# Patient Record
Sex: MALE | Race: WHITE | Employment: FULL TIME | URBAN - METROPOLITAN AREA
[De-identification: names, ages, dates, MRNs, and addresses within clinical notes are randomized per-mention and may not be internally consistent; named-entity substitution may affect disease eponyms.]

---

## 2020-10-16 ENCOUNTER — HOSPITAL ENCOUNTER (INPATIENT)
Age: 45
LOS: 18 days | Discharge: NURSING, REHAB OR LONG-TERM CARE | DRG: 969 | End: 2020-11-03
Attending: EMERGENCY MEDICINE | Admitting: INTERNAL MEDICINE
Payer: COMMERCIAL

## 2020-10-16 ENCOUNTER — APPOINTMENT (OUTPATIENT)
Dept: CT IMAGING | Age: 45
DRG: 969 | End: 2020-10-16
Payer: COMMERCIAL

## 2020-10-16 PROBLEM — L02.211 ABDOMINAL WALL ABSCESS: Status: ACTIVE | Noted: 2020-10-16

## 2020-10-16 PROBLEM — K57.92 DIVERTICULITIS: Status: ACTIVE | Noted: 2020-10-16

## 2020-10-16 LAB
ALBUMIN SERPL-MCNC: 2.8 GM/DL (ref 3.4–5)
ALCOHOL SCREEN SERUM: <0.01 %WT/VOL
ALP BLD-CCNC: 127 IU/L (ref 40–129)
ALT SERPL-CCNC: 6 U/L (ref 10–40)
ANION GAP SERPL CALCULATED.3IONS-SCNC: 13 MMOL/L (ref 4–16)
ANISOCYTOSIS: ABNORMAL
AST SERPL-CCNC: 10 IU/L (ref 15–37)
BACTERIA: NEGATIVE /HPF
BANDED NEUTROPHILS ABSOLUTE COUNT: 0.66 K/CU MM
BANDED NEUTROPHILS RELATIVE PERCENT: 2 % (ref 5–11)
BASOPHILS ABSOLUTE: 0.3 K/CU MM
BASOPHILS RELATIVE PERCENT: 1 % (ref 0–1)
BILIRUB SERPL-MCNC: 0.5 MG/DL (ref 0–1)
BILIRUBIN URINE: ABNORMAL MG/DL
BLOOD, URINE: NEGATIVE
BUN BLDV-MCNC: 8 MG/DL (ref 6–23)
CALCIUM SERPL-MCNC: 8.8 MG/DL (ref 8.3–10.6)
CAST TYPE: ABNORMAL /HPF
CHLORIDE BLD-SCNC: 98 MMOL/L (ref 99–110)
CLARITY: ABNORMAL
CO2: 25 MMOL/L (ref 21–32)
COLOR: ABNORMAL
COMMENT UA: ABNORMAL
CREAT SERPL-MCNC: 0.9 MG/DL (ref 0.9–1.3)
CRYSTAL TYPE: ABNORMAL /HPF
DIFFERENTIAL TYPE: ABNORMAL
EPITHELIAL CELLS, UA: ABNORMAL /HPF
GFR AFRICAN AMERICAN: >60 ML/MIN/1.73M2
GFR NON-AFRICAN AMERICAN: >60 ML/MIN/1.73M2
GLUCOSE BLD-MCNC: 117 MG/DL (ref 70–99)
GLUCOSE, URINE: NEGATIVE MG/DL
HCT VFR BLD CALC: 39.3 % (ref 42–52)
HEMOGLOBIN: 12.8 GM/DL (ref 13.5–18)
HYPERSEGMENTED NEUTROPHILS: ABNORMAL
HYPOCHROMIA: ABNORMAL
ICTOTEST: POSITIVE
KETONES, URINE: 80 MG/DL
LACTATE: 1.5 MMOL/L (ref 0.4–2)
LEUKOCYTE ESTERASE, URINE: NEGATIVE
LIPASE: 24 IU/L (ref 13–60)
LYMPHOCYTES ABSOLUTE: 6.2 K/CU MM
LYMPHOCYTES RELATIVE PERCENT: 19 % (ref 24–44)
MCH RBC QN AUTO: 30.3 PG (ref 27–31)
MCHC RBC AUTO-ENTMCNC: 32.6 % (ref 32–36)
MCV RBC AUTO: 92.9 FL (ref 78–100)
MONOCYTES ABSOLUTE: 2 K/CU MM
MONOCYTES RELATIVE PERCENT: 6 % (ref 0–4)
NITRITE URINE, QUANTITATIVE: NEGATIVE
PDW BLD-RTO: 13.1 % (ref 11.7–14.9)
PH, URINE: 5 (ref 5–8)
PLATELET # BLD: 563 K/CU MM (ref 140–440)
PLT MORPHOLOGY: ABNORMAL
PMV BLD AUTO: 9.1 FL (ref 7.5–11.1)
POTASSIUM SERPL-SCNC: 3.8 MMOL/L (ref 3.5–5.1)
PROTEIN UA: 2000 MG/DL
RBC # BLD: 4.23 M/CU MM (ref 4.6–6.2)
RBC URINE: ABNORMAL /HPF (ref 0–3)
SEGMENTED NEUTROPHILS ABSOLUTE COUNT: 23.6 K/CU MM
SEGMENTED NEUTROPHILS RELATIVE PERCENT: 72 % (ref 36–66)
SODIUM BLD-SCNC: 136 MMOL/L (ref 135–145)
SPECIFIC GRAVITY UA: 1.02 (ref 1–1.03)
STOMATOCYTES: ABNORMAL
TOTAL PROTEIN: 7.7 GM/DL (ref 6.4–8.2)
UROBILINOGEN, URINE: 0.2 MG/DL (ref 0.2–1)
WBC # BLD: 32.8 K/CU MM (ref 4–10.5)
WBC UA: ABNORMAL /HPF (ref 0–2)

## 2020-10-16 PROCEDURE — 93010 ELECTROCARDIOGRAM REPORT: CPT | Performed by: INTERNAL MEDICINE

## 2020-10-16 PROCEDURE — 85027 COMPLETE CBC AUTOMATED: CPT

## 2020-10-16 PROCEDURE — 6360000002 HC RX W HCPCS: Performed by: EMERGENCY MEDICINE

## 2020-10-16 PROCEDURE — 74177 CT ABD & PELVIS W/CONTRAST: CPT

## 2020-10-16 PROCEDURE — 6360000002 HC RX W HCPCS: Performed by: INTERNAL MEDICINE

## 2020-10-16 PROCEDURE — 96375 TX/PRO/DX INJ NEW DRUG ADDON: CPT

## 2020-10-16 PROCEDURE — 1200000000 HC SEMI PRIVATE

## 2020-10-16 PROCEDURE — 83605 ASSAY OF LACTIC ACID: CPT

## 2020-10-16 PROCEDURE — C9113 INJ PANTOPRAZOLE SODIUM, VIA: HCPCS | Performed by: EMERGENCY MEDICINE

## 2020-10-16 PROCEDURE — 2580000003 HC RX 258: Performed by: EMERGENCY MEDICINE

## 2020-10-16 PROCEDURE — 83690 ASSAY OF LIPASE: CPT

## 2020-10-16 PROCEDURE — 93005 ELECTROCARDIOGRAM TRACING: CPT | Performed by: EMERGENCY MEDICINE

## 2020-10-16 PROCEDURE — 2580000003 HC RX 258: Performed by: INTERNAL MEDICINE

## 2020-10-16 PROCEDURE — 94761 N-INVAS EAR/PLS OXIMETRY MLT: CPT

## 2020-10-16 PROCEDURE — 96361 HYDRATE IV INFUSION ADD-ON: CPT

## 2020-10-16 PROCEDURE — 87040 BLOOD CULTURE FOR BACTERIA: CPT

## 2020-10-16 PROCEDURE — 96367 TX/PROPH/DG ADDL SEQ IV INF: CPT

## 2020-10-16 PROCEDURE — 81001 URINALYSIS AUTO W/SCOPE: CPT

## 2020-10-16 PROCEDURE — 96376 TX/PRO/DX INJ SAME DRUG ADON: CPT

## 2020-10-16 PROCEDURE — 80053 COMPREHEN METABOLIC PANEL: CPT

## 2020-10-16 PROCEDURE — 2500000003 HC RX 250 WO HCPCS: Performed by: EMERGENCY MEDICINE

## 2020-10-16 PROCEDURE — 2500000003 HC RX 250 WO HCPCS: Performed by: INTERNAL MEDICINE

## 2020-10-16 PROCEDURE — G0480 DRUG TEST DEF 1-7 CLASSES: HCPCS

## 2020-10-16 PROCEDURE — 85007 BL SMEAR W/DIFF WBC COUNT: CPT

## 2020-10-16 PROCEDURE — 99285 EMERGENCY DEPT VISIT HI MDM: CPT

## 2020-10-16 PROCEDURE — 96365 THER/PROPH/DIAG IV INF INIT: CPT

## 2020-10-16 PROCEDURE — 6360000004 HC RX CONTRAST MEDICATION: Performed by: EMERGENCY MEDICINE

## 2020-10-16 RX ORDER — MORPHINE SULFATE 4 MG/ML
4 INJECTION, SOLUTION INTRAMUSCULAR; INTRAVENOUS ONCE
Status: COMPLETED | OUTPATIENT
Start: 2020-10-16 | End: 2020-10-16

## 2020-10-16 RX ORDER — KETOROLAC TROMETHAMINE 30 MG/ML
30 INJECTION, SOLUTION INTRAMUSCULAR; INTRAVENOUS ONCE
Status: COMPLETED | OUTPATIENT
Start: 2020-10-16 | End: 2020-10-16

## 2020-10-16 RX ORDER — SODIUM CHLORIDE 0.9 % (FLUSH) 0.9 %
10 SYRINGE (ML) INJECTION
Status: COMPLETED | OUTPATIENT
Start: 2020-10-16 | End: 2020-10-16

## 2020-10-16 RX ORDER — CIPROFLOXACIN 2 MG/ML
400 INJECTION, SOLUTION INTRAVENOUS ONCE
Status: COMPLETED | OUTPATIENT
Start: 2020-10-16 | End: 2020-10-16

## 2020-10-16 RX ORDER — 0.9 % SODIUM CHLORIDE 0.9 %
1000 INTRAVENOUS SOLUTION INTRAVENOUS ONCE
Status: COMPLETED | OUTPATIENT
Start: 2020-10-16 | End: 2020-10-16

## 2020-10-16 RX ORDER — ACETAMINOPHEN 650 MG/1
650 SUPPOSITORY RECTAL EVERY 6 HOURS PRN
Status: DISCONTINUED | OUTPATIENT
Start: 2020-10-16 | End: 2020-10-28

## 2020-10-16 RX ORDER — SODIUM CHLORIDE 0.9 % (FLUSH) 0.9 %
10 SYRINGE (ML) INJECTION EVERY 12 HOURS SCHEDULED
Status: DISCONTINUED | OUTPATIENT
Start: 2020-10-16 | End: 2020-10-28

## 2020-10-16 RX ORDER — ACETAMINOPHEN 325 MG/1
650 TABLET ORAL EVERY 6 HOURS PRN
Status: DISCONTINUED | OUTPATIENT
Start: 2020-10-16 | End: 2020-11-03 | Stop reason: HOSPADM

## 2020-10-16 RX ORDER — SODIUM CHLORIDE 9 MG/ML
INJECTION, SOLUTION INTRAVENOUS CONTINUOUS
Status: ACTIVE | OUTPATIENT
Start: 2020-10-16 | End: 2020-10-17

## 2020-10-16 RX ORDER — PANTOPRAZOLE SODIUM 40 MG/10ML
40 INJECTION, POWDER, LYOPHILIZED, FOR SOLUTION INTRAVENOUS ONCE
Status: COMPLETED | OUTPATIENT
Start: 2020-10-16 | End: 2020-10-16

## 2020-10-16 RX ORDER — CIPROFLOXACIN 2 MG/ML
400 INJECTION, SOLUTION INTRAVENOUS EVERY 12 HOURS
Status: DISCONTINUED | OUTPATIENT
Start: 2020-10-17 | End: 2020-10-17

## 2020-10-16 RX ORDER — SODIUM CHLORIDE 0.9 % (FLUSH) 0.9 %
10 SYRINGE (ML) INJECTION PRN
Status: DISCONTINUED | OUTPATIENT
Start: 2020-10-16 | End: 2020-10-28

## 2020-10-16 RX ORDER — ONDANSETRON 2 MG/ML
4 INJECTION INTRAMUSCULAR; INTRAVENOUS EVERY 6 HOURS PRN
Status: DISCONTINUED | OUTPATIENT
Start: 2020-10-16 | End: 2020-11-03 | Stop reason: HOSPADM

## 2020-10-16 RX ADMIN — IOPAMIDOL 75 ML: 755 INJECTION, SOLUTION INTRAVENOUS at 12:24

## 2020-10-16 RX ADMIN — CIPROFLOXACIN 400 MG: 2 INJECTION, SOLUTION INTRAVENOUS at 13:18

## 2020-10-16 RX ADMIN — MORPHINE SULFATE 4 MG: 4 INJECTION, SOLUTION INTRAMUSCULAR; INTRAVENOUS at 16:08

## 2020-10-16 RX ADMIN — HYDROMORPHONE HYDROCHLORIDE 1 MG: 1 INJECTION, SOLUTION INTRAMUSCULAR; INTRAVENOUS; SUBCUTANEOUS at 22:33

## 2020-10-16 RX ADMIN — MORPHINE SULFATE 4 MG: 4 INJECTION, SOLUTION INTRAMUSCULAR; INTRAVENOUS at 13:18

## 2020-10-16 RX ADMIN — SODIUM CHLORIDE, PRESERVATIVE FREE 10 ML: 5 INJECTION INTRAVENOUS at 12:24

## 2020-10-16 RX ADMIN — METRONIDAZOLE 500 MG: 500 INJECTION, SOLUTION INTRAVENOUS at 14:32

## 2020-10-16 RX ADMIN — SODIUM CHLORIDE 1000 ML: 9 INJECTION, SOLUTION INTRAVENOUS at 11:20

## 2020-10-16 RX ADMIN — ONDANSETRON 4 MG: 2 INJECTION INTRAMUSCULAR; INTRAVENOUS at 11:22

## 2020-10-16 RX ADMIN — METRONIDAZOLE 500 MG: 500 INJECTION, SOLUTION INTRAVENOUS at 22:33

## 2020-10-16 RX ADMIN — HYDROMORPHONE HYDROCHLORIDE 1 MG: 1 INJECTION, SOLUTION INTRAMUSCULAR; INTRAVENOUS; SUBCUTANEOUS at 18:50

## 2020-10-16 RX ADMIN — ENOXAPARIN SODIUM 40 MG: 40 INJECTION SUBCUTANEOUS at 18:49

## 2020-10-16 RX ADMIN — PANTOPRAZOLE SODIUM 40 MG: 40 INJECTION, POWDER, FOR SOLUTION INTRAVENOUS at 11:21

## 2020-10-16 RX ADMIN — KETOROLAC TROMETHAMINE 30 MG: 30 INJECTION, SOLUTION INTRAMUSCULAR; INTRAVENOUS at 11:21

## 2020-10-16 RX ADMIN — SODIUM CHLORIDE: 9 INJECTION, SOLUTION INTRAVENOUS at 18:47

## 2020-10-16 RX ADMIN — MORPHINE SULFATE 4 MG: 4 INJECTION, SOLUTION INTRAMUSCULAR; INTRAVENOUS at 11:21

## 2020-10-16 ASSESSMENT — PAIN SCALES - GENERAL
PAINLEVEL_OUTOF10: 10
PAINLEVEL_OUTOF10: 9
PAINLEVEL_OUTOF10: 10
PAINLEVEL_OUTOF10: 10
PAINLEVEL_OUTOF10: 8
PAINLEVEL_OUTOF10: 7
PAINLEVEL_OUTOF10: 9
PAINLEVEL_OUTOF10: 10

## 2020-10-16 ASSESSMENT — PAIN DESCRIPTION - LOCATION
LOCATION: ABDOMEN

## 2020-10-16 ASSESSMENT — PAIN DESCRIPTION - FREQUENCY
FREQUENCY: CONTINUOUS
FREQUENCY: CONTINUOUS

## 2020-10-16 ASSESSMENT — PAIN DESCRIPTION - ORIENTATION
ORIENTATION: RIGHT;LEFT
ORIENTATION: RIGHT;LEFT

## 2020-10-16 ASSESSMENT — PAIN DESCRIPTION - DESCRIPTORS
DESCRIPTORS: ACHING;DISCOMFORT
DESCRIPTORS: ACHING

## 2020-10-16 ASSESSMENT — PAIN DESCRIPTION - PROGRESSION: CLINICAL_PROGRESSION: NOT CHANGED

## 2020-10-16 ASSESSMENT — PAIN DESCRIPTION - ONSET: ONSET: ON-GOING

## 2020-10-16 ASSESSMENT — PAIN DESCRIPTION - PAIN TYPE
TYPE: ACUTE PAIN

## 2020-10-16 ASSESSMENT — PAIN - FUNCTIONAL ASSESSMENT: PAIN_FUNCTIONAL_ASSESSMENT: ACTIVITIES ARE NOT PREVENTED

## 2020-10-16 NOTE — PROGRESS NOTES
Was called about this patient by Dr. Rachel Zhu. Pt with previous colon surgery. Asked ED at that time who performed that surgery and was uncertain. When I went to see pt this evening, he stated, \"Dr. Anselmo Moore is my surgeon. \" D/w pt that the other surgery group would see him. All of his questions were answered to his satisfaction. Will update Hospitalist and other surgery group.     [address] [place] [address]

## 2020-10-16 NOTE — H&P
History and Physical      Name:  Ar Cho /Age/Sex: 1975  (39 y.o. male)   MRN & CSN:  [id number] & [id number] Admission Date/Time: 10/16/2020 11:04 AM   Location:  [address] [place] [address]- PCP: Matilde Espinoza, RN, BSN, MSN, [facility]       Hospital Day: 1    Assessment and Plan:   Ar Cho is a 39 y.o.  male  who presents with abdominal pain. · Sepsis due to possible diverticulitis with abscess  -SIRS criteria, tachycardia, leukocytosis plus source  -CT abdomen pelvis with colitis, abscess, pancreatic ductal dilatation    Plan  -Admit to [facility] [place] [place]  -Blood cultures collected pending  -Start on IV hydration, antiemetics, Dilaudid for pain  -Cont on ciprofloxacin and metronidazole, has allergy to penicillins(anaphylactic)  -Consult general surgery    · Proteinuria with hypoalbuminemia, may be related to HIV/medication related.   -Seen on urinalysis, order protein creatinine ratio    · Chronic medical conditions  -History of hypertension, has not been compliant for over a year, resume metoprolol, adjust as needed  -History of diverticulitis status post bowel resection in   -History of HIV, last 17 years ago, on antiretrovirals  -History of alcohol use disorder, last use about 5 years ago  Diet DIET GENERAL;   DVT Prophylaxis [x] Lovenox, []  Heparin, [] SCDs, [] No VTE prophylaxis, patient ambulating   GI Prophylaxis [] PPI, [] H2 Blocker, [] No GI prophylaxis, patient is receiving diet/Tube Feeds   Code Status Full Code   Disposition Patient requires continued admission due to sepsis   MDM [] Low, [x] Moderate,[]  High  Patient's risk as above due to      History of Present Illness:     Chief Complaint: Left lower quadrant abdominal pain    Ar Cho is a 39 y.o.  male  who presents with left lower quadrant abdominal pain, which patient notes started about 2 weeks ago, but has gotten worse in the past 2 to 3 days, pain is described as 10/10 in severity, sharp intermittent, no known relieving or exacerbating factors. Pain was associated with nausea, denies any vomiting, denies any diarrhea or constipation, denies any fever or chills. He admits to loss of appetite, due to abdominal pain. he has no complaints of chest pain, shortness of breath, urinary symptoms. 10-14 point ROS reviewed negative, unless as noted above    Objective:   No intake or output data in the 24 hours ending 10/16/20 1828   Vitals:   Vitals:    10/16/20 1800   BP: (!) 169/96   Pulse: 132   Resp: 16   Temp: 99.8 °F (37.7 °C)   SpO2: 96%     Physical Exam:   GEN Awake male, sitting upright in bed in mild  distress. Appears given age. EYES Pupils are equally round. No scleral erythema, discharge, or conjunctivitis. HENT Mucous membranes are moist.   NECK No apparent thyromegaly or masses. RESP Clear to auscultation, no wheezes, rales or rhonchi. Symmetric chest movement while on room air. CARDIO/VASC S1/S2 auscultated. Regular rate without appreciable murmurs, rubs, or gallops. Peripheral pulses equal bilaterally and palpable. No peripheral edema. GI tenderness in the left lower quadrant and suprapubic area, no rebound tenderness or rigidity. bowel sounds are normoactive. Rectal exam deferred.  Lackey catheter is not present. HEME/LYMPH No petechiae or ecchymoses. MSK No gross joint deformities. Spontaneous movement of all extremities  SKIN Normal coloration, warm, dry. NEURO Cranial nerves appear grossly intact, normal speech, no lateralizing weakness. PSYCH Awake, alert, oriented x 4. Affect appropriate. Past Medical History:      Past Medical History:   Diagnosis Date    Alcoholic ([facility] [address] [facility]     Diverticulitis     HIV positive ([facility] [address] [facility]     Hypertension     Pancreatitis      PSHX:  has a past surgical history that includes hernia repair and Small intestine surgery. Allergies:    Allergies   Allergen Reactions    Pcn [Penicillins]        FAM HX: Patient notes mother  at the age of 50, of pancreatitis, alcohol use, father is healthy, recovering alcoholic, no family history of malignancy  Soc HX: History of alcohol use disorder, stopped about 5 years ago, smokes a pack and a half of cigarettes a day since the age of 16 years, occasional marijuana denies any other recreational drug use  Social History     Socioeconomic History    Marital status: Single     Spouse name: None    Number of children: 0    Years of education: None    Highest education level: None   Occupational History    None   Social Needs    Financial resource strain: None    Food insecurity     Worry: None     Inability: None    Transportation needs     Medical: None     Non-medical: None   Tobacco Use    Smoking status: Current Every Day Smoker     Packs/day: 2.00     Years: 18.00     Pack years: 36.00     Types: Cigarettes    Smokeless tobacco: Never Used   Substance and Sexual Activity    Alcohol use: Not Currently    Drug use: Yes     Types: Marijuana    Sexual activity: None   Lifestyle    Physical activity     Days per week: None     Minutes per session: None    Stress: None   Relationships    Social connections     Talks on phone: None     Gets together: None     Attends Zoroastrianism service: None     Active member of club or organization: None     Attends meetings of clubs or organizations: None     Relationship status: None    Intimate partner violence     Fear of current or ex partner: None     Emotionally abused: None     Physically abused: None     Forced sexual activity: None   Other Topics Concern    None   Social History Narrative    None       Medications:   Medications:    sodium chloride flush  10 mL Intravenous 2 times per day    enoxaparin  40 mg Subcutaneous Daily    [START ON 10/17/2020] ciprofloxacin  400 mg Intravenous Q12H    metroNIDAZOLE  500 mg Intravenous Q8H      Infusions:    sodium chloride       PRN Meds: ondansetron, 4 mg, Q6H PRN  sodium chloride flush, 10 mL, PRN  acetaminophen, 650 mg, Q6H PRN    Or  acetaminophen, 650 mg, Q6H PRN  HYDROmorphone, 1 mg, Q4H PRN      EKG reviewed, sinus tachycardia otherwise normal    Images  Ct Abdomen Pelvis W Iv Contrast Additional Contrast? None    Result Date: 10/16/2020  EXAMINATION: CT OF THE ABDOMEN AND PELVIS WITH CONTRAST 10/16/2020 11:54 am TECHNIQUE: CT of the abdomen and pelvis was performed with the administration of intravenous contrast. Multiplanar reformatted images are provided for review. Dose modulation, iterative reconstruction, and/or weight based adjustment of the mA/kV was utilized to reduce the radiation dose to as low as reasonably achievable. COMPARISON: 01/08/2016 HISTORY: ORDERING SYSTEM PROVIDED HISTORY: left sided abd pain TECHNOLOGIST PROVIDED HISTORY: Reason for exam:->left sided abd pain Additional Contrast?->None Reason for Exam: left sided abd pain Acuity: Acute Type of Exam: Initial Additional signs and symptoms: LLQ pain, nausea, vomiting, constipation, diarrhea x 2 weeks Relevant Medical/Surgical History: Hx Diverticulitis, Pancreatitis, Hernia repair, Small bowel surg / 75cc [id number] FINDINGS: Lower Chest: The lung bases are clear. Organs: The spleen, adrenal glands and kidneys are unremarkable. There are right simple renal cysts. There is improved with residual mild-to-moderate intrahepatic ductal dilatation without evidence of a radiopaque obstructing nephrolithiasis. Coarse calcifications are present within the pancreatic head, likely sequela pancreatitis. There is worsening moderate pancreatic ductal dilatation, likely from stenosis from chronic pancreatitis. GI/Bowel: There is no bowel obstruction. Status post ileocecectomy. However, there is a large complex air-fluid level in close association to the descending colon measuring 6.3 x 6.6 cm in maximal dimension consistent with an abscess. Scattered diverticular present along the descending colon.  There is moderate to severe continuous circumferential wall thickening of the descending colon with moderate pericolonic inflammation likely due to colitis. Pelvis: There is mild to moderate perivesicular inflammation due to secondary cystitis. Peritoneum/Retroperitoneum: There is no free fluid. Reactive mesenteric lymph nodes are present in the left lower quadrant. An index lymph node measures 1.1 x 1.8 cm. There is no pneumoperitoneum. There are unchanged small fat containing supraumbilical hernias, likely incisional.  Mild atherosclerosis involves the abdominal aorta. Bones/Soft Tissues: Degenerative changes involve the thoracolumbar spine. 1. Multi-septated complex fluid collection within the left mid abdomen along the course of the descending colon consistent with an abscess. This is likely caused from colitis causing secondary cystitis, but cannot exclude underlying malignancy; correlate with colonoscopy once the acute symptoms have resolved. 2. Improved with residual mild-to-moderate intrahepatic, but worsening moderate pancreatic ductal dilatation likely from stenosis of chronic pancreatitis.          Electronically signed by Cari Melvin MD on 10/16/2020 at 6:28 PM

## 2020-10-16 NOTE — ED NOTES
Pt resting lights dimmed. Gave lemon swabs to ease mouth dryness.  Call light in reach     Tala Vázquez RN  10/16/20 [id number]

## 2020-10-16 NOTE — ED NOTES
Report given to medics by Dr Dank Peterson, RN  10/16/20 195 [facility], RN  10/16/20 6712

## 2020-10-16 NOTE — ED NOTES
Contacted the access center to begin the transfer process.  I have asked to speak to general surgery and then the hospitalist.      Adela Manzano RN  10/16/20 [id number]

## 2020-10-16 NOTE — ED NOTES
The patient presents to the er today with complaints of abdominal pain for the past 2 weeks. He reports that he has history of Diverticulitis and Pancreatis. He reports some intermittent vomiting and diarrhea in the past 2 weeks but none today. He is alert and oriented and rated his pain initially at 10/10. He has someone to drive him home.                        David Diaz RN  10/16/20 1847

## 2020-10-16 NOTE — ED NOTES
The patient was updated on the room assignment. He ambulated to the bathroom and  Back.                 Marikay Meigs, RN  10/16/20 8401

## 2020-10-16 NOTE — ED NOTES
Report was called to Angie Jones at [facility] and report was given to the ambulance crew. The patient was medicated with 4  Mg of Morphine just prior to departure from this ED. He is alert and oriented on departure and has stable vital signs.                       Brennon Lemus RN  10/16/20 9638

## 2020-10-16 NOTE — ED NOTES
Lab called with a critical protein level greater than 2000. Dr. Rachel Zhu was notified.                          Estevan Bertrand RN  10/16/20 1404

## 2020-10-16 NOTE — ED PROVIDER NOTES
Triage Chief Complaint:   Abdominal Pain (reports abd pain for 2 weeks)      Elem:  Marylin Nj is a 39 y.o. male that presents to the emergency department with left lower abdominal pain for the last 2 weeks. States he is having difficulty urinating. States has had diverticulitis before in the past and it feels similar. He is noted to have a history of alcoholism, HIV positive on antiviral therapy, pancreatitis in the past. Patient states he no longer drinks heavily as he once did, sober over 3 years. Denies any chest pain or pressure. Denies any shortness of breath or coughing. denies any fevers or chills. States pain is a 10 out of 10 aching, constant. Nothing makes it better or worse. States he has not eaten much so there is nothing to vomit. Denies any diarrhea or constipation. .    Past Medical History:   Diagnosis Date    Alcoholic ([facility]     Diverticulitis     HIV positive ([facility] 2003    Hypertension     Pancreatitis      Past Surgical History:   Procedure Laterality Date   Berta Arredondo     History reviewed. No pertinent family history.   Social History     Socioeconomic History    Marital status: Single     Spouse name: Not on file    Number of children: 0    Years of education: Not on file    Highest education level: Not on file   Occupational History    Not on file   Social Needs    Financial resource strain: Not on file    Food insecurity     Worry: Not on file     Inability: Not on file    Transportation needs     Medical: Not on file     Non-medical: Not on file   Tobacco Use    Smoking status: Current Every Day Smoker     Packs/day: 2.00     Years: 18.00     Pack years: 36.00     Types: Cigarettes    Smokeless tobacco: Never Used   Substance and Sexual Activity    Alcohol use: Not Currently    Drug use: Yes     Types: Marijuana    Sexual activity: Not on file   Lifestyle    Physical activity     Days per week: Not on file Minutes per session: Not on file    Stress: Not on file   Relationships    Social connections     Talks on phone: Not on file     Gets together: Not on file     Attends [employer] service: Not on file     Active member of club or organization: Not on file     Attends meetings of clubs or organizations: Not on file     Relationship status: Not on file    Intimate partner violence     Fear of current or ex partner: Not on file     Emotionally abused: Not on file     Physically abused: Not on file     Forced sexual activity: Not on file   Other Topics Concern    Not on file   Social History Narrative    Not on file     Current Facility-Administered Medications   Medication Dose Route Frequency Provider Last Rate Last Dose    ondansetron (ZOFRAN) injection 4 mg  4 mg Intravenous Q6H PRN Jose Llamas MD   4 mg at 10/16/20 1122    ciprofloxacin (CIPRO) IVPB 400 mg  400 mg Intravenous Once Jose Llamas  mL/hr at 10/16/20 1318 400 mg at 10/16/20 1318    metronidazole (FLAGYL) 500 mg in NaCl 100 mL IVPB premix  500 mg Intravenous Once Jose Llamas MD         Current Outpatient Medications   Medication Sig Dispense Refill    Abacavir-Dolutegravir-Lamivud 600- MG TABS Take by mouth      efavirenz (SUSTIVA) 600 MG tablet Take 600 mg by mouth nightly. Allergies   Allergen Reactions    Pcn [Penicillins]      Nursing Notes Reviewed    ROS:  At least 10 systems reviewed and otherwise negative except as in the [address] Physical Exam:  ED Triage Vitals [10/16/20 1107]   Enc Vitals Group      BP (!) 152/104      Pulse 120      Resp 18      Temp 97.8 °F (36.6 °C)      Temp Source Infrared      SpO2 99 %      Weight 175 lb (79.4 kg)      Height 6' 11\" (2.108 m)      Head Circumference       Peak Flow       Pain Score       Pain Loc       Pain Edu? Excl. in [address]? My pulse oximetry interpretation is which is within the normal range    GENERAL APPEARANCE: Awake and alert. Cooperative. Anxious and uncomfortable appearing. HEAD: Normocephalic. Atraumatic. EYES: EOM's grossly intact. Sclera anicteric. ENT: Mucous membranes are moist. Tolerates saliva. No trismus. NECK: Supple. No meningismus. Trachea midline. HEART: Sinus tachycardia to the 120s. . Radial pulses 2+. LUNGS: Respirations unlabored. CTAB  ABDOMEN: Soft. Left lower quadrant tenderness. . No guarding or rebound. EXTREMITIES: No acute deformities. No edema. SKIN: Warm and dry. NEUROLOGICAL: No gross facial drooping. Moves all 4 extremities spontaneously. PSYCHIATRIC: Normal mood.     I have reviewed and interpreted all of the currently available lab results from this visit (if applicable):  Results for orders placed or performed during the hospital encounter of 10/16/20   CBC with Auto Diff   Result Value Ref Range    WBC 32.8 (HH) 4.0 - 10.5 K/CU MM    RBC 4.23 (L) 4.6 - 6.2 M/CU MM    Hemoglobin 12.8 (L) 13.5 - 18.0 GM/DL    Hematocrit 39.3 (L) 42 - 52 %    MCV 92.9 78 - 100 FL    MCH 30.3 27 - 31 PG    MCHC 32.6 32.0 - 36.0 %    RDW 13.1 11.7 - 14.9 %    Platelets 959 (H) 961 - 440 K/CU MM    MPV 9.1 7.5 - 11.1 FL    Bands Relative 2 (L) 5 - 11 %    Segs Relative 72.0 (H) 36 - 66 %    Basophils % 1.0 0 - 1 %    Lymphocytes % 19.0 (L) 24 - 44 %    Monocytes % 6.0 (H) 0 - 4 %    Bands Absolute 0.66 K/CU MM    Segs Absolute 23.6 K/CU MM    Basophils Absolute 0.3 K/CU MM    Lymphocytes Absolute 6.2 K/CU MM    Monocytes Absolute 2.0 K/CU MM    Differential Type MANUAL DIFFERENTIAL     Anisocytosis 1+     Hypochromia 1+     Stomatocytes 1+     Hypersegmented Neutrophils 1+     PLT Morphology LARGE    CMP   Result Value Ref Range    Sodium 136 135 - 145 MMOL/L    Potassium 3.8 3.5 - 5.1 MMOL/L    Chloride 98 (L) 99 - 110 mMol/L    CO2 25 21 - 32 MMOL/L    BUN 8 6 - 23 MG/DL    CREATININE 0.9 0.9 - 1.3 MG/DL    Glucose 117 (H) 70 - 99 MG/DL    Calcium 8.8 8.3 - 10.6 MG/DL    Alb 2.8 (L) 3.4 - 5.0 GM/DL    Total Protein 7.7 6.4 - 8.2 GM/DL    Total Bilirubin 0.5 0.0 - 1.0 MG/DL    ALT 6 (L) 10 - 40 U/L    AST 10 (L) 15 - 37 IU/L    Alkaline Phosphatase 127 40 - 129 IU/L    GFR Non-African American >60 >60 mL/min/1.73m2    GFR African American >60 >60 mL/min/1.73m2    Anion Gap 13 4 - 16   Lipase   Result Value Ref Range    Lipase 24 13 - 60 IU/L   Urinalysis with microscopic   Result Value Ref Range    Color, UA DARK YELLOW (A) YELLOW    Clarity, UA SLIGHTLY CLOUDY (A) CLEAR    Glucose, Urine NEGATIVE NEGATIVE MG/DL    Bilirubin Urine SMALL (A) NEGATIVE MG/DL    Ketones, Urine 80 (A) NEGATIVE MG/DL    Specific Gravity, UA 1.020 1.001 - 1.035    Blood, Urine NEGATIVE NEGATIVE    pH, Urine 5.0 5.0 - 8.0    Protein, UA 2000 (HH) NEGATIVE MG/DL    Urobilinogen, Urine 0.2 0.2 - 1.0 MG/DL    Nitrite Urine, Quantitative NEGATIVE NEGATIVE    Leukocyte Esterase, Urine NEGATIVE NEGATIVE    RBC, UA NO CELLS SEEN 0 - 3 /HPF    WBC, UA RARE 0 - 2 /HPF    Epithelial Cells, UA FEW /HPF    Cast Type NO CAST FORMS SEEN NO CAST FORMS SEEN /HPF    Bacteria, UA NEGATIVE NEGATIVE /HPF    Crystal Type NO CELLS SEEN (A) NEGATIVE /HPF    Urinalysis Comments 4+ MUCAS OBSERVED    Lactic Acid, Plasma   Result Value Ref Range    Lactate 1.5 0.4 - 2.0 mMOL/L   ICTOTEST, URINE   Result Value Ref Range    Ictotest POSITIVE    Ethanol Level   Result Value Ref Range    Alcohol Scrn <0.01 <0.01 %WT/VOL   EKG 12 Lead   Result Value Ref Range    Ventricular Rate 111 BPM    Atrial Rate 111 BPM    P-R Interval 118 ms    QRS Duration 88 ms    Q-T Interval 354 ms    QTc Calculation (Bazett) 481 ms    P Axis 29 degrees    R Axis 33 degrees    T Axis 44 degrees    Diagnosis       Sinus tachycardia  Otherwise normal ECG  No previous ECGs available          Radiographs:  [] Radiologist's Wet Read Report Reviewed:      CT ABDOMEN PELVIS W IV CONTRAST Additional Contrast? None (Final result)   Result time 10/16/20 12:51:01   Final result by Kiko Valdez MD (10/16/20 12:51:01) Impression:     1. Multi-septated complex fluid collection within the left mid abdomen along   the course of the descending colon consistent with an abscess.  This is   likely caused from colitis causing secondary cystitis, but cannot exclude   underlying malignancy; correlate with colonoscopy once the acute symptoms   have resolved. 2. Improved with residual mild-to-moderate intrahepatic, but worsening   moderate pancreatic ductal dilatation likely from stenosis of chronic   pancreatitis. Narrative:     EXAMINATION:   CT OF THE ABDOMEN AND PELVIS WITH CONTRAST 10/16/2020 11:54 am     TECHNIQUE:   CT of the abdomen and pelvis was performed with the administration of   intravenous contrast. Multiplanar reformatted images are provided for review. Dose modulation, iterative reconstruction, and/or weight based adjustment of   the mA/kV was utilized to reduce the radiation dose to as low as reasonably   achievable. COMPARISON:   01/08/2016     HISTORY:   ORDERING SYSTEM PROVIDED HISTORY: left sided abd pain   TECHNOLOGIST PROVIDED HISTORY:   Reason for exam:->left sided abd pain   Additional Contrast?->None   Reason for Exam: left sided abd pain   Acuity: Acute   Type of Exam: Initial   Additional signs and symptoms: LLQ pain, nausea, vomiting, constipation,   diarrhea x 2 weeks   Relevant Medical/Surgical History: Hx Diverticulitis, Pancreatitis, Hernia   repair, Small bowel surg / 75cc [id number]     FINDINGS:   Lower Chest: The lung bases are clear. Organs: The spleen, adrenal glands and kidneys are unremarkable.  There are   right simple renal cysts. There is improved with residual mild-to-moderate intrahepatic ductal   dilatation without evidence of a radiopaque obstructing nephrolithiasis.    Coarse calcifications are present within the pancreatic head, likely sequela   pancreatitis. Moshe Emilee is worsening moderate pancreatic ductal dilatation,   likely from stenosis from chronic pancreatitis. GI/Bowel: There is no bowel obstruction.  Status post ileocecectomy. However, there is a large complex air-fluid level in close association to the   descending colon measuring 6.3 x 6.6 cm in maximal dimension consistent with   an abscess.  Scattered diverticular present along the descending colon. There is moderate to severe continuous circumferential wall thickening of the   descending colon with moderate pericolonic inflammation likely due to colitis. Pelvis: There is mild to moderate perivesicular inflammation due to secondary   cystitis. Peritoneum/Retroperitoneum: There is no free fluid.  Reactive mesenteric   lymph nodes are present in the left lower quadrant.  An index lymph node   measures 1.1 x 1.8 cm.  There is no pneumoperitoneum. There are unchanged small fat containing supraumbilical hernias, likely   incisional.  Mild atherosclerosis involves the abdominal aorta. Bones/Soft Tissues: Degenerative changes involve the thoracolumbar spine.                        [] Discussed with Radiologist:     [] The following radiograph was interpreted by myself in the absence of a radiologist:     EKG: (All EKG's are interpreted by myself in the absence of a cardiologist)  The Ekg interpreted by me shows  sinus tachycardia, fmbg=218 with a rate of 111  Axis is   Normal  QTc is  normal  Intervals and Durations are unremarkable. ST Segments: no acute change  No significant change from prior EKG dated 11-4-2012          MDM:  And is mildly hypertensive at 152/104. Afebrile. Tachycardic in the 120s. Sats normal.  Started on IV fluids, Zofran, Toradol, morphine, IV Protonix. EKG shows sinus tachycardia without any acute changes. CBC shows leukocytosis of 32.8. Hemoglobin of 12.8. CMP shows a glucose of 117. Normal sodium, normal potassium. Normal CO2. Normal anion gap. . Lipase normal at 24. . Lactic acid normal at 1.5..  Urinalysis shows dark yellow color, small bilirubin, 80 ketones, 2000 protein level and urine. Negative nitrites, negative leuks, no RBCs, rare WBCs with negative for bacteria. Patient's kidney function is normal despite very high level of protein in urine. . CT abdomen shows multi septated complex fluid collection in the left mid abdomen along the course of descending colon consistent with an abscess. Because term colitis causing secondary cystitis. Improved with residual mild to moderate intrahepatic but worsening moderate pancreatic ductal dilation stemming from stenosis of chronic pancreatitis. Did discuss results with patient. He states he would like to be transferred to [place] [address]. He states he did have a partial bowel resection over 13 years ago at the [address].  States he does not recall the surgeon's name nor does he have a general surgeon that he follows with. Per chart review he has seen Dr. Elizabeth Telles of GI before in the past.  Will call and speak with general surgeon on call at [place] [address] for consult to work on getting patient placed at [facility] for further evaluation and treatment. Patient does voiced understanding and agree to plan. Did start him on Cipro and Flagyl IV. Did speak with Dr. Stiven Aguirre who is on-call for general surgery. Did state that interventional radiology should be the ones to place a drain instead of having an open procedure and that infectious disease will likely be needed. He did state that he would consult on patient as needed if hospitalist agreed to acceptance. Did discuss with hospitalist and need for IR to place a drain as well as possible infectious disease consult as they feel necessary. Patient is currently stable, receiving IV antibiotics, pain free. Afebrile. They are comfortable accepting patient in transfer at this time. Clinical Impression:  1. Proteinuria, unspecified type    2. Diverticulitis of large intestine with abscess without bleeding    3.  Leukocytosis, unspecified type        Disposition Vitals:  [unfilled], [unfilled], [unfilled], [unfilled]    Disposition referral (if applicable):  No follow-up provider specified.     Disposition medications (if applicable):  New Prescriptions    No medications on file         (Please note that portions of this note may have been completed with a voice recognition program. Efforts were made to edit the dictations but occasionally words are mis-transcribed.)    MD Rodriguez Woodruff MD  10/16/20 5253

## 2020-10-17 ENCOUNTER — APPOINTMENT (OUTPATIENT)
Dept: CT IMAGING | Age: 45
DRG: 969 | End: 2020-10-17
Payer: COMMERCIAL

## 2020-10-17 LAB
ALBUMIN SERPL-MCNC: 2.7 GM/DL (ref 3.4–5)
ALP BLD-CCNC: 110 IU/L (ref 40–128)
ALT SERPL-CCNC: 7 U/L (ref 10–40)
ANION GAP SERPL CALCULATED.3IONS-SCNC: 11 MMOL/L (ref 4–16)
AST SERPL-CCNC: 9 IU/L (ref 15–37)
BANDED NEUTROPHILS ABSOLUTE COUNT: 2.56 K/CU MM
BANDED NEUTROPHILS RELATIVE PERCENT: 9 % (ref 5–11)
BILIRUB SERPL-MCNC: 0.5 MG/DL (ref 0–1)
BUN BLDV-MCNC: 6 MG/DL (ref 6–23)
CALCIUM SERPL-MCNC: 7.9 MG/DL (ref 8.3–10.6)
CHLORIDE BLD-SCNC: 97 MMOL/L (ref 99–110)
CO2: 22 MMOL/L (ref 21–32)
CREAT SERPL-MCNC: 0.8 MG/DL (ref 0.9–1.3)
DIFFERENTIAL TYPE: ABNORMAL
GFR AFRICAN AMERICAN: >60 ML/MIN/1.73M2
GFR NON-AFRICAN AMERICAN: >60 ML/MIN/1.73M2
GLUCOSE BLD-MCNC: 81 MG/DL (ref 70–99)
HCT VFR BLD CALC: 35.1 % (ref 42–52)
HEMOGLOBIN: 11.2 GM/DL (ref 13.5–18)
INR BLD: 1.36 INDEX
LYMPHOCYTES ABSOLUTE: 2.6 K/CU MM
LYMPHOCYTES RELATIVE PERCENT: 9 % (ref 24–44)
MCH RBC QN AUTO: 30.9 PG (ref 27–31)
MCHC RBC AUTO-ENTMCNC: 31.9 % (ref 32–36)
MCV RBC AUTO: 97 FL (ref 78–100)
MONOCYTES ABSOLUTE: 1.7 K/CU MM
MONOCYTES RELATIVE PERCENT: 6 % (ref 0–4)
PDW BLD-RTO: 13.2 % (ref 11.7–14.9)
PLATELET # BLD: 482 K/CU MM (ref 140–440)
PMV BLD AUTO: 9.2 FL (ref 7.5–11.1)
POLYCHROMASIA: ABNORMAL
POTASSIUM SERPL-SCNC: 4.1 MMOL/L (ref 3.5–5.1)
PROTHROMBIN TIME: 16.5 SECONDS (ref 11.7–14.5)
RBC # BLD: 3.62 M/CU MM (ref 4.6–6.2)
SEGMENTED NEUTROPHILS ABSOLUTE COUNT: 21.5 K/CU MM
SEGMENTED NEUTROPHILS RELATIVE PERCENT: 76 % (ref 36–66)
SODIUM BLD-SCNC: 130 MMOL/L (ref 135–145)
TOTAL PROTEIN: 6.1 GM/DL (ref 6.4–8.2)
TOXIC GRANULATION: PRESENT
WBC # BLD: 28.4 K/CU MM (ref 4–10.5)

## 2020-10-17 PROCEDURE — 80053 COMPREHEN METABOLIC PANEL: CPT

## 2020-10-17 PROCEDURE — 0W9F0ZZ DRAINAGE OF ABDOMINAL WALL, OPEN APPROACH: ICD-10-PCS | Performed by: RADIOLOGY

## 2020-10-17 PROCEDURE — 85007 BL SMEAR W/DIFF WBC COUNT: CPT

## 2020-10-17 PROCEDURE — 6370000000 HC RX 637 (ALT 250 FOR IP): Performed by: INTERNAL MEDICINE

## 2020-10-17 PROCEDURE — 94761 N-INVAS EAR/PLS OXIMETRY MLT: CPT

## 2020-10-17 PROCEDURE — 2500000003 HC RX 250 WO HCPCS: Performed by: INTERNAL MEDICINE

## 2020-10-17 PROCEDURE — C1769 GUIDE WIRE: HCPCS

## 2020-10-17 PROCEDURE — 87077 CULTURE AEROBIC IDENTIFY: CPT

## 2020-10-17 PROCEDURE — 87075 CULTR BACTERIA EXCEPT BLOOD: CPT

## 2020-10-17 PROCEDURE — 2580000003 HC RX 258: Performed by: INTERNAL MEDICINE

## 2020-10-17 PROCEDURE — 87186 SC STD MICRODIL/AGAR DIL: CPT

## 2020-10-17 PROCEDURE — 6360000002 HC RX W HCPCS: Performed by: INTERNAL MEDICINE

## 2020-10-17 PROCEDURE — 85027 COMPLETE CBC AUTOMATED: CPT

## 2020-10-17 PROCEDURE — C9113 INJ PANTOPRAZOLE SODIUM, VIA: HCPCS | Performed by: INTERNAL MEDICINE

## 2020-10-17 PROCEDURE — 6360000002 HC RX W HCPCS: Performed by: RADIOLOGY

## 2020-10-17 PROCEDURE — 87205 SMEAR GRAM STAIN: CPT

## 2020-10-17 PROCEDURE — 87076 CULTURE ANAEROBE IDENT EACH: CPT

## 2020-10-17 PROCEDURE — 1200000000 HC SEMI PRIVATE

## 2020-10-17 PROCEDURE — 87070 CULTURE OTHR SPECIMN AEROBIC: CPT

## 2020-10-17 PROCEDURE — 2580000003 HC RX 258: Performed by: PHYSICIAN ASSISTANT

## 2020-10-17 PROCEDURE — 85610 PROTHROMBIN TIME: CPT

## 2020-10-17 PROCEDURE — 36415 COLL VENOUS BLD VENIPUNCTURE: CPT

## 2020-10-17 PROCEDURE — 6360000002 HC RX W HCPCS: Performed by: SURGERY

## 2020-10-17 RX ORDER — SODIUM CHLORIDE 0.9 % (FLUSH) 0.9 %
10 SYRINGE (ML) INJECTION 2 TIMES DAILY
Status: DISCONTINUED | OUTPATIENT
Start: 2020-10-17 | End: 2020-10-28

## 2020-10-17 RX ORDER — 0.9 % SODIUM CHLORIDE 0.9 %
1000 INTRAVENOUS SOLUTION INTRAVENOUS ONCE
Status: COMPLETED | OUTPATIENT
Start: 2020-10-17 | End: 2020-10-17

## 2020-10-17 RX ORDER — MIDAZOLAM HYDROCHLORIDE 1 MG/ML
1 INJECTION INTRAMUSCULAR; INTRAVENOUS ONCE
Status: COMPLETED | OUTPATIENT
Start: 2020-10-17 | End: 2020-10-17

## 2020-10-17 RX ORDER — PANTOPRAZOLE SODIUM 40 MG/10ML
40 INJECTION, POWDER, LYOPHILIZED, FOR SOLUTION INTRAVENOUS DAILY
Status: DISCONTINUED | OUTPATIENT
Start: 2020-10-17 | End: 2020-10-23

## 2020-10-17 RX ORDER — EPINEPHRINE 1 MG/ML
0.3 INJECTION, SOLUTION, CONCENTRATE INTRAVENOUS
Status: ACTIVE | OUTPATIENT
Start: 2020-10-17 | End: 2020-10-17

## 2020-10-17 RX ORDER — DIPHENHYDRAMINE HYDROCHLORIDE 50 MG/ML
50 INJECTION INTRAMUSCULAR; INTRAVENOUS
Status: DISPENSED | OUTPATIENT
Start: 2020-10-17 | End: 2020-10-17

## 2020-10-17 RX ORDER — METHYLPREDNISOLONE SODIUM SUCCINATE 40 MG/ML
40 INJECTION, POWDER, LYOPHILIZED, FOR SOLUTION INTRAMUSCULAR; INTRAVENOUS
Status: ACTIVE | OUTPATIENT
Start: 2020-10-17 | End: 2020-10-17

## 2020-10-17 RX ORDER — FENTANYL CITRATE 50 UG/ML
100 INJECTION, SOLUTION INTRAMUSCULAR; INTRAVENOUS ONCE
Status: COMPLETED | OUTPATIENT
Start: 2020-10-17 | End: 2020-10-17

## 2020-10-17 RX ORDER — MORPHINE SULFATE 2 MG/ML
1 INJECTION, SOLUTION INTRAMUSCULAR; INTRAVENOUS
Status: DISCONTINUED | OUTPATIENT
Start: 2020-10-17 | End: 2020-10-24

## 2020-10-17 RX ORDER — HYDROMORPHONE HCL IN WATER/PF 6 MG/30 ML
PATIENT CONTROLLED ANALGESIA SYRINGE INTRAVENOUS CONTINUOUS
Status: DISCONTINUED | OUTPATIENT
Start: 2020-10-17 | End: 2020-10-21

## 2020-10-17 RX ORDER — SODIUM CHLORIDE 9 MG/ML
INJECTION, SOLUTION INTRAVENOUS CONTINUOUS
Status: DISCONTINUED | OUTPATIENT
Start: 2020-10-18 | End: 2020-10-24

## 2020-10-17 RX ORDER — KETOROLAC TROMETHAMINE 30 MG/ML
15 INJECTION, SOLUTION INTRAMUSCULAR; INTRAVENOUS EVERY 6 HOURS PRN
Status: ACTIVE | OUTPATIENT
Start: 2020-10-17 | End: 2020-10-22

## 2020-10-17 RX ORDER — EPINEPHRINE 1 MG/ML
0.3 INJECTION, SOLUTION, CONCENTRATE INTRAVENOUS
Status: DISCONTINUED | OUTPATIENT
Start: 2020-10-17 | End: 2020-10-17 | Stop reason: CLARIF

## 2020-10-17 RX ORDER — METOPROLOL TARTRATE 50 MG/1
100 TABLET, FILM COATED ORAL 2 TIMES DAILY
Status: DISCONTINUED | OUTPATIENT
Start: 2020-10-17 | End: 2020-10-25

## 2020-10-17 RX ORDER — EPINEPHRINE 1 MG/ML
0.3 INJECTION, SOLUTION, CONCENTRATE INTRAVENOUS ONCE
Status: DISCONTINUED | OUTPATIENT
Start: 2020-10-17 | End: 2020-10-17

## 2020-10-17 RX ADMIN — METRONIDAZOLE 500 MG: 500 INJECTION, SOLUTION INTRAVENOUS at 06:20

## 2020-10-17 RX ADMIN — MEROPENEM 1 G: 1 INJECTION, POWDER, FOR SOLUTION INTRAVENOUS at 18:46

## 2020-10-17 RX ADMIN — METOPROLOL TARTRATE 100 MG: 50 TABLET, FILM COATED ORAL at 20:43

## 2020-10-17 RX ADMIN — SODIUM CHLORIDE 1000 ML: 9 INJECTION, SOLUTION INTRAVENOUS at 16:13

## 2020-10-17 RX ADMIN — Medication: at 09:50

## 2020-10-17 RX ADMIN — HYDROMORPHONE HYDROCHLORIDE 1 MG: 1 INJECTION, SOLUTION INTRAMUSCULAR; INTRAVENOUS; SUBCUTANEOUS at 06:20

## 2020-10-17 RX ADMIN — HYDROMORPHONE HYDROCHLORIDE 1 MG: 1 INJECTION, SOLUTION INTRAMUSCULAR; INTRAVENOUS; SUBCUTANEOUS at 02:26

## 2020-10-17 RX ADMIN — HYDROMORPHONE HYDROCHLORIDE 1 MG: 1 INJECTION, SOLUTION INTRAMUSCULAR; INTRAVENOUS; SUBCUTANEOUS at 09:20

## 2020-10-17 RX ADMIN — SODIUM CHLORIDE, PRESERVATIVE FREE 10 ML: 5 INJECTION INTRAVENOUS at 20:44

## 2020-10-17 RX ADMIN — ENOXAPARIN SODIUM 40 MG: 40 INJECTION SUBCUTANEOUS at 18:55

## 2020-10-17 RX ADMIN — MIDAZOLAM 1 MG: 1 INJECTION INTRAMUSCULAR; INTRAVENOUS at 15:09

## 2020-10-17 RX ADMIN — CIPROFLOXACIN 400 MG: 2 INJECTION, SOLUTION INTRAVENOUS at 02:26

## 2020-10-17 RX ADMIN — SODIUM CHLORIDE: 9 INJECTION, SOLUTION INTRAVENOUS at 11:18

## 2020-10-17 RX ADMIN — SODIUM CHLORIDE, PRESERVATIVE FREE 10 ML: 5 INJECTION INTRAVENOUS at 23:43

## 2020-10-17 RX ADMIN — METOPROLOL TARTRATE 100 MG: 50 TABLET, FILM COATED ORAL at 09:30

## 2020-10-17 RX ADMIN — SODIUM CHLORIDE: 9 INJECTION, SOLUTION INTRAVENOUS at 02:27

## 2020-10-17 RX ADMIN — PANTOPRAZOLE SODIUM 40 MG: 40 INJECTION, POWDER, FOR SOLUTION INTRAVENOUS at 18:55

## 2020-10-17 RX ADMIN — VANCOMYCIN HYDROCHLORIDE 1000 MG: 1 INJECTION, POWDER, LYOPHILIZED, FOR SOLUTION INTRAVENOUS at 20:44

## 2020-10-17 RX ADMIN — ACETAMINOPHEN 650 MG: 325 TABLET ORAL at 16:02

## 2020-10-17 RX ADMIN — METRONIDAZOLE 500 MG: 500 INJECTION, SOLUTION INTRAVENOUS at 23:42

## 2020-10-17 RX ADMIN — ACETAMINOPHEN 650 MG: 325 TABLET ORAL at 23:48

## 2020-10-17 RX ADMIN — FENTANYL CITRATE 100 MCG: 50 INJECTION INTRAMUSCULAR; INTRAVENOUS at 15:09

## 2020-10-17 ASSESSMENT — PAIN DESCRIPTION - ORIENTATION
ORIENTATION: RIGHT;LEFT
ORIENTATION: RIGHT;LEFT;MID

## 2020-10-17 ASSESSMENT — PAIN DESCRIPTION - DESCRIPTORS
DESCRIPTORS: ACHING;DISCOMFORT;CONSTANT
DESCRIPTORS: ACHING;CONSTANT;DISCOMFORT

## 2020-10-17 ASSESSMENT — PAIN DESCRIPTION - LOCATION
LOCATION: ABDOMEN
LOCATION: ABDOMEN

## 2020-10-17 ASSESSMENT — PAIN DESCRIPTION - PROGRESSION
CLINICAL_PROGRESSION: NOT CHANGED
CLINICAL_PROGRESSION: NOT CHANGED

## 2020-10-17 ASSESSMENT — PAIN SCALES - GENERAL
PAINLEVEL_OUTOF10: 0
PAINLEVEL_OUTOF10: 0
PAINLEVEL_OUTOF10: 5
PAINLEVEL_OUTOF10: 5
PAINLEVEL_OUTOF10: 10
PAINLEVEL_OUTOF10: 6
PAINLEVEL_OUTOF10: 10
PAINLEVEL_OUTOF10: 7
PAINLEVEL_OUTOF10: 9

## 2020-10-17 ASSESSMENT — PAIN DESCRIPTION - PAIN TYPE
TYPE: ACUTE PAIN
TYPE: ACUTE PAIN

## 2020-10-17 ASSESSMENT — PAIN DESCRIPTION - FREQUENCY
FREQUENCY: CONTINUOUS
FREQUENCY: CONTINUOUS

## 2020-10-17 ASSESSMENT — PAIN DESCRIPTION - ONSET
ONSET: ON-GOING
ONSET: PROGRESSIVE

## 2020-10-17 ASSESSMENT — PAIN - FUNCTIONAL ASSESSMENT
PAIN_FUNCTIONAL_ASSESSMENT: ACTIVITIES ARE NOT PREVENTED
PAIN_FUNCTIONAL_ASSESSMENT: ACTIVITIES ARE NOT PREVENTED

## 2020-10-17 NOTE — PROGRESS NOTES
1415 pt transferred to the CT table for 1 drain placements. Pt signed consent with  [place]. Pt is alert and oriented. Pt stated he has diverticulitis and eats whatever he wants. Pt prepped and draped for procedure. Time out completed at 1420  Fentanyl and versed given at 1430   drain in place with large amounts of foul odor, gray/tan/red draining from LLQ. Sutured in place. 10 cc sent to the lab. Bag attached. Fentanyl given at 1442  Pt remains relaxed. 300cc removed in all. Report called to the floor. Sterile dressing attached.    Procedure ended at [facility]

## 2020-10-17 NOTE — CONSULTS
[facility]  Consultation Note    Sydney Jeter M.D.      Reason for Consult: Abdominal abscess      Patient's Name/Date of Birth: Stanislav Magallon / 1975 (2 y.o.)    Date: October 17, 2020     HPI:  61-year-old male who presents with abdominal pain worsening over 2 weeks. This patient claims that he had had surgery with a bowel resection from diverticulitis by my partner 10 years ago. The patient has had progressive discomfort and pain in the left side of the abdomen. He has had anorexia no fevers no chills. He came to the emergency room had a CT scan of the abdomen and pelvis which showed a intra-abdominal abscess along the left colon consistent with a diverticular abscess. Initially the hospital employed physician on call was consulted. According to his note the patient said that my partner Dr. Callie Burgess was his surgeon. I was never contacted yesterday and saw the patient on my list today and I am seeing the patient in consultation. The patient has had decreased bowel movements no nausea or vomiting. Past Medical History:   Diagnosis Date    [facility] ([facility]     Diverticulitis     HIV positive ([facility] 2003    Hypertension     Pancreatitis        Past Surgical History:   Procedure Laterality Date    HERNIA REPAIR      SMALL INTESTINE SURGERY      Dr Callie Burgess       Allergies   Allergen Reactions    Pcn [Penicillins]        History reviewed. No pertinent family history.     Social History     Socioeconomic History    Marital status: Single     Spouse name: Not on file    Number of children: 0    Years of education: Not on file    Highest education level: Not on file   Occupational History    Not on file   Social Needs    Financial resource strain: Not on file    Food insecurity     Worry: Not on file     Inability: Not on file    Transportation needs     Medical: Not on file     Non-medical: Not on file   Tobacco Use    Smoking status: Current Every Day Smoker Packs/day: 2.00     Years: 18.00     Pack years: 36.00     Types: Cigarettes    Smokeless tobacco: Never Used   Substance and Sexual Activity    Alcohol use: Not Currently    Drug use: Yes     Types: Marijuana    Sexual activity: Not on file   Lifestyle    Physical activity     Days per week: Not on file     Minutes per session: Not on file    Stress: Not on file   Relationships    Social connections     Talks on phone: Not on file     Gets together: Not on file     Attends Faith service: Not on file     Active member of club or organization: Not on file     Attends meetings of clubs or organizations: Not on file     Relationship status: Not on file    Intimate partner violence     Fear of current or ex partner: Not on file     Emotionally abused: Not on file     Physically abused: Not on file     Forced sexual activity: Not on file   Other Topics Concern    Not on file   Social History Narrative    Not on file       ROS: Non-contributory    Physical Exam:  Vitals:    10/17/20 0615   BP: (!) 161/88   Pulse: 123   Resp: 16   Temp: 99.3 °F (37.4 °C)   SpO2: 96%       Chest: Breath sounds were clear and equal with no rales, wheezes, or rhonchi. Respiratory effort was normal with no retractions or use of accessory muscles. Cardiovascular: Heart sounds were normal with a regular rate and rhythm. There were no murmurs or gallops. Abdomen: Midline scar from previous surgery bowel sounds were normal.  The abdomen was soft and mildly distended. There was left-sided tendernes with guarding and rebound, no rigidity. There was no masses, hepatosplenomegaly, or hernias. Genitourinary: No inguinal hernias were noted on coughing and straining.       Labs    CBC:   Lab Results   Component Value Date    WBC 32.8 10/16/2020    RBC 4.23 10/16/2020    HGB 12.8 10/16/2020    HCT 39.3 10/16/2020    MCV 92.9 10/16/2020    MCH 30.3 10/16/2020    MCHC 32.6 10/16/2020    RDW 13.1 10/16/2020     10/16/2020 MPV 9.1 10/16/2020         Assessment/Plan:  71-year-old male with 2 weeks of abdominal pain intra-abdominal abscess leukocytosis most consistent with bowel perforation from diverticulitis. I have ordered a stat IR consult for interventional radiology to drain the abscess. He will be placed on broad-spectrum antibiotic therapy kept n.p.o. and given more adequate pain medicine. I have counseled the patient regarding my findings and the plan of care.     Nelida Lopes MD   10/17/2020 at 8:27 AM

## 2020-10-17 NOTE — PROGRESS NOTES
[id number] [facility]  consulted by Dr. Kelly Alvarez for monitoring and adjustment. Indication for treatment: Diverticular abscess  Goal trough: 15 mcg/mL (AUC/BRANDEE 400-600)     Pertinent Laboratory Values:   Temp Readings from Last 3 Encounters:   10/17/20 101.9 °F (38.8 °C) (Oral)   01/08/16 98.5 °F (36.9 °C) (Oral)     Recent Labs     10/16/20  1100 10/16/20  1125 10/17/20  0942   WBC 32.8*  --  28.4*   LACTATE  --  1.5  --      Recent Labs     10/16/20  1100 10/17/20  0942   BUN 8 6   CREATININE 0.9 0.8*     Estimated Creatinine Clearance: 124 mL/min (A) (based on SCr of 0.8 mg/dL (L)). Intake/Output Summary (Last 24 hours) at 10/17/2020 1600  Last data filed at 10/17/2020 0610  Gross per 24 hour   Intake 1707.5 ml   Output --   Net 1707.5 ml       Pertinent Cultures:  Date    Source    Results  10/16   Blood    Sent           Vancomycin level:   TROUGH:  No results for input(s): VANCOTROUGH in the last 72 hours. RANDOM:  No results for input(s): VANCORANDOM in the last 72 hours. Assessment:  · WBC and temperature: elevated WBC, Tmax 101.9  · SCr, BUN, and urine output: at baseline   · Day(s) of therapy: 2   · Vancomycin level: to be collected    Plan:  · Vancomycin 1000 mg q8h   · Ordered to start @ 1000, dose has yet to be given, RN aware    · Pharmacy will continue to monitor patient and adjust therapy as indicated    Tyler 10/18 @9731    Thank you for the consult.   Merritt Mcfadden, PharmD, BCPS   10/17/2020 4:00 PM

## 2020-10-17 NOTE — PROGRESS NOTES
Hospitalist Progress Note      Name:  Ca Dexter /Age/Sex: 1975  (39 y.o. male)   MRN & CSN:  [id number] & [id number] Admission Date/Time: 10/16/2020 11:04 AM   Location:  [address] [place] [address] PCP: Rubens Galloway, RN, BSN, MSN, [facility]         Hospital Day: 2    History of Present Illness:     Chief Complaint: Ca Dexter is a 39 y.o.  male  who presents with abdominal pain     The patient seen and examined at bed side. Says has abdominal pain. Denies have any chest pain or SOB. Ten point ROS reviewed negative, unless as noted above    Objective:        Intake/Output Summary (Last 24 hours) at 10/17/2020 1219  Last data filed at 10/17/2020 0610  Gross per 24 hour   Intake 1707.5 ml   Output --   Net 1707.5 ml      Vitals:   Vitals:    10/17/20 1115   BP: 134/79   Pulse: 100   Resp: 14   Temp: 98.5 °F (36.9 °C)   SpO2:      Physical Exam:   General Appearance: alert and oriented to person, place and time, in no acute distress  Cardiovascular: normal rate, regular rhythm, normal S1 and S2  Pulmonary/Chest: clear to auscultation bilaterally  Abdomen: soft, + left lower quadrant tenderness, non-distended, normal bowel sounds, no masses   Extremities: no cyanosis, clubbing or edema, pulse   Skin: warm and dry, no rash or erythema  Head: normocephalic and atraumatic  Eyes: pupils equal, round, and reactive to light  Neck: supple and non-tender without mass, no thyromegaly   Musculoskeletal: normal range of motion, no joint swelling, deformity or tenderness  Neurological: alert, oriented, normal speech, no focal findings or movement disorder noted    Medications:   Medications:    metoprolol  100 mg Oral BID    meropenem  1 g Intravenous Q8H    vancomycin  1,000 mg Intravenous Q8H    sodium chloride flush  10 mL Intravenous 2 times per day    enoxaparin  40 mg Subcutaneous Daily    metroNIDAZOLE  500 mg Intravenous Q8H      Infusions:    HYDROmorphone HCl      sodium chloride 150 mL/hr at 10/17/20 1118     PRN Meds: morphine, 1 mg, Q2H PRN  HYDROmorphone, 1 mg, Q3H PRN  ketorolac, 15 mg, Q6H PRN  diphenhydrAMINE, 50 mg, Once PRN  methylPREDNISolone, 40 mg, Once PRN  EPINEPHrine, 0.3 mg, Once PRN  ondansetron, 4 mg, Q6H PRN  sodium chloride flush, 10 mL, PRN  acetaminophen, 650 mg, Q6H PRN    Or  acetaminophen, 650 mg, Q6H PRN            Pertinent New Labs & Imaging Studies     CBC with Differential:    Lab Results   Component Value Date    WBC 28.4 10/17/2020    RBC 3.62 10/17/2020    HGB 11.2 10/17/2020    HCT 35.1 10/17/2020     10/17/2020    MCV 97.0 10/17/2020    MCH 30.9 10/17/2020    MCHC 31.9 10/17/2020    RDW 13.2 10/17/2020    SEGSPCT 76.0 10/17/2020    BANDSPCT 9 10/17/2020    LYMPHOPCT 9.0 10/17/2020    MONOPCT 6.0 10/17/2020    BASOPCT 1.0 10/16/2020    MONOSABS 1.7 10/17/2020    LYMPHSABS 2.6 10/17/2020    EOSABS 0.2 01/08/2016    BASOSABS 0.3 10/16/2020    DIFFTYPE MANUAL DIFFERENTIAL 10/17/2020     CMP:    Lab Results   Component Value Date     10/17/2020    K 4.1 10/17/2020    CL 97 10/17/2020    CO2 22 10/17/2020    BUN 6 10/17/2020    CREATININE 0.8 10/17/2020    GFRAA >60 10/17/2020    LABGLOM >60 10/17/2020    GLUCOSE 81 10/17/2020    PROT 6.1 10/17/2020    PROT 6.1 11/04/2012    LABALBU 2.7 10/17/2020    CALCIUM 7.9 10/17/2020    BILITOT 0.5 10/17/2020    ALKPHOS 110 10/17/2020    AST 9 10/17/2020    ALT 7 10/17/2020     Ct Abdomen Pelvis W Iv Contrast Additional Contrast? None    Result Date: 10/16/2020  EXAMINATION: CT OF THE ABDOMEN AND PELVIS WITH CONTRAST 10/16/2020 11:54 am TECHNIQUE: CT of the abdomen and pelvis was performed with the administration of intravenous contrast. Multiplanar reformatted images are provided for review. Dose modulation, iterative reconstruction, and/or weight based adjustment of the mA/kV was utilized to reduce the radiation dose to as low as reasonably achievable.  COMPARISON: 01/08/2016 HISTORY: ORDERING SYSTEM PROVIDED HISTORY: left sided abd pain TECHNOLOGIST PROVIDED HISTORY: Reason for exam:->left sided abd pain Additional Contrast?->None Reason for Exam: left sided abd pain Acuity: Acute Type of Exam: Initial Additional signs and symptoms: LLQ pain, nausea, vomiting, constipation, diarrhea x 2 weeks Relevant Medical/Surgical History: Hx Diverticulitis, Pancreatitis, Hernia repair, Small bowel surg / 75cc [id number] FINDINGS: Lower Chest: The lung bases are clear. Organs: The spleen, adrenal glands and kidneys are unremarkable. There are right simple renal cysts. There is improved with residual mild-to-moderate intrahepatic ductal dilatation without evidence of a radiopaque obstructing nephrolithiasis. Coarse calcifications are present within the pancreatic head, likely sequela pancreatitis. There is worsening moderate pancreatic ductal dilatation, likely from stenosis from chronic pancreatitis. GI/Bowel: There is no bowel obstruction. Status post ileocecectomy. However, there is a large complex air-fluid level in close association to the descending colon measuring 6.3 x 6.6 cm in maximal dimension consistent with an abscess. Scattered diverticular present along the descending colon. There is moderate to severe continuous circumferential wall thickening of the descending colon with moderate pericolonic inflammation likely due to colitis. Pelvis: There is mild to moderate perivesicular inflammation due to secondary cystitis. Peritoneum/Retroperitoneum: There is no free fluid. Reactive mesenteric lymph nodes are present in the left lower quadrant. An index lymph node measures 1.1 x 1.8 cm. There is no pneumoperitoneum. There are unchanged small fat containing supraumbilical hernias, likely incisional.  Mild atherosclerosis involves the abdominal aorta. Bones/Soft Tissues: Degenerative changes involve the thoracolumbar spine.      1. Multi-septated complex fluid collection within the left mid abdomen along the course of the descending colon consistent with an abscess. This is likely caused from colitis causing secondary cystitis, but cannot exclude underlying malignancy; correlate with colonoscopy once the acute symptoms have resolved. 2. Improved with residual mild-to-moderate intrahepatic, but worsening moderate pancreatic ductal dilatation likely from stenosis of chronic pancreatitis.        Assessment and Plan:   Yuriy Akhtar is a 39 y.o.  male  who presents with abdominal pain    Sepsis  Possible diverticulitis  Elevated white count  Tachycardia  CT abdomen and pelvis with colitis, abscess, pancreatic ductal dilatation  Blood cultures pending  Added Meropenem and Vancomycin; continue Flagyl and DC Cipro  General surgery recommendations appreciated- Plan for IR guided drainage    HIV   Continue home medications    Proteinuria  Not sure if it is related to antivirals    Hypertension  Continue Metoprolol    Diet Diet NPO Effective Now Exceptions are: Ice Chips, Sips with Meds   DVT Prophylaxis [x] Lovenox, []  Heparin, [] SCDs, [] Ambulation   GI Prophylaxis [x] PPI,  [] H2 Blocker,  [] Carafate,  [] Diet/Tube Feeds   Code Status Full Code   Disposition Patient requires continued admission due to abdominal pain   MDM [] Low, [x] Moderate,[]  High     Electronically signed by Aris Lauren MD on 10/17/2020 at 12:19 PM

## 2020-10-18 LAB
ALBUMIN SERPL-MCNC: 2.3 GM/DL (ref 3.4–5)
ALP BLD-CCNC: 92 IU/L (ref 40–128)
ALT SERPL-CCNC: 6 U/L (ref 10–40)
ANION GAP SERPL CALCULATED.3IONS-SCNC: 9 MMOL/L (ref 4–16)
AST SERPL-CCNC: 8 IU/L (ref 15–37)
BANDED NEUTROPHILS ABSOLUTE COUNT: 3.3 K/CU MM
BANDED NEUTROPHILS RELATIVE PERCENT: 15 % (ref 5–11)
BILIRUB SERPL-MCNC: 0.4 MG/DL (ref 0–1)
BUN BLDV-MCNC: 8 MG/DL (ref 6–23)
CALCIUM SERPL-MCNC: 7.7 MG/DL (ref 8.3–10.6)
CHLORIDE BLD-SCNC: 100 MMOL/L (ref 99–110)
CO2: 25 MMOL/L (ref 21–32)
CREAT SERPL-MCNC: 0.9 MG/DL (ref 0.9–1.3)
DIFFERENTIAL TYPE: ABNORMAL
DOHLE BODIES: PRESENT
DOSE AMOUNT: NORMAL
DOSE TIME: NORMAL
GFR AFRICAN AMERICAN: >60 ML/MIN/1.73M2
GFR NON-AFRICAN AMERICAN: >60 ML/MIN/1.73M2
GLUCOSE BLD-MCNC: 117 MG/DL (ref 70–99)
HCT VFR BLD CALC: 33.1 % (ref 42–52)
HEMOGLOBIN: 10.4 GM/DL (ref 13.5–18)
LYMPHOCYTES ABSOLUTE: 2.2 K/CU MM
LYMPHOCYTES RELATIVE PERCENT: 10 % (ref 24–44)
MCH RBC QN AUTO: 30.9 PG (ref 27–31)
MCHC RBC AUTO-ENTMCNC: 31.4 % (ref 32–36)
MCV RBC AUTO: 98.2 FL (ref 78–100)
MONOCYTES ABSOLUTE: 1.5 K/CU MM
MONOCYTES RELATIVE PERCENT: 7 % (ref 0–4)
PDW BLD-RTO: 13.3 % (ref 11.7–14.9)
PLATELET # BLD: 426 K/CU MM (ref 140–440)
PMV BLD AUTO: 9.2 FL (ref 7.5–11.1)
POLYCHROMASIA: ABNORMAL
POTASSIUM SERPL-SCNC: 4.3 MMOL/L (ref 3.5–5.1)
RBC # BLD: 3.37 M/CU MM (ref 4.6–6.2)
SEGMENTED NEUTROPHILS ABSOLUTE COUNT: 15 K/CU MM
SEGMENTED NEUTROPHILS RELATIVE PERCENT: 68 % (ref 36–66)
SODIUM BLD-SCNC: 134 MMOL/L (ref 135–145)
TOTAL PROTEIN: 5.3 GM/DL (ref 6.4–8.2)
TOXIC GRANULATION: PRESENT
VANCOMYCIN TROUGH: 12.6 UG/ML (ref 10–20)
WBC # BLD: 22 K/CU MM (ref 4–10.5)

## 2020-10-18 PROCEDURE — 6360000002 HC RX W HCPCS: Performed by: SURGERY

## 2020-10-18 PROCEDURE — 6360000002 HC RX W HCPCS: Performed by: INTERNAL MEDICINE

## 2020-10-18 PROCEDURE — 94761 N-INVAS EAR/PLS OXIMETRY MLT: CPT

## 2020-10-18 PROCEDURE — 2580000003 HC RX 258: Performed by: INTERNAL MEDICINE

## 2020-10-18 PROCEDURE — 36415 COLL VENOUS BLD VENIPUNCTURE: CPT

## 2020-10-18 PROCEDURE — 1200000000 HC SEMI PRIVATE

## 2020-10-18 PROCEDURE — 6370000000 HC RX 637 (ALT 250 FOR IP): Performed by: INTERNAL MEDICINE

## 2020-10-18 PROCEDURE — 2580000003 HC RX 258: Performed by: PHYSICIAN ASSISTANT

## 2020-10-18 PROCEDURE — 85007 BL SMEAR W/DIFF WBC COUNT: CPT

## 2020-10-18 PROCEDURE — 80053 COMPREHEN METABOLIC PANEL: CPT

## 2020-10-18 PROCEDURE — 2500000003 HC RX 250 WO HCPCS: Performed by: INTERNAL MEDICINE

## 2020-10-18 PROCEDURE — C9113 INJ PANTOPRAZOLE SODIUM, VIA: HCPCS | Performed by: INTERNAL MEDICINE

## 2020-10-18 PROCEDURE — 85027 COMPLETE CBC AUTOMATED: CPT

## 2020-10-18 PROCEDURE — 6370000000 HC RX 637 (ALT 250 FOR IP): Performed by: PHYSICIAN ASSISTANT

## 2020-10-18 PROCEDURE — 80202 ASSAY OF VANCOMYCIN: CPT

## 2020-10-18 RX ORDER — FAMOTIDINE 20 MG/1
20 TABLET, FILM COATED ORAL ONCE
Status: COMPLETED | OUTPATIENT
Start: 2020-10-18 | End: 2020-10-18

## 2020-10-18 RX ORDER — CALCIUM CARBONATE 200(500)MG
500 TABLET,CHEWABLE ORAL 3 TIMES DAILY PRN
Status: DISCONTINUED | OUTPATIENT
Start: 2020-10-18 | End: 2020-10-18

## 2020-10-18 RX ADMIN — METRONIDAZOLE 500 MG: 500 INJECTION, SOLUTION INTRAVENOUS at 23:06

## 2020-10-18 RX ADMIN — Medication: at 06:00

## 2020-10-18 RX ADMIN — Medication: at 22:53

## 2020-10-18 RX ADMIN — VANCOMYCIN HYDROCHLORIDE 1000 MG: 1 INJECTION, POWDER, LYOPHILIZED, FOR SOLUTION INTRAVENOUS at 22:05

## 2020-10-18 RX ADMIN — VANCOMYCIN HYDROCHLORIDE 1000 MG: 1 INJECTION, POWDER, LYOPHILIZED, FOR SOLUTION INTRAVENOUS at 05:00

## 2020-10-18 RX ADMIN — ENOXAPARIN SODIUM 40 MG: 40 INJECTION SUBCUTANEOUS at 18:10

## 2020-10-18 RX ADMIN — PANTOPRAZOLE SODIUM 40 MG: 40 INJECTION, POWDER, FOR SOLUTION INTRAVENOUS at 08:01

## 2020-10-18 RX ADMIN — METOPROLOL TARTRATE 100 MG: 50 TABLET, FILM COATED ORAL at 22:05

## 2020-10-18 RX ADMIN — VANCOMYCIN HYDROCHLORIDE 1000 MG: 1 INJECTION, POWDER, LYOPHILIZED, FOR SOLUTION INTRAVENOUS at 12:54

## 2020-10-18 RX ADMIN — FAMOTIDINE 20 MG: 20 TABLET, FILM COATED ORAL at 22:53

## 2020-10-18 RX ADMIN — METRONIDAZOLE 500 MG: 500 INJECTION, SOLUTION INTRAVENOUS at 14:51

## 2020-10-18 RX ADMIN — MEROPENEM 1 G: 1 INJECTION, POWDER, FOR SOLUTION INTRAVENOUS at 08:01

## 2020-10-18 RX ADMIN — METRONIDAZOLE 500 MG: 500 INJECTION, SOLUTION INTRAVENOUS at 08:01

## 2020-10-18 RX ADMIN — SODIUM CHLORIDE, PRESERVATIVE FREE 10 ML: 5 INJECTION INTRAVENOUS at 08:02

## 2020-10-18 RX ADMIN — METOPROLOL TARTRATE 100 MG: 50 TABLET, FILM COATED ORAL at 08:01

## 2020-10-18 RX ADMIN — MEROPENEM 1 G: 1 INJECTION, POWDER, FOR SOLUTION INTRAVENOUS at 18:10

## 2020-10-18 RX ADMIN — SODIUM CHLORIDE: 9 INJECTION, SOLUTION INTRAVENOUS at 00:59

## 2020-10-18 RX ADMIN — MEROPENEM 1 G: 1 INJECTION, POWDER, FOR SOLUTION INTRAVENOUS at 01:00

## 2020-10-18 ASSESSMENT — PAIN SCALES - GENERAL: PAINLEVEL_OUTOF10: 0

## 2020-10-18 NOTE — PROGRESS NOTES
Progress Note    Subjective:      Jenny Rucker   Successful IR drainage of intra-abdominal abscess. White count down from 35,000-22,000. Patient feels better but did have a fever of 101 yesterday.   He is hungry and tolerating full liquid diet     Objective:     /77   Pulse 92   Temp 98.2 °F (36.8 °C) (Oral)   Resp 16   Ht 5' 11\" (1.803 m)   Wt 167 lb 14.4 oz (76.2 kg)   SpO2 96%   BMI 23.42 kg/m²     In: 10 [I.V.:10]  Out: 60 [Drains:60]         General: Awake and alert feeling better  Abdomen: Soft nondistended mildly tender drain in place draining purulent fluid  Lungs: Clear  Other: N/A    Labs:   CBC:   Lab Results   Component Value Date    WBC 22.0 10/18/2020    RBC 3.37 10/18/2020    HGB 10.4 10/18/2020    HCT 33.1 10/18/2020    MCV 98.2 10/18/2020    MCH 30.9 10/18/2020    MCHC 31.4 10/18/2020    RDW 13.3 10/18/2020     10/18/2020    MPV 9.2 10/18/2020        Assessment:     Intra-abdominal abscess from presumed perforated diverticulitis  Improved after abscess drainage     Plan:   Continue IV antibiotics  Check cultures  Stay on full liquid diet  Pain medication  Dr. Lisa Mccurdy will be back tomorrow to assume care of this patient

## 2020-10-18 NOTE — PROGRESS NOTES
Hospitalist Progress Note      Name:  Casper Espana /Age/Sex: 1975  (39 y.o. male)   MRN & CSN:  [id number] & [id number] Admission Date/Time: 10/16/2020 11:04 AM   Location:  [address] [place] [address] PCP: Nanda Conteh, RN, BSN, MSN, [facility]         Hospital Day: 3    History of Present Illness:     Chief Complaint: Casper Espana is a 39 y.o.  male  who presents with abdominal pain     The patient seen and examined at bed side. Says pain has improved and doing better. Denies have any chest pain or SOB. Ten point ROS reviewed negative, unless as noted above    Objective:        Intake/Output Summary (Last 24 hours) at 10/18/2020 1059  Last data filed at 10/17/2020 2043  Gross per 24 hour   Intake 10 ml   Output 60 ml   Net -50 ml      Vitals:   Vitals:    10/18/20 0823   BP: 120/73   Pulse: 91   Resp: 16   Temp: 98.7 °F (37.1 °C)   SpO2: 92%     Physical Exam:   General Appearance: alert and oriented to person, place and time, in no acute distress  Cardiovascular: normal rate, regular rhythm, normal S1 and S2  Pulmonary/Chest: clear to auscultation bilaterally  Abdomen: soft, s/p IR drainage with placement of drain with purulent fluid, non-distended, normal bowel sounds, no masses   Extremities: no cyanosis, clubbing or edema, pulse   Skin: warm and dry, no rash or erythema  Head: normocephalic and atraumatic  Eyes: pupils equal, round, and reactive to light  Neck: supple and non-tender without mass, no thyromegaly   Musculoskeletal: normal range of motion, no joint swelling, deformity or tenderness  Neurological: alert, oriented, normal speech, no focal findings or movement disorder noted    Medications:   Medications:    metoprolol  100 mg Oral BID    meropenem  1 g Intravenous Q8H    vancomycin  1,000 mg Intravenous Q8H    Abacavir-Dolutegravir-Lamivud  1 tablet Oral Daily    pantoprazole  40 mg Intravenous Daily    sodium chloride flush  10 mL Intravenous BID    sodium chloride flush  10 mL Intravenous 2 times per day    enoxaparin  40 mg Subcutaneous Daily    metroNIDAZOLE  500 mg Intravenous Q8H      Infusions:    HYDROmorphone HCl      sodium chloride 20 mL/hr at 10/18/20 0059     PRN Meds: morphine, 1 mg, Q2H PRN  HYDROmorphone, 1 mg, Q3H PRN  ketorolac, 15 mg, Q6H PRN  ondansetron, 4 mg, Q6H PRN  sodium chloride flush, 10 mL, PRN  acetaminophen, 650 mg, Q6H PRN    Or  acetaminophen, 650 mg, Q6H PRN            Pertinent New Labs & Imaging Studies     CBC with Differential:    Lab Results   Component Value Date    WBC 22.0 10/18/2020    RBC 3.37 10/18/2020    HGB 10.4 10/18/2020    HCT 33.1 10/18/2020     10/18/2020    MCV 98.2 10/18/2020    MCH 30.9 10/18/2020    MCHC 31.4 10/18/2020    RDW 13.3 10/18/2020    SEGSPCT 68.0 10/18/2020    BANDSPCT 15 10/18/2020    LYMPHOPCT 10.0 10/18/2020    MONOPCT 7.0 10/18/2020    BASOPCT 1.0 10/16/2020    MONOSABS 1.5 10/18/2020    LYMPHSABS 2.2 10/18/2020    EOSABS 0.2 01/08/2016    BASOSABS 0.3 10/16/2020    DIFFTYPE MANUAL DIFFERENTIAL 10/18/2020     CMP:    Lab Results   Component Value Date     10/18/2020    K 4.3 10/18/2020     10/18/2020    CO2 25 10/18/2020    BUN 8 10/18/2020    CREATININE 0.9 10/18/2020    GFRAA >60 10/18/2020    LABGLOM >60 10/18/2020    GLUCOSE 117 10/18/2020    PROT 5.3 10/18/2020    PROT 6.1 11/04/2012    LABALBU 2.3 10/18/2020    CALCIUM 7.7 10/18/2020    BILITOT 0.4 10/18/2020    ALKPHOS 92 10/18/2020    AST 8 10/18/2020    ALT 6 10/18/2020     Ct Abdomen Pelvis W Iv Contrast Additional Contrast? None    Result Date: 10/16/2020  EXAMINATION: CT OF THE ABDOMEN AND PELVIS WITH CONTRAST 10/16/2020 11:54 am TECHNIQUE: CT of the abdomen and pelvis was performed with the administration of intravenous contrast. Multiplanar reformatted images are provided for review.  Dose modulation, iterative reconstruction, and/or weight based adjustment of the mA/kV was utilized to reduce the radiation dose to as low as reasonably achievable. COMPARISON: 01/08/2016 HISTORY: ORDERING SYSTEM PROVIDED HISTORY: left sided abd pain TECHNOLOGIST PROVIDED HISTORY: Reason for exam:->left sided abd pain Additional Contrast?->None Reason for Exam: left sided abd pain Acuity: Acute Type of Exam: Initial Additional signs and symptoms: LLQ pain, nausea, vomiting, constipation, diarrhea x 2 weeks Relevant Medical/Surgical History: Hx Diverticulitis, Pancreatitis, Hernia repair, Small bowel surg / 75cc [id number] FINDINGS: Lower Chest: The lung bases are clear. Organs: The spleen, adrenal glands and kidneys are unremarkable. There are right simple renal cysts. There is improved with residual mild-to-moderate intrahepatic ductal dilatation without evidence of a radiopaque obstructing nephrolithiasis. Coarse calcifications are present within the pancreatic head, likely sequela pancreatitis. There is worsening moderate pancreatic ductal dilatation, likely from stenosis from chronic pancreatitis. GI/Bowel: There is no bowel obstruction. Status post ileocecectomy. However, there is a large complex air-fluid level in close association to the descending colon measuring 6.3 x 6.6 cm in maximal dimension consistent with an abscess. Scattered diverticular present along the descending colon. There is moderate to severe continuous circumferential wall thickening of the descending colon with moderate pericolonic inflammation likely due to colitis. Pelvis: There is mild to moderate perivesicular inflammation due to secondary cystitis. Peritoneum/Retroperitoneum: There is no free fluid. Reactive mesenteric lymph nodes are present in the left lower quadrant. An index lymph node measures 1.1 x 1.8 cm. There is no pneumoperitoneum. There are unchanged small fat containing supraumbilical hernias, likely incisional.  Mild atherosclerosis involves the abdominal aorta. Bones/Soft Tissues: Degenerative changes involve the thoracolumbar spine.      1. Multi-septated complex fluid collection within the left mid abdomen along the course of the descending colon consistent with an abscess. This is likely caused from colitis causing secondary cystitis, but cannot exclude underlying malignancy; correlate with colonoscopy once the acute symptoms have resolved. 2. Improved with residual mild-to-moderate intrahepatic, but worsening moderate pancreatic ductal dilatation likely from stenosis of chronic pancreatitis.        Assessment and Plan:   Bradley Bray is a 39 y.o.  male  who presents with abdominal pain    Sepsis  Acute Diverticulitis  S/p IR drainage with drain tube placement  Elevated white count>>currently trending down  Cultures pending  CT abdomen and pelvis with colitis, abscess, pancreatic ductal dilatation  Blood cultures pending  Surgical cultures with mixed organisms, including GPC, gram positive bacilli and Gram negative bacilli  Continue Meropenem(no side effects noted), Vancomycin and Flagyl   General surgery recommendations appreciated    HIV   Continue home medications    Proteinuria  Not sure if it is related to antivirals    Hypertension  Continue Metoprolol    Diet DIET FULL LIQUID;   DVT Prophylaxis [x] Lovenox, []  Heparin, [] SCDs, [] Ambulation   GI Prophylaxis [x] PPI,  [] H2 Blocker,  [] Carafate,  [] Diet/Tube Feeds   Code Status Full Code   Disposition Patient requires continued admission due to abdominal pain   MDM [] Low, [x] Moderate,[]  High     Electronically signed by Tony Isaacs MD on 10/18/2020 at 10:59 AM

## 2020-10-19 PROBLEM — K57.20 ABSCESS OF SIGMOID COLON DUE TO DIVERTICULITIS: Status: ACTIVE | Noted: 2020-10-16

## 2020-10-19 LAB
ALBUMIN SERPL-MCNC: 2.1 GM/DL (ref 3.4–5)
ALP BLD-CCNC: 111 IU/L (ref 40–129)
ALT SERPL-CCNC: 5 U/L (ref 10–40)
ANION GAP SERPL CALCULATED.3IONS-SCNC: 12 MMOL/L (ref 4–16)
AST SERPL-CCNC: 10 IU/L (ref 15–37)
BASOPHILS ABSOLUTE: 0 K/CU MM
BASOPHILS RELATIVE PERCENT: 0.2 % (ref 0–1)
BILIRUB SERPL-MCNC: 0.3 MG/DL (ref 0–1)
BUN BLDV-MCNC: 5 MG/DL (ref 6–23)
CALCIUM SERPL-MCNC: 7.5 MG/DL (ref 8.3–10.6)
CHLORIDE BLD-SCNC: 95 MMOL/L (ref 99–110)
CO2: 26 MMOL/L (ref 21–32)
CREAT SERPL-MCNC: 0.8 MG/DL (ref 0.9–1.3)
CREATININE URINE: 58.9 MG/DL (ref 39–259)
DIFFERENTIAL TYPE: ABNORMAL
EOSINOPHILS ABSOLUTE: 0.1 K/CU MM
EOSINOPHILS RELATIVE PERCENT: 0.8 % (ref 0–3)
GFR AFRICAN AMERICAN: >60 ML/MIN/1.73M2
GFR NON-AFRICAN AMERICAN: >60 ML/MIN/1.73M2
GLUCOSE BLD-MCNC: 105 MG/DL (ref 70–99)
HCT VFR BLD CALC: 34.7 % (ref 42–52)
HEMOGLOBIN: 11 GM/DL (ref 13.5–18)
IMMATURE NEUTROPHIL %: 2.8 % (ref 0–0.43)
LYMPHOCYTES ABSOLUTE: 1.9 K/CU MM
LYMPHOCYTES RELATIVE PERCENT: 11.7 % (ref 24–44)
MCH RBC QN AUTO: 30.6 PG (ref 27–31)
MCHC RBC AUTO-ENTMCNC: 31.7 % (ref 32–36)
MCV RBC AUTO: 96.7 FL (ref 78–100)
MONOCYTES ABSOLUTE: 1.9 K/CU MM
MONOCYTES RELATIVE PERCENT: 11.5 % (ref 0–4)
NUCLEATED RBC %: 0 %
PDW BLD-RTO: 13.2 % (ref 11.7–14.9)
PLATELET # BLD: 491 K/CU MM (ref 140–440)
PMV BLD AUTO: 9.1 FL (ref 7.5–11.1)
POTASSIUM SERPL-SCNC: 3.6 MMOL/L (ref 3.5–5.1)
PROT/CREAT RATIO, UR: 0.3
RBC # BLD: 3.59 M/CU MM (ref 4.6–6.2)
SEGMENTED NEUTROPHILS ABSOLUTE COUNT: 12.1 K/CU MM
SEGMENTED NEUTROPHILS RELATIVE PERCENT: 73 % (ref 36–66)
SODIUM BLD-SCNC: 133 MMOL/L (ref 135–145)
TOTAL IMMATURE NEUTOROPHIL: 0.46 K/CU MM
TOTAL NUCLEATED RBC: 0 K/CU MM
TOTAL PROTEIN: 5.5 GM/DL (ref 6.4–8.2)
URINE TOTAL PROTEIN: 19.2 MG/DL
WBC # BLD: 16.6 K/CU MM (ref 4–10.5)

## 2020-10-19 PROCEDURE — 82570 ASSAY OF URINE CREATININE: CPT

## 2020-10-19 PROCEDURE — 6360000002 HC RX W HCPCS: Performed by: SURGERY

## 2020-10-19 PROCEDURE — 6370000000 HC RX 637 (ALT 250 FOR IP): Performed by: INTERNAL MEDICINE

## 2020-10-19 PROCEDURE — 99255 IP/OBS CONSLTJ NEW/EST HI 80: CPT | Performed by: INTERNAL MEDICINE

## 2020-10-19 PROCEDURE — C9113 INJ PANTOPRAZOLE SODIUM, VIA: HCPCS | Performed by: INTERNAL MEDICINE

## 2020-10-19 PROCEDURE — 84156 ASSAY OF PROTEIN URINE: CPT

## 2020-10-19 PROCEDURE — 36415 COLL VENOUS BLD VENIPUNCTURE: CPT

## 2020-10-19 PROCEDURE — 6360000002 HC RX W HCPCS: Performed by: INTERNAL MEDICINE

## 2020-10-19 PROCEDURE — 2580000003 HC RX 258: Performed by: INTERNAL MEDICINE

## 2020-10-19 PROCEDURE — 80053 COMPREHEN METABOLIC PANEL: CPT

## 2020-10-19 PROCEDURE — 2580000003 HC RX 258: Performed by: PHYSICIAN ASSISTANT

## 2020-10-19 PROCEDURE — 85025 COMPLETE CBC W/AUTO DIFF WBC: CPT

## 2020-10-19 PROCEDURE — 1200000000 HC SEMI PRIVATE

## 2020-10-19 RX ORDER — LINEZOLID 2 MG/ML
600 INJECTION, SOLUTION INTRAVENOUS EVERY 12 HOURS
Status: DISCONTINUED | OUTPATIENT
Start: 2020-10-19 | End: 2020-11-03

## 2020-10-19 RX ADMIN — Medication 0.2 MG: at 19:16

## 2020-10-19 RX ADMIN — SODIUM CHLORIDE, PRESERVATIVE FREE 10 ML: 5 INJECTION INTRAVENOUS at 09:59

## 2020-10-19 RX ADMIN — MEROPENEM 1 G: 1 INJECTION, POWDER, FOR SOLUTION INTRAVENOUS at 21:50

## 2020-10-19 RX ADMIN — METOPROLOL TARTRATE 100 MG: 50 TABLET, FILM COATED ORAL at 21:50

## 2020-10-19 RX ADMIN — VANCOMYCIN HYDROCHLORIDE 1000 MG: 1 INJECTION, POWDER, LYOPHILIZED, FOR SOLUTION INTRAVENOUS at 05:33

## 2020-10-19 RX ADMIN — PANTOPRAZOLE SODIUM 40 MG: 40 INJECTION, POWDER, FOR SOLUTION INTRAVENOUS at 09:58

## 2020-10-19 RX ADMIN — LINEZOLID 600 MG: 600 INJECTION, SOLUTION INTRAVENOUS at 18:04

## 2020-10-19 RX ADMIN — METOPROLOL TARTRATE 100 MG: 50 TABLET, FILM COATED ORAL at 09:58

## 2020-10-19 RX ADMIN — MEROPENEM 1 G: 1 INJECTION, POWDER, FOR SOLUTION INTRAVENOUS at 01:24

## 2020-10-19 RX ADMIN — MEROPENEM 1 G: 1 INJECTION, POWDER, FOR SOLUTION INTRAVENOUS at 09:58

## 2020-10-19 RX ADMIN — ENOXAPARIN SODIUM 40 MG: 40 INJECTION SUBCUTANEOUS at 18:21

## 2020-10-19 ASSESSMENT — PAIN DESCRIPTION - LOCATION: LOCATION: ABDOMEN

## 2020-10-19 ASSESSMENT — PAIN SCALES - GENERAL
PAINLEVEL_OUTOF10: 8
PAINLEVEL_OUTOF10: 7
PAINLEVEL_OUTOF10: 8

## 2020-10-19 ASSESSMENT — PAIN DESCRIPTION - PAIN TYPE: TYPE: SURGICAL PAIN

## 2020-10-19 NOTE — PROGRESS NOTES
Infusions:    HYDROmorphone HCl      sodium chloride 20 mL/hr at 10/18/20 0059     PRN Meds: morphine, 1 mg, Q2H PRN  HYDROmorphone, 1 mg, Q3H PRN  ketorolac, 15 mg, Q6H PRN  ondansetron, 4 mg, Q6H PRN  sodium chloride flush, 10 mL, PRN  acetaminophen, 650 mg, Q6H PRN    Or  acetaminophen, 650 mg, Q6H PRN            Pertinent New Labs & Imaging Studies     CBC with Differential:    Lab Results   Component Value Date    WBC 16.6 10/19/2020    RBC 3.59 10/19/2020    HGB 11.0 10/19/2020    HCT 34.7 10/19/2020     10/19/2020    MCV 96.7 10/19/2020    MCH 30.6 10/19/2020    MCHC 31.7 10/19/2020    RDW 13.2 10/19/2020    SEGSPCT 73.0 10/19/2020    BANDSPCT 15 10/18/2020    LYMPHOPCT 11.7 10/19/2020    MONOPCT 11.5 10/19/2020    BASOPCT 0.2 10/19/2020    MONOSABS 1.9 10/19/2020    LYMPHSABS 1.9 10/19/2020    EOSABS 0.1 10/19/2020    BASOSABS 0.0 10/19/2020    DIFFTYPE AUTOMATED DIFFERENTIAL 10/19/2020     CMP:    Lab Results   Component Value Date     10/19/2020    K 3.6 10/19/2020    CL 95 10/19/2020    CO2 26 10/19/2020    BUN 5 10/19/2020    CREATININE 0.8 10/19/2020    GFRAA >60 10/19/2020    LABGLOM >60 10/19/2020    GLUCOSE 105 10/19/2020    PROT 5.5 10/19/2020    PROT 6.1 11/04/2012    LABALBU 2.1 10/19/2020    CALCIUM 7.5 10/19/2020    BILITOT 0.3 10/19/2020    ALKPHOS 111 10/19/2020    AST 10 10/19/2020    ALT 5 10/19/2020     Ct Abdomen Pelvis W Iv Contrast Additional Contrast? None    Result Date: 10/16/2020  EXAMINATION: CT OF THE ABDOMEN AND PELVIS WITH CONTRAST 10/16/2020 11:54 am TECHNIQUE: CT of the abdomen and pelvis was performed with the administration of intravenous contrast. Multiplanar reformatted images are provided for review. Dose modulation, iterative reconstruction, and/or weight based adjustment of the mA/kV was utilized to reduce the radiation dose to as low as reasonably achievable.  COMPARISON: 01/08/2016 HISTORY: ORDERING SYSTEM PROVIDED HISTORY: left sided abd pain TECHNOLOGIST PROVIDED HISTORY: Reason for exam:->left sided abd pain Additional Contrast?->None Reason for Exam: left sided abd pain Acuity: Acute Type of Exam: Initial Additional signs and symptoms: LLQ pain, nausea, vomiting, constipation, diarrhea x 2 weeks Relevant Medical/Surgical History: Hx Diverticulitis, Pancreatitis, Hernia repair, Small bowel surg / 75cc [id number] FINDINGS: Lower Chest: The lung bases are clear. Organs: The spleen, adrenal glands and kidneys are unremarkable. There are right simple renal cysts. There is improved with residual mild-to-moderate intrahepatic ductal dilatation without evidence of a radiopaque obstructing nephrolithiasis. Coarse calcifications are present within the pancreatic head, likely sequela pancreatitis. There is worsening moderate pancreatic ductal dilatation, likely from stenosis from chronic pancreatitis. GI/Bowel: There is no bowel obstruction. Status post ileocecectomy. However, there is a large complex air-fluid level in close association to the descending colon measuring 6.3 x 6.6 cm in maximal dimension consistent with an abscess. Scattered diverticular present along the descending colon. There is moderate to severe continuous circumferential wall thickening of the descending colon with moderate pericolonic inflammation likely due to colitis. Pelvis: There is mild to moderate perivesicular inflammation due to secondary cystitis. Peritoneum/Retroperitoneum: There is no free fluid. Reactive mesenteric lymph nodes are present in the left lower quadrant. An index lymph node measures 1.1 x 1.8 cm. There is no pneumoperitoneum. There are unchanged small fat containing supraumbilical hernias, likely incisional.  Mild atherosclerosis involves the abdominal aorta. Bones/Soft Tissues: Degenerative changes involve the thoracolumbar spine.      1. Multi-septated complex fluid collection within the left mid abdomen along the course of the descending colon consistent with an abscess. This is likely caused from colitis causing secondary cystitis, but cannot exclude underlying malignancy; correlate with colonoscopy once the acute symptoms have resolved. 2. Improved with residual mild-to-moderate intrahepatic, but worsening moderate pancreatic ductal dilatation likely from stenosis of chronic pancreatitis.        Assessment and Plan:   Bradley Bray is a 39 y.o.  male  who presents with abdominal pain    Sepsis  Acute Diverticulitis  S/p IR drainage with drain tube placement  Abdominal wall cellulitis  Elevated white count>>currently trending down  Cultures pending  CT abdomen and pelvis with colitis, abscess, pancreatic ductal dilatation  Blood cultures pending  Surgical cultures with mixed organisms, including GPC, gram positive bacilli and Gram negative bacilli  Continue Meropenem(no side effects noted), Vancomycin and Flagyl   ID consulted, pending recommendations  General surgery recommendations appreciated    HIV   Continue home medications    Proteinuria  Not sure if it is related to antivirals    Hypertension  Continue Metoprolol    Diet DIET FULL LIQUID;   DVT Prophylaxis [x] Lovenox, []  Heparin, [] SCDs, [] Ambulation   GI Prophylaxis [x] PPI,  [] H2 Blocker,  [] Carafate,  [] Diet/Tube Feeds   Code Status Full Code   Disposition Patient requires continued admission due to abdominal pain   MDM [] Low, [x] Moderate,[]  High     Electronically signed by Tony Isaacs MD on 10/19/2020 at 2:32 PM

## 2020-10-19 NOTE — PROGRESS NOTES
Patient seen and examined. Doing better today. Notes reviewed from the weekend. Temperatures have normalized and pain is controlled with PCA. Approximately 10 years ago patient had diverticular disease with perforation in his cecum which required a bowel resection. Status post percutaneous CT-guided drainage of pericolonic abscess secondary to complicated diverticular disease. AF VSS. Good UOP. MARI with feculent drainage. Cultures are growing out E. coli which is to be expected. abd is soft, ND, tenderness in LLQ. NR, some guarding. Continue with Zyvox and meropenem. H/o HIV, patient reports the viral load is undetectable. On medication. Keep on full liquid diet. Follow clinical course. WBC down to 16.6 from 33. Blood cultures pending.

## 2020-10-19 NOTE — PROGRESS NOTES
[id number] [facility]  consulted by Dr. Delonte Rivero for monitoring and adjustment. Indication for treatment: Diverticular abscess  Goal trough: 15 mcg/mL (AUC/BRANDEE 400-600)     Pertinent Laboratory Values:   Temp Readings from Last 3 Encounters:   10/18/20 99.2 °F (37.3 °C) (Oral)   01/08/16 98.5 °F (36.9 °C) (Oral)     Recent Labs     10/16/20  1100 10/16/20  1125 10/17/20  0942 10/18/20  0557   WBC 32.8*  --  28.4* 22.0*   LACTATE  --  1.5  --   --      Recent Labs     10/16/20  1100 10/17/20  0942 10/18/20  0557   BUN 8 6 8   CREATININE 0.9 0.8* 0.9     Estimated Creatinine Clearance: 110 mL/min (based on SCr of 0.9 mg/dL). Intake/Output Summary (Last 24 hours) at 10/19/2020 0210  Last data filed at 10/18/2020 1213  Gross per 24 hour   Intake --   Output 10 ml   Net -10 ml       Pertinent Cultures:  Date    Source    Results  10/16   Blood    Sent           Vancomycin level:   TROUGH:    Recent Labs     10/18/20  1958   VANCOTROUGH 12.6     RANDOM:  No results for input(s): VANCORANDOM in the last 72 hours. Assessment:  · WBC and temperature: elevated WBC; Afebrile  · SCr, BUN, and urine output: at baseline   · Day(s) of therapy: 3   · Vancomycin level: 12.6, slightly below therapeutic level    Plan:  · Vancomycin level returned with a trough slightly below therapeutic goal  · Continue Vancomycin 1000 mg IVPB q8h  · Repeat trough on 10/20 @ 2130  · Pharmacy will continue to monitor patient and adjust therapy as indicated    [place] 10/20/20 @2130    Thank you for the consult.   Scott Infante PharmD  10/19/2020 2:10 AM

## 2020-10-19 NOTE — CONSULTS
of significance.  No recent unusual exposures.  NO pets    ? ALLERGIES  Allergies   Allergen Reactions    Pcn [Penicillins]       MEDICATIONS  Reviewed and are per the chart/EMR. IMMUNIZATION HISTORY  Immunization History   Administered Date(s) Administered    Influenza Virus Vaccine 11/05/2012    Pneumococcal Polysaccharide ([id number]) 11/05/2012     ? Antibiotics:   Vancomycin  meropenem  ?  -------------------------------------------------------------------------------------------------------------------    Vital Signs:  Vitals:    10/19/20 0745   BP: (!) 139/92   Pulse: 95   Resp: 16   Temp: 97.8 °F (36.6 °C)   SpO2: 98%         Exam:    VS: noted; wt 76.2 kg  Gen: alert and oriented X3, no distress  Skin: no stigmata of endocarditis  Wounds: C/D/I  HEMT: AT/NC Oropharynx pink, moist, and without lesions or exudates; dentition in good state of repair  Eyes: PERRLA, EOMI, conjunctiva pink, sclera anicteric. Neck: Supple. Trachea midline. No LAD. Chest: no distress and CTA. Good air movement. Heart: RRR and no MRG. Abd: LLQ MARI drain, containing pus, left flank erythema and tenderness. soft, non-distended, no tenderness, no hepatomegaly. Normoactive bowel sounds. Ext: no clubbing, cyanosis, or edema  Catheter Site: without erythema or tenderness  Neuro: Mental status intact. CN 2-12 intact and no focal sensory or motor deficits    ? Diagnostic Studies: reviewed  ? ? I have examined this patient and available medical records on this date and have made the above observations, conclusions and recommendations.   Electronically signed by: Electronically signed by Tapan Gómez MD on 10/19/2020 at 10:27 AM

## 2020-10-20 PROBLEM — Z21 HIV (HUMAN IMMUNODEFICIENCY VIRUS INFECTION) (HCC): Status: ACTIVE | Noted: 2020-10-20

## 2020-10-20 PROBLEM — B20 HIV (HUMAN IMMUNODEFICIENCY VIRUS INFECTION) (HCC): Status: ACTIVE | Noted: 2020-10-20

## 2020-10-20 LAB
ALBUMIN SERPL-MCNC: 2.2 GM/DL (ref 3.4–5)
ALP BLD-CCNC: 83 IU/L (ref 40–128)
ALT SERPL-CCNC: 6 U/L (ref 10–40)
ANION GAP SERPL CALCULATED.3IONS-SCNC: 10 MMOL/L (ref 4–16)
AST SERPL-CCNC: 8 IU/L (ref 15–37)
BANDED NEUTROPHILS ABSOLUTE COUNT: 1.15 K/CU MM
BANDED NEUTROPHILS RELATIVE PERCENT: 7 % (ref 5–11)
BILIRUB SERPL-MCNC: 0.3 MG/DL (ref 0–1)
BUN BLDV-MCNC: 4 MG/DL (ref 6–23)
CALCIUM SERPL-MCNC: 7.3 MG/DL (ref 8.3–10.6)
CHLORIDE BLD-SCNC: 96 MMOL/L (ref 99–110)
CO2: 25 MMOL/L (ref 21–32)
CREAT SERPL-MCNC: 0.8 MG/DL (ref 0.9–1.3)
DIFFERENTIAL TYPE: ABNORMAL
EKG ATRIAL RATE: 111 BPM
EKG DIAGNOSIS: NORMAL
EKG P AXIS: 29 DEGREES
EKG P-R INTERVAL: 118 MS
EKG Q-T INTERVAL: 354 MS
EKG QRS DURATION: 88 MS
EKG QTC CALCULATION (BAZETT): 481 MS
EKG R AXIS: 33 DEGREES
EKG T AXIS: 44 DEGREES
EKG VENTRICULAR RATE: 111 BPM
EOSINOPHILS ABSOLUTE: 0.3 K/CU MM
EOSINOPHILS RELATIVE PERCENT: 2 % (ref 0–3)
ERYTHROCYTE SEDIMENTATION RATE: 81 MM/HR (ref 0–15)
GFR AFRICAN AMERICAN: >60 ML/MIN/1.73M2
GFR NON-AFRICAN AMERICAN: >60 ML/MIN/1.73M2
GLUCOSE BLD-MCNC: 97 MG/DL (ref 70–99)
HCT VFR BLD CALC: 31.5 % (ref 42–52)
HEMOGLOBIN: 10.2 GM/DL (ref 13.5–18)
HIGH SENSITIVE C-REACTIVE PROTEIN: 76.3 MG/L
LYMPHOCYTES ABSOLUTE: 1.5 K/CU MM
LYMPHOCYTES RELATIVE PERCENT: 9 % (ref 24–44)
MCH RBC QN AUTO: 31.1 PG (ref 27–31)
MCHC RBC AUTO-ENTMCNC: 32.4 % (ref 32–36)
MCV RBC AUTO: 96 FL (ref 78–100)
METAMYELOCYTES ABSOLUTE COUNT: 0.16 K/CU MM
METAMYELOCYTES PERCENT: 1 %
MONOCYTES ABSOLUTE: 1.6 K/CU MM
MONOCYTES RELATIVE PERCENT: 10 % (ref 0–4)
PDW BLD-RTO: 13.2 % (ref 11.7–14.9)
PLATELET # BLD: 454 K/CU MM (ref 140–440)
PMV BLD AUTO: 9.1 FL (ref 7.5–11.1)
POLYCHROMASIA: ABNORMAL
POTASSIUM SERPL-SCNC: 3.6 MMOL/L (ref 3.5–5.1)
PROCALCITONIN: 3.9
RBC # BLD: 3.28 M/CU MM (ref 4.6–6.2)
SEGMENTED NEUTROPHILS ABSOLUTE COUNT: 11.7 K/CU MM
SEGMENTED NEUTROPHILS RELATIVE PERCENT: 71 % (ref 36–66)
SODIUM BLD-SCNC: 131 MMOL/L (ref 135–145)
TOTAL PROTEIN: 5.2 GM/DL (ref 6.4–8.2)
TOXIC GRANULATION: PRESENT
WBC # BLD: 16.4 K/CU MM (ref 4–10.5)

## 2020-10-20 PROCEDURE — 86593 SYPHILIS TEST NON-TREP QUANT: CPT

## 2020-10-20 PROCEDURE — 85652 RBC SED RATE AUTOMATED: CPT

## 2020-10-20 PROCEDURE — 36415 COLL VENOUS BLD VENIPUNCTURE: CPT

## 2020-10-20 PROCEDURE — 6370000000 HC RX 637 (ALT 250 FOR IP): Performed by: INTERNAL MEDICINE

## 2020-10-20 PROCEDURE — 80053 COMPREHEN METABOLIC PANEL: CPT

## 2020-10-20 PROCEDURE — 2580000003 HC RX 258: Performed by: INTERNAL MEDICINE

## 2020-10-20 PROCEDURE — 87536 HIV-1 QUANT&REVRSE TRNSCRPJ: CPT

## 2020-10-20 PROCEDURE — C9113 INJ PANTOPRAZOLE SODIUM, VIA: HCPCS | Performed by: INTERNAL MEDICINE

## 2020-10-20 PROCEDURE — 87507 IADNA-DNA/RNA PROBE TQ 12-25: CPT

## 2020-10-20 PROCEDURE — 87506 IADNA-DNA/RNA PROBE TQ 6-11: CPT

## 2020-10-20 PROCEDURE — 6360000002 HC RX W HCPCS: Performed by: SURGERY

## 2020-10-20 PROCEDURE — 87517 HEPATITIS B DNA QUANT: CPT

## 2020-10-20 PROCEDURE — 2580000003 HC RX 258: Performed by: SURGERY

## 2020-10-20 PROCEDURE — 6360000002 HC RX W HCPCS: Performed by: INTERNAL MEDICINE

## 2020-10-20 PROCEDURE — 87505 NFCT AGENT DETECTION GI: CPT

## 2020-10-20 PROCEDURE — 85007 BL SMEAR W/DIFF WBC COUNT: CPT

## 2020-10-20 PROCEDURE — 94761 N-INVAS EAR/PLS OXIMETRY MLT: CPT

## 2020-10-20 PROCEDURE — 88185 FLOWCYTOMETRY/TC ADD-ON: CPT

## 2020-10-20 PROCEDURE — 1200000000 HC SEMI PRIVATE

## 2020-10-20 PROCEDURE — 99233 SBSQ HOSP IP/OBS HIGH 50: CPT | Performed by: INTERNAL MEDICINE

## 2020-10-20 PROCEDURE — 2580000003 HC RX 258: Performed by: PHYSICIAN ASSISTANT

## 2020-10-20 PROCEDURE — 86592 SYPHILIS TEST NON-TREP QUAL: CPT

## 2020-10-20 PROCEDURE — 85027 COMPLETE CBC AUTOMATED: CPT

## 2020-10-20 PROCEDURE — 86141 C-REACTIVE PROTEIN HS: CPT

## 2020-10-20 PROCEDURE — 6360000002 HC RX W HCPCS: Performed by: EMERGENCY MEDICINE

## 2020-10-20 PROCEDURE — 88184 FLOWCYTOMETRY/ TC 1 MARKER: CPT

## 2020-10-20 PROCEDURE — 84145 PROCALCITONIN (PCT): CPT

## 2020-10-20 RX ADMIN — METOPROLOL TARTRATE 100 MG: 50 TABLET, FILM COATED ORAL at 08:09

## 2020-10-20 RX ADMIN — Medication 0.2 MG: at 10:05

## 2020-10-20 RX ADMIN — PANTOPRAZOLE SODIUM 40 MG: 40 INJECTION, POWDER, FOR SOLUTION INTRAVENOUS at 08:10

## 2020-10-20 RX ADMIN — METOPROLOL TARTRATE 100 MG: 50 TABLET, FILM COATED ORAL at 20:02

## 2020-10-20 RX ADMIN — SODIUM CHLORIDE: 9 INJECTION, SOLUTION INTRAVENOUS at 05:12

## 2020-10-20 RX ADMIN — SODIUM CHLORIDE, PRESERVATIVE FREE 10 ML: 5 INJECTION INTRAVENOUS at 08:10

## 2020-10-20 RX ADMIN — LINEZOLID 600 MG: 600 INJECTION, SOLUTION INTRAVENOUS at 12:59

## 2020-10-20 RX ADMIN — ONDANSETRON 4 MG: 2 INJECTION INTRAMUSCULAR; INTRAVENOUS at 12:59

## 2020-10-20 RX ADMIN — SODIUM CHLORIDE: 9 INJECTION, SOLUTION INTRAVENOUS at 20:02

## 2020-10-20 RX ADMIN — ENOXAPARIN SODIUM 40 MG: 40 INJECTION SUBCUTANEOUS at 16:38

## 2020-10-20 RX ADMIN — MEROPENEM 1 G: 1 INJECTION, POWDER, FOR SOLUTION INTRAVENOUS at 01:23

## 2020-10-20 RX ADMIN — LINEZOLID 600 MG: 600 INJECTION, SOLUTION INTRAVENOUS at 00:20

## 2020-10-20 RX ADMIN — MEROPENEM 1 G: 1 INJECTION, POWDER, FOR SOLUTION INTRAVENOUS at 08:03

## 2020-10-20 RX ADMIN — LINEZOLID 600 MG: 600 INJECTION, SOLUTION INTRAVENOUS at 23:46

## 2020-10-20 RX ADMIN — MEROPENEM 1 G: 1 INJECTION, POWDER, FOR SOLUTION INTRAVENOUS at 16:38

## 2020-10-20 ASSESSMENT — PAIN DESCRIPTION - ONSET
ONSET: ON-GOING

## 2020-10-20 ASSESSMENT — PAIN SCALES - WONG BAKER
WONGBAKER_NUMERICALRESPONSE: 0

## 2020-10-20 ASSESSMENT — PAIN DESCRIPTION - DESCRIPTORS
DESCRIPTORS: ACHING;CONSTANT

## 2020-10-20 ASSESSMENT — PAIN DESCRIPTION - PROGRESSION
CLINICAL_PROGRESSION: GRADUALLY WORSENING

## 2020-10-20 ASSESSMENT — PAIN DESCRIPTION - PAIN TYPE
TYPE: SURGICAL PAIN

## 2020-10-20 ASSESSMENT — PAIN SCALES - GENERAL
PAINLEVEL_OUTOF10: 7
PAINLEVEL_OUTOF10: 7
PAINLEVEL_OUTOF10: 0
PAINLEVEL_OUTOF10: 7
PAINLEVEL_OUTOF10: 7

## 2020-10-20 ASSESSMENT — PAIN DESCRIPTION - FREQUENCY
FREQUENCY: CONTINUOUS

## 2020-10-20 ASSESSMENT — PAIN - FUNCTIONAL ASSESSMENT
PAIN_FUNCTIONAL_ASSESSMENT: PREVENTS OR INTERFERES SOME ACTIVE ACTIVITIES AND ADLS

## 2020-10-20 ASSESSMENT — PAIN DESCRIPTION - LOCATION
LOCATION: ABDOMEN

## 2020-10-20 ASSESSMENT — PAIN DESCRIPTION - ORIENTATION
ORIENTATION: LEFT

## 2020-10-20 NOTE — PROGRESS NOTES
Infectious Disease Progress Note  10/20/2020   Patient Name: Petey Fuentes : 1975       Reason for visit: F/u sepsis secondary to diverticular abscess  History:? Interval history noted  Increased bowel motion, left flank pain persists  Physical Exam:  Vital Signs: BP (!) 143/88   Pulse 79   Temp 97.7 °F (36.5 °C) (Oral)   Resp 16   Ht 5' 11\" (1.803 m)   Wt 167 lb 14.4 oz (76.2 kg)   SpO2 100%   BMI 23.42 kg/m²     Gen: alert and oriented X3, no distress  Skin: no stigmata of endocarditis  Wounds: C/D/I  HEMT: AT/NC Oropharynx pink, moist, and without lesions or exudates; dentition in good state of repair  Eyes: PERRLA, EOMI, conjunctiva pink, sclera anicteric. Neck: Supple. Trachea midline. No LAD. Chest: no distress and CTA. Good air movement. Heart: RRR and no MRG. Abd: LLQ MARI drain, containing pus, left flank erythema and tenderness. soft, non-distended, no tenderness, no hepatomegaly. Normoactive bowel sounds. Ext: no clubbing, cyanosis, or edema  Catheter Site: without erythema or tenderness  Neuro: Mental status intact. CN 2-12 intact and no focal sensory or motor deficits     Radiologic / Imaging / TESTING  No results found.      Labs:    Recent Results (from the past 24 hour(s))   Protein / creatinine ratio, urine    Collection Time: 10/19/20  9:53 PM   Result Value Ref Range    Urine Total Protein 19.2 (H) <12 MG/DL    Creatinine, Ur 58.9 39 - 259 MG/DL    Prot/Creat Ratio, Ur 0.3 (H) <0.2   Procalcitonin    Collection Time: 10/20/20  5:06 AM   Result Value Ref Range    Procalcitonin 3.90    Comprehensive Metabolic Panel w/ Reflex to MG    Collection Time: 10/20/20  5:06 AM   Result Value Ref Range    Sodium 131 (L) 135 - 145 MMOL/L    Potassium 3.6 3.5 - 5.1 MMOL/L    Chloride 96 (L) 99 - 110 mMol/L    CO2 25 21 - 32 MMOL/L    BUN 4 (L) 6 - 23 MG/DL    CREATININE 0.8 (L) 0.9 - 1.3 MG/DL    Glucose 97 70 - 99 MG/DL    Calcium 7.3 (L) 8.3 - 10.6 MG/DL    Alb 2.2 (L) 3.4 - 5.0 GM/DL Total Protein 5.2 (L) 6.4 - 8.2 GM/DL    Total Bilirubin 0.3 0.0 - 1.0 MG/DL    ALT 6 (L) 10 - 40 U/L    AST 8 (L) 15 - 37 IU/L    Alkaline Phosphatase 83 40 - 128 IU/L    GFR Non-African American >60 >60 mL/min/1.73m2    GFR African American >60 >60 mL/min/1.73m2    Anion Gap 10 4 - 16   CBC auto differential    Collection Time: 10/20/20  5:06 AM   Result Value Ref Range    WBC 16.4 (H) 4.0 - 10.5 K/CU MM    RBC 3.28 (L) 4.6 - 6.2 M/CU MM    Hemoglobin 10.2 (L) 13.5 - 18.0 GM/DL    Hematocrit 31.5 (L) 42 - 52 %    MCV 96.0 78 - 100 FL    MCH 31.1 (H) 27 - 31 PG    MCHC 32.4 32.0 - 36.0 %    RDW 13.2 11.7 - 14.9 %    Platelets 265 (H) 129 - 440 K/CU MM    MPV 9.1 7.5 - 11.1 FL    Metamyelocytes Relative 1 (H) 0.0 %    Bands Relative 7 5 - 11 %    Segs Relative 71.0 (H) 36 - 66 %    Eosinophils % 2.0 0 - 3 %    Lymphocytes % 9.0 (L) 24 - 44 %    Monocytes % 10.0 (H) 0 - 4 %    Metamyelocytes Absolute 0.16 K/CU MM    Bands Absolute 1.15 K/CU MM    Segs Absolute 11.7 K/CU MM    Eosinophils Absolute 0.3 K/CU MM    Lymphocytes Absolute 1.5 K/CU MM    Monocytes Absolute 1.6 K/CU MM    Differential Type MANUAL DIFFERENTIAL     Polychromasia 1+     Toxic Granulation PRESENT    Sedimentation Rate    Collection Time: 10/20/20  5:06 AM   Result Value Ref Range    Sed Rate 81 (H) 0 - 15 MM/HR   C-Reactive Protein    Collection Time: 10/20/20  5:06 AM   Result Value Ref Range    CRP, High Sensitivity 76.3 mg/L     CULTURE results: Invalid input(s): BLOOD CULTURE,  URINE CULTURE, SURGICAL CULTURE    Diagnosis:  Patient Active Problem List   Diagnosis    HIV positive ([facility]    Alcoholic ([facility]    Hypertension    Alcohol withdrawal ([facility]    Abdominal wall abscess    Abscess of sigmoid colon due to diverticulitis    Proteinuria    HIV (human immunodeficiency virus infection) (HCC)       Active Problems  Active Problems:    Abdominal wall abscess    Abscess of sigmoid colon due to diverticulitis    Proteinuria    HIV (human immunodeficiency virus infection) ([facility]  Resolved Problems:    * No resolved hospital problems.  *      Impression and plan   Clinical status: no clear improvement   Therapeutic: continue linezolid 10/19 and meropenem 10/17   Diagnostic: trend crp, and pct   F/u:   Other: if he fails to improve then repeat CT abd/pelvis with po and IV contrast/   Summary:      Electronically signed by: Electronically signed by Reid Portillo MD on 10/20/2020 at 7:34 PM

## 2020-10-20 NOTE — PROGRESS NOTES
Hospitalist Progress Note      Name:  Dulce Chaudhary /Age/Sex: 1975  (39 y.o. male)   MRN & CSN:  [id number] & [id number] Admission Date/Time: 10/16/2020 11:04 AM   Location:  [address] [place] [address] PCP: Richmond Peace, RN, BSN, MSN, [facility]         Hospital Day: 5    History of Present Illness:     Chief Complaint: Dulce Chaudhary is a 39 y.o.  male  who presents with abdominal pain     The patient seen and examined at bed side. Says his red ness of the left side of abdomen is persistent. Denies have any chest pain or SOB. Ten point ROS reviewed negative, unless as noted above    Objective: Intake/Output Summary (Last 24 hours) at 10/20/2020 1231  Last data filed at 10/20/2020 0805  Gross per 24 hour   Intake --   Output 265 ml   Net -265 ml      Vitals:   Vitals:    10/20/20 0805   BP: (!) 156/95   Pulse: 91   Resp: 16   Temp: 97.8 °F (36.6 °C)   SpO2: 99%     Physical Exam:   General Appearance: alert and oriented to person, place and time, in no acute distress  Cardiovascular: normal rate, regular rhythm, normal S1 and S2  Pulmonary/Chest: clear to auscultation bilaterally  Abdomen: soft, s/p IR drainage with placement of drain with purulent fluid, non-distended, normal bowel sounds, no masses.  Left lower quadrant redness  Extremities: no cyanosis, clubbing or edema, pulse   Skin: redness of the left abdominal wall  Head: normocephalic and atraumatic  Eyes: pupils equal, round, and reactive to light  Neck: supple and non-tender without mass, no thyromegaly   Musculoskeletal: normal range of motion, no joint swelling, deformity or tenderness  Neurological: alert, oriented, normal speech, no focal findings or movement disorder noted    Medications:   Medications:    linezolid  600 mg Intravenous Q12H    metoprolol  100 mg Oral BID    meropenem  1 g Intravenous Q8H    Abacavir-Dolutegravir-Lamivud  1 tablet Oral Daily    pantoprazole  40 mg Intravenous Daily    sodium chloride flush  10 mL dose to as low as reasonably achievable. COMPARISON: 01/08/2016 HISTORY: ORDERING SYSTEM PROVIDED HISTORY: left sided abd pain TECHNOLOGIST PROVIDED HISTORY: Reason for exam:->left sided abd pain Additional Contrast?->None Reason for Exam: left sided abd pain Acuity: Acute Type of Exam: Initial Additional signs and symptoms: LLQ pain, nausea, vomiting, constipation, diarrhea x 2 weeks Relevant Medical/Surgical History: Hx Diverticulitis, Pancreatitis, Hernia repair, Small bowel surg / 75cc [id number] FINDINGS: Lower Chest: The lung bases are clear. Organs: The spleen, adrenal glands and kidneys are unremarkable. There are right simple renal cysts. There is improved with residual mild-to-moderate intrahepatic ductal dilatation without evidence of a radiopaque obstructing nephrolithiasis. Coarse calcifications are present within the pancreatic head, likely sequela pancreatitis. There is worsening moderate pancreatic ductal dilatation, likely from stenosis from chronic pancreatitis. GI/Bowel: There is no bowel obstruction. Status post ileocecectomy. However, there is a large complex air-fluid level in close association to the descending colon measuring 6.3 x 6.6 cm in maximal dimension consistent with an abscess. Scattered diverticular present along the descending colon. There is moderate to severe continuous circumferential wall thickening of the descending colon with moderate pericolonic inflammation likely due to colitis. Pelvis: There is mild to moderate perivesicular inflammation due to secondary cystitis. Peritoneum/Retroperitoneum: There is no free fluid. Reactive mesenteric lymph nodes are present in the left lower quadrant. An index lymph node measures 1.1 x 1.8 cm. There is no pneumoperitoneum. There are unchanged small fat containing supraumbilical hernias, likely incisional.  Mild atherosclerosis involves the abdominal aorta. Bones/Soft Tissues: Degenerative changes involve the thoracolumbar spine. 1. Multi-septated complex fluid collection within the left mid abdomen along the course of the descending colon consistent with an abscess. This is likely caused from colitis causing secondary cystitis, but cannot exclude underlying malignancy; correlate with colonoscopy once the acute symptoms have resolved. 2. Improved with residual mild-to-moderate intrahepatic, but worsening moderate pancreatic ductal dilatation likely from stenosis of chronic pancreatitis. Assessment and Plan:   Camacho Castillo is a 39 y.o.  male  who presents with abdominal pain    Sepsis  Acute Diverticulitis  S/p IR drainage with drain tube placement  Abdominal wall cellulitis  Elevated white count>>currently trending down  Cultures pending  CT abdomen and pelvis with colitis, abscess, pancreatic ductal dilatation  Blood cultures pending  Surgical cultures with mixed organisms, including GPC, gram positive bacilli and Gram negative bacilli  Continue Meropenem(no side effects noted).   Vancomycin changed to Zyvox by ID  ID consulted, recommendations appreciated  General surgery recommendations appreciated-Placed back NPO if in cases needed any procedure    HIV   Continue home medications    Proteinuria  Not sure if it is related to antivirals    Hypertension  Continue Metoprolol    Diet Diet NPO Effective Now Exceptions are: Ice Chips, Sips with Meds   DVT Prophylaxis [x] Lovenox, []  Heparin, [] SCDs, [] Ambulation   GI Prophylaxis [x] PPI,  [] H2 Blocker,  [] Carafate,  [] Diet/Tube Feeds   Code Status Full Code   Disposition Patient requires continued admission due to abdominal pain   MDM [] Low, [x] Moderate,[]  High     Electronically signed by Alfredo Cervantes MD on 10/20/2020 at 12:31 PM

## 2020-10-20 NOTE — CARE COORDINATION
Reviewed chart and spoke with pt about discharge needs/plans. Pt lives with his life partner, PTA he was totally independent. He has a PCP and insurance that covers medications. We discussed possible need for De[facility]re for iv atb and MARI/wound care. He is hoping not to need Deidre d/t having multiple animals at his home.   CM will give pt PPO HC list and continue to follow

## 2020-10-20 NOTE — PLAN OF CARE
Problem: Pain:  Goal: Pain level will decrease  Description: Pain level will decrease  10/20/2020 0040 by Johnna Rascon RN  Outcome: Ongoing  10/19/2020 1401 by Jannette Soriano RN  Outcome: Ongoing  Goal: Control of acute pain  Description: Control of acute pain  10/20/2020 0040 by Johnna Rascon RN  Outcome: Ongoing  10/19/2020 1401 by Jannette Soriano RN  Outcome: Ongoing  Goal: Control of chronic pain  Description: Control of chronic pain  10/20/2020 0040 by Johnna Rascon RN  Outcome: Ongoing  10/19/2020 1401 by Jannette Soriano RN  Outcome: Ongoing

## 2020-10-21 ENCOUNTER — ANESTHESIA (OUTPATIENT)
Dept: OPERATING ROOM | Age: 45
DRG: 969 | End: 2020-10-21
Payer: COMMERCIAL

## 2020-10-21 ENCOUNTER — ANESTHESIA EVENT (OUTPATIENT)
Dept: OPERATING ROOM | Age: 45
DRG: 969 | End: 2020-10-21
Payer: COMMERCIAL

## 2020-10-21 VITALS
OXYGEN SATURATION: 100 % | DIASTOLIC BLOOD PRESSURE: 82 MMHG | RESPIRATION RATE: 14 BRPM | TEMPERATURE: 97.5 F | SYSTOLIC BLOOD PRESSURE: 122 MMHG

## 2020-10-21 LAB
ALBUMIN SERPL-MCNC: 2.3 GM/DL (ref 3.4–5)
ALP BLD-CCNC: 101 IU/L (ref 40–128)
ALT SERPL-CCNC: 7 U/L (ref 10–40)
AMPHETAMINES: NEGATIVE
ANION GAP SERPL CALCULATED.3IONS-SCNC: 10 MMOL/L (ref 4–16)
ANISOCYTOSIS: ABNORMAL
AST SERPL-CCNC: 14 IU/L (ref 15–37)
BANDED NEUTROPHILS ABSOLUTE COUNT: 1.26 K/CU MM
BANDED NEUTROPHILS RELATIVE PERCENT: 9 % (ref 5–11)
BARBITURATE SCREEN URINE: NEGATIVE
BENZODIAZEPINE SCREEN, URINE: NEGATIVE
BILIRUB SERPL-MCNC: 0.3 MG/DL (ref 0–1)
BUN BLDV-MCNC: 3 MG/DL (ref 6–23)
CALCIUM SERPL-MCNC: 7.6 MG/DL (ref 8.3–10.6)
CANNABINOID SCREEN URINE: NEGATIVE
CHLORIDE BLD-SCNC: 99 MMOL/L (ref 99–110)
CO2: 26 MMOL/L (ref 21–32)
COCAINE METABOLITE: NEGATIVE
CREAT SERPL-MCNC: 0.8 MG/DL (ref 0.9–1.3)
CULTURE: ABNORMAL
DIFFERENTIAL TYPE: ABNORMAL
GFR AFRICAN AMERICAN: >60 ML/MIN/1.73M2
GFR NON-AFRICAN AMERICAN: >60 ML/MIN/1.73M2
GLUCOSE BLD-MCNC: 83 MG/DL (ref 70–99)
GRAM SMEAR: ABNORMAL
HCT VFR BLD CALC: 34.6 % (ref 42–52)
HEMOGLOBIN: 10.7 GM/DL (ref 13.5–18)
HIGH SENSITIVE C-REACTIVE PROTEIN: 52.7 MG/L
LYMPHOCYTES ABSOLUTE: 1.5 K/CU MM
LYMPHOCYTES RELATIVE PERCENT: 11 % (ref 24–44)
Lab: ABNORMAL
MCH RBC QN AUTO: 30.2 PG (ref 27–31)
MCHC RBC AUTO-ENTMCNC: 30.9 % (ref 32–36)
MCV RBC AUTO: 97.7 FL (ref 78–100)
METAMYELOCYTES ABSOLUTE COUNT: 0.14 K/CU MM
METAMYELOCYTES PERCENT: 1 %
MONOCYTES ABSOLUTE: 1.4 K/CU MM
MONOCYTES RELATIVE PERCENT: 10 % (ref 0–4)
MYELOCYTE PERCENT: 1 %
MYELOCYTES ABSOLUTE COUNT: 0.14 K/CU MM
OPIATES, URINE: NEGATIVE
OXYCODONE: NEGATIVE
PDW BLD-RTO: 13.2 % (ref 11.7–14.9)
PHENCYCLIDINE, URINE: NEGATIVE
PLATELET # BLD: 485 K/CU MM (ref 140–440)
PMV BLD AUTO: 9 FL (ref 7.5–11.1)
POTASSIUM SERPL-SCNC: 5 MMOL/L (ref 3.5–5.1)
RBC # BLD: 3.54 M/CU MM (ref 4.6–6.2)
RBC # BLD: ABNORMAL 10*6/UL
RPR TITER: NORMAL
RPR: REACTIVE
SARS-COV-2, NAAT: NOT DETECTED
SEGMENTED NEUTROPHILS ABSOLUTE COUNT: 9.6 K/CU MM
SEGMENTED NEUTROPHILS RELATIVE PERCENT: 68 % (ref 36–66)
SODIUM BLD-SCNC: 135 MMOL/L (ref 135–145)
SOURCE: NORMAL
SPECIMEN: ABNORMAL
TOTAL PROTEIN: 5.4 GM/DL (ref 6.4–8.2)
TOXIC GRANULATION: PRESENT
WBC # BLD: 14 K/CU MM (ref 4–10.5)

## 2020-10-21 PROCEDURE — 85027 COMPLETE CBC AUTOMATED: CPT

## 2020-10-21 PROCEDURE — 2709999900 HC NON-CHARGEABLE SUPPLY: Performed by: SURGERY

## 2020-10-21 PROCEDURE — 2580000003 HC RX 258: Performed by: INTERNAL MEDICINE

## 2020-10-21 PROCEDURE — 3600000004 HC SURGERY LEVEL 4 BASE: Performed by: SURGERY

## 2020-10-21 PROCEDURE — 88307 TISSUE EXAM BY PATHOLOGIST: CPT

## 2020-10-21 PROCEDURE — U0002 COVID-19 LAB TEST NON-CDC: HCPCS

## 2020-10-21 PROCEDURE — 7100000001 HC PACU RECOVERY - ADDTL 15 MIN: Performed by: SURGERY

## 2020-10-21 PROCEDURE — 6360000002 HC RX W HCPCS: Performed by: SURGERY

## 2020-10-21 PROCEDURE — 6360000002 HC RX W HCPCS: Performed by: INTERNAL MEDICINE

## 2020-10-21 PROCEDURE — 1200000000 HC SEMI PRIVATE

## 2020-10-21 PROCEDURE — 85007 BL SMEAR W/DIFF WBC COUNT: CPT

## 2020-10-21 PROCEDURE — 2580000003 HC RX 258: Performed by: SURGERY

## 2020-10-21 PROCEDURE — 0WQF0ZZ REPAIR ABDOMINAL WALL, OPEN APPROACH: ICD-10-PCS | Performed by: SURGERY

## 2020-10-21 PROCEDURE — 2500000003 HC RX 250 WO HCPCS: Performed by: NURSE ANESTHETIST, CERTIFIED REGISTERED

## 2020-10-21 PROCEDURE — 6370000000 HC RX 637 (ALT 250 FOR IP): Performed by: SURGERY

## 2020-10-21 PROCEDURE — 7100000000 HC PACU RECOVERY - FIRST 15 MIN: Performed by: SURGERY

## 2020-10-21 PROCEDURE — 3600000014 HC SURGERY LEVEL 4 ADDTL 15MIN: Performed by: SURGERY

## 2020-10-21 PROCEDURE — 94761 N-INVAS EAR/PLS OXIMETRY MLT: CPT

## 2020-10-21 PROCEDURE — 2720000010 HC SURG SUPPLY STERILE: Performed by: SURGERY

## 2020-10-21 PROCEDURE — 2580000003 HC RX 258: Performed by: NURSE ANESTHETIST, CERTIFIED REGISTERED

## 2020-10-21 PROCEDURE — 2580000003 HC RX 258: Performed by: PHYSICIAN ASSISTANT

## 2020-10-21 PROCEDURE — 6360000002 HC RX W HCPCS: Performed by: ANESTHESIOLOGY

## 2020-10-21 PROCEDURE — 0D1N0Z4 BYPASS SIGMOID COLON TO CUTANEOUS, OPEN APPROACH: ICD-10-PCS | Performed by: SURGERY

## 2020-10-21 PROCEDURE — 2580000003 HC RX 258: Performed by: ANESTHESIOLOGY

## 2020-10-21 PROCEDURE — 0DSN0ZZ REPOSITION SIGMOID COLON, OPEN APPROACH: ICD-10-PCS | Performed by: SURGERY

## 2020-10-21 PROCEDURE — 3700000000 HC ANESTHESIA ATTENDED CARE: Performed by: SURGERY

## 2020-10-21 PROCEDURE — 6370000000 HC RX 637 (ALT 250 FOR IP)

## 2020-10-21 PROCEDURE — 3700000001 HC ADD 15 MINUTES (ANESTHESIA): Performed by: SURGERY

## 2020-10-21 PROCEDURE — P9045 ALBUMIN (HUMAN), 5%, 250 ML: HCPCS | Performed by: NURSE ANESTHETIST, CERTIFIED REGISTERED

## 2020-10-21 PROCEDURE — C9113 INJ PANTOPRAZOLE SODIUM, VIA: HCPCS | Performed by: INTERNAL MEDICINE

## 2020-10-21 PROCEDURE — 86141 C-REACTIVE PROTEIN HS: CPT

## 2020-10-21 PROCEDURE — 80307 DRUG TEST PRSMV CHEM ANLYZR: CPT

## 2020-10-21 PROCEDURE — 88305 TISSUE EXAM BY PATHOLOGIST: CPT

## 2020-10-21 PROCEDURE — 6360000002 HC RX W HCPCS: Performed by: NURSE ANESTHETIST, CERTIFIED REGISTERED

## 2020-10-21 PROCEDURE — 36415 COLL VENOUS BLD VENIPUNCTURE: CPT

## 2020-10-21 PROCEDURE — 80053 COMPREHEN METABOLIC PANEL: CPT

## 2020-10-21 RX ORDER — PROPOFOL 10 MG/ML
INJECTION, EMULSION INTRAVENOUS PRN
Status: DISCONTINUED | OUTPATIENT
Start: 2020-10-21 | End: 2020-10-21 | Stop reason: SDUPTHER

## 2020-10-21 RX ORDER — FENTANYL CITRATE 50 UG/ML
50 INJECTION, SOLUTION INTRAMUSCULAR; INTRAVENOUS EVERY 5 MIN PRN
Status: DISCONTINUED | OUTPATIENT
Start: 2020-10-21 | End: 2020-10-21

## 2020-10-21 RX ORDER — HYDRALAZINE HYDROCHLORIDE 20 MG/ML
5 INJECTION INTRAMUSCULAR; INTRAVENOUS EVERY 10 MIN PRN
Status: DISCONTINUED | OUTPATIENT
Start: 2020-10-21 | End: 2020-10-21

## 2020-10-21 RX ORDER — DEXAMETHASONE SODIUM PHOSPHATE 4 MG/ML
INJECTION, SOLUTION INTRA-ARTICULAR; INTRALESIONAL; INTRAMUSCULAR; INTRAVENOUS; SOFT TISSUE PRN
Status: DISCONTINUED | OUTPATIENT
Start: 2020-10-21 | End: 2020-10-21 | Stop reason: SDUPTHER

## 2020-10-21 RX ORDER — HYDROMORPHONE HCL 110MG/55ML
PATIENT CONTROLLED ANALGESIA SYRINGE INTRAVENOUS PRN
Status: DISCONTINUED | OUTPATIENT
Start: 2020-10-21 | End: 2020-10-21 | Stop reason: SDUPTHER

## 2020-10-21 RX ORDER — FENTANYL CITRATE 50 UG/ML
25 INJECTION, SOLUTION INTRAMUSCULAR; INTRAVENOUS EVERY 5 MIN PRN
Status: DISCONTINUED | OUTPATIENT
Start: 2020-10-21 | End: 2020-10-21

## 2020-10-21 RX ORDER — ONDANSETRON 2 MG/ML
INJECTION INTRAMUSCULAR; INTRAVENOUS PRN
Status: DISCONTINUED | OUTPATIENT
Start: 2020-10-21 | End: 2020-10-21 | Stop reason: SDUPTHER

## 2020-10-21 RX ORDER — HYDROMORPHONE HCL IN WATER/PF 6 MG/30 ML
PATIENT CONTROLLED ANALGESIA SYRINGE INTRAVENOUS CONTINUOUS
Status: DISCONTINUED | OUTPATIENT
Start: 2020-10-21 | End: 2020-10-29

## 2020-10-21 RX ORDER — MIDAZOLAM HYDROCHLORIDE 1 MG/ML
INJECTION INTRAMUSCULAR; INTRAVENOUS PRN
Status: DISCONTINUED | OUTPATIENT
Start: 2020-10-21 | End: 2020-10-21 | Stop reason: SDUPTHER

## 2020-10-21 RX ORDER — ONDANSETRON 2 MG/ML
4 INJECTION INTRAMUSCULAR; INTRAVENOUS
Status: COMPLETED | OUTPATIENT
Start: 2020-10-21 | End: 2020-10-21

## 2020-10-21 RX ORDER — LIDOCAINE HYDROCHLORIDE 20 MG/ML
INJECTION, SOLUTION INTRAVENOUS PRN
Status: DISCONTINUED | OUTPATIENT
Start: 2020-10-21 | End: 2020-10-21 | Stop reason: SDUPTHER

## 2020-10-21 RX ORDER — FENTANYL CITRATE 50 UG/ML
INJECTION, SOLUTION INTRAMUSCULAR; INTRAVENOUS PRN
Status: DISCONTINUED | OUTPATIENT
Start: 2020-10-21 | End: 2020-10-21 | Stop reason: SDUPTHER

## 2020-10-21 RX ORDER — ONDANSETRON 2 MG/ML
4 INJECTION INTRAMUSCULAR; INTRAVENOUS
Status: DISCONTINUED | OUTPATIENT
Start: 2020-10-21 | End: 2020-10-21

## 2020-10-21 RX ORDER — LABETALOL HYDROCHLORIDE 5 MG/ML
5 INJECTION, SOLUTION INTRAVENOUS EVERY 10 MIN PRN
Status: DISCONTINUED | OUTPATIENT
Start: 2020-10-21 | End: 2020-10-21

## 2020-10-21 RX ORDER — SODIUM CHLORIDE, SODIUM LACTATE, POTASSIUM CHLORIDE, CALCIUM CHLORIDE 600; 310; 30; 20 MG/100ML; MG/100ML; MG/100ML; MG/100ML
INJECTION, SOLUTION INTRAVENOUS CONTINUOUS PRN
Status: DISCONTINUED | OUTPATIENT
Start: 2020-10-21 | End: 2020-10-21 | Stop reason: SDUPTHER

## 2020-10-21 RX ORDER — SODIUM CHLORIDE, SODIUM LACTATE, POTASSIUM CHLORIDE, CALCIUM CHLORIDE 600; 310; 30; 20 MG/100ML; MG/100ML; MG/100ML; MG/100ML
INJECTION, SOLUTION INTRAVENOUS ONCE
Status: COMPLETED | OUTPATIENT
Start: 2020-10-21 | End: 2020-10-21

## 2020-10-21 RX ORDER — ALBUMIN, HUMAN INJ 5% 5 %
SOLUTION INTRAVENOUS PRN
Status: DISCONTINUED | OUTPATIENT
Start: 2020-10-21 | End: 2020-10-21 | Stop reason: SDUPTHER

## 2020-10-21 RX ORDER — ROCURONIUM BROMIDE 10 MG/ML
INJECTION, SOLUTION INTRAVENOUS PRN
Status: DISCONTINUED | OUTPATIENT
Start: 2020-10-21 | End: 2020-10-21 | Stop reason: SDUPTHER

## 2020-10-21 RX ORDER — VECURONIUM BROMIDE 1 MG/ML
INJECTION, POWDER, LYOPHILIZED, FOR SOLUTION INTRAVENOUS PRN
Status: DISCONTINUED | OUTPATIENT
Start: 2020-10-21 | End: 2020-10-21 | Stop reason: SDUPTHER

## 2020-10-21 RX ADMIN — MEROPENEM 1 G: 1 INJECTION, POWDER, FOR SOLUTION INTRAVENOUS at 09:01

## 2020-10-21 RX ADMIN — VECURONIUM BROMIDE FOR INJECTION 3 MG: 1 INJECTION, POWDER, LYOPHILIZED, FOR SOLUTION INTRAVENOUS at 14:17

## 2020-10-21 RX ADMIN — Medication 0.2 MG: at 03:54

## 2020-10-21 RX ADMIN — SODIUM CHLORIDE, POTASSIUM CHLORIDE, SODIUM LACTATE AND CALCIUM CHLORIDE: 600; 310; 30; 20 INJECTION, SOLUTION INTRAVENOUS at 12:35

## 2020-10-21 RX ADMIN — FENTANYL CITRATE 25 MCG: 50 INJECTION, SOLUTION INTRAMUSCULAR; INTRAVENOUS at 17:04

## 2020-10-21 RX ADMIN — ONDANSETRON 4 MG: 2 INJECTION INTRAMUSCULAR; INTRAVENOUS at 17:25

## 2020-10-21 RX ADMIN — FENTANYL CITRATE 50 MCG: 50 INJECTION INTRAMUSCULAR; INTRAVENOUS at 13:33

## 2020-10-21 RX ADMIN — ALBUMIN (HUMAN) 250 ML: 12.5 INJECTION, SOLUTION INTRAVENOUS at 15:00

## 2020-10-21 RX ADMIN — FENTANYL CITRATE 25 MCG: 50 INJECTION, SOLUTION INTRAMUSCULAR; INTRAVENOUS at 17:19

## 2020-10-21 RX ADMIN — LINEZOLID 600 MG: 600 INJECTION, SOLUTION INTRAVENOUS at 23:46

## 2020-10-21 RX ADMIN — METOPROLOL TARTRATE 100 MG: 50 TABLET, FILM COATED ORAL at 20:49

## 2020-10-21 RX ADMIN — PANTOPRAZOLE SODIUM 40 MG: 40 INJECTION, POWDER, FOR SOLUTION INTRAVENOUS at 09:01

## 2020-10-21 RX ADMIN — ROCURONIUM BROMIDE 50 MG: 10 INJECTION INTRAVENOUS at 13:36

## 2020-10-21 RX ADMIN — FENTANYL CITRATE 50 MCG: 50 INJECTION INTRAMUSCULAR; INTRAVENOUS at 13:45

## 2020-10-21 RX ADMIN — HYDROMORPHONE HYDROCHLORIDE 0.5 MG: 2 INJECTION INTRAMUSCULAR; INTRAVENOUS; SUBCUTANEOUS at 15:50

## 2020-10-21 RX ADMIN — SODIUM CHLORIDE, POTASSIUM CHLORIDE, SODIUM LACTATE AND CALCIUM CHLORIDE: 600; 310; 30; 20 INJECTION, SOLUTION INTRAVENOUS at 14:30

## 2020-10-21 RX ADMIN — SUGAMMADEX 200 MG: 100 INJECTION, SOLUTION INTRAVENOUS at 16:13

## 2020-10-21 RX ADMIN — PHENYLEPHRINE HYDROCHLORIDE 200 MCG: 10 INJECTION INTRAVENOUS at 14:49

## 2020-10-21 RX ADMIN — HYDROMORPHONE HYDROCHLORIDE 1 MG: 2 INJECTION INTRAMUSCULAR; INTRAVENOUS; SUBCUTANEOUS at 14:15

## 2020-10-21 RX ADMIN — PHENYLEPHRINE HYDROCHLORIDE 100 MCG: 10 INJECTION INTRAVENOUS at 14:42

## 2020-10-21 RX ADMIN — LIDOCAINE HYDROCHLORIDE 100 MG: 20 INJECTION, SOLUTION INTRAVENOUS at 13:35

## 2020-10-21 RX ADMIN — MEROPENEM 1 G: 1 INJECTION, POWDER, FOR SOLUTION INTRAVENOUS at 18:18

## 2020-10-21 RX ADMIN — DEXAMETHASONE SODIUM PHOSPHATE 4 MG: 4 INJECTION, SOLUTION INTRAMUSCULAR; INTRAVENOUS at 13:45

## 2020-10-21 RX ADMIN — MIDAZOLAM 2 MG: 1 INJECTION INTRAMUSCULAR; INTRAVENOUS at 13:25

## 2020-10-21 RX ADMIN — VECURONIUM BROMIDE FOR INJECTION 1 MG: 1 INJECTION, POWDER, LYOPHILIZED, FOR SOLUTION INTRAVENOUS at 15:48

## 2020-10-21 RX ADMIN — SODIUM CHLORIDE: 9 INJECTION, SOLUTION INTRAVENOUS at 05:07

## 2020-10-21 RX ADMIN — SODIUM CHLORIDE, PRESERVATIVE FREE 10 ML: 5 INJECTION INTRAVENOUS at 09:02

## 2020-10-21 RX ADMIN — HYDROMORPHONE HYDROCHLORIDE 1 MG: 1 INJECTION, SOLUTION INTRAMUSCULAR; INTRAVENOUS; SUBCUTANEOUS at 19:41

## 2020-10-21 RX ADMIN — PROPOFOL 200 MG: 10 INJECTION, EMULSION INTRAVENOUS at 13:35

## 2020-10-21 RX ADMIN — SODIUM CHLORIDE, POTASSIUM CHLORIDE, SODIUM LACTATE AND CALCIUM CHLORIDE: 600; 310; 30; 20 INJECTION, SOLUTION INTRAVENOUS at 13:11

## 2020-10-21 RX ADMIN — HYDROMORPHONE HYDROCHLORIDE 1 MG: 1 INJECTION, SOLUTION INTRAMUSCULAR; INTRAVENOUS; SUBCUTANEOUS at 23:47

## 2020-10-21 RX ADMIN — LINEZOLID 600 MG: 600 INJECTION, SOLUTION INTRAVENOUS at 13:43

## 2020-10-21 RX ADMIN — MEROPENEM 1 G: 1 INJECTION, POWDER, FOR SOLUTION INTRAVENOUS at 00:45

## 2020-10-21 RX ADMIN — VECURONIUM BROMIDE FOR INJECTION 2 MG: 1 INJECTION, POWDER, LYOPHILIZED, FOR SOLUTION INTRAVENOUS at 15:01

## 2020-10-21 RX ADMIN — SODIUM CHLORIDE, PRESERVATIVE FREE 10 ML: 5 INJECTION INTRAVENOUS at 09:01

## 2020-10-21 RX ADMIN — ONDANSETRON 4 MG: 2 INJECTION INTRAMUSCULAR; INTRAVENOUS at 15:57

## 2020-10-21 RX ADMIN — HYDROMORPHONE HYDROCHLORIDE 0.5 MG: 2 INJECTION INTRAMUSCULAR; INTRAVENOUS; SUBCUTANEOUS at 16:10

## 2020-10-21 ASSESSMENT — PULMONARY FUNCTION TESTS
PIF_VALUE: 15
PIF_VALUE: 15
PIF_VALUE: 16
PIF_VALUE: 15
PIF_VALUE: 16
PIF_VALUE: 15
PIF_VALUE: 15
PIF_VALUE: 16
PIF_VALUE: 15
PIF_VALUE: 16
PIF_VALUE: 0
PIF_VALUE: 15
PIF_VALUE: 16
PIF_VALUE: 10
PIF_VALUE: 15
PIF_VALUE: 16
PIF_VALUE: 15
PIF_VALUE: 16
PIF_VALUE: 22
PIF_VALUE: 15
PIF_VALUE: 16
PIF_VALUE: 25
PIF_VALUE: 4
PIF_VALUE: 16
PIF_VALUE: 15
PIF_VALUE: 1
PIF_VALUE: 16
PIF_VALUE: 15
PIF_VALUE: 0
PIF_VALUE: 16
PIF_VALUE: 15
PIF_VALUE: 16
PIF_VALUE: 0
PIF_VALUE: 16
PIF_VALUE: 16
PIF_VALUE: 15
PIF_VALUE: 13
PIF_VALUE: 16
PIF_VALUE: 15
PIF_VALUE: 16
PIF_VALUE: 15
PIF_VALUE: 16
PIF_VALUE: 16
PIF_VALUE: 0
PIF_VALUE: 15
PIF_VALUE: 15
PIF_VALUE: 16
PIF_VALUE: 0
PIF_VALUE: 16
PIF_VALUE: 15
PIF_VALUE: 0
PIF_VALUE: 15
PIF_VALUE: 16
PIF_VALUE: 15
PIF_VALUE: 16
PIF_VALUE: 15
PIF_VALUE: 15
PIF_VALUE: 16
PIF_VALUE: 15
PIF_VALUE: 22
PIF_VALUE: 16
PIF_VALUE: 16
PIF_VALUE: 18
PIF_VALUE: 15
PIF_VALUE: 25
PIF_VALUE: 15
PIF_VALUE: 0
PIF_VALUE: 15
PIF_VALUE: 22
PIF_VALUE: 1
PIF_VALUE: 16
PIF_VALUE: 0
PIF_VALUE: 15
PIF_VALUE: 4
PIF_VALUE: 15
PIF_VALUE: 15
PIF_VALUE: 22
PIF_VALUE: 16
PIF_VALUE: 15
PIF_VALUE: 16
PIF_VALUE: 4
PIF_VALUE: 14
PIF_VALUE: 16
PIF_VALUE: 14
PIF_VALUE: 3
PIF_VALUE: 15
PIF_VALUE: 16
PIF_VALUE: 15
PIF_VALUE: 16
PIF_VALUE: 16
PIF_VALUE: 15
PIF_VALUE: 16
PIF_VALUE: 14
PIF_VALUE: 16
PIF_VALUE: 15
PIF_VALUE: 15
PIF_VALUE: 16
PIF_VALUE: 0
PIF_VALUE: 16
PIF_VALUE: 16
PIF_VALUE: 1
PIF_VALUE: 17
PIF_VALUE: 22
PIF_VALUE: 18
PIF_VALUE: 15
PIF_VALUE: 15
PIF_VALUE: 16
PIF_VALUE: 22
PIF_VALUE: 15
PIF_VALUE: 16
PIF_VALUE: 15
PIF_VALUE: 16
PIF_VALUE: 16
PIF_VALUE: 15
PIF_VALUE: 16
PIF_VALUE: 16
PIF_VALUE: 15
PIF_VALUE: 15
PIF_VALUE: 16
PIF_VALUE: 16
PIF_VALUE: 15
PIF_VALUE: 16
PIF_VALUE: 18
PIF_VALUE: 16
PIF_VALUE: 21
PIF_VALUE: 1
PIF_VALUE: 16
PIF_VALUE: 15
PIF_VALUE: 16
PIF_VALUE: 17
PIF_VALUE: 24
PIF_VALUE: 15
PIF_VALUE: 16
PIF_VALUE: 17

## 2020-10-21 ASSESSMENT — PAIN SCALES - GENERAL
PAINLEVEL_OUTOF10: 5
PAINLEVEL_OUTOF10: 7
PAINLEVEL_OUTOF10: 4
PAINLEVEL_OUTOF10: 7
PAINLEVEL_OUTOF10: 6
PAINLEVEL_OUTOF10: 9
PAINLEVEL_OUTOF10: 10
PAINLEVEL_OUTOF10: 8

## 2020-10-21 ASSESSMENT — PAIN DESCRIPTION - PAIN TYPE
TYPE: SURGICAL PAIN

## 2020-10-21 ASSESSMENT — PAIN DESCRIPTION - FREQUENCY
FREQUENCY: CONTINUOUS

## 2020-10-21 ASSESSMENT — PAIN DESCRIPTION - ONSET: ONSET: ON-GOING

## 2020-10-21 ASSESSMENT — PAIN DESCRIPTION - PROGRESSION
CLINICAL_PROGRESSION: GRADUALLY IMPROVING
CLINICAL_PROGRESSION: GRADUALLY WORSENING

## 2020-10-21 ASSESSMENT — PAIN DESCRIPTION - LOCATION
LOCATION: ABDOMEN

## 2020-10-21 ASSESSMENT — PAIN DESCRIPTION - DESCRIPTORS
DESCRIPTORS: ACHING;DISCOMFORT
DESCRIPTORS: ACHING;CONSTANT
DESCRIPTORS: ACHING;DISCOMFORT

## 2020-10-21 ASSESSMENT — LIFESTYLE VARIABLES: SMOKING_STATUS: 1

## 2020-10-21 ASSESSMENT — PAIN DESCRIPTION - ORIENTATION: ORIENTATION: LEFT

## 2020-10-21 ASSESSMENT — PAIN - FUNCTIONAL ASSESSMENT: PAIN_FUNCTIONAL_ASSESSMENT: PREVENTS OR INTERFERES SOME ACTIVE ACTIVITIES AND ADLS

## 2020-10-21 NOTE — PROGRESS NOTES
Hospitalist Progress Note      Name:  Allan Cowart /Age/Sex: 1975  (39 y.o. male)   MRN & CSN:  [id number] & [id number] Admission Date/Time: 10/16/2020 11:04 AM   Location:  OR/NONE PCP: Catalina Collier, RN, BSN, MSN, [facility]         Hospital Day: 6    History of Present Illness:     Chief Complaint: Allan Cowart is a 39 y.o.  male  who presents with abdominal pain     The patient seen and examined at bed side before surgery. Says his red ness of the left side of abdomen is persistent. Denies have any chest pain or SOB. Ten point ROS reviewed negative, unless as noted above    Objective: Intake/Output Summary (Last 24 hours) at 10/21/2020 1446  Last data filed at 10/21/2020 0530  Gross per 24 hour   Intake 2844.58 ml   Output 100 ml   Net 2744.58 ml      Vitals:   Vitals:    10/21/20 1045   BP: (!) 157/92   Pulse: 97   Resp: 16   Temp: 98.2 °F (36.8 °C)   SpO2: 96%     Physical Exam:   General Appearance: alert and oriented to person, place and time, in no acute distress  Cardiovascular: normal rate, regular rhythm, normal S1 and S2  Pulmonary/Chest: clear to auscultation bilaterally  Abdomen: soft, s/p IR drainage with placement of drain with purulent fluid, non-distended, normal bowel sounds, no masses.  Left lower quadrant redness  Extremities: no cyanosis, clubbing or edema, pulse   Skin: redness of the left abdominal wall  Head: normocephalic and atraumatic  Eyes: pupils equal, round, and reactive to light  Neck: supple and non-tender without mass, no thyromegaly   Musculoskeletal: normal range of motion, no joint swelling, deformity or tenderness  Neurological: alert, oriented, normal speech, no focal findings or movement disorder noted    Medications:   Medications:    linezolid  600 mg Intravenous Q12H    metoprolol  100 mg Oral BID    meropenem  1 g Intravenous Q8H    Abacavir-Dolutegravir-Lamivud  1 tablet Oral Daily    pantoprazole  40 mg Intravenous Daily    sodium chloride flush  10 mL Intravenous BID    sodium chloride flush  10 mL Intravenous 2 times per day      Infusions:    HYDROmorphone HCl Stopped (10/21/20 1150)    sodium chloride 125 mL/hr at 10/21/20 0902     PRN Meds: morphine, 1 mg, Q2H PRN  HYDROmorphone, 1 mg, Q3H PRN  ketorolac, 15 mg, Q6H PRN  ondansetron, 4 mg, Q6H PRN  sodium chloride flush, 10 mL, PRN  acetaminophen, 650 mg, Q6H PRN    Or  acetaminophen, 650 mg, Q6H PRN            Pertinent New Labs & Imaging Studies     CBC with Differential:    Lab Results   Component Value Date    WBC 14.0 10/21/2020    RBC 3.54 10/21/2020    HGB 10.7 10/21/2020    HCT 34.6 10/21/2020     10/21/2020    MCV 97.7 10/21/2020    MCH 30.2 10/21/2020    MCHC 30.9 10/21/2020    RDW 13.2 10/21/2020    SEGSPCT 68.0 10/21/2020    BANDSPCT 9 10/21/2020    LYMPHOPCT 11.0 10/21/2020    MONOPCT 10.0 10/21/2020    MYELOPCT 1 10/21/2020    BASOPCT 0.2 10/19/2020    MONOSABS 1.4 10/21/2020    LYMPHSABS 1.5 10/21/2020    EOSABS 0.3 10/20/2020    BASOSABS 0.0 10/19/2020    DIFFTYPE MANUAL DIFFERENTIAL 10/21/2020     CMP:    Lab Results   Component Value Date     10/21/2020    K 5.0 10/21/2020    CL 99 10/21/2020    CO2 26 10/21/2020    BUN 3 10/21/2020    CREATININE 0.8 10/21/2020    GFRAA >60 10/21/2020    LABGLOM >60 10/21/2020    GLUCOSE 83 10/21/2020    PROT 5.4 10/21/2020    PROT 6.1 11/04/2012    LABALBU 2.3 10/21/2020    CALCIUM 7.6 10/21/2020    BILITOT 0.3 10/21/2020    ALKPHOS 101 10/21/2020    AST 14 10/21/2020    ALT 7 10/21/2020     Ct Abdomen Pelvis W Iv Contrast Additional Contrast? None    Result Date: 10/16/2020  EXAMINATION: CT OF THE ABDOMEN AND PELVIS WITH CONTRAST 10/16/2020 11:54 am TECHNIQUE: CT of the abdomen and pelvis was performed with the administration of intravenous contrast. Multiplanar reformatted images are provided for review.  Dose modulation, iterative reconstruction, and/or weight based adjustment of the mA/kV was utilized to reduce the radiation dose to as low as reasonably achievable. COMPARISON: 01/08/2016 HISTORY: ORDERING SYSTEM PROVIDED HISTORY: left sided abd pain TECHNOLOGIST PROVIDED HISTORY: Reason for exam:->left sided abd pain Additional Contrast?->None Reason for Exam: left sided abd pain Acuity: Acute Type of Exam: Initial Additional signs and symptoms: LLQ pain, nausea, vomiting, constipation, diarrhea x 2 weeks Relevant Medical/Surgical History: Hx Diverticulitis, Pancreatitis, Hernia repair, Small bowel surg / 75cc [id number] FINDINGS: Lower Chest: The lung bases are clear. Organs: The spleen, adrenal glands and kidneys are unremarkable. There are right simple renal cysts. There is improved with residual mild-to-moderate intrahepatic ductal dilatation without evidence of a radiopaque obstructing nephrolithiasis. Coarse calcifications are present within the pancreatic head, likely sequela pancreatitis. There is worsening moderate pancreatic ductal dilatation, likely from stenosis from chronic pancreatitis. GI/Bowel: There is no bowel obstruction. Status post ileocecectomy. However, there is a large complex air-fluid level in close association to the descending colon measuring 6.3 x 6.6 cm in maximal dimension consistent with an abscess. Scattered diverticular present along the descending colon. There is moderate to severe continuous circumferential wall thickening of the descending colon with moderate pericolonic inflammation likely due to colitis. Pelvis: There is mild to moderate perivesicular inflammation due to secondary cystitis. Peritoneum/Retroperitoneum: There is no free fluid. Reactive mesenteric lymph nodes are present in the left lower quadrant. An index lymph node measures 1.1 x 1.8 cm. There is no pneumoperitoneum. There are unchanged small fat containing supraumbilical hernias, likely incisional.  Mild atherosclerosis involves the abdominal aorta.  Bones/Soft Tissues: Degenerative changes involve the thoracolumbar spine.     1. Multi-septated complex fluid collection within the left mid abdomen along the course of the descending colon consistent with an abscess. This is likely caused from colitis causing secondary cystitis, but cannot exclude underlying malignancy; correlate with colonoscopy once the acute symptoms have resolved. 2. Improved with residual mild-to-moderate intrahepatic, but worsening moderate pancreatic ductal dilatation likely from stenosis of chronic pancreatitis.        Assessment and Plan:   Marianela Umaña is a 39 y.o.  male  who presents with abdominal pain    Sepsis  Acute Diverticulitis  S/p IR drainage with drain tube placement  Abdominal wall cellulitis  Elevated white count>>currently trending down  Cultures pending  CT abdomen and pelvis with colitis, abscess, pancreatic ductal dilatation  Blood cultures no growth so far  Surgical cultures with mixed organisms, including GPC, gram positive bacilli and Gram negative bacilli  Continue Meropenem(no side effects noted) and Zyvox  ID consulted, recommendations appreciated  General surgery recommendations appreciated-For exploratory laportotomy today    HIV   Continue home medications    Proteinuria  Not sure if it is related to antivirals    Hypertension  Continue Metoprolol    Diet Diet NPO Effective Now   DVT Prophylaxis [x] Lovenox, []  Heparin, [] SCDs, [] Ambulation   GI Prophylaxis [x] PPI,  [] H2 Blocker,  [] Carafate,  [] Diet/Tube Feeds   Code Status Full Code   Disposition Patient requires continued admission due to abdominal pain   MDM [] Low, [x] Moderate,[]  High     Electronically signed by Laury Wilkins MD on 10/21/2020 at 2:46 PM

## 2020-10-21 NOTE — ANESTHESIA POSTPROCEDURE EVALUATION
Department of Anesthesiology  Postprocedure Note    Patient: Palma Smith  MRN: [id number]  YOB: 1975  Date of evaluation: 10/21/2020  Time:  4:34 PM     Procedure Summary     Date:  10/21/20 Room / Location:  [address] [place] [address]    Anesthesia Start:  9336 Anesthesia Stop:  1275    Procedure:  LAPAROTOMY EXPLORATORY COLOSTOMY CREATION (N/A Abdomen) Diagnosis:  (BOWEL OBSTRUCTION)    Surgeon:  Kelton Bo MD Responsible Provider:  KEANU Rey CRNA    Anesthesia Type:  general ASA Status:  4 - Emergent          Anesthesia Type: general    Nain Phase I: Nain Score: 8    Nain Phase II:      Last vitals: Reviewed and per EMR flowsheets.        Anesthesia Post Evaluation    Patient location during evaluation: PACU  Patient participation: complete - patient participated  Level of consciousness: sleepy but conscious  Pain score: 1  Airway patency: patent  Nausea & Vomiting: no nausea and no vomiting  Complications: no  Cardiovascular status: hemodynamically stable  Respiratory status: acceptable  Hydration status: euvolemic

## 2020-10-21 NOTE — ANESTHESIA PRE PROCEDURE
Department of Anesthesiology  Preprocedure Note       Name:  Javier Lyons   Age:  39 y.o.  :  1975                                          MRN:  [id number]         Date:  10/21/2020      Surgeon: Rakesh Francis):  Kely Jean-Baptiste MD    Procedure: Procedure(s):  LAPAROTOMY EXPLORATORY POSS COLOSTOMY    Medications prior to admission:   Prior to Admission medications    Medication Sig Start Date End Date Taking? Authorizing Provider   Abacavir-Dolutegravir-Lamivud 803- MG TABS Take by mouth   Yes Historical Provider, MD   efavirenz (SUSTIVA) 600 MG tablet Take 600 mg by mouth nightly.      Yes Historical Provider, MD       Current medications:    Current Facility-Administered Medications   Medication Dose Route Frequency Provider Last Rate Last Dose    linezolid (ZYVOX) IVPB 600 mg  600 mg Intravenous Q12H Chas Mendez MD   Stopped at 10/21/20 0046    morphine (PF) injection 1 mg  1 mg Intravenous Q2H PRN Trino Oneil MD        metoprolol tartrate (LOPRESSOR) tablet 100 mg  100 mg Oral BID Kinjal Virgen MD   100 mg at 10/20/20 2002    HYDROmorphone (DILAUDID) injection 1 mg  1 mg Intravenous Q3H PRN Kailey Scott MD   1 mg at 10/17/20 0920    HYDROmorphone HCl (DILAUDID) 0.2 mg/mL PCA 50 mL   Intravenous Continuous Kailey Scott MD 1 mL/hr at 10/21/20 0354 0.2 mg at 10/21/20 0354    ketorolac (TORADOL) injection 15 mg  15 mg Intravenous Q6H PRN Kailey Scott MD        [facility]) 1 g in sodium chloride 0.9 % 100 mL IVPB (mini-bag)  1 g Intravenous Q8H Ksenia Garcia MD   Stopped at 10/21/20 0931    Abacavir-Dolutegravir-Lamivud 600- MG TABS 600 mg  1 tablet Oral Daily Ksenia Garcia MD   600 mg at 10/20/20 3778    pantoprazole (PROTONIX) injection 40 mg  40 mg Intravenous Daily Ksenia Garcia MD   40 mg at 10/21/20 0901    sodium chloride flush 0.9 % injection 10 mL  10 mL Intravenous BID Brenda Espinoza PA-C   10 mL at 10/21/20 0901    0.9 % sodium chloride infusion   Intravenous Continuous Wendy Rosas  mL/hr at 10/21/20 0902      ondansetron [facility]) injection 4 mg  4 mg Intravenous Q6H PRN Taty Vergara MD   4 mg at 10/20/20 1259    sodium chloride flush 0.9 % injection 10 mL  10 mL Intravenous 2 times per day Diego Navarro MD   10 mL at 10/21/20 0902    sodium chloride flush 0.9 % injection 10 mL  10 mL Intravenous PRN Diego Navarro MD        acetaminophen (TYLENOL) tablet 650 mg  650 mg Oral Q6H PRN Diego Navarro MD   650 mg at 10/17/20 2348    Or    acetaminophen (TYLENOL) suppository 650 mg  650 mg Rectal Q6H PRN Diego Navarro MD           Allergies: Allergies   Allergen Reactions    Pcn [Penicillins]        Problem List:    Patient Active Problem List   Diagnosis Code    HIV positive ([facility] [address] X97    Alcoholic ([facility] [address] C33.45    Hypertension I10    Alcohol withdrawal ([facility] [address] F10.239    Abdominal wall abscess L02. 80    Abscess of sigmoid colon due to diverticulitis K57.20    Proteinuria R80.9    HIV (human immunodeficiency virus infection) ([facility] [address] B20       Past Medical History:        Diagnosis Date    Abscess of sigmoid colon due to diverticulitis 55/20/8298    Alcoholic ([address]     Diverticulitis     HIV positive ([facility] [address] 2003    Hypertension     Pancreatitis        Past Surgical History:        Procedure Laterality Date    CT DRAINAGE ABDOM ABSCESS OPEN  10/17/2020    CT DRAINAGE ABDOM ABSCESS OPEN 10/17/2020 [facility] CT SCAN   [place] Kye Edmondson       Social History:    Social History     Tobacco Use    Smoking status: Current Every Day Smoker     Packs/day: 2.00     Years: 18.00     Pack years: 36.00     Types: Cigarettes    Smokeless tobacco: Never Used   Substance Use Topics    Alcohol use: Not Currently                                Ready to quit: Not Answered  Counseling given: Not Answered      Vital Signs (Current):   Vitals:    10/20/20 1945 10/20/20 2100 10/21/20 0500 10/21/20 1045   BP: (!) 153/94 (!) 153/93 (!) 165/98 (!) 157/92   Pulse: 85 83 91 97   Resp: 16 16 16 16   Temp: 36.8 °C (98.2 °F) 36.9 °C (98.5 °F) 36.8 °C (98.2 °F) 36.8 °C (98.2 °F)   TempSrc: Oral Oral Oral Oral   SpO2: 95%  97% 96%   Weight:       Height:                                                  BP Readings from Last 3 Encounters:   10/21/20 (!) 157/92   01/09/16 123/85       NPO Status:                                                                                 BMI:   Wt Readings from Last 3 Encounters:   10/17/20 167 lb 14.4 oz (76.2 kg)   01/08/16 165 lb (74.8 kg)     Body mass index is 23.42 kg/m². CBC:   Lab Results   Component Value Date    WBC 14.0 10/21/2020    RBC 3.54 10/21/2020    HGB 10.7 10/21/2020    HCT 34.6 10/21/2020    MCV 97.7 10/21/2020    RDW 13.2 10/21/2020     10/21/2020       CMP:   Lab Results   Component Value Date     10/21/2020    K 5.0 10/21/2020    CL 99 10/21/2020    CO2 26 10/21/2020    BUN 3 10/21/2020    CREATININE 0.8 10/21/2020    GFRAA >60 10/21/2020    LABGLOM >60 10/21/2020    GLUCOSE 83 10/21/2020    PROT 5.4 10/21/2020    PROT 6.1 11/04/2012    CALCIUM 7.6 10/21/2020    BILITOT 0.3 10/21/2020    ALKPHOS 101 10/21/2020    AST 14 10/21/2020    ALT 7 10/21/2020       POC Tests: No results for input(s): POCGLU, POCNA, POCK, POCCL, POCBUN, POCHEMO, POCHCT in the last 72 hours.     Coags:   Lab Results   Component Value Date    PROTIME 16.5 10/17/2020    INR 1.36 10/17/2020       HCG (If Applicable): No results found for: PREGTESTUR, PREGSERUM, HCG, HCGQUANT     ABGs: No results found for: PHART, PO2ART, MKA2IXD, NLD9WUS, BEART, Y4SQIZQN     Type & Screen (If Applicable):  No results found for: LABABO, LABRH    Drug/Infectious Status (If Applicable):  Lab Results   Component Value Date    HEPCAB 0.03 04/30/2015       COVID-19 Screening (If Applicable): No results found for: COVID19      Anesthesia Evaluation  Patient summary reviewed  Airway: Mallampati: III  TM distance: >3 FB   Neck ROM: full  Mouth opening: > = 3 FB Dental:          Pulmonary: breath sounds clear to auscultation  (+) current smoker                          ROS comment: Home O2    thc hx etoh use    Cardiovascular:    (+) hypertension: mild,       ECG reviewed  Rhythm: regular             Beta Blocker:  Not on Beta Blocker      ROS comment: Sinus tachycardia   Otherwise normal ECG   No previous ECGs available   Confirmed by Jeremy Sharpe, SAYED ([id number]) on 10/16/2020 3:41:49 PM      Neuro/Psych:   (+) psychiatric history:            GI/Hepatic/Renal:            ROS comment: Hx etoh and pancreatitis   Hx diverticular dx . Endo/Other:    (+) electrolyte abnormalities, . Pt had no PAT visit        ROS comment: HIV+ Abdominal:           Vascular:                                  Anesthesia Plan      general     ASA 4 - emergent     (  HIV+  covid and drug screen need collected )  Induction: intravenous. MIPS: Postoperative opioids intended and Prophylactic antiemetics administered. Anesthetic plan and risks discussed with patient. Plan discussed with CRNA.     Attending anesthesiologist reviewed and agrees with Pre Eval content          KEANU Leiva - CRNA   10/21/2020

## 2020-10-21 NOTE — BRIEF OP NOTE
Brief Postoperative Note      Patient: Ca Dexter  YOB: 1975  MRN: [id number]    Date of Procedure: 10/21/2020    Pre-Op Diagnosis: Perforated sigmoid diverticulitis with pericolonic abscess. Incisional hernias    Post-Op Diagnosis: Same       Procedure(s):  LAPAROTOMY EXPLORATORY COLOSTOMY CREATION , drainage of peritoneal abscess. Removal of accessory spleen. IHR. Surgeon(s):  Bela Amin MD    Assistant:  * No surgical staff found *    Anesthesia: General    Estimated Blood Loss (mL): less than 345     Complications: None    Specimens:   ID Type Source Tests Collected by Time Destination   A : suture marks distal colon Tissue Colon SURGICAL PATHOLOGY Bela Amin MD 10/21/2020 7018    B : part of descending colon Tissue Colon SURGICAL PATHOLOGY Bela Amin MD 10/21/2020 1508    C : accessory spleen Tissue Spleen SURGICAL PATHOLOGY Bela Amin MD 10/21/2020 1517        Implants:  * No implants in log *      Drains:   Closed/Suction Drain LLQ Bulb 19 [place] (Active)       Colostomy LUQ (Active)       Urethral Catheter (Active)       [REMOVED] Closed/Suction Drain Left LLQ Bulb (Removed)   Site Description Reddened 10/21/20 0530   Dressing Status Clean;Dry; Intact 10/21/20 0530   Drainage Appearance Purulent 10/21/20 0530   Status Compressed 10/21/20 0530   Output (ml) 30 ml 10/21/20 0530       Findings: as above    Electronically signed by Bela Amin MD on 10/21/2020 at 4:17 PM

## 2020-10-21 NOTE — PROGRESS NOTES
1630 - transferred from OR on bed, monitor applied, alarms on and verified, bedside handoff provided by Luis Lenz RN and Dr Beti Askew  [phone] - medicated for complaint of abdominal surgical pain/discomfort  [phone] - report called to [address] - phase one care complete  [phone] - transferred to room 4102

## 2020-10-21 NOTE — OP NOTE
Operative Note      Patient: Janusz Beasley  YOB: 1975  MRN: [id number]    Date of Procedure: 10/21/2020      Detailed Description of Procedure:   Dictated.      Electronically signed by Abimbola Parks MD on 10/21/2020 at 4:21 PM

## 2020-10-21 NOTE — PLAN OF CARE
Problem: Pain:  Goal: Pain level will decrease  Description: Pain level will decrease  10/20/2020 1928 by Katlin Tay RN  Outcome: Ongoing  Goal: Control of acute pain  Description: Control of acute pain  10/20/2020 1928 by Katlin Tay RN  Outcome: Ongoing  Goal: Control of chronic pain  Description: Control of chronic pain  10/20/2020 1928 by Katlin Tay RN  Outcome: Ongoing

## 2020-10-22 PROBLEM — K43.2 INCISIONAL HERNIA, WITHOUT OBSTRUCTION OR GANGRENE: Status: ACTIVE | Noted: 2020-10-22

## 2020-10-22 LAB
ALBUMIN SERPL-MCNC: 2.3 GM/DL (ref 3.4–5)
ALP BLD-CCNC: 78 IU/L (ref 40–128)
ALT SERPL-CCNC: 8 U/L (ref 10–40)
ANION GAP SERPL CALCULATED.3IONS-SCNC: 11 MMOL/L (ref 4–16)
AST SERPL-CCNC: 16 IU/L (ref 15–37)
BASOPHILS ABSOLUTE: 0.1 K/CU MM
BASOPHILS RELATIVE PERCENT: 0.3 % (ref 0–1)
BILIRUB SERPL-MCNC: 0.3 MG/DL (ref 0–1)
BUN BLDV-MCNC: 5 MG/DL (ref 6–23)
CALCIUM SERPL-MCNC: 7.4 MG/DL (ref 8.3–10.6)
CD3 % TOTAL T CELLS: 77 % (ref 62–87)
CD3 ABSOLUTE: 1401 CELLS/UL (ref 570–2400)
CD4 % HELPER T CELL: 47 % (ref 32–64)
CD4 T CELL ABSOLUTE: 859 CELLS/UL (ref 430–1800)
CD4/CD8 RATIO: 1.62 RATIO (ref 0.8–3.9)
CD8 % SUPPRESSOR T CELL: 29 % (ref 15–46)
CD8 T CELL ABSOLUTE: 536 CELLS/UL (ref 210–1200)
CHLORIDE BLD-SCNC: 99 MMOL/L (ref 99–110)
CO2: 24 MMOL/L (ref 21–32)
CREAT SERPL-MCNC: 0.7 MG/DL (ref 0.9–1.3)
CULTURE: NORMAL
CULTURE: NORMAL
DIFFERENTIAL TYPE: ABNORMAL
EOSINOPHILS ABSOLUTE: 0.2 K/CU MM
EOSINOPHILS RELATIVE PERCENT: 0.8 % (ref 0–3)
GFR AFRICAN AMERICAN: >60 ML/MIN/1.73M2
GFR NON-AFRICAN AMERICAN: >60 ML/MIN/1.73M2
GLUCOSE BLD-MCNC: 89 MG/DL (ref 70–99)
HBV QNT LOG, IU/ML: NOT DETECTED LOG IU/ML
HBV QNT, IU/ML: NOT DETECTED IU/ML
HCT VFR BLD CALC: 34.1 % (ref 42–52)
HEMOGLOBIN: 10.5 GM/DL (ref 13.5–18)
HIGH SENSITIVE C-REACTIVE PROTEIN: 60.6 MG/L
HIV-1 COPIES/ML: NOT DETECTED CPY/ML
IMMATURE NEUTROPHIL %: 2.7 % (ref 0–0.43)
INTERPRETATION: NOT DETECTED
INTERPRETATION: NOT DETECTED
LOG COPIES/ML (ULTRA): NOT DETECTED LOG CPY/ML
LYMPHOCYTES ABSOLUTE: 2.2 K/CU MM
LYMPHOCYTES RELATIVE PERCENT: 11.4 % (ref 24–44)
Lab: NORMAL
Lab: NORMAL
MCH RBC QN AUTO: 30.2 PG (ref 27–31)
MCHC RBC AUTO-ENTMCNC: 30.8 % (ref 32–36)
MCV RBC AUTO: 98 FL (ref 78–100)
MONOCYTES ABSOLUTE: 2.5 K/CU MM
MONOCYTES RELATIVE PERCENT: 12.6 % (ref 0–4)
NUCLEATED RBC %: 0 %
PDW BLD-RTO: 13.3 % (ref 11.7–14.9)
PLATELET # BLD: 472 K/CU MM (ref 140–440)
PMV BLD AUTO: 9 FL (ref 7.5–11.1)
POTASSIUM SERPL-SCNC: 4.1 MMOL/L (ref 3.5–5.1)
PROCALCITONIN: 0.79
RBC # BLD: 3.48 M/CU MM (ref 4.6–6.2)
SEGMENTED NEUTROPHILS ABSOLUTE COUNT: 14.1 K/CU MM
SEGMENTED NEUTROPHILS RELATIVE PERCENT: 72.2 % (ref 36–66)
SODIUM BLD-SCNC: 134 MMOL/L (ref 135–145)
SPECIMEN: NORMAL
SPECIMEN: NORMAL
TOTAL IMMATURE NEUTOROPHIL: 0.53 K/CU MM
TOTAL NUCLEATED RBC: 0 K/CU MM
TOTAL PROTEIN: 5.2 GM/DL (ref 6.4–8.2)
WBC # BLD: 19.6 K/CU MM (ref 4–10.5)

## 2020-10-22 PROCEDURE — 84145 PROCALCITONIN (PCT): CPT

## 2020-10-22 PROCEDURE — 6360000002 HC RX W HCPCS: Performed by: SURGERY

## 2020-10-22 PROCEDURE — 6370000000 HC RX 637 (ALT 250 FOR IP): Performed by: SURGERY

## 2020-10-22 PROCEDURE — 2580000003 HC RX 258: Performed by: SURGERY

## 2020-10-22 PROCEDURE — 86141 C-REACTIVE PROTEIN HS: CPT

## 2020-10-22 PROCEDURE — 94761 N-INVAS EAR/PLS OXIMETRY MLT: CPT

## 2020-10-22 PROCEDURE — 80053 COMPREHEN METABOLIC PANEL: CPT

## 2020-10-22 PROCEDURE — 99232 SBSQ HOSP IP/OBS MODERATE 35: CPT | Performed by: INTERNAL MEDICINE

## 2020-10-22 PROCEDURE — 85025 COMPLETE CBC W/AUTO DIFF WBC: CPT

## 2020-10-22 PROCEDURE — C9113 INJ PANTOPRAZOLE SODIUM, VIA: HCPCS | Performed by: SURGERY

## 2020-10-22 PROCEDURE — 1200000000 HC SEMI PRIVATE

## 2020-10-22 PROCEDURE — 36415 COLL VENOUS BLD VENIPUNCTURE: CPT

## 2020-10-22 RX ADMIN — SODIUM CHLORIDE, PRESERVATIVE FREE 10 ML: 5 INJECTION INTRAVENOUS at 10:30

## 2020-10-22 RX ADMIN — METOPROLOL TARTRATE 100 MG: 50 TABLET, FILM COATED ORAL at 20:34

## 2020-10-22 RX ADMIN — HYDROMORPHONE HYDROCHLORIDE 1 MG: 1 INJECTION, SOLUTION INTRAMUSCULAR; INTRAVENOUS; SUBCUTANEOUS at 06:05

## 2020-10-22 RX ADMIN — PANTOPRAZOLE SODIUM 40 MG: 40 INJECTION, POWDER, FOR SOLUTION INTRAVENOUS at 11:02

## 2020-10-22 RX ADMIN — Medication 0.2 MG: at 18:06

## 2020-10-22 RX ADMIN — LINEZOLID 600 MG: 600 INJECTION, SOLUTION INTRAVENOUS at 23:41

## 2020-10-22 RX ADMIN — HYDROMORPHONE HYDROCHLORIDE 1 MG: 1 INJECTION, SOLUTION INTRAMUSCULAR; INTRAVENOUS; SUBCUTANEOUS at 10:02

## 2020-10-22 RX ADMIN — METOPROLOL TARTRATE 100 MG: 50 TABLET, FILM COATED ORAL at 10:28

## 2020-10-22 RX ADMIN — MEROPENEM 1 G: 1 INJECTION, POWDER, FOR SOLUTION INTRAVENOUS at 10:29

## 2020-10-22 RX ADMIN — Medication: at 02:48

## 2020-10-22 RX ADMIN — ONDANSETRON 4 MG: 2 INJECTION INTRAMUSCULAR; INTRAVENOUS at 11:02

## 2020-10-22 RX ADMIN — SODIUM CHLORIDE: 9 INJECTION, SOLUTION INTRAVENOUS at 20:34

## 2020-10-22 RX ADMIN — MEROPENEM 1 G: 1 INJECTION, POWDER, FOR SOLUTION INTRAVENOUS at 01:17

## 2020-10-22 RX ADMIN — MEROPENEM 1 G: 1 INJECTION, POWDER, FOR SOLUTION INTRAVENOUS at 17:01

## 2020-10-22 RX ADMIN — LINEZOLID 600 MG: 600 INJECTION, SOLUTION INTRAVENOUS at 13:31

## 2020-10-22 RX ADMIN — HYDROMORPHONE HYDROCHLORIDE 1 MG: 1 INJECTION, SOLUTION INTRAMUSCULAR; INTRAVENOUS; SUBCUTANEOUS at 18:45

## 2020-10-22 RX ADMIN — ONDANSETRON 4 MG: 2 INJECTION INTRAMUSCULAR; INTRAVENOUS at 23:41

## 2020-10-22 RX ADMIN — SODIUM CHLORIDE: 9 INJECTION, SOLUTION INTRAVENOUS at 10:28

## 2020-10-22 RX ADMIN — HYDROMORPHONE HYDROCHLORIDE 1 MG: 1 INJECTION, SOLUTION INTRAMUSCULAR; INTRAVENOUS; SUBCUTANEOUS at 17:01

## 2020-10-22 ASSESSMENT — PAIN SCALES - GENERAL
PAINLEVEL_OUTOF10: 6
PAINLEVEL_OUTOF10: 0
PAINLEVEL_OUTOF10: 10
PAINLEVEL_OUTOF10: 6
PAINLEVEL_OUTOF10: 10
PAINLEVEL_OUTOF10: 10

## 2020-10-22 NOTE — PROGRESS NOTES
Infectious Disease Progress Note  10/22/2020   Patient Name: Allan Cowart : 1975       Reason for visit: F/u sepsis secondary to diverticular abscess  History:? Interval history noted. Status post exploratory laparotomy, colostomy creation, drainage of peritoneal abscess, removal of catheter spleen on 10/21 for perforated sigmoid diverticulitis with pericolonic abscess, incisional hernias. Ieft flank pain persists  Physical Exam:  Vital Signs: BP (!) 167/111   Pulse 99   Temp 97.4 °F (36.3 °C) (Oral)   Resp 15   Ht 5' 11\" (1.803 m)   Wt 187 lb (84.8 kg)   SpO2 96%   BMI 26.08 kg/m²     Gen: alert and oriented X3, no distress  Skin: no stigmata of endocarditis  Wounds: C/D/I  HEMT: AT/NC Oropharynx pink, moist, and without lesions or exudates; dentition in good state of repair  Eyes: PERRLA, EOMI, conjunctiva pink, sclera anicteric. Neck: Supple. Trachea midline. No LAD. Chest: no distress and CTA. Good air movement. Heart: RRR and no MRG. Abd: LLQ colostomy and LLQ  left flank erythema and tenderness. soft, non-distended, no tenderness, no hepatomegaly. Normoactive bowel sounds. Ext: no clubbing, cyanosis, or edema  Catheter Site: without erythema or tenderness  Neuro: Mental status intact. CN 2-12 intact and no focal sensory or motor deficits     Radiologic / Imaging / TESTING  No results found.      Labs:    Recent Results (from the past 24 hour(s))   Procalcitonin    Collection Time: 10/22/20 12:21 AM   Result Value Ref Range    Procalcitonin 0.794    Comprehensive Metabolic Panel w/ Reflex to MG    Collection Time: 10/22/20  9:34 AM   Result Value Ref Range    Sodium 134 (L) 135 - 145 MMOL/L    Potassium 4.1 3.5 - 5.1 MMOL/L    Chloride 99 99 - 110 mMol/L    CO2 24 21 - 32 MMOL/L    BUN 5 (L) 6 - 23 MG/DL    CREATININE 0.7 (L) 0.9 - 1.3 MG/DL    Glucose 89 70 - 99 MG/DL    Calcium 7.4 (L) 8.3 - 10.6 MG/DL    Alb 2.3 (L) 3.4 - 5.0 GM/DL    Total Protein 5.2 (L) 6.4 - 8.2 GM/DL    Total Bilirubin 0.3 0.0 - 1.0 MG/DL    ALT 8 (L) 10 - 40 U/L    AST 16 15 - 37 IU/L    Alkaline Phosphatase 78 40 - 128 IU/L    GFR Non-African American >60 >60 mL/min/1.73m2    GFR African American >60 >60 mL/min/1.73m2    Anion Gap 11 4 - 16   CBC auto differential    Collection Time: 10/22/20  9:34 AM   Result Value Ref Range    WBC 19.6 (H) 4.0 - 10.5 K/CU MM    RBC 3.48 (L) 4.6 - 6.2 M/CU MM    Hemoglobin 10.5 (L) 13.5 - 18.0 GM/DL    Hematocrit 34.1 (L) 42 - 52 %    MCV 98.0 78 - 100 FL    MCH 30.2 27 - 31 PG    MCHC 30.8 (L) 32.0 - 36.0 %    RDW 13.3 11.7 - 14.9 %    Platelets 638 (H) 573 - 440 K/CU MM    MPV 9.0 7.5 - 11.1 FL    Differential Type AUTOMATED DIFFERENTIAL     Segs Relative 72.2 (H) 36 - 66 %    Lymphocytes % 11.4 (L) 24 - 44 %    Monocytes % 12.6 (H) 0 - 4 %    Eosinophils % 0.8 0 - 3 %    Basophils % 0.3 0 - 1 %    Segs Absolute 14.1 K/CU MM    Lymphocytes Absolute 2.2 K/CU MM    Monocytes Absolute 2.5 K/CU MM    Eosinophils Absolute 0.2 K/CU MM    Basophils Absolute 0.1 K/CU MM    Nucleated RBC % 0.0 %    Total Nucleated RBC 0.0 K/CU MM    Total Immature Neutrophil 0.53 K/CU MM    Immature Neutrophil % 2.7 (H) 0 - 0.43 %   C-Reactive Protein    Collection Time: 10/22/20  9:34 AM   Result Value Ref Range    CRP, High Sensitivity 60.6 mg/L     CULTURE results: Invalid input(s): BLOOD CULTURE,  URINE CULTURE, SURGICAL CULTURE    Diagnosis:  Patient Active Problem List   Diagnosis    [facility]    Alcoholic [facility]    Hypertension    Alcohol withdrawal ([facility]    Abdominal wall abscess    Abscess of sigmoid colon due to diverticulitis    Proteinuria    HIV (human immunodeficiency virus infection) ([facility]       Active Problems  Active Problems:    Abdominal wall abscess    Abscess of sigmoid colon due to diverticulitis    Proteinuria    HIV (human immunodeficiency virus infection) (HCC)  Resolved Problems:    * No resolved hospital problems.  *      Impression and plan   Clinical status:

## 2020-10-22 NOTE — PROGRESS NOTES
10 mL Intravenous 2 times per day      Infusions:    HYDROmorphone HCl      sodium chloride 125 mL/hr at 10/22/20 1028     PRN Meds: morphine, 1 mg, Q2H PRN  HYDROmorphone, 1 mg, Q3H PRN  ondansetron, 4 mg, Q6H PRN  sodium chloride flush, 10 mL, PRN  acetaminophen, 650 mg, Q6H PRN    Or  acetaminophen, 650 mg, Q6H PRN            Pertinent New Labs & Imaging Studies     CBC with Differential:    Lab Results   Component Value Date    WBC 19.6 10/22/2020    RBC 3.48 10/22/2020    HGB 10.5 10/22/2020    HCT 34.1 10/22/2020     10/22/2020    MCV 98.0 10/22/2020    MCH 30.2 10/22/2020    MCHC 30.8 10/22/2020    RDW 13.3 10/22/2020    SEGSPCT 72.2 10/22/2020    BANDSPCT 9 10/21/2020    LYMPHOPCT 11.4 10/22/2020    MONOPCT 12.6 10/22/2020    MYELOPCT 1 10/21/2020    BASOPCT 0.3 10/22/2020    MONOSABS 2.5 10/22/2020    LYMPHSABS 2.2 10/22/2020    EOSABS 0.2 10/22/2020    BASOSABS 0.1 10/22/2020    DIFFTYPE AUTOMATED DIFFERENTIAL 10/22/2020     CMP:    Lab Results   Component Value Date     10/22/2020    K 4.1 10/22/2020    CL 99 10/22/2020    CO2 24 10/22/2020    BUN 5 10/22/2020    CREATININE 0.7 10/22/2020    GFRAA >60 10/22/2020    LABGLOM >60 10/22/2020    GLUCOSE 89 10/22/2020    PROT 5.2 10/22/2020    PROT 6.1 11/04/2012    LABALBU 2.3 10/22/2020    CALCIUM 7.4 10/22/2020    BILITOT 0.3 10/22/2020    ALKPHOS 78 10/22/2020    AST 16 10/22/2020    ALT 8 10/22/2020     Ct Abdomen Pelvis W Iv Contrast Additional Contrast? None    Result Date: 10/16/2020  EXAMINATION: CT OF THE ABDOMEN AND PELVIS WITH CONTRAST 10/16/2020 11:54 am TECHNIQUE: CT of the abdomen and pelvis was performed with the administration of intravenous contrast. Multiplanar reformatted images are provided for review. Dose modulation, iterative reconstruction, and/or weight based adjustment of the mA/kV was utilized to reduce the radiation dose to as low as reasonably achievable.  COMPARISON: 01/08/2016 HISTORY: ORDERING SYSTEM PROVIDED HISTORY: left sided abd pain TECHNOLOGIST PROVIDED HISTORY: Reason for exam:->left sided abd pain Additional Contrast?->None Reason for Exam: left sided abd pain Acuity: Acute Type of Exam: Initial Additional signs and symptoms: LLQ pain, nausea, vomiting, constipation, diarrhea x 2 weeks Relevant Medical/Surgical History: Hx Diverticulitis, Pancreatitis, Hernia repair, Small bowel surg / 75cc [id number] FINDINGS: Lower Chest: The lung bases are clear. Organs: The spleen, adrenal glands and kidneys are unremarkable. There are right simple renal cysts. There is improved with residual mild-to-moderate intrahepatic ductal dilatation without evidence of a radiopaque obstructing nephrolithiasis. Coarse calcifications are present within the pancreatic head, likely sequela pancreatitis. There is worsening moderate pancreatic ductal dilatation, likely from stenosis from chronic pancreatitis. GI/Bowel: There is no bowel obstruction. Status post ileocecectomy. However, there is a large complex air-fluid level in close association to the descending colon measuring 6.3 x 6.6 cm in maximal dimension consistent with an abscess. Scattered diverticular present along the descending colon. There is moderate to severe continuous circumferential wall thickening of the descending colon with moderate pericolonic inflammation likely due to colitis. Pelvis: There is mild to moderate perivesicular inflammation due to secondary cystitis. Peritoneum/Retroperitoneum: There is no free fluid. Reactive mesenteric lymph nodes are present in the left lower quadrant. An index lymph node measures 1.1 x 1.8 cm. There is no pneumoperitoneum. There are unchanged small fat containing supraumbilical hernias, likely incisional.  Mild atherosclerosis involves the abdominal aorta. Bones/Soft Tissues: Degenerative changes involve the thoracolumbar spine.      1. Multi-septated complex fluid collection within the left mid abdomen along the course of the

## 2020-10-22 NOTE — OP NOTE
[address] [place] [address], [address] [place] [address]                                OPERATIVE REPORT    PATIENT NAME: Bradley Ruiz                  :        1975  MED REC NO:   [id number]                          ROOM:       8171  ACCOUNT NO:   [de-identified]                           ADMIT DATE: 10/16/2020  PROVIDER:     Lexus Jaramillo MD    DATE OF PROCEDURE:  10/21/2020    PREOPERATIVE DIAGNOSES:  1. Perforated sigmoid diverticulitis with pericolonic abscess. 2.  Multiple incisional hernias. POSTOPERATIVE DIAGNOSES:  1. Perforated sigmoid diverticulitis with pericolonic abscess. 2.  Multiple incisional hernias. PROCEDURES PERFORMED:  1. Exploratory laparotomy. 2.  Yazan's procedure with mobilization of the splenic flexure. 3.  Multiple small incisional hernia repairs with primary closure. 4.  Removal of accessory spleen from greater omentum. 5.  Drainage of intraabdominal abscess. SURGEON:  Lexus Jaramillo MD    ANESTHESIA:  General anesthesia combined with endotracheal tube  intubation. SPECIMENS:  1. Sigmoid colon. 2.  Accessory spleen. COMPLICATIONS:  None. DRAINS:  A 19-Malaysian Eric drain placed within the left pericolic gutter  with the tip at the inferior pole of the spleen. ESTIMATED BLOOD LOSS:  100 mL. INTRAOPERATIVE FINDINGS:  The patient had about a 1-cm hole in the  sigmoid colon where the diverticulum had perforated and created the  abscess. Pericolonic abscess was also drained and evacuated  intraoperatively. As I was taking down the splenic flexure, there was a  portion of the omentum that was attached to the spleen. This did create  a splenic capsule tear, which I was able to cauterize with Bovie  electrocautery along with the use of hemostatic agents.   Additionally,  as I was taking down the splenic flexure and the omentum, I did note  that there was a tiny small accessory spleen, which alternatives of the procedure in  detail with the patient. His operative consent was signed and placed in  the chart, and he had already been placed on the antibiotics meropenem  along with Zyvox. DESCRIPTION OF PROCEDURE:  The patient was brought to the operating  room, placed in the supine position. Bilateral lower extremity  compression boots were placed. General anesthesia combined with  endotracheal tube intubation was then performed. Universal timeout was  performed verifying the patient, date of birth, site of surgery, and we  were all in agreement to proceed. A Lackey catheter was then inserted  sterilely. Anesthesia then also placed an oral gastric tube. Prior to  prepping the abdomen, I went ahead and removed the percutaneous drain  from the left lower quadrant. Once this was completed, his abdomen was  then sterilely prepped and draped in a standard surgical fashion. A  vertical midline incision was then made from just above the xiphoid to  just above the pubic symphysis. This was then deepened through the  subcutaneous tissues. Hemostasis was achieved with electrocautery. Linea alba was identified and sized and peritoneal cavity was then  infiltrated. The abdomen was then explored. Minimal amount of  adhesions were then lysed under direct vision with Metzenbaum scissors  and also with the use of the LigaSure device. There was some omentum  stuck from my prior midline incision. This was then taken down and he  was also noted to have multiple small incisional hernias filled with the  omental contents, which I easily took down and removed the small little  hernia sacs from the midline incision. I then identified an abscess in  the left lower quadrant along a very boggy, indurated sigmoid colon. I  did identify about 1 cm hole in the sigmoid colon from the perforated  diverticular disease. The small bowel was then inspected and retracted  to the right.   Using electrocautery along with blunt dissection, the  colon was then freed from its peritoneal attachments along the line of  Toldt proximally with mobilization of the splenic flexure distally to  the pelvic inlet. The ureter was identified and protected throughout  the course of the dissection. The omentum was then freed from the  transverse colon and the splenic flexure was then mobilized, and during  the course of this, there was a portion of omentum that was attached to  the spleen, which caused a small splenic tear, and then I was able to  control this bleeding with electrocautery along with the use of  fibrillar. A point of transection was then selected proximally in the  mid descending colon and distally at the rectosigmoid junction. Mesenteric windows were then made in the bowel and both of these ends  were then divided with a linear 75 mm blue cutting stapler device. The  peritoneum overlying the mesentery was then scored with electrocautery  and this was then taken down with the use of a LigaSure device. The  peritoneum overlying the mesentery was then scored and all of this and  also the remaining mesentery was also taken down again with LigaSure  device. This specimen was then removed, distal end was tagged with a  silk suture and sent for pathologic examination. I did remove  approximately _____ of sigmoid colon. The abdomen was then copiously  irrigated and hemostasis was then checked. It should also be noted that  when I was taking down the splenic flexure and the attachments of the  omentum to the distal transverse colon, I did note that there was a  small accessory spleen, which I actually had traumatized and it was  bleeding a little during the course of the dissection, so I went ahead  and removed this and sent this for pathological examination. I was  concerned that it would continue to bleed.   I then checked to see if the  proximal colon would easily reach to the proposed colostomy site in the  mid left abdomen, but it did not, it was tethered with the mesentery,  and then what I determined was that I could just resect another 2 inches  of colon and then I was going to be able to reach the appropriate  colostomy sites, which I did again with a LUCRETIA-75 blue tissue load  stapling device. This portion of the colon was also sent for pathologic  examination. Once this was performed, I then determined that the colon  could easily reach to my colostomy site and then a disk of skin was then  removed from the colostomy site in the left lower quadrant. The  incision was easily dilated to the abdominal wall and dilated to admit 3  fingers. The colon was then passed out through the ostomy site without  _____. I then copiously irrigated out the abdominal cavity with 2  liters of normal saline solution. Hemostasis was intact and there was  no bleeding from the spleen. I then tagged my rectal pouch with two  long sutures of 2-0 Prolene and this was then allowed to fall into the  pelvis. A 19-Croatian Eric drain was then placed in the left pericolic  gutter with the tip near the inferior portion of the spleen through a  separate stab incision in the left lower quadrant. The drain was then  secured with a 3-0 silk stitch. The fascia was then closed with a  running suture of #1 looped 0 PDS. Skin was then closed with skin  staples and then left  and packed with iodoform gauze. Dry  sterile dressing with Tegaderm was then placed. The colostomy was then  matured with multiple interrupted sutures of 4-0 Vicryl. An ostomy bag  was then applied. All instrument, sponge, and needle counts were  correct prior to the closure of the abdomen and also at the completion  of the case. The patient was then awoken from anesthesia, transferred  to the recovery room in a satisfactory and stable condition.         Trena Paiz MD    D: 10/22/2020 6:42:10       T: 10/22/2020 16:02:19     [email or web address]  Job#: [id number]     Doc#:

## 2020-10-22 NOTE — CARE COORDINATION
Reviewed chart and pt has a new Colostomy along with garfield drain. Spoke again with pt about home care and he is still declining HC at this time. He states he father ran a [employer] and step mother is a retired hospice nurse, if he requires assistance he will go to their home. He states will reach out to CM if he changes his mind about home care. CM will continue to follow.

## 2020-10-23 ENCOUNTER — APPOINTMENT (OUTPATIENT)
Dept: ULTRASOUND IMAGING | Age: 45
DRG: 969 | End: 2020-10-23
Payer: COMMERCIAL

## 2020-10-23 LAB
ALBUMIN SERPL-MCNC: 2.1 GM/DL (ref 3.4–5)
ALP BLD-CCNC: 83 IU/L (ref 40–128)
ALT SERPL-CCNC: 9 U/L (ref 10–40)
ANION GAP SERPL CALCULATED.3IONS-SCNC: 16 MMOL/L (ref 4–16)
AST SERPL-CCNC: 24 IU/L (ref 15–37)
BASOPHILS ABSOLUTE: 0.1 K/CU MM
BASOPHILS RELATIVE PERCENT: 0.4 % (ref 0–1)
BILIRUB SERPL-MCNC: 0.3 MG/DL (ref 0–1)
BUN BLDV-MCNC: 4 MG/DL (ref 6–23)
CALCIUM SERPL-MCNC: 7.4 MG/DL (ref 8.3–10.6)
CHLORIDE BLD-SCNC: 98 MMOL/L (ref 99–110)
CO2: 17 MMOL/L (ref 21–32)
CREAT SERPL-MCNC: 0.7 MG/DL (ref 0.9–1.3)
DIFFERENTIAL TYPE: ABNORMAL
EOSINOPHILS ABSOLUTE: 0.3 K/CU MM
EOSINOPHILS RELATIVE PERCENT: 1.4 % (ref 0–3)
GFR AFRICAN AMERICAN: >60 ML/MIN/1.73M2
GFR NON-AFRICAN AMERICAN: >60 ML/MIN/1.73M2
GLUCOSE BLD-MCNC: 82 MG/DL (ref 70–99)
HCT VFR BLD CALC: 39.3 % (ref 42–52)
HEMOGLOBIN: 11.1 GM/DL (ref 13.5–18)
IMMATURE NEUTROPHIL %: 2.1 % (ref 0–0.43)
LYMPHOCYTES ABSOLUTE: 2 K/CU MM
LYMPHOCYTES RELATIVE PERCENT: 9.4 % (ref 24–44)
MCH RBC QN AUTO: 30.1 PG (ref 27–31)
MCHC RBC AUTO-ENTMCNC: 28.2 % (ref 32–36)
MCV RBC AUTO: 106.5 FL (ref 78–100)
MONOCYTES ABSOLUTE: 2.5 K/CU MM
MONOCYTES RELATIVE PERCENT: 11.8 % (ref 0–4)
NUCLEATED RBC %: 0 %
PDW BLD-RTO: 14 % (ref 11.7–14.9)
PLATELET # BLD: 482 K/CU MM (ref 140–440)
PMV BLD AUTO: 9.4 FL (ref 7.5–11.1)
POTASSIUM SERPL-SCNC: 4.5 MMOL/L (ref 3.5–5.1)
RBC # BLD: 3.69 M/CU MM (ref 4.6–6.2)
SEGMENTED NEUTROPHILS ABSOLUTE COUNT: 15.8 K/CU MM
SEGMENTED NEUTROPHILS RELATIVE PERCENT: 74.9 % (ref 36–66)
SODIUM BLD-SCNC: 131 MMOL/L (ref 135–145)
TOTAL IMMATURE NEUTOROPHIL: 0.45 K/CU MM
TOTAL NUCLEATED RBC: 0 K/CU MM
TOTAL PROTEIN: 5.2 GM/DL (ref 6.4–8.2)
WBC # BLD: 21.1 K/CU MM (ref 4–10.5)

## 2020-10-23 PROCEDURE — C9113 INJ PANTOPRAZOLE SODIUM, VIA: HCPCS | Performed by: SURGERY

## 2020-10-23 PROCEDURE — 99232 SBSQ HOSP IP/OBS MODERATE 35: CPT | Performed by: INTERNAL MEDICINE

## 2020-10-23 PROCEDURE — 2580000003 HC RX 258: Performed by: SURGERY

## 2020-10-23 PROCEDURE — 80053 COMPREHEN METABOLIC PANEL: CPT

## 2020-10-23 PROCEDURE — 76775 US EXAM ABDO BACK WALL LIM: CPT

## 2020-10-23 PROCEDURE — 85025 COMPLETE CBC W/AUTO DIFF WBC: CPT

## 2020-10-23 PROCEDURE — 6360000002 HC RX W HCPCS: Performed by: SURGERY

## 2020-10-23 PROCEDURE — 6370000000 HC RX 637 (ALT 250 FOR IP): Performed by: NURSE PRACTITIONER

## 2020-10-23 PROCEDURE — 6370000000 HC RX 637 (ALT 250 FOR IP): Performed by: INTERNAL MEDICINE

## 2020-10-23 PROCEDURE — 94761 N-INVAS EAR/PLS OXIMETRY MLT: CPT

## 2020-10-23 PROCEDURE — 36415 COLL VENOUS BLD VENIPUNCTURE: CPT

## 2020-10-23 PROCEDURE — 6370000000 HC RX 637 (ALT 250 FOR IP): Performed by: SURGERY

## 2020-10-23 PROCEDURE — 81001 URINALYSIS AUTO W/SCOPE: CPT

## 2020-10-23 PROCEDURE — 1200000000 HC SEMI PRIVATE

## 2020-10-23 PROCEDURE — 99211 OFF/OP EST MAY X REQ PHY/QHP: CPT

## 2020-10-23 RX ORDER — AMLODIPINE BESYLATE 5 MG/1
5 TABLET ORAL DAILY
Status: DISCONTINUED | OUTPATIENT
Start: 2020-10-23 | End: 2020-10-24

## 2020-10-23 RX ORDER — CALCIUM CARBONATE 200(500)MG
500 TABLET,CHEWABLE ORAL 3 TIMES DAILY PRN
Status: DISCONTINUED | OUTPATIENT
Start: 2020-10-23 | End: 2020-10-23

## 2020-10-23 RX ORDER — CALCIUM CARBONATE 200(500)MG
500 TABLET,CHEWABLE ORAL 4 TIMES DAILY PRN
Status: DISCONTINUED | OUTPATIENT
Start: 2020-10-23 | End: 2020-10-29

## 2020-10-23 RX ORDER — PANTOPRAZOLE SODIUM 40 MG/10ML
40 INJECTION, POWDER, LYOPHILIZED, FOR SOLUTION INTRAVENOUS 2 TIMES DAILY
Status: DISCONTINUED | OUTPATIENT
Start: 2020-10-23 | End: 2020-11-03 | Stop reason: HOSPADM

## 2020-10-23 RX ADMIN — CALCIUM CARBONATE 500 MG: 500 TABLET, CHEWABLE ORAL at 15:38

## 2020-10-23 RX ADMIN — METOPROLOL TARTRATE 100 MG: 50 TABLET, FILM COATED ORAL at 08:55

## 2020-10-23 RX ADMIN — MEROPENEM 1 G: 1 INJECTION, POWDER, FOR SOLUTION INTRAVENOUS at 15:31

## 2020-10-23 RX ADMIN — AMLODIPINE BESYLATE 5 MG: 5 TABLET ORAL at 15:31

## 2020-10-23 RX ADMIN — ONDANSETRON 4 MG: 2 INJECTION INTRAMUSCULAR; INTRAVENOUS at 06:03

## 2020-10-23 RX ADMIN — Medication 0.2 MG: at 08:56

## 2020-10-23 RX ADMIN — MEROPENEM 1 G: 1 INJECTION, POWDER, FOR SOLUTION INTRAVENOUS at 08:56

## 2020-10-23 RX ADMIN — LINEZOLID 600 MG: 600 INJECTION, SOLUTION INTRAVENOUS at 11:54

## 2020-10-23 RX ADMIN — HYDROMORPHONE HYDROCHLORIDE 1 MG: 1 INJECTION, SOLUTION INTRAMUSCULAR; INTRAVENOUS; SUBCUTANEOUS at 15:30

## 2020-10-23 RX ADMIN — MEROPENEM 1 G: 1 INJECTION, POWDER, FOR SOLUTION INTRAVENOUS at 01:16

## 2020-10-23 RX ADMIN — HYDROMORPHONE HYDROCHLORIDE 1 MG: 1 INJECTION, SOLUTION INTRAMUSCULAR; INTRAVENOUS; SUBCUTANEOUS at 22:00

## 2020-10-23 RX ADMIN — PANTOPRAZOLE SODIUM 40 MG: 40 INJECTION, POWDER, FOR SOLUTION INTRAVENOUS at 20:46

## 2020-10-23 RX ADMIN — HYDROMORPHONE HYDROCHLORIDE 1 MG: 1 INJECTION, SOLUTION INTRAMUSCULAR; INTRAVENOUS; SUBCUTANEOUS at 01:36

## 2020-10-23 RX ADMIN — CALCIUM CARBONATE 500 MG: 500 TABLET, CHEWABLE ORAL at 01:16

## 2020-10-23 RX ADMIN — HYDROMORPHONE HYDROCHLORIDE 1 MG: 1 INJECTION, SOLUTION INTRAMUSCULAR; INTRAVENOUS; SUBCUTANEOUS at 06:03

## 2020-10-23 RX ADMIN — HYDROMORPHONE HYDROCHLORIDE 1 MG: 1 INJECTION, SOLUTION INTRAMUSCULAR; INTRAVENOUS; SUBCUTANEOUS at 18:50

## 2020-10-23 RX ADMIN — ONDANSETRON 4 MG: 2 INJECTION INTRAMUSCULAR; INTRAVENOUS at 18:50

## 2020-10-23 RX ADMIN — CALCIUM CARBONATE 500 MG: 500 TABLET, CHEWABLE ORAL at 08:55

## 2020-10-23 RX ADMIN — PANTOPRAZOLE SODIUM 40 MG: 40 INJECTION, POWDER, FOR SOLUTION INTRAVENOUS at 08:55

## 2020-10-23 RX ADMIN — METOPROLOL TARTRATE 100 MG: 50 TABLET, FILM COATED ORAL at 20:46

## 2020-10-23 RX ADMIN — SODIUM CHLORIDE, PRESERVATIVE FREE 10 ML: 5 INJECTION INTRAVENOUS at 20:46

## 2020-10-23 RX ADMIN — HYDROMORPHONE HYDROCHLORIDE 1 MG: 1 INJECTION, SOLUTION INTRAMUSCULAR; INTRAVENOUS; SUBCUTANEOUS at 09:17

## 2020-10-23 RX ADMIN — HYDROMORPHONE HYDROCHLORIDE 1 MG: 1 INJECTION, SOLUTION INTRAMUSCULAR; INTRAVENOUS; SUBCUTANEOUS at 12:06

## 2020-10-23 RX ADMIN — ENOXAPARIN SODIUM 40 MG: 40 INJECTION SUBCUTANEOUS at 08:55

## 2020-10-23 RX ADMIN — SODIUM CHLORIDE: 9 INJECTION, SOLUTION INTRAVENOUS at 06:07

## 2020-10-23 ASSESSMENT — PAIN DESCRIPTION - LOCATION
LOCATION: ABDOMEN
LOCATION: ABDOMEN

## 2020-10-23 ASSESSMENT — PAIN SCALES - GENERAL
PAINLEVEL_OUTOF10: 8
PAINLEVEL_OUTOF10: 9
PAINLEVEL_OUTOF10: 6
PAINLEVEL_OUTOF10: 9
PAINLEVEL_OUTOF10: 5
PAINLEVEL_OUTOF10: 8
PAINLEVEL_OUTOF10: 7
PAINLEVEL_OUTOF10: 8
PAINLEVEL_OUTOF10: 8

## 2020-10-23 ASSESSMENT — PAIN DESCRIPTION - PROGRESSION
CLINICAL_PROGRESSION: NOT CHANGED

## 2020-10-23 ASSESSMENT — PAIN DESCRIPTION - ONSET
ONSET: ON-GOING
ONSET: ON-GOING

## 2020-10-23 ASSESSMENT — PAIN DESCRIPTION - FREQUENCY
FREQUENCY: CONTINUOUS
FREQUENCY: CONTINUOUS

## 2020-10-23 ASSESSMENT — PAIN DESCRIPTION - ORIENTATION
ORIENTATION: LEFT
ORIENTATION: LEFT

## 2020-10-23 ASSESSMENT — PAIN SCALES - WONG BAKER
WONGBAKER_NUMERICALRESPONSE: 0

## 2020-10-23 ASSESSMENT — PAIN DESCRIPTION - PAIN TYPE
TYPE: SURGICAL PAIN
TYPE: SURGICAL PAIN

## 2020-10-23 ASSESSMENT — PAIN DESCRIPTION - DESCRIPTORS
DESCRIPTORS: ACHING;DISCOMFORT
DESCRIPTORS: ACHING;DISCOMFORT

## 2020-10-23 NOTE — PROGRESS NOTES
Too soon to determine     Electronically signed by Summer Weeks MS, RD, LD on 10/23/20 at 4:10 PM EDT    Contact: [phone]

## 2020-10-23 NOTE — CONSULTS
[address] [id number] Continence Nurse  Consult Note       Petey Fuentes  AGE: 39 y.o. GENDER: male  : 1975  TODAY'S DATE:  10/23/2020    Subjective:     Reason for  Evaluation and Assessment:  Wound care for new colostomy teaching. Petey Fuentes is a 39 y.o. 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[facility]     Diverticulitis     [facility]     Hypertension     Incisional hernia, without obstruction or gangrene 10/22/2020    Pancreatitis        PAST SURGICAL HISTORY    Past Surgical History:   Procedure Laterality Date    CT DRAINAGE ABDOM ABSCESS OPEN  10/17/2020    CT DRAINAGE ABDOM ABSCESS OPEN 10/17/2020 [facility] SCAN    HERNIA REPAIR      LAPAROTOMY N/A 10/21/2020    LAPAROTOMY EXPLORATORY COLOSTOMY CREATION performed by Rosie Eubanks MD at [address] [place] [address]      Dr Elaine Wells       FAMILY HISTORY    History reviewed. No pertinent family history. SOCIAL HISTORY    Social History     Tobacco Use    Smoking status: Current Every Day Smoker     Packs/day: 2.00     Years: 18.00     Pack years: 36.00     Types: Cigarettes    Smokeless tobacco: Never Used   Substance Use Topics    Alcohol use: Not Currently    Drug use: Yes     Types: Marijuana       ALLERGIES    Allergies   Allergen Reactions    Pcn [Penicillins]        MEDICATIONS    No current facility-administered medications on file prior to encounter. Current Outpatient Medications on File Prior to Encounter   Medication Sig Dispense Refill    Abacavir-Dolutegravir-Lamivud 600- MG TABS Take by mouth      efavirenz (SUSTIVA) 600 MG tablet Take 600 mg by mouth nightly.              Objective:      BP (!) 165/96   Pulse 98   Temp 99.1 °F (37.3 °C) (Oral)   Resp 16   Ht 5' 11\" (1.803 m)   Wt 187 lb (84.8 kg)   SpO2 95%   BMI 26.08 kg/m²   Ck Risk Score: Ck Scale Score: 19    LABS    CBC:   Lab Results   Component Value Date    WBC 19.6 10/22/2020    RBC 3.48 10/22/2020    HGB 10.5 10/22/2020    HCT 34.1 10/22/2020    MCV 98.0 10/22/2020    MCH 30.2 10/22/2020    MCHC 30.8 10/22/2020    RDW 13.3 10/22/2020     10/22/2020    MPV 9.0 10/22/2020     CMP:    Lab Results   Component Value Date     10/22/2020    K 4.1 10/22/2020    CL 99 10/22/2020    CO2 24 10/22/2020    BUN 5 10/22/2020    CREATININE 0.7 10/22/2020    GFRAA >60 10/22/2020    LABGLOM >60 10/22/2020    GLUCOSE 89 10/22/2020    PROT 5.2 10/22/2020    PROT 6.1 11/04/2012    LABALBU 2.3 10/22/2020    CALCIUM 7.4 10/22/2020    BILITOT 0.3 10/22/2020    ALKPHOS 78 10/22/2020    AST 16 10/22/2020    ALT 8 10/22/2020     Albumin:    Lab Results   Component Value Date    LABALBU 2.3 10/22/2020     PT/INR:    Lab Results   Component Value Date    PROTIME 16.5 10/17/2020    INR 1.36 10/17/2020     HgBA1c:  No results found for: LABA1C      Assessment:     Patient Active Problem List   Diagnosis    [facility] ([facility]    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 Pain Assessment:  Severity:  0  Quality of pain: no  Wound Pain Timing/Severity:   Premedicated: no    Plan:     Plan of Care:       Pt in bed agreeable to wound for ostomy teaching.  Pt stated appliance was changed last night due to leaking is now dry/intact stoma appears to be protruding pink/moist. . Left pt with education packet and discussed some about supplies that we will send pt home with supplies to get started and send for a starter kit pt is agreeable to. Midline dressing intact. Wound team to f/u with pt Monday for teaching and appliance change. Pt verbalized his mother is a hospice nurse and will be assisting at home as well. Pt stated he felt better knowing he would have supplies to take home. Orders put in for ostomy. Wound care- Ostomy Care:  Remove pouch and barrier. Cleanse stoma and peristomal skin with warm water. Dry thoroughly. Apply stoma powder prn to any reddened peristomal skin. Measure stoma and cut barrier opening to fit close around base of stoma. Apply coloplast barrier # [id number]  , size  2 11/16\"   and pouch # [id number] . Empty pouch when 1/3 to 1/2 full. Change every 3-7 days and prn. Review teaching folder with patient and caregivers when performing ostomy care. Patient to practice with assistance, frequent checking and emptying output. Specialty Bed Required : no  [] Low Air Loss   [] Pressure Redistribution  [] Fluid Immersion  [] Bariatric  [] Total Pressure Relief  [] Other:     Discharge Plan:  Placement for patient upon discharge:  home  Hospice Care: no  Patient appropriate for Outpatient [address]: no    Patient/Caregiver Teaching:  Level of patient/caregiver understanding able to:  Pt verbalized understanding of stoma care    Electronically signed by Nate Klein.  JUN Orona,  on 10/23/2020 at 11:11 AM

## 2020-10-23 NOTE — CONSULTS
Q12H    metoprolol  100 mg Oral BID    meropenem  1 g Intravenous Q8H    Abacavir-Dolutegravir-Lamivud  1 tablet Oral Daily    sodium chloride flush  10 mL Intravenous BID    sodium chloride flush  10 mL Intravenous 2 times per day     Continuous Infusions:   HYDROmorphone HCl 0.2 mg (10/23/20 0856)    sodium chloride 125 mL/hr at 10/23/20 0607     PRN Meds:.calcium carbonate, morphine, HYDROmorphone, ondansetron, sodium chloride flush, acetaminophen **OR** acetaminophen    Allergies:  Pcn [penicillins]    Family History:   History reviewed. No pertinent family history. Physical Exam:    Vitals: BP (!) 165/96   Pulse 98   Temp 99.1 °F (37.3 °C) (Oral)   Resp 16   Ht 5' 11\" (1.803 m)   Wt 187 lb (84.8 kg)   SpO2 95%   BMI 26.08 kg/m²     General appearance:  awake and verbally interactive   HEENT: Head: Normal, normocephalic, atraumatic. Neck: supple, symmetrical, trachea midline  Cardiovascular: S1 and S2: normal / no rub  Pulmonary: diminished lung sounds bilaterally  Abdomen:  soft / tender / midline abdominal dressing / ostomy to left side   Extremities: Trace edema to bilateral lower legs     CBC:   Recent Labs     10/21/20  0448 10/22/20  0934   WBC 14.0* 19.6*   HGB 10.7* 10.5*   * 472*     BMP:    Recent Labs     10/21/20  0448 10/22/20  0934 10/23/20  0600    134* 131*   K 5.0 4.1 4.5   CL 99 99 98*   CO2 26 24 17*   BUN 3* 5* 4*   CREATININE 0.8* 0.7* 0.7*   GLUCOSE 83 89 82     Hepatic:   Recent Labs     10/21/20  0448 10/22/20  0934 10/23/20  0600   AST 14* 16 24   ALT 7* 8* 9*   BILITOT 0.3 0.3 0.3   ALKPHOS 101 78 83     Troponin: No results for input(s): TROPONINI in the last 72 hours. Mg, Phos: No results for input(s): MG, PHOS in the last 72 hours. ABGs: No results found for: PHART, PO2ART, VWM2YIE  INR: No results for input(s): INR in the last 72 hours.   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 80    Abscess of sigmoid colon due to diverticulitis K57.20    Proteinuria R80.9    HIV (human immunodeficiency virus infection) ([facility] B20    Incisional hernia, without obstruction or gangrene K43.2     Assessment and Recommendations     Impression   1. Possible Stage 2 CKD with proteinuria   2. Sepsis secondary to abscess of sigmoid colon     3. Abscess of the sigmoid colon secondary to diverticulitis   -exploratory lap with colostomy 10/21  4. HIV (currently on meds)  5. HTN   6. Anemia     PLAN  1.   -renal US ordered to characterize kidneys / evaluate for signs of obstruction   -UPC: 0.3 / repeat UA with micro  -UA from 10/16: blood: negative and albuminuria: 2K  -Microscopic urinalysis demonstrated bland sediment.  -baseline serum creatinine with normal: Na / K / CO2  2.   -followed by ID: meropenem and linezolid   3.   -currently on NS at 125 / hour   -followed by Dr. Connie Ma   -on dilaudid PCA   4.   -followed by ID and on anti-retrovirals  5. -BP trend: systolic BP's have recently been 151 - 166  -on metoprolol with addition of amlodipine   6.    -latest Hb: 11.1; follow hemoglobin trend     Electronically signed by KEANU Riley - [facility]      Nephrology Attending Progress Note  10/23/2020 5:30 PM  Subjective:   Admit Date: 10/16/2020  PCP: Gissell Crowder, RN, BSN, MSN, FNP-C    Interval History: I have personally performed face to face diagnostic evaluation on this patient. I have personally reviewed pertinent labs and imaging and agree with the care plan above.  My additional findings are as follows:  39year old male with divierticulitis and abscess in setting hiv and htn known to dr Chato Mireles and labs with proteinuria and wanted renal to see    Objective:   Vitals: BP (!) 165/96   Pulse 98   Temp 99.1 °F (37.3 °C) (Oral)   Resp 16   Ht 5' 10.98\" (1.803 m)   Wt 187 lb (84.8 kg)   SpO2 95%   BMI 26.09 kg/m²   Weak awake  Soft tender  No edema    Assessment and Plan:  IMP:  As stated above    Plan     1 sp abd surgery monitor  2 currently 300mg proteinuria not nephrotic and likley from htn and can monitor  3 monitor hiv and not felt factor in renal  4 na monitor  Will follow           Electronically signed by Maay Shields MD on 10/23/2020 at 5:30 PM

## 2020-10-23 NOTE — PROGRESS NOTES
Hospitalist Progress Note      Name:  Palma Smith /Age/Sex: 1975  (39 y.o. male)   MRN & CSN:  [id number] & [id number] Admission Date/Time: 10/16/2020 11:04 AM   Location:  [address] [place] [address] PCP: Peyton Ynug, RN, BSN, MSN, FNP-C         Hospital Day: 8    Assessment and Plan:   Palma Smith is a 39 y.o.  male  who presents with abdominal pain     #Sepsis  #Acute Diverticulitis  #Peritonitis  -SIRS 2/4 leukocytosis and tachycardia  -S/p IR drainage with drain placement  -S/p Exploratory laparotomy with colostomy creation, drainage of peritoneal abscess  -CT abdomen and pelvis with colitis, abscess, pancreatic ductal dilatation  -Blood cultures no growth so far  -Surgical cultures with mixed organisms, including E Coli, Prevotella Denticola  -Continue Meropenem and Zyvox  -ID on board  General surgery recommendations appreciated  Pain control with PCA     HIV   Continue home medications     #Nephrotic range proteinuria:  -urine protein   -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  male  who presents with abdominal pain       Ten point ROS reviewed negative, unless as noted above    Objective:        Intake/Output Summary (Last 24 hours) at 10/23/2020 1045  Last data filed at 10/23/2020 0150  Gross per 24 hour   Intake --   Output 780 ml   Net -780 ml      Vitals:   Vitals:    10/23/20 0910   BP: (!) 165/96   Pulse: 98   Resp: 16   Temp: 99.1 °F (37.3 °C)   SpO2: 95% Physical Exam:   GEN Awake male, sitting upright in bed in no apparent distress. Appears given age. EYES Pupils are equally round. No scleral erythema, discharge, or conjunctivitis. HENT Mucous membranes are moist. Oral pharynx without exudates, no evidence of thrush. NECK Supple, no apparent thyromegaly or masses. RESP Clear to auscultation, no wheezes, rales or rhonchi. Symmetric chest movement while on room air. CARDIO/VASC S1/S2 auscultated. Regular rate without appreciable murmurs, rubs, or gallops. No JVD or carotid bruits. Peripheral pulses equal bilaterally and palpable. No peripheral edema. GI colostomy in place, ostomy is clean pink. Colostomy bag empty. Drain in place mild amount of blood. clean dressing on abdomen. Bowel sounds are sluggish. Rectal exam deferred.  No costovertebral angle tenderness. Normal appearing external genitalia. Lackey catheter present. HEME/LYMPH No palpable cervical lymphadenopathy and no hepatosplenomegaly. No petechiae or ecchymoses. MSK No gross joint deformities. SKIN Normal coloration, warm, dry. NEURO Cranial nerves appear grossly intact, normal speech, no lateralizing weakness. PSYCH Awake, alert, oriented x 4. Affect appropriate.     Medications:   Medications:    pantoprazole  40 mg Intravenous BID    enoxaparin  40 mg Subcutaneous Daily    linezolid  600 mg Intravenous Q12H    metoprolol  100 mg Oral BID    meropenem  1 g Intravenous Q8H    Abacavir-Dolutegravir-Lamivud  1 tablet Oral Daily    sodium chloride flush  10 mL Intravenous BID    sodium chloride flush  10 mL Intravenous 2 times per day      Infusions:    HYDROmorphone HCl 0.2 mg (10/23/20 0856)    sodium chloride 125 mL/hr at 10/23/20 0607     PRN Meds: calcium carbonate, 500 mg, TID PRN  morphine, 1 mg, Q2H PRN  HYDROmorphone, 1 mg, Q3H PRN  ondansetron, 4 mg, Q6H PRN  sodium chloride flush, 10 mL, PRN  acetaminophen, 650 mg, Q6H PRN    Or  acetaminophen, 650 mg, Q6H PRN          Electronically signed by Pauly Magallon MD on 10/23/2020 at 10:45 AM

## 2020-10-23 NOTE — PROGRESS NOTES
POD 2 Yazan's procedure, IHR. AF VSS. On PCA. MARI drain is 30, serosanginous. Complaining of heartburn. Adequate UOP. H/H stable. Labs pending. COVID negative. abd is soft, colostomy is pink, edematous. Mildly distended. Still with some left sided abd wall cellulitis, decreasing. IVF's, abx's, PCA, lovenox, mobilize. On HIV meds. Monitor for postop ileus. Will remove vargas tomorrow. Kerry Cuevas will be on call for me this weekend. Back on Monday.

## 2020-10-23 NOTE — PROGRESS NOTES
Infectious Disease Progress Note  10/23/2020   Patient Name: Marianela Umaña : 1975       Reason for visit: F/u sepsis secondary to diverticular abscess  History:? Interval history noted. Status post exploratory laparotomy, colostomy creation, drainage of peritoneal abscess, removal of catheter spleen on 10/21 for perforated sigmoid diverticulitis with pericolonic abscess, incisional hernias. Ieft flank pain persists  Physical Exam:  Vital Signs: BP (!) 165/92   Pulse 99   Temp 98.6 °F (37 °C) (Oral)   Resp 16   Ht 5' 11\" (1.803 m)   Wt 187 lb (84.8 kg)   SpO2 94%   BMI 26.08 kg/m²     Gen: alert and oriented X3, no distress  Skin: no stigmata of endocarditis  Wounds: C/D/I  HEMT: AT/NC Oropharynx pink, moist, and without lesions or exudates; dentition in good state of repair  Eyes: PERRLA, EOMI, conjunctiva pink, sclera anicteric. Neck: Supple. Trachea midline. No LAD. Chest: no distress and CTA. Good air movement. Heart: RRR and no MRG. Abd: LLQ colostomy and LLQ  left flank erythema and tenderness. soft, non-distended, no tenderness, no hepatomegaly. Normoactive bowel sounds. Ext: no clubbing, cyanosis, or edema  Catheter Site: without erythema or tenderness  Neuro: Mental status intact. CN 2-12 intact and no focal sensory or motor deficits     Radiologic / Imaging / TESTING  No results found.      Labs:    Recent Results (from the past 24 hour(s))   Comprehensive Metabolic Panel w/ Reflex to MG    Collection Time: 10/22/20  9:34 AM   Result Value Ref Range    Sodium 134 (L) 135 - 145 MMOL/L    Potassium 4.1 3.5 - 5.1 MMOL/L    Chloride 99 99 - 110 mMol/L    CO2 24 21 - 32 MMOL/L    BUN 5 (L) 6 - 23 MG/DL    CREATININE 0.7 (L) 0.9 - 1.3 MG/DL    Glucose 89 70 - 99 MG/DL    Calcium 7.4 (L) 8.3 - 10.6 MG/DL    Alb 2.3 (L) 3.4 - 5.0 GM/DL    Total Protein 5.2 (L) 6.4 - 8.2 GM/DL    Total Bilirubin 0.3 0.0 - 1.0 MG/DL    ALT 8 (L) 10 - 40 U/L    AST 16 15 - 37 IU/L    Alkaline Phosphatase 78 40 - 128 IU/L    GFR Non-African American >60 >60 mL/min/1.73m2    GFR African American >60 >60 mL/min/1.73m2    Anion Gap 11 4 - 16   CBC auto differential    Collection Time: 10/22/20  9:34 AM   Result Value Ref Range    WBC 19.6 (H) 4.0 - 10.5 K/CU MM    RBC 3.48 (L) 4.6 - 6.2 M/CU MM    Hemoglobin 10.5 (L) 13.5 - 18.0 GM/DL    Hematocrit 34.1 (L) 42 - 52 %    MCV 98.0 78 - 100 FL    MCH 30.2 27 - 31 PG    MCHC 30.8 (L) 32.0 - 36.0 %    RDW 13.3 11.7 - 14.9 %    Platelets 759 (H) 454 - 440 K/CU MM    MPV 9.0 7.5 - 11.1 FL    Differential Type AUTOMATED DIFFERENTIAL     Segs Relative 72.2 (H) 36 - 66 %    Lymphocytes % 11.4 (L) 24 - 44 %    Monocytes % 12.6 (H) 0 - 4 %    Eosinophils % 0.8 0 - 3 %    Basophils % 0.3 0 - 1 %    Segs Absolute 14.1 K/CU MM    Lymphocytes Absolute 2.2 K/CU MM    Monocytes Absolute 2.5 K/CU MM    Eosinophils Absolute 0.2 K/CU MM    Basophils Absolute 0.1 K/CU MM    Nucleated RBC % 0.0 %    Total Nucleated RBC 0.0 K/CU MM    Total Immature Neutrophil 0.53 K/CU MM    Immature Neutrophil % 2.7 (H) 0 - 0.43 %   C-Reactive Protein    Collection Time: 10/22/20  9:34 AM   Result Value Ref Range    CRP, High Sensitivity 60.6 mg/L     CULTURE results: Invalid input(s): BLOOD CULTURE,  URINE CULTURE, SURGICAL CULTURE    Diagnosis:  Patient Active Problem List   Diagnosis    [facility]    Alcoholic ([facility]    Hypertension    Alcohol withdrawal ([facility] [facility]    Abdominal wall abscess    Abscess of sigmoid colon due to diverticulitis    Proteinuria    HIV (human immunodeficiency virus infection) ([facility] [facility]    Incisional hernia, without obstruction or gangrene       Active Problems  Active Problems:    Abdominal wall abscess    Abscess of sigmoid colon due to diverticulitis    Proteinuria    HIV (human immunodeficiency virus infection) (HCC)    Incisional hernia, without obstruction or gangrene  Resolved Problems:    * No resolved hospital problems.  *      Impression and plan   Clinical status: Status post exploratory laparotomy, drainage of pericolonic abscess on 10/21/2020. Still has considerable pain. Unfortunately surgical cultures not done.  Therapeutic: continue linezolid 10/19 and meropenem 10/17. If the patient worsens then will need to add antifungal.   Diagnostic: trend crp, and pct   F/u:   Other:   Summary: [facility] since 2003, 10/22/2020 HIV VL undetected, CD4 859, RPR: 1:2 (previously treated for syphilis).        Electronically signed by: Electronically signed by Chas Mendez MD on 10/23/2020 at 7:53 AM

## 2020-10-24 LAB
ALBUMIN SERPL-MCNC: 2.4 GM/DL (ref 3.4–5)
ALP BLD-CCNC: 75 IU/L (ref 40–128)
ALT SERPL-CCNC: 9 U/L (ref 10–40)
ANION GAP SERPL CALCULATED.3IONS-SCNC: 15 MMOL/L (ref 4–16)
AST SERPL-CCNC: 13 IU/L (ref 15–37)
BACTERIA: ABNORMAL /HPF
BANDED NEUTROPHILS ABSOLUTE COUNT: 0.84 K/CU MM
BANDED NEUTROPHILS RELATIVE PERCENT: 3 % (ref 5–11)
BILIRUB SERPL-MCNC: 0.4 MG/DL (ref 0–1)
BILIRUBIN URINE: NEGATIVE MG/DL
BLOOD, URINE: ABNORMAL
BUN BLDV-MCNC: 2 MG/DL (ref 6–23)
CALCIUM SERPL-MCNC: 7.6 MG/DL (ref 8.3–10.6)
CHLORIDE BLD-SCNC: 96 MMOL/L (ref 99–110)
CLARITY: CLEAR
CO2: 21 MMOL/L (ref 21–32)
COLOR: YELLOW
CREAT SERPL-MCNC: 0.6 MG/DL (ref 0.9–1.3)
DIFFERENTIAL TYPE: ABNORMAL
GFR AFRICAN AMERICAN: >60 ML/MIN/1.73M2
GFR NON-AFRICAN AMERICAN: >60 ML/MIN/1.73M2
GLUCOSE BLD-MCNC: 97 MG/DL (ref 70–99)
GLUCOSE, URINE: NEGATIVE MG/DL
HCT VFR BLD CALC: 34.5 % (ref 42–52)
HEMOGLOBIN: 10.8 GM/DL (ref 13.5–18)
HIGH SENSITIVE C-REACTIVE PROTEIN: 95.2 MG/L
KETONES, URINE: NEGATIVE MG/DL
LEUKOCYTE ESTERASE, URINE: NEGATIVE
LYMPHOCYTES ABSOLUTE: 0.8 K/CU MM
LYMPHOCYTES RELATIVE PERCENT: 3 % (ref 24–44)
MCH RBC QN AUTO: 30.1 PG (ref 27–31)
MCHC RBC AUTO-ENTMCNC: 31.3 % (ref 32–36)
MCV RBC AUTO: 96.1 FL (ref 78–100)
METAMYELOCYTES ABSOLUTE COUNT: 0.28 K/CU MM
METAMYELOCYTES PERCENT: 1 %
MONOCYTES ABSOLUTE: 2.2 K/CU MM
MONOCYTES RELATIVE PERCENT: 8 % (ref 0–4)
MUCUS: ABNORMAL HPF
NITRITE URINE, QUANTITATIVE: NEGATIVE
PDW BLD-RTO: 13.2 % (ref 11.7–14.9)
PH, URINE: 6 (ref 5–8)
PLATELET # BLD: 587 K/CU MM (ref 140–440)
PMV BLD AUTO: 9.1 FL (ref 7.5–11.1)
POLYCHROMASIA: ABNORMAL
POTASSIUM SERPL-SCNC: 4.4 MMOL/L (ref 3.5–5.1)
PROCALCITONIN: 0.24
PROTEIN UA: 30 MG/DL
RBC # BLD: 3.59 M/CU MM (ref 4.6–6.2)
RBC URINE: 26 /HPF (ref 0–3)
SEGMENTED NEUTROPHILS ABSOLUTE COUNT: 23.9 K/CU MM
SEGMENTED NEUTROPHILS RELATIVE PERCENT: 85 % (ref 36–66)
SODIUM BLD-SCNC: 132 MMOL/L (ref 135–145)
SPECIFIC GRAVITY UA: 1.01 (ref 1–1.03)
SQUAMOUS EPITHELIAL: <1 /HPF
TOTAL PROTEIN: 5.5 GM/DL (ref 6.4–8.2)
TOXIC GRANULATION: PRESENT
TRICHOMONAS: ABNORMAL /HPF
UROBILINOGEN, URINE: NORMAL MG/DL (ref 0.2–1)
WBC # BLD: 28 K/CU MM (ref 4–10.5)
WBC UA: 2 /HPF (ref 0–2)
YEAST: ABNORMAL /HPF

## 2020-10-24 PROCEDURE — 6370000000 HC RX 637 (ALT 250 FOR IP): Performed by: INTERNAL MEDICINE

## 2020-10-24 PROCEDURE — C9113 INJ PANTOPRAZOLE SODIUM, VIA: HCPCS | Performed by: SURGERY

## 2020-10-24 PROCEDURE — 85007 BL SMEAR W/DIFF WBC COUNT: CPT

## 2020-10-24 PROCEDURE — 6360000002 HC RX W HCPCS: Performed by: SURGERY

## 2020-10-24 PROCEDURE — 2580000003 HC RX 258: Performed by: SURGERY

## 2020-10-24 PROCEDURE — 36415 COLL VENOUS BLD VENIPUNCTURE: CPT

## 2020-10-24 PROCEDURE — 85027 COMPLETE CBC AUTOMATED: CPT

## 2020-10-24 PROCEDURE — 80053 COMPREHEN METABOLIC PANEL: CPT

## 2020-10-24 PROCEDURE — 1200000000 HC SEMI PRIVATE

## 2020-10-24 PROCEDURE — 86141 C-REACTIVE PROTEIN HS: CPT

## 2020-10-24 PROCEDURE — 94761 N-INVAS EAR/PLS OXIMETRY MLT: CPT

## 2020-10-24 PROCEDURE — 87040 BLOOD CULTURE FOR BACTERIA: CPT

## 2020-10-24 PROCEDURE — 6370000000 HC RX 637 (ALT 250 FOR IP): Performed by: SURGERY

## 2020-10-24 PROCEDURE — 2500000003 HC RX 250 WO HCPCS: Performed by: SURGERY

## 2020-10-24 PROCEDURE — 84145 PROCALCITONIN (PCT): CPT

## 2020-10-24 PROCEDURE — 99233 SBSQ HOSP IP/OBS HIGH 50: CPT | Performed by: INTERNAL MEDICINE

## 2020-10-24 RX ORDER — AMLODIPINE BESYLATE 10 MG/1
10 TABLET ORAL DAILY
Status: DISCONTINUED | OUTPATIENT
Start: 2020-10-24 | End: 2020-11-03 | Stop reason: HOSPADM

## 2020-10-24 RX ORDER — LISINOPRIL 40 MG/1
40 TABLET ORAL DAILY
Status: DISCONTINUED | OUTPATIENT
Start: 2020-10-24 | End: 2020-10-25

## 2020-10-24 RX ORDER — ACETAMINOPHEN 325 MG/1
650 TABLET ORAL ONCE
Status: COMPLETED | OUTPATIENT
Start: 2020-10-24 | End: 2020-10-24

## 2020-10-24 RX ORDER — CLONIDINE HYDROCHLORIDE 0.2 MG/1
0.2 TABLET ORAL ONCE
Status: COMPLETED | OUTPATIENT
Start: 2020-10-24 | End: 2020-10-24

## 2020-10-24 RX ORDER — CLONIDINE HYDROCHLORIDE 0.1 MG/1
0.1 TABLET ORAL 2 TIMES DAILY
Status: DISCONTINUED | OUTPATIENT
Start: 2020-10-24 | End: 2020-10-24

## 2020-10-24 RX ORDER — CLONIDINE HYDROCHLORIDE 0.1 MG/1
0.1 TABLET ORAL 3 TIMES DAILY
Status: DISCONTINUED | OUTPATIENT
Start: 2020-10-24 | End: 2020-10-25

## 2020-10-24 RX ORDER — METOCLOPRAMIDE HYDROCHLORIDE 5 MG/ML
10 INJECTION INTRAMUSCULAR; INTRAVENOUS EVERY 6 HOURS
Status: DISPENSED | OUTPATIENT
Start: 2020-10-24 | End: 2020-10-26

## 2020-10-24 RX ORDER — PROMETHAZINE HYDROCHLORIDE 25 MG/ML
12.5 INJECTION, SOLUTION INTRAMUSCULAR; INTRAVENOUS EVERY 6 HOURS PRN
Status: DISCONTINUED | OUTPATIENT
Start: 2020-10-24 | End: 2020-11-03 | Stop reason: HOSPADM

## 2020-10-24 RX ORDER — SODIUM CHLORIDE 9 MG/ML
INJECTION, SOLUTION INTRAVENOUS CONTINUOUS
Status: DISCONTINUED | OUTPATIENT
Start: 2020-10-24 | End: 2020-10-29

## 2020-10-24 RX ADMIN — MEROPENEM 1 G: 1 INJECTION, POWDER, FOR SOLUTION INTRAVENOUS at 01:21

## 2020-10-24 RX ADMIN — CLONIDINE HYDROCHLORIDE 0.2 MG: 0.2 TABLET ORAL at 23:45

## 2020-10-24 RX ADMIN — SODIUM CHLORIDE: 9 INJECTION, SOLUTION INTRAVENOUS at 00:13

## 2020-10-24 RX ADMIN — PROMETHAZINE HYDROCHLORIDE 12.5 MG: 25 INJECTION INTRAMUSCULAR; INTRAVENOUS at 17:33

## 2020-10-24 RX ADMIN — METOCLOPRAMIDE 10 MG: 5 INJECTION, SOLUTION INTRAMUSCULAR; INTRAVENOUS at 17:32

## 2020-10-24 RX ADMIN — METOCLOPRAMIDE 10 MG: 5 INJECTION, SOLUTION INTRAMUSCULAR; INTRAVENOUS at 09:59

## 2020-10-24 RX ADMIN — LINEZOLID 600 MG: 600 INJECTION, SOLUTION INTRAVENOUS at 23:46

## 2020-10-24 RX ADMIN — METOPROLOL TARTRATE 100 MG: 50 TABLET, FILM COATED ORAL at 09:58

## 2020-10-24 RX ADMIN — ONDANSETRON 4 MG: 2 INJECTION INTRAMUSCULAR; INTRAVENOUS at 06:03

## 2020-10-24 RX ADMIN — SODIUM CHLORIDE: 9 INJECTION, SOLUTION INTRAVENOUS at 10:00

## 2020-10-24 RX ADMIN — LINEZOLID 600 MG: 600 INJECTION, SOLUTION INTRAVENOUS at 11:51

## 2020-10-24 RX ADMIN — SODIUM CHLORIDE, PRESERVATIVE FREE 10 ML: 5 INJECTION INTRAVENOUS at 20:22

## 2020-10-24 RX ADMIN — PANTOPRAZOLE SODIUM 40 MG: 40 INJECTION, POWDER, FOR SOLUTION INTRAVENOUS at 09:57

## 2020-10-24 RX ADMIN — LISINOPRIL 40 MG: 40 TABLET ORAL at 09:58

## 2020-10-24 RX ADMIN — PANTOPRAZOLE SODIUM 40 MG: 40 INJECTION, POWDER, FOR SOLUTION INTRAVENOUS at 20:20

## 2020-10-24 RX ADMIN — CLONIDINE HYDROCHLORIDE 0.1 MG: 0.1 TABLET ORAL at 20:21

## 2020-10-24 RX ADMIN — HYDROMORPHONE HYDROCHLORIDE 1 MG: 1 INJECTION, SOLUTION INTRAMUSCULAR; INTRAVENOUS; SUBCUTANEOUS at 14:19

## 2020-10-24 RX ADMIN — AMLODIPINE BESYLATE 10 MG: 10 TABLET ORAL at 09:59

## 2020-10-24 RX ADMIN — METOPROLOL TARTRATE 100 MG: 50 TABLET, FILM COATED ORAL at 20:21

## 2020-10-24 RX ADMIN — METOCLOPRAMIDE 10 MG: 5 INJECTION, SOLUTION INTRAMUSCULAR; INTRAVENOUS at 21:38

## 2020-10-24 RX ADMIN — HYDROMORPHONE HYDROCHLORIDE 1 MG: 1 INJECTION, SOLUTION INTRAMUSCULAR; INTRAVENOUS; SUBCUTANEOUS at 01:21

## 2020-10-24 RX ADMIN — LINEZOLID 600 MG: 600 INJECTION, SOLUTION INTRAVENOUS at 00:13

## 2020-10-24 RX ADMIN — CLONIDINE HYDROCHLORIDE 0.1 MG: 0.1 TABLET ORAL at 14:19

## 2020-10-24 RX ADMIN — METRONIDAZOLE 500 MG: 500 INJECTION, SOLUTION INTRAVENOUS at 18:21

## 2020-10-24 RX ADMIN — HYDROMORPHONE HYDROCHLORIDE 1 MG: 1 INJECTION, SOLUTION INTRAMUSCULAR; INTRAVENOUS; SUBCUTANEOUS at 21:38

## 2020-10-24 RX ADMIN — ENOXAPARIN SODIUM 40 MG: 40 INJECTION SUBCUTANEOUS at 09:57

## 2020-10-24 RX ADMIN — MEROPENEM 1 G: 1 INJECTION, POWDER, FOR SOLUTION INTRAVENOUS at 17:32

## 2020-10-24 RX ADMIN — METRONIDAZOLE 500 MG: 500 INJECTION, SOLUTION INTRAVENOUS at 10:00

## 2020-10-24 RX ADMIN — HYDROMORPHONE HYDROCHLORIDE 1 MG: 1 INJECTION, SOLUTION INTRAMUSCULAR; INTRAVENOUS; SUBCUTANEOUS at 17:33

## 2020-10-24 RX ADMIN — HYDROMORPHONE HYDROCHLORIDE 1 MG: 1 INJECTION, SOLUTION INTRAMUSCULAR; INTRAVENOUS; SUBCUTANEOUS at 09:59

## 2020-10-24 RX ADMIN — HYDROMORPHONE HYDROCHLORIDE 1 MG: 1 INJECTION, SOLUTION INTRAMUSCULAR; INTRAVENOUS; SUBCUTANEOUS at 06:03

## 2020-10-24 RX ADMIN — ACETAMINOPHEN 650 MG: 325 TABLET ORAL at 20:41

## 2020-10-24 RX ADMIN — MEROPENEM 1 G: 1 INJECTION, POWDER, FOR SOLUTION INTRAVENOUS at 09:57

## 2020-10-24 RX ADMIN — ACETAMINOPHEN 650 MG: 325 TABLET ORAL at 23:44

## 2020-10-24 RX ADMIN — Medication: at 04:12

## 2020-10-24 ASSESSMENT — PAIN DESCRIPTION - PAIN TYPE
TYPE: SURGICAL PAIN

## 2020-10-24 ASSESSMENT — PAIN DESCRIPTION - PROGRESSION

## 2020-10-24 ASSESSMENT — PAIN DESCRIPTION - ORIENTATION
ORIENTATION: LEFT

## 2020-10-24 ASSESSMENT — PAIN DESCRIPTION - LOCATION
LOCATION: ABDOMEN

## 2020-10-24 ASSESSMENT — PAIN SCALES - WONG BAKER
WONGBAKER_NUMERICALRESPONSE: 0
WONGBAKER_NUMERICALRESPONSE: 0

## 2020-10-24 ASSESSMENT — PAIN SCALES - GENERAL
PAINLEVEL_OUTOF10: 9
PAINLEVEL_OUTOF10: 8
PAINLEVEL_OUTOF10: 8
PAINLEVEL_OUTOF10: 9
PAINLEVEL_OUTOF10: 7
PAINLEVEL_OUTOF10: 9
PAINLEVEL_OUTOF10: 7
PAINLEVEL_OUTOF10: 9

## 2020-10-24 ASSESSMENT — PAIN DESCRIPTION - ONSET
ONSET: ON-GOING

## 2020-10-24 ASSESSMENT — PAIN DESCRIPTION - DESCRIPTORS
DESCRIPTORS: ACHING;DISCOMFORT

## 2020-10-24 ASSESSMENT — PAIN DESCRIPTION - FREQUENCY
FREQUENCY: CONTINUOUS

## 2020-10-24 ASSESSMENT — PAIN - FUNCTIONAL ASSESSMENT: PAIN_FUNCTIONAL_ASSESSMENT: PREVENTS OR INTERFERES SOME ACTIVE ACTIVITIES AND ADLS

## 2020-10-24 NOTE — PROGRESS NOTES
Patient sleeping but awakens, complains of nausea and burping  No flatus into appliance    PE:    Vitals:    10/23/20 2030 10/24/20 0507 10/24/20 0615 10/24/20 0814   BP: (!) 171/95  (!) 178/101 (!) 175/91   Pulse: 98  101 107   Resp: 16  16 16   Temp: 98.5 °F (36.9 °C)  97.9 °F (36.6 °C) 99.2 °F (37.3 °C)   TempSrc: Oral  Oral Oral   SpO2: 94%  96% 97%   Weight:  194 lb 11.2 oz (88.3 kg)     Height:         Abd: soft, distended, dressing with sero-sang d/c, stoma pink and viable-edematous, minimal redness left abdominal wall    A/P:  -POD#3 s/p Yazan's  -post-op ileus, patient drinking a fair bit of water, drank several times while I was in the room. Will change to limited ice chips only, if emesis will need NG tube  -ambulate  -advance titi today  -WBC count increased, low grade fever. Meropenum d#8, Zyvox d#6, add flagyl today (patient with provotella denticola and fusobacterium nucleatum from abdominal abscess, recommended abx is flagyl)  -Patient asked me to call his father Ricarda Smith, [phone], I did call and left an update on Jenny as a message at that number.      Addendum: 10:35 am  Called patient's father again at [phone], again no answer, no new message left

## 2020-10-24 NOTE — PROGRESS NOTES
Infectious Disease Progress Note  10/24/2020   Patient Name: Camacho Castillo : 1975       Reason for visit: F/u sepsis secondary to diverticular abscess  History:? Interval history noted. Status post exploratory laparotomy, colostomy creation, drainage of peritoneal abscess, removal of catheter spleen on 10/21 for perforated sigmoid diverticulitis with pericolonic abscess, incisional hernias. Ieft flank pain persists  Physical Exam:  Vital Signs: BP (!) 175/91   Pulse 107   Temp 99.2 °F (37.3 °C) (Oral)   Resp 16   Ht 5' 10.98\" (1.803 m)   Wt 194 lb 11.2 oz (88.3 kg)   SpO2 97%   BMI 27.17 kg/m²     Gen: alert and oriented X3, no distress  Skin: no stigmata of endocarditis  Wounds: C/D/I  HEMT: AT/NC Oropharynx pink, moist, and without lesions or exudates; dentition in good state of repair  Eyes: PERRLA, EOMI, conjunctiva pink, sclera anicteric. Neck: Supple. Trachea midline. No LAD. Chest: no distress and CTA. Good air movement. Heart: RRR and no MRG. Abd: LLQ colostomy and LLQ  left flank erythema and tenderness. soft, non-distended, no tenderness, no hepatomegaly. Normoactive bowel sounds. Ext: no clubbing, cyanosis, or edema  Catheter Site: without erythema or tenderness  Neuro: Mental status intact. CN 2-12 intact and no focal sensory or motor deficits     Radiologic / Imaging / TESTING  No results found.      Labs:    Recent Results (from the past 24 hour(s))   Urinalysis with Microscopic    Collection Time: 10/23/20  1:45 PM   Result Value Ref Range    Color, UA YELLOW YELLOW    Clarity, UA CLEAR CLEAR    Glucose, Urine NEGATIVE NEGATIVE MG/DL    Bilirubin Urine NEGATIVE NEGATIVE MG/DL    Ketones, Urine NEGATIVE NEGATIVE MG/DL    Specific Gravity, UA 1.008 1.001 - 1.035    Blood, Urine LARGE (A) NEGATIVE    pH, Urine 6.0 5.0 - 8.0    Protein, UA 30 (A) NEGATIVE MG/DL    Urobilinogen, Urine NORMAL 0.2 - 1.0 MG/DL    Nitrite Urine, Quantitative NEGATIVE NEGATIVE    Leukocyte Esterase, Urine NEGATIVE NEGATIVE    RBC, UA 26 (H) 0 - 3 /HPF    WBC, UA 2 0 - 2 /HPF    Bacteria, UA RARE (A) NEGATIVE /HPF    Yeast, UA OCCASIONAL /HPF    Squam Epithel, UA <1 /HPF    Mucus, UA RARE (A) NEGATIVE HPF    Trichomonas, UA NONE SEEN NONE SEEN /HPF   Procalcitonin    Collection Time: 10/24/20  8:54 AM   Result Value Ref Range    Procalcitonin 0.245    Comprehensive Metabolic Panel w/ Reflex to MG    Collection Time: 10/24/20  8:54 AM   Result Value Ref Range    Sodium 132 (L) 135 - 145 MMOL/L    Potassium 4.4 3.5 - 5.1 MMOL/L    Chloride 96 (L) 99 - 110 mMol/L    CO2 21 21 - 32 MMOL/L    BUN 2 (L) 6 - 23 MG/DL    CREATININE 0.6 (L) 0.9 - 1.3 MG/DL    Glucose 97 70 - 99 MG/DL    Calcium 7.6 (L) 8.3 - 10.6 MG/DL    Alb 2.4 (L) 3.4 - 5.0 GM/DL    Total Protein 5.5 (L) 6.4 - 8.2 GM/DL    Total Bilirubin 0.4 0.0 - 1.0 MG/DL    ALT 9 (L) 10 - 40 U/L    AST 13 (L) 15 - 37 IU/L    Alkaline Phosphatase 75 40 - 128 IU/L    GFR Non-African American >60 >60 mL/min/1.73m2    GFR African American >60 >60 mL/min/1.73m2    Anion Gap 15 4 - 16   CBC auto differential    Collection Time: 10/24/20  8:54 AM   Result Value Ref Range    WBC 28.0 (H) 4.0 - 10.5 K/CU MM    RBC 3.59 (L) 4.6 - 6.2 M/CU MM    Hemoglobin 10.8 (L) 13.5 - 18.0 GM/DL    Hematocrit 34.5 (L) 42 - 52 %    MCV 96.1 78 - 100 FL    MCH 30.1 27 - 31 PG    MCHC 31.3 (L) 32.0 - 36.0 %    RDW 13.2 11.7 - 14.9 %    Platelets 185 (H) 126 - 440 K/CU MM    MPV 9.1 7.5 - 11.1 FL   C-Reactive Protein    Collection Time: 10/24/20  8:54 AM   Result Value Ref Range    CRP, High Sensitivity 95.2 mg/L     CULTURE results: Invalid input(s): BLOOD CULTURE,  URINE CULTURE, SURGICAL CULTURE    Diagnosis:  Patient Active Problem List   Diagnosis    [facility] ([facility]    Alcoholic ([facility]    Hypertension    Alcohol withdrawal ([facility]    Abdominal wall abscess    Abscess of sigmoid colon due to diverticulitis    Proteinuria    HIV (human immunodeficiency virus infection) ([facility]    Incisional hernia, without obstruction or gangrene       Active Problems  Active Problems:    Abdominal wall abscess    Abscess of sigmoid colon due to diverticulitis    Proteinuria    HIV (human immunodeficiency virus infection) ([facility]    Incisional hernia, without obstruction or gangrene  Resolved Problems:    * No resolved hospital problems. *      Impression and plan   Clinical status: Status post exploratory laparotomy, drainage of pericolonic abscess on 10/21/2020. Still has considerable pain. Unfortunately surgical cultures not done. Procalcitonin remains in a downward trend but CRP is elevated.  Therapeutic: continue linezolid 10/19 and meropenem 10/17. Metronidazole 10/24-   Diagnostic: trend crp, and pct   F/u:   Other: Had a discussion with Dr. Darell Drew will consider the possibility of resistance to meropenem and had added metronidazole. Resistance to carbapanem group of antibiotics by both Bacteroides and non-Bacteroides anaerobes is rare but not impossible   Summary: [facility] since 2003, 10/22/2020 HIV VL undetected, CD4 859, RPR: 1:2 (previously treated for syphilis).        Electronically signed by: Electronically signed by Ambrocio Hollingsworth MD on 10/24/2020 at 11:17 AM

## 2020-10-24 NOTE — PROGRESS NOTES
175/91   Pulse: 107   Resp: 16   Temp: 99.2 °F (37.3 °C)   SpO2: 97%     Physical Exam:   GEN Awake male, sitting upright in bed in no apparent distress. Appears given age. EYES Pupils are equally round. No scleral erythema, discharge, or conjunctivitis. HENT Mucous membranes are moist. Oral pharynx without exudates, no evidence of thrush. NECK Supple, no apparent thyromegaly or masses. RESP Clear to auscultation, no wheezes, rales or rhonchi. Symmetric chest movement while on room air. CARDIO/VASC S1/S2 auscultated. Regular rate without appreciable murmurs, rubs, or gallops. No JVD or carotid bruits. Peripheral pulses equal bilaterally and palpable. No peripheral edema. GI Colostomy in place,drain in place. Dressing on abdomen clean. Abdomen is soft, Bowel sounds are decreased 4q. Rectal exam deferred.  No costovertebral angle tenderness. Normal appearing external genitalia. Lackey catheter is not present. HEME/LYMPH No palpable cervical lymphadenopathy and no hepatosplenomegaly. No petechiae or ecchymoses. MSK No gross joint deformities. SKIN Normal coloration, warm, dry. NEURO Cranial nerves appear grossly intact, normal speech, no lateralizing weakness. PSYCH Awake, alert, oriented x 4. Affect appropriate.     Medications:   Medications:    lipase-protease-amylase  30,000 Units Oral TID WC    lisinopril  40 mg Oral Daily    amLODIPine  10 mg Oral Daily    cloNIDine  0.1 mg Oral TID    metoclopramide  10 mg Intravenous Q6H    metroNIDAZOLE  500 mg Intravenous Q8H    pantoprazole  40 mg Intravenous BID    enoxaparin  40 mg Subcutaneous Daily    linezolid  600 mg Intravenous Q12H    metoprolol  100 mg Oral BID    meropenem  1 g Intravenous Q8H    Abacavir-Dolutegravir-Lamivud  1 tablet Oral Daily    sodium chloride flush  10 mL Intravenous BID    sodium chloride flush  10 mL Intravenous 2 times per day      Infusions:    sodium chloride 100 mL/hr at 10/24/20 1000    HYDROmorphone HCl 1 mL/hr at 10/24/20 0412     PRN Meds: promethazine, 12.5 mg, Q6H PRN  calcium carbonate, 500 mg, 4x Daily PRN  HYDROmorphone, 1 mg, Q3H PRN  ondansetron, 4 mg, Q6H PRN  sodium chloride flush, 10 mL, PRN  acetaminophen, 650 mg, Q6H PRN    Or  acetaminophen, 650 mg, Q6H PRN          Electronically signed by Claudia Cervantes MD on 10/24/2020 at 11:36 AM

## 2020-10-24 NOTE — PLAN OF CARE
Problem: Pain:  Goal: Pain level will decrease  Description: Pain level will decrease  10/23/2020 2328 by Cordell Garay RN  Outcome: Ongoing  10/23/2020 1211 by Caity Arias RN  Outcome: Ongoing  Goal: Control of acute pain  Description: Control of acute pain  10/23/2020 2328 by Cordell Garay RN  Outcome: Ongoing  10/23/2020 1211 by Caity Arias RN  Outcome: Ongoing  Goal: Control of chronic pain  Description: Control of chronic pain  10/23/2020 2328 by Cordell Garay RN  Outcome: Ongoing  10/23/2020 1211 by Caity Arias RN  Outcome: Ongoing

## 2020-10-25 ENCOUNTER — APPOINTMENT (OUTPATIENT)
Dept: GENERAL RADIOLOGY | Age: 45
DRG: 969 | End: 2020-10-25
Payer: COMMERCIAL

## 2020-10-25 ENCOUNTER — APPOINTMENT (OUTPATIENT)
Dept: CT IMAGING | Age: 45
DRG: 969 | End: 2020-10-25
Payer: COMMERCIAL

## 2020-10-25 LAB
ALBUMIN SERPL-MCNC: 2.3 GM/DL (ref 3.4–5)
ALP BLD-CCNC: 66 IU/L (ref 40–128)
ALT SERPL-CCNC: 7 U/L (ref 10–40)
ANION GAP SERPL CALCULATED.3IONS-SCNC: 9 MMOL/L (ref 4–16)
AST SERPL-CCNC: 10 IU/L (ref 15–37)
BASOPHILS ABSOLUTE: 0.1 K/CU MM
BASOPHILS RELATIVE PERCENT: 0.3 % (ref 0–1)
BILIRUB SERPL-MCNC: 0.4 MG/DL (ref 0–1)
BUN BLDV-MCNC: 2 MG/DL (ref 6–23)
CALCIUM SERPL-MCNC: 7.1 MG/DL (ref 8.3–10.6)
CHLORIDE BLD-SCNC: 96 MMOL/L (ref 99–110)
CO2: 26 MMOL/L (ref 21–32)
CREAT SERPL-MCNC: 0.7 MG/DL (ref 0.9–1.3)
DIFFERENTIAL TYPE: ABNORMAL
EOSINOPHILS ABSOLUTE: 0 K/CU MM
EOSINOPHILS RELATIVE PERCENT: 0.1 % (ref 0–3)
GFR AFRICAN AMERICAN: >60 ML/MIN/1.73M2
GFR NON-AFRICAN AMERICAN: >60 ML/MIN/1.73M2
GLUCOSE BLD-MCNC: 116 MG/DL (ref 70–99)
HCT VFR BLD CALC: 30.4 % (ref 42–52)
HEMOGLOBIN: 10.2 GM/DL (ref 13.5–18)
HIGH SENSITIVE C-REACTIVE PROTEIN: 106 MG/L
IMMATURE NEUTROPHIL %: 2.4 % (ref 0–0.43)
LACTATE: 0.9 MMOL/L (ref 0.4–2)
LYMPHOCYTES ABSOLUTE: 2 K/CU MM
LYMPHOCYTES RELATIVE PERCENT: 8.9 % (ref 24–44)
MAGNESIUM: 1.5 MG/DL (ref 1.8–2.4)
MCH RBC QN AUTO: 31.1 PG (ref 27–31)
MCHC RBC AUTO-ENTMCNC: 33.6 % (ref 32–36)
MCV RBC AUTO: 92.7 FL (ref 78–100)
MONOCYTES ABSOLUTE: 3.1 K/CU MM
MONOCYTES RELATIVE PERCENT: 13.4 % (ref 0–4)
NUCLEATED RBC %: 0 %
PDW BLD-RTO: 13.4 % (ref 11.7–14.9)
PHOSPHORUS: 2.8 MG/DL (ref 2.5–4.9)
PLATELET # BLD: 469 K/CU MM (ref 140–440)
PMV BLD AUTO: 8.9 FL (ref 7.5–11.1)
POTASSIUM SERPL-SCNC: 3.5 MMOL/L (ref 3.5–5.1)
RBC # BLD: 3.28 M/CU MM (ref 4.6–6.2)
SEGMENTED NEUTROPHILS ABSOLUTE COUNT: 17.2 K/CU MM
SEGMENTED NEUTROPHILS RELATIVE PERCENT: 74.9 % (ref 36–66)
SODIUM BLD-SCNC: 131 MMOL/L (ref 135–145)
TOTAL IMMATURE NEUTOROPHIL: 0.55 K/CU MM
TOTAL NUCLEATED RBC: 0 K/CU MM
TOTAL PROTEIN: 5.2 GM/DL (ref 6.4–8.2)
WBC # BLD: 22.9 K/CU MM (ref 4–10.5)

## 2020-10-25 PROCEDURE — 74177 CT ABD & PELVIS W/CONTRAST: CPT

## 2020-10-25 PROCEDURE — 6360000002 HC RX W HCPCS: Performed by: SURGERY

## 2020-10-25 PROCEDURE — 94761 N-INVAS EAR/PLS OXIMETRY MLT: CPT

## 2020-10-25 PROCEDURE — 6360000004 HC RX CONTRAST MEDICATION: Performed by: INTERNAL MEDICINE

## 2020-10-25 PROCEDURE — 6370000000 HC RX 637 (ALT 250 FOR IP): Performed by: INTERNAL MEDICINE

## 2020-10-25 PROCEDURE — 86141 C-REACTIVE PROTEIN HS: CPT

## 2020-10-25 PROCEDURE — 85025 COMPLETE CBC W/AUTO DIFF WBC: CPT

## 2020-10-25 PROCEDURE — 2580000003 HC RX 258: Performed by: SURGERY

## 2020-10-25 PROCEDURE — 84100 ASSAY OF PHOSPHORUS: CPT

## 2020-10-25 PROCEDURE — 80053 COMPREHEN METABOLIC PANEL: CPT

## 2020-10-25 PROCEDURE — 1200000000 HC SEMI PRIVATE

## 2020-10-25 PROCEDURE — 83735 ASSAY OF MAGNESIUM: CPT

## 2020-10-25 PROCEDURE — 36415 COLL VENOUS BLD VENIPUNCTURE: CPT

## 2020-10-25 PROCEDURE — C9113 INJ PANTOPRAZOLE SODIUM, VIA: HCPCS | Performed by: SURGERY

## 2020-10-25 PROCEDURE — 6360000002 HC RX W HCPCS: Performed by: INTERNAL MEDICINE

## 2020-10-25 PROCEDURE — 84145 PROCALCITONIN (PCT): CPT

## 2020-10-25 PROCEDURE — 2580000003 HC RX 258: Performed by: INTERNAL MEDICINE

## 2020-10-25 PROCEDURE — 2500000003 HC RX 250 WO HCPCS: Performed by: SURGERY

## 2020-10-25 PROCEDURE — 87040 BLOOD CULTURE FOR BACTERIA: CPT

## 2020-10-25 PROCEDURE — 2500000003 HC RX 250 WO HCPCS: Performed by: INTERNAL MEDICINE

## 2020-10-25 PROCEDURE — 83605 ASSAY OF LACTIC ACID: CPT

## 2020-10-25 PROCEDURE — 71045 X-RAY EXAM CHEST 1 VIEW: CPT

## 2020-10-25 PROCEDURE — 74018 RADEX ABDOMEN 1 VIEW: CPT

## 2020-10-25 RX ORDER — LABETALOL HYDROCHLORIDE 5 MG/ML
20 INJECTION, SOLUTION INTRAVENOUS ONCE
Status: DISCONTINUED | OUTPATIENT
Start: 2020-10-25 | End: 2020-10-25 | Stop reason: CLARIF

## 2020-10-25 RX ORDER — CLONIDINE HYDROCHLORIDE 0.1 MG/1
0.1 TABLET ORAL 2 TIMES DAILY
Status: DISCONTINUED | OUTPATIENT
Start: 2020-10-25 | End: 2020-10-25

## 2020-10-25 RX ORDER — ALPRAZOLAM 0.5 MG/1
0.5 TABLET ORAL ONCE
Status: COMPLETED | OUTPATIENT
Start: 2020-10-25 | End: 2020-10-25

## 2020-10-25 RX ORDER — CLONIDINE HYDROCHLORIDE 0.2 MG/1
0.2 TABLET ORAL 3 TIMES DAILY
Status: DISCONTINUED | OUTPATIENT
Start: 2020-10-25 | End: 2020-10-25

## 2020-10-25 RX ORDER — 0.9 % SODIUM CHLORIDE 0.9 %
500 INTRAVENOUS SOLUTION INTRAVENOUS ONCE
Status: COMPLETED | OUTPATIENT
Start: 2020-10-25 | End: 2020-10-25

## 2020-10-25 RX ORDER — CLONIDINE 0.1 MG/24H
1 PATCH, EXTENDED RELEASE TRANSDERMAL WEEKLY
Status: DISCONTINUED | OUTPATIENT
Start: 2020-10-25 | End: 2020-11-03 | Stop reason: HOSPADM

## 2020-10-25 RX ORDER — SODIUM CHLORIDE 0.9 % (FLUSH) 0.9 %
10 SYRINGE (ML) INJECTION PRN
Status: DISCONTINUED | OUTPATIENT
Start: 2020-10-25 | End: 2020-11-03 | Stop reason: HOSPADM

## 2020-10-25 RX ORDER — MAGNESIUM SULFATE IN WATER 40 MG/ML
2 INJECTION, SOLUTION INTRAVENOUS ONCE
Status: COMPLETED | OUTPATIENT
Start: 2020-10-25 | End: 2020-10-25

## 2020-10-25 RX ADMIN — ALPRAZOLAM 0.5 MG: 0.5 TABLET ORAL at 08:52

## 2020-10-25 RX ADMIN — AMLODIPINE BESYLATE 10 MG: 10 TABLET ORAL at 08:52

## 2020-10-25 RX ADMIN — METRONIDAZOLE 500 MG: 500 INJECTION, SOLUTION INTRAVENOUS at 17:53

## 2020-10-25 RX ADMIN — HYDROMORPHONE HYDROCHLORIDE 1 MG: 1 INJECTION, SOLUTION INTRAMUSCULAR; INTRAVENOUS; SUBCUTANEOUS at 05:55

## 2020-10-25 RX ADMIN — SODIUM CHLORIDE, PRESERVATIVE FREE 10 ML: 5 INJECTION INTRAVENOUS at 08:31

## 2020-10-25 RX ADMIN — METRONIDAZOLE 500 MG: 500 INJECTION, SOLUTION INTRAVENOUS at 03:02

## 2020-10-25 RX ADMIN — Medication 0.2 MG: at 03:44

## 2020-10-25 RX ADMIN — LINEZOLID 600 MG: 600 INJECTION, SOLUTION INTRAVENOUS at 13:16

## 2020-10-25 RX ADMIN — CLONIDINE HYDROCHLORIDE 0.2 MG: 0.2 TABLET ORAL at 08:53

## 2020-10-25 RX ADMIN — SODIUM CHLORIDE, PRESERVATIVE FREE 10 ML: 5 INJECTION INTRAVENOUS at 20:53

## 2020-10-25 RX ADMIN — PANCRELIPASE 30000 UNITS: 30000; 6000; 19000 CAPSULE, DELAYED RELEASE PELLETS ORAL at 08:53

## 2020-10-25 RX ADMIN — SODIUM CHLORIDE 20 MG: 9 INJECTION, SOLUTION INTRAVENOUS at 02:07

## 2020-10-25 RX ADMIN — ENOXAPARIN SODIUM 40 MG: 40 INJECTION SUBCUTANEOUS at 08:53

## 2020-10-25 RX ADMIN — METOPROLOL TARTRATE 125 MG: 50 TABLET, FILM COATED ORAL at 08:52

## 2020-10-25 RX ADMIN — HYDROMORPHONE HYDROCHLORIDE 1 MG: 1 INJECTION, SOLUTION INTRAMUSCULAR; INTRAVENOUS; SUBCUTANEOUS at 13:16

## 2020-10-25 RX ADMIN — METOCLOPRAMIDE 10 MG: 5 INJECTION, SOLUTION INTRAMUSCULAR; INTRAVENOUS at 14:40

## 2020-10-25 RX ADMIN — LISINOPRIL 40 MG: 40 TABLET ORAL at 08:52

## 2020-10-25 RX ADMIN — METOCLOPRAMIDE 10 MG: 5 INJECTION, SOLUTION INTRAMUSCULAR; INTRAVENOUS at 05:53

## 2020-10-25 RX ADMIN — MEROPENEM 1 G: 1 INJECTION, POWDER, FOR SOLUTION INTRAVENOUS at 08:43

## 2020-10-25 RX ADMIN — IOPAMIDOL 80 ML: 755 INJECTION, SOLUTION INTRAVENOUS at 08:30

## 2020-10-25 RX ADMIN — HYDROMORPHONE HYDROCHLORIDE 1 MG: 1 INJECTION, SOLUTION INTRAMUSCULAR; INTRAVENOUS; SUBCUTANEOUS at 08:52

## 2020-10-25 RX ADMIN — PANTOPRAZOLE SODIUM 40 MG: 40 INJECTION, POWDER, FOR SOLUTION INTRAVENOUS at 08:45

## 2020-10-25 RX ADMIN — CEFTAZIDIME, AVIBACTAM 2.5 G: 2; .5 POWDER, FOR SOLUTION INTRAVENOUS at 23:00

## 2020-10-25 RX ADMIN — PANTOPRAZOLE SODIUM 40 MG: 40 INJECTION, POWDER, FOR SOLUTION INTRAVENOUS at 20:53

## 2020-10-25 RX ADMIN — SODIUM CHLORIDE 500 ML: 9 INJECTION, SOLUTION INTRAVENOUS at 03:02

## 2020-10-25 RX ADMIN — SODIUM CHLORIDE, PRESERVATIVE FREE 10 ML: 5 INJECTION INTRAVENOUS at 23:01

## 2020-10-25 RX ADMIN — METRONIDAZOLE 500 MG: 500 INJECTION, SOLUTION INTRAVENOUS at 11:42

## 2020-10-25 RX ADMIN — DEXTROSE MONOHYDRATE 200 MG: 50 INJECTION, SOLUTION INTRAVENOUS at 04:28

## 2020-10-25 RX ADMIN — CEFTAZIDIME, AVIBACTAM 2.5 G: 2; .5 POWDER, FOR SOLUTION INTRAVENOUS at 14:40

## 2020-10-25 RX ADMIN — MAGNESIUM SULFATE IN WATER 2 G: 40 INJECTION, SOLUTION INTRAVENOUS at 08:45

## 2020-10-25 RX ADMIN — HYDROMORPHONE HYDROCHLORIDE 1 MG: 1 INJECTION, SOLUTION INTRAMUSCULAR; INTRAVENOUS; SUBCUTANEOUS at 20:53

## 2020-10-25 RX ADMIN — METOPROLOL TARTRATE 125 MG: 50 TABLET, FILM COATED ORAL at 20:53

## 2020-10-25 RX ADMIN — HYDROMORPHONE HYDROCHLORIDE 1 MG: 1 INJECTION, SOLUTION INTRAMUSCULAR; INTRAVENOUS; SUBCUTANEOUS at 00:45

## 2020-10-25 RX ADMIN — HYDROMORPHONE HYDROCHLORIDE 1 MG: 1 INJECTION, SOLUTION INTRAMUSCULAR; INTRAVENOUS; SUBCUTANEOUS at 17:52

## 2020-10-25 RX ADMIN — METOCLOPRAMIDE 10 MG: 5 INJECTION, SOLUTION INTRAMUSCULAR; INTRAVENOUS at 20:53

## 2020-10-25 ASSESSMENT — PAIN SCALES - GENERAL
PAINLEVEL_OUTOF10: 9
PAINLEVEL_OUTOF10: 9
PAINLEVEL_OUTOF10: 8
PAINLEVEL_OUTOF10: 9
PAINLEVEL_OUTOF10: 8
PAINLEVEL_OUTOF10: 9
PAINLEVEL_OUTOF10: 0
PAINLEVEL_OUTOF10: 8
PAINLEVEL_OUTOF10: 0
PAINLEVEL_OUTOF10: 9
PAINLEVEL_OUTOF10: 8

## 2020-10-25 ASSESSMENT — PAIN DESCRIPTION - LOCATION
LOCATION: ABDOMEN
LOCATION: ABDOMEN

## 2020-10-25 ASSESSMENT — PAIN SCALES - WONG BAKER
WONGBAKER_NUMERICALRESPONSE: 0
WONGBAKER_NUMERICALRESPONSE: 2
WONGBAKER_NUMERICALRESPONSE: 0
WONGBAKER_NUMERICALRESPONSE: 2
WONGBAKER_NUMERICALRESPONSE: 6
WONGBAKER_NUMERICALRESPONSE: 0
WONGBAKER_NUMERICALRESPONSE: 2

## 2020-10-25 ASSESSMENT — PAIN DESCRIPTION - ORIENTATION
ORIENTATION: LOWER;LEFT
ORIENTATION: LEFT;LOWER

## 2020-10-25 ASSESSMENT — PAIN DESCRIPTION - PAIN TYPE
TYPE: SURGICAL PAIN
TYPE: SURGICAL PAIN

## 2020-10-25 ASSESSMENT — PAIN DESCRIPTION - PROGRESSION
CLINICAL_PROGRESSION: NOT CHANGED
CLINICAL_PROGRESSION: GRADUALLY WORSENING
CLINICAL_PROGRESSION: NOT CHANGED

## 2020-10-25 ASSESSMENT — PAIN - FUNCTIONAL ASSESSMENT
PAIN_FUNCTIONAL_ASSESSMENT: PREVENTS OR INTERFERES SOME ACTIVE ACTIVITIES AND ADLS
PAIN_FUNCTIONAL_ASSESSMENT: PREVENTS OR INTERFERES SOME ACTIVE ACTIVITIES AND ADLS

## 2020-10-25 ASSESSMENT — PAIN DESCRIPTION - DESCRIPTORS
DESCRIPTORS: ACHING;DISCOMFORT
DESCRIPTORS: ACHING;DISCOMFORT

## 2020-10-25 ASSESSMENT — PAIN DESCRIPTION - ONSET
ONSET: ON-GOING
ONSET: ON-GOING

## 2020-10-25 ASSESSMENT — PAIN DESCRIPTION - FREQUENCY
FREQUENCY: CONTINUOUS
FREQUENCY: CONTINUOUS

## 2020-10-25 NOTE — PROGRESS NOTES
Hospitalist Progress Note      Name:  Marianela Umaña /Age/Sex: 1975  (39 y.o. male)   MRN & CSN:  [id number] & [id number] Admission Date/Time: 10/16/2020 11:04 AM   [facility] PCP: Castro Myrick, RN, BSN, MSN, [facility]         Hospital Day: 10    Assessment and Plan:   Marli Rucker is a 39 y.o.  male  who presents with abdominal pain     #Sepsis  #Acute Diverticulitis  #Peritonitis  -Patient spiked a fever 103. F Overnight and was giving a dose of Eraxis. -Repeat blood cultures sent  -SIRS 3/4 leukocytosis and tachycardia  -S/p IR drainage with drain placement  -S/p Exploratory laparotomy with colostomy creation, drainage of peritoneal abscess  -CT abdomen and pelvis with colitis, abscess, pancreatic ductal dilatation  -Blood cultures no growth so far  -Surgical cultures with mixed organisms, including E Coli, Prevotella Denticola  -Continue Meropenem and Zyvox, flagyl added per ID with concerns for resistance  -patient not tolerating clear liquids. monitor and advance diet slowly  -ID on board  -General surgery on board  -Pain control with PCA     HIV   Continue home medications     #Proteinuria  -urine protein 10/16/20 2000  -rpeat ua pr 30  -urine pr /cr 0.3  -Nephrology on board     #Hypertension  Continue Metoprolol    #Diet:  -Patient has been unable to tolerate p.o.  -Currently on clear liquids but unable to take  -Dietitian on board    Diet Diet NPO Effective Now Exceptions are: Ice Chips, Sips with Meds, Other (See Comment)   DVT Prophylaxis [] Lovenox, []  Heparin, [] SCDs, [] Ambulation   GI Prophylaxis [] PPI,  [] H2 Blocker,  [] Carafate,  [] Diet/Tube Feeds   Code Status Full Code   Disposition Patient postop with poor oral intake         History of Present Illness:     Chief Complaint: <principal problem not specified>  Marianela Umaña is a 39 y.o.  male  who presents with abdominal pain       Ten point ROS reviewed negative, unless as noted above    Objective: Intake/Output Summary (Last 24 hours) at 10/25/2020 1125  Last data filed at 10/25/2020 0941  Gross per 24 hour   Intake 0 ml   Output 3430 ml   Net -3430 ml      Vitals:   Vitals:    10/25/20 0839   BP: (!) 174/103   Pulse: 97   Resp: 18   Temp: 99.5 °F (37.5 °C)   SpO2: 95%     Physical Exam:   GEN Awake male, sitting upright in bed in no apparent distress. Appears given age. EYES Pupils are equally round. No scleral erythema, discharge, or conjunctivitis. HENT Mucous membranes are moist. Oral pharynx without exudates, no evidence of thrush. NECK Supple, no apparent thyromegaly or masses. RESP Clear to auscultation, no wheezes, rales or rhonchi. Symmetric chest movement while on room air. CARDIO/VASC S1/S2 auscultated. Regular rate without appreciable murmurs, rubs, or gallops. No JVD or carotid bruits. Peripheral pulses equal bilaterally and palpable. No peripheral edema. GI colostomy in place, abdomen is soft without significant tenderness, masses, or guarding. Bowel sounds sluggish. Rectal exam deferred.  No costovertebral angle tenderness. Normal appearing external genitalia. Lackey catheter present. HEME/LYMPH No palpable cervical lymphadenopathy and no hepatosplenomegaly. No petechiae or ecchymoses. MSK No gross joint deformities. SKIN Normal coloration, warm, dry. NEURO Cranial nerves appear grossly intact, normal speech, no lateralizing weakness. PSYCH Awake, alert, oriented x 4. Affect appropriate.     Medications:   Medications:    cloNIDine  0.2 mg Oral TID    metoprolol  125 mg Oral BID    lipase-protease-amylase  30,000 Units Oral TID     lisinopril  40 mg Oral Daily    amLODIPine  10 mg Oral Daily    metoclopramide  10 mg Intravenous Q6H    metroNIDAZOLE  500 mg Intravenous Q8H    pantoprazole  40 mg Intravenous BID    enoxaparin  40 mg Subcutaneous Daily    linezolid  600 mg Intravenous Q12H    meropenem  1 g Intravenous Q8H    Abacavir-Dolutegravir-Lamivud  1 tablet Oral Daily    sodium chloride flush  10 mL Intravenous BID    sodium chloride flush  10 mL Intravenous 2 times per day      Infusions:    sodium chloride 100 mL/hr at 10/24/20 1000    HYDROmorphone HCl 0.2 mg (10/25/20 0344)     PRN Meds: sodium chloride flush, 10 mL, PRN  promethazine, 12.5 mg, Q6H PRN  calcium carbonate, 500 mg, 4x Daily PRN  HYDROmorphone, 1 mg, Q3H PRN  ondansetron, 4 mg, Q6H PRN  sodium chloride flush, 10 mL, PRN  acetaminophen, 650 mg, Q6H PRN    Or  acetaminophen, 650 mg, Q6H PRN          Electronically signed by Yu Sanders MD on 10/25/2020 at 11:25 AM

## 2020-10-25 NOTE — PROGRESS NOTES
Patient tired today, denies nausea currently. No burping now, no flatus in appliance  Fever to 103 last night, I was not notified.   Patient states he just wants to rest today, still having pain but currently controlled    PE:  Vitals:    10/24/20 2242 10/25/20 0045 10/25/20 0545 10/25/20 0839   BP: (!) 178/104 (!) 174/98 (!) 167/91 (!) 174/103   Pulse: 118 112 98 97   Resp: 19 18 18 18   Temp: 103 °F (39.4 °C) 102.2 °F (39 °C) 98.7 °F (37.1 °C) 99.5 °F (37.5 °C)   TempSrc: Oral Oral Oral Oral   SpO2: 93% 93% 96% 95%   Weight:       Height:         Abd: soft, min d/c on dressing, titi advanced yesterday  Stoma pink and viable, edematous, no stool  No cellulitis or redness of abdominal wall or flank, no crepitus, +body wall edema    Labs reviewed  CT scan films and report reviewed    A/P:  LUQ fluid likely hematoma   Some dilation of bowel c/w ileus, patient without nausea currently so will hold off on NG  abx adjusted and anti-fungal added last night after fever spike, cultures pending  Continue current treatment plan

## 2020-10-25 NOTE — PROGRESS NOTES
Nephrology Progress Note  10/25/2020 12:18 PM  Subjective:   Admit Date: 10/16/2020  PCP: Garry Conteh, RN, BSN, MSN, FNP-C  Interval History: pt seen this am and more lethargic today    Data:   Scheduled Meds:   cloNIDine  0.2 mg Oral TID    metoprolol  125 mg Oral BID    lipase-protease-amylase  30,000 Units Oral TID WC    lisinopril  40 mg Oral Daily    amLODIPine  10 mg Oral Daily    metoclopramide  10 mg Intravenous Q6H    metroNIDAZOLE  500 mg Intravenous Q8H    pantoprazole  40 mg Intravenous BID    enoxaparin  40 mg Subcutaneous Daily    linezolid  600 mg Intravenous Q12H    meropenem  1 g Intravenous Q8H    Abacavir-Dolutegravir-Lamivud  1 tablet Oral Daily    sodium chloride flush  10 mL Intravenous BID    sodium chloride flush  10 mL Intravenous 2 times per day     Continuous Infusions:   sodium chloride 100 mL/hr at 10/24/20 1000    HYDROmorphone HCl 0.2 mg (10/25/20 0344)       I/O this shift:  In: -   Out: 1400 [Urine:1400]  [unfilled]    CBC:   Recent Labs     10/23/20  0600 10/24/20  0854 10/25/20  0337   WBC 21.1* 28.0* 22.9*   HGB 11.1* 10.8* 10.2*   * 587* 469*     BMP:    Recent Labs     10/23/20  0600 10/24/20  0854 10/25/20  0337   * 132* 131*   K 4.5 4.4 3.5   CL 98* 96* 96*   CO2 17* 21 26   BUN 4* 2* 2*   CREATININE 0.7* 0.6* 0.7*   GLUCOSE 82 97 116*       Renal Labs  Albumin:    Lab Results   Component Value Date    LABALBU 2.3 10/25/2020     Calcium:    Lab Results   Component Value Date    CALCIUM 7.1 10/25/2020     Phosphorus:    Lab Results   Component Value Date    PHOS 2.8 10/25/2020     U/A:    Lab Results   Component Value Date    NITRU NEGATIVE 10/23/2020    COLORU YELLOW 10/23/2020    WBCUA 2 10/23/2020    RBCUA 26 10/23/2020    MUCUS RARE 10/23/2020    TRICHOMONAS NONE SEEN 10/23/2020    YEAST OCCASIONAL 10/23/2020    BACTERIA RARE 10/23/2020    CLARITYU CLEAR 10/23/2020    SPECGRAV 1.008 10/23/2020    UROBILINOGEN NORMAL 10/23/2020 BILIRUBINUR NEGATIVE 10/23/2020    BLOODU LARGE 10/23/2020    KETUA NEGATIVE 10/23/2020           Objective:   Vitals: BP (!) 174/103   Pulse 97   Temp 99.5 °F (37.5 °C) (Oral)   Resp 18   Ht 5' 10.98\" (1.803 m)   Wt 194 lb 11.2 oz (88.3 kg)   SpO2 95%   BMI 27.17 kg/m²   General appearance: letahrgic weak  HEENT: Head: Normal, normocephalic, atraumatic. Neck: supple, symmetrical, trachea midline  Lungs: diminished breath sounds bilaterally  Heart: S1, S2 normal  Abdomen: abnormal findings:  soft tender with ostomy  Extremities: edema trace  Neurologic: Mental status: alertness: letahrgic      Patient Active Problem List:     HIV positive ([address]     Alcoholic ([address]     Hypertension     Alcohol withdrawal ([facility]     Abdominal wall abscess     Abscess of sigmoid colon due to diverticulitis     Proteinuria     HIV (human immunodeficiency virus infection) ([facility]     Incisional hernia, without obstruction or gangrene    Assessment and Plan:      IMP:  ckd 2 with proteinuria  Abscess sigmoid colon  hiv  htn  hyponatremia    Plan     1 renal function was stable and ct scan with contrast done. Monitor post contrast   2 with fever sp ct scan and fu surgery eval and maintain on abx therapy  3 maintain therapy for hiv  4 as bp staying high tried xanax X1 but not sure if able take po and if taking po bp meds.  Will place clonidine patch and monitor bp  5 na low stable monitor  Will follow and monitor surgery rec  [place] bp increase with pain and agitation but today more lethargic and has pain pump so will try carefully with clonidine patch  Hold lisinopril till more stable           Juanpablo Li MD

## 2020-10-25 NOTE — PROGRESS NOTES
HOSPITALIST    Called with 103 fever.    -requested cultures and repeat CT.   -after reviewing ID note ordered one dose Eraxis. Also, BP is high - see orders.

## 2020-10-25 NOTE — PROGRESS NOTES
Overnight events were reviewed. Patient still had a fever spike. CRP is elevated. Taking all things into consideration: Presence intra-abdominal collections anterior to the spleen which cannot be drained, antecedent Zosyn exposure which does increase the risk of CRE. Appreciate CT of the abdomen and pelvis performed by the hospitalist service. A dose of Eraxis was given. Plan: Continue linezolid and metronidazole. Discontinue meropenem. Start Avycaz and Eraxis. Check beta-1 3D glucan. Expect that he will be elevated due to intra-abdominal surgery. However it could be tracked. Follow-up repeat blood cultures from 10/24/2020.

## 2020-10-26 ENCOUNTER — APPOINTMENT (OUTPATIENT)
Dept: ULTRASOUND IMAGING | Age: 45
DRG: 969 | End: 2020-10-26
Payer: COMMERCIAL

## 2020-10-26 LAB
BACTERIA: NEGATIVE /HPF
BANDED NEUTROPHILS ABSOLUTE COUNT: 0.66 K/CU MM
BANDED NEUTROPHILS RELATIVE PERCENT: 2 % (ref 5–11)
BILIRUBIN URINE: NEGATIVE MG/DL
BLOOD, URINE: ABNORMAL
CLARITY: CLEAR
COLOR: YELLOW
CORTISOL - AM: 20.9 UG/DL (ref 6–18.4)
CORTISOL, PLASMA: 16.8
DIFFERENTIAL TYPE: ABNORMAL
EOSINOPHILS ABSOLUTE: 0.3 K/CU MM
EOSINOPHILS RELATIVE PERCENT: 1 % (ref 0–3)
GLUCOSE, URINE: NEGATIVE MG/DL
HCT VFR BLD CALC: 37.8 % (ref 42–52)
HEMOGLOBIN: 12 GM/DL (ref 13.5–18)
HIGH SENSITIVE C-REACTIVE PROTEIN: 175.2 MG/L
KETONES, URINE: ABNORMAL MG/DL
LACTIC ACID, SEPSIS: 1.4 MMOL/L (ref 0.5–1.9)
LEUKOCYTE ESTERASE, URINE: NEGATIVE
LIPASE: 19 IU/L (ref 13–60)
LYMPHOCYTES ABSOLUTE: 2.3 K/CU MM
LYMPHOCYTES RELATIVE PERCENT: 7 % (ref 24–44)
Lab: NORMAL
MCH RBC QN AUTO: 29.9 PG (ref 27–31)
MCHC RBC AUTO-ENTMCNC: 31.7 % (ref 32–36)
MCV RBC AUTO: 94 FL (ref 78–100)
METAMYELOCYTES ABSOLUTE COUNT: 0.33 K/CU MM
METAMYELOCYTES PERCENT: 1 %
MONOCYTES ABSOLUTE: 1.6 K/CU MM
MONOCYTES RELATIVE PERCENT: 5 % (ref 0–4)
MUCUS: ABNORMAL HPF
NITRITE URINE, QUANTITATIVE: NEGATIVE
PDW BLD-RTO: 13.5 % (ref 11.7–14.9)
PH, URINE: 6 (ref 5–8)
PLATELET # BLD: 518 K/CU MM (ref 140–440)
PLT MORPHOLOGY: ABNORMAL
PMV BLD AUTO: 8.9 FL (ref 7.5–11.1)
POLYCHROMASIA: ABNORMAL
PROCALCITONIN: 80.38
PROTEIN UA: 30 MG/DL
RBC # BLD: 4.02 M/CU MM (ref 4.6–6.2)
RBC URINE: <1 /HPF (ref 0–3)
SEGMENTED NEUTROPHILS ABSOLUTE COUNT: 27.6 K/CU MM
SEGMENTED NEUTROPHILS RELATIVE PERCENT: 84 % (ref 36–66)
SPECIFIC GRAVITY UA: 1.01 (ref 1–1.03)
TEST NAME: NORMAL
TOXIC GRANULATION: PRESENT
TRICHOMONAS: ABNORMAL /HPF
UROBILINOGEN, URINE: NORMAL MG/DL (ref 0.2–1)
WBC # BLD: 32.8 K/CU MM (ref 4–10.5)
WBC # BLD: ABNORMAL 10*3/UL
WBC UA: 1 /HPF (ref 0–2)

## 2020-10-26 PROCEDURE — 2500000003 HC RX 250 WO HCPCS: Performed by: SURGERY

## 2020-10-26 PROCEDURE — 76937 US GUIDE VASCULAR ACCESS: CPT

## 2020-10-26 PROCEDURE — 2580000003 HC RX 258: Performed by: SURGERY

## 2020-10-26 PROCEDURE — 36569 INSJ PICC 5 YR+ W/O IMAGING: CPT

## 2020-10-26 PROCEDURE — 6360000002 HC RX W HCPCS: Performed by: SURGERY

## 2020-10-26 PROCEDURE — 6370000000 HC RX 637 (ALT 250 FOR IP): Performed by: INTERNAL MEDICINE

## 2020-10-26 PROCEDURE — 76705 ECHO EXAM OF ABDOMEN: CPT

## 2020-10-26 PROCEDURE — 36415 COLL VENOUS BLD VENIPUNCTURE: CPT

## 2020-10-26 PROCEDURE — 87324 CLOSTRIDIUM AG IA: CPT

## 2020-10-26 PROCEDURE — 82533 TOTAL CORTISOL: CPT

## 2020-10-26 PROCEDURE — 02HV33Z INSERTION OF INFUSION DEVICE INTO SUPERIOR VENA CAVA, PERCUTANEOUS APPROACH: ICD-10-PCS | Performed by: INTERNAL MEDICINE

## 2020-10-26 PROCEDURE — 6370000000 HC RX 637 (ALT 250 FOR IP): Performed by: NURSE PRACTITIONER

## 2020-10-26 PROCEDURE — C1751 CATH, INF, PER/CENT/MIDLINE: HCPCS

## 2020-10-26 PROCEDURE — 99233 SBSQ HOSP IP/OBS HIGH 50: CPT | Performed by: INTERNAL MEDICINE

## 2020-10-26 PROCEDURE — 82024 ASSAY OF ACTH: CPT

## 2020-10-26 PROCEDURE — 2500000003 HC RX 250 WO HCPCS: Performed by: INTERNAL MEDICINE

## 2020-10-26 PROCEDURE — 2580000003 HC RX 258: Performed by: INTERNAL MEDICINE

## 2020-10-26 PROCEDURE — 1200000000 HC SEMI PRIVATE

## 2020-10-26 PROCEDURE — 83605 ASSAY OF LACTIC ACID: CPT

## 2020-10-26 PROCEDURE — 99213 OFFICE O/P EST LOW 20 MIN: CPT

## 2020-10-26 PROCEDURE — 6370000000 HC RX 637 (ALT 250 FOR IP): Performed by: SURGERY

## 2020-10-26 PROCEDURE — C9113 INJ PANTOPRAZOLE SODIUM, VIA: HCPCS | Performed by: SURGERY

## 2020-10-26 PROCEDURE — 81001 URINALYSIS AUTO W/SCOPE: CPT

## 2020-10-26 PROCEDURE — 87449 NOS EACH ORGANISM AG IA: CPT

## 2020-10-26 PROCEDURE — 2700000000 HC OXYGEN THERAPY PER DAY

## 2020-10-26 PROCEDURE — 6360000002 HC RX W HCPCS: Performed by: INTERNAL MEDICINE

## 2020-10-26 PROCEDURE — 85027 COMPLETE CBC AUTOMATED: CPT

## 2020-10-26 PROCEDURE — 94761 N-INVAS EAR/PLS OXIMETRY MLT: CPT

## 2020-10-26 PROCEDURE — 83690 ASSAY OF LIPASE: CPT

## 2020-10-26 PROCEDURE — 85007 BL SMEAR W/DIFF WBC COUNT: CPT

## 2020-10-26 PROCEDURE — 86141 C-REACTIVE PROTEIN HS: CPT

## 2020-10-26 RX ORDER — LIDOCAINE HYDROCHLORIDE 10 MG/ML
5 INJECTION, SOLUTION EPIDURAL; INFILTRATION; INTRACAUDAL; PERINEURAL ONCE
Status: COMPLETED | OUTPATIENT
Start: 2020-10-26 | End: 2020-10-26

## 2020-10-26 RX ORDER — GUAIFENESIN 600 MG/1
600 TABLET, EXTENDED RELEASE ORAL 2 TIMES DAILY
Status: DISCONTINUED | OUTPATIENT
Start: 2020-10-26 | End: 2020-11-03 | Stop reason: HOSPADM

## 2020-10-26 RX ORDER — MORPHINE SULFATE 2 MG/ML
2 INJECTION, SOLUTION INTRAMUSCULAR; INTRAVENOUS EVERY 4 HOURS PRN
Status: DISCONTINUED | OUTPATIENT
Start: 2020-10-26 | End: 2020-10-28

## 2020-10-26 RX ORDER — SODIUM CHLORIDE 0.9 % (FLUSH) 0.9 %
10 SYRINGE (ML) INJECTION EVERY 12 HOURS SCHEDULED
Status: DISCONTINUED | OUTPATIENT
Start: 2020-10-26 | End: 2020-11-03 | Stop reason: HOSPADM

## 2020-10-26 RX ORDER — SODIUM CHLORIDE 0.9 % (FLUSH) 0.9 %
10 SYRINGE (ML) INJECTION PRN
Status: DISCONTINUED | OUTPATIENT
Start: 2020-10-26 | End: 2020-11-03 | Stop reason: HOSPADM

## 2020-10-26 RX ORDER — LISINOPRIL 40 MG/1
40 TABLET ORAL DAILY
Status: DISCONTINUED | OUTPATIENT
Start: 2020-10-26 | End: 2020-11-03 | Stop reason: HOSPADM

## 2020-10-26 RX ADMIN — MORPHINE SULFATE 2 MG: 2 INJECTION, SOLUTION INTRAMUSCULAR; INTRAVENOUS at 17:19

## 2020-10-26 RX ADMIN — Medication 0.2 MG: at 09:47

## 2020-10-26 RX ADMIN — GUAIFENESIN 600 MG: 600 TABLET, EXTENDED RELEASE ORAL at 20:30

## 2020-10-26 RX ADMIN — MORPHINE SULFATE 2 MG: 2 INJECTION, SOLUTION INTRAMUSCULAR; INTRAVENOUS at 21:54

## 2020-10-26 RX ADMIN — PANTOPRAZOLE SODIUM 40 MG: 40 INJECTION, POWDER, FOR SOLUTION INTRAVENOUS at 11:24

## 2020-10-26 RX ADMIN — ENOXAPARIN SODIUM 40 MG: 40 INJECTION SUBCUTANEOUS at 11:25

## 2020-10-26 RX ADMIN — CEFTAZIDIME, AVIBACTAM 2.5 G: 2; .5 POWDER, FOR SOLUTION INTRAVENOUS at 14:05

## 2020-10-26 RX ADMIN — SODIUM CHLORIDE: 9 INJECTION, SOLUTION INTRAVENOUS at 06:26

## 2020-10-26 RX ADMIN — METOCLOPRAMIDE 10 MG: 5 INJECTION, SOLUTION INTRAMUSCULAR; INTRAVENOUS at 03:17

## 2020-10-26 RX ADMIN — AMLODIPINE BESYLATE 10 MG: 10 TABLET ORAL at 11:22

## 2020-10-26 RX ADMIN — ERAVACYCLINE: 50 INJECTION, POWDER, LYOPHILIZED, FOR SOLUTION INTRAVENOUS at 18:18

## 2020-10-26 RX ADMIN — ACETAMINOPHEN 650 MG: 325 TABLET ORAL at 20:44

## 2020-10-26 RX ADMIN — METOPROLOL TARTRATE 125 MG: 50 TABLET, FILM COATED ORAL at 20:30

## 2020-10-26 RX ADMIN — LINEZOLID 600 MG: 600 INJECTION, SOLUTION INTRAVENOUS at 01:08

## 2020-10-26 RX ADMIN — METRONIDAZOLE 500 MG: 500 INJECTION, SOLUTION INTRAVENOUS at 02:44

## 2020-10-26 RX ADMIN — METOPROLOL TARTRATE 125 MG: 50 TABLET, FILM COATED ORAL at 11:21

## 2020-10-26 RX ADMIN — PANTOPRAZOLE SODIUM 40 MG: 40 INJECTION, POWDER, FOR SOLUTION INTRAVENOUS at 20:33

## 2020-10-26 RX ADMIN — DEXTROSE MONOHYDRATE 100 MG: 50 INJECTION, SOLUTION INTRAVENOUS at 01:08

## 2020-10-26 RX ADMIN — METRONIDAZOLE 500 MG: 500 INJECTION, SOLUTION INTRAVENOUS at 11:12

## 2020-10-26 RX ADMIN — HYDROMORPHONE HYDROCHLORIDE 1 MG: 1 INJECTION, SOLUTION INTRAMUSCULAR; INTRAVENOUS; SUBCUTANEOUS at 03:17

## 2020-10-26 RX ADMIN — METRONIDAZOLE 500 MG: 500 INJECTION, SOLUTION INTRAVENOUS at 18:24

## 2020-10-26 RX ADMIN — LINEZOLID 600 MG: 600 INJECTION, SOLUTION INTRAVENOUS at 16:56

## 2020-10-26 RX ADMIN — HYDROMORPHONE HYDROCHLORIDE 1 MG: 1 INJECTION, SOLUTION INTRAMUSCULAR; INTRAVENOUS; SUBCUTANEOUS at 06:23

## 2020-10-26 RX ADMIN — ACETAMINOPHEN 650 MG: 325 TABLET ORAL at 06:33

## 2020-10-26 RX ADMIN — SODIUM CHLORIDE, PRESERVATIVE FREE 10 ML: 5 INJECTION INTRAVENOUS at 11:27

## 2020-10-26 RX ADMIN — HYDROMORPHONE HYDROCHLORIDE 1 MG: 1 INJECTION, SOLUTION INTRAMUSCULAR; INTRAVENOUS; SUBCUTANEOUS at 00:01

## 2020-10-26 RX ADMIN — LIDOCAINE HYDROCHLORIDE ANHYDROUS 5 ML: 10 INJECTION, SOLUTION INFILTRATION at 10:01

## 2020-10-26 RX ADMIN — LISINOPRIL 40 MG: 40 TABLET ORAL at 11:21

## 2020-10-26 RX ADMIN — CEFTAZIDIME, AVIBACTAM 2.5 G: 2; .5 POWDER, FOR SOLUTION INTRAVENOUS at 22:16

## 2020-10-26 ASSESSMENT — PAIN DESCRIPTION - LOCATION
LOCATION: ABDOMEN

## 2020-10-26 ASSESSMENT — PAIN SCALES - WONG BAKER
WONGBAKER_NUMERICALRESPONSE: 0

## 2020-10-26 ASSESSMENT — PAIN DESCRIPTION - PROGRESSION
CLINICAL_PROGRESSION: NOT CHANGED
CLINICAL_PROGRESSION: GRADUALLY WORSENING
CLINICAL_PROGRESSION: NOT CHANGED
CLINICAL_PROGRESSION: GRADUALLY WORSENING

## 2020-10-26 ASSESSMENT — PAIN SCALES - GENERAL
PAINLEVEL_OUTOF10: 8
PAINLEVEL_OUTOF10: 10
PAINLEVEL_OUTOF10: 0
PAINLEVEL_OUTOF10: 0
PAINLEVEL_OUTOF10: 8
PAINLEVEL_OUTOF10: 0
PAINLEVEL_OUTOF10: 8

## 2020-10-26 ASSESSMENT — PAIN DESCRIPTION - PAIN TYPE
TYPE: SURGICAL PAIN

## 2020-10-26 ASSESSMENT — PAIN DESCRIPTION - DESCRIPTORS
DESCRIPTORS: ACHING;DISCOMFORT
DESCRIPTORS: DISCOMFORT

## 2020-10-26 ASSESSMENT — PAIN DESCRIPTION - ORIENTATION
ORIENTATION: LEFT;LOWER

## 2020-10-26 ASSESSMENT — PAIN DESCRIPTION - FREQUENCY
FREQUENCY: CONTINUOUS

## 2020-10-26 ASSESSMENT — PAIN - FUNCTIONAL ASSESSMENT
PAIN_FUNCTIONAL_ASSESSMENT: PREVENTS OR INTERFERES SOME ACTIVE ACTIVITIES AND ADLS

## 2020-10-26 ASSESSMENT — PAIN DESCRIPTION - ONSET
ONSET: ON-GOING

## 2020-10-26 NOTE — CONSULTS
Consult completed. Procedure/rationale explained to pt including benefits vs potential risks/complications; questions answered & consent obtained. #5Fr Double Lumen PowerPICC SOLO initiated to RUE Basilic Vein using sterile, UltraSound-guided technique without difficulty/complications. Positioning verified via TPS & 3CG with appropriate P-wave changes noted. Sterile dressing with SkinPrep, StatLock Securing Device, BioPatch, SwabCaps, and Limb Precautions band applied. Pt tolerated well & denies other c/o or needs. RN notified.

## 2020-10-26 NOTE — PROGRESS NOTES
Infectious Disease Progress Note  10/26/2020   Patient Name: Emilie Holliday : 1975       Reason for visit: F/u sepsis secondary to diverticular abscess  History:? Interval history noted. Status post exploratory laparotomy, colostomy creation, drainage of peritoneal abscess, removal of catheter spleen on 10/21 for perforated sigmoid diverticulitis with pericolonic abscess, incisional hernias. Feels better but still has abdoimal pain   Physical Exam:  Vital Signs: BP (!) 172/108   Pulse 115   Temp 102.4 °F (39.1 °C) (Oral)   Resp 20   Ht 5' 10.98\" (1.803 m)   Wt 194 lb 11.2 oz (88.3 kg)   SpO2 96%   BMI 27.17 kg/m²     Gen: alert and oriented X3,discomfort  Skin: no stigmata of endocarditis  Wounds: C/D/I  HEMT: AT/NC Oropharynx pink, moist, and without lesions or exudates; dentition in good state of repair  Eyes: PERRLA, EOMI, conjunctiva pink, sclera anicteric. Neck: Supple. Trachea midline. No LAD. Chest: no distress and CTA. Good air movement. Heart: RRR and no MRG. Abd: LLQ colostomy and LLQ  left flank erythema and tenderness. RUq tenderness, soft, non-distended, no tenderness, no hepatomegaly. Ext: no clubbing, cyanosis, or edema  Catheter Site: without erythema or tenderness  Neuro: Mental status intact. CN 2-12 intact and no focal sensory or motor deficits     Radiologic / Imaging / TESTING  No results found.      Labs:    Recent Results (from the past 24 hour(s))   Procalcitonin    Collection Time: 10/25/20 11:16 PM   Result Value Ref Range    Procalcitonin 80.38    CBC auto differential    Collection Time: 10/26/20  5:39 AM   Result Value Ref Range    WBC 32.8 (HH) 4.0 - 10.5 K/CU MM    RBC 4.02 (L) 4.6 - 6.2 M/CU MM    Hemoglobin 12.0 (L) 13.5 - 18.0 GM/DL    Hematocrit 37.8 (L) 42 - 52 %    MCV 94.0 78 - 100 FL    MCH 29.9 27 - 31 PG    MCHC 31.7 (L) 32.0 - 36.0 %    RDW 13.5 11.7 - 14.9 %    Platelets 850 (H) 458 - 440 K/CU MM    MPV 8.9 7.5 - 11.1 FL     CULTURE results: [employer] input(s): BLOOD CULTURE,  URINE CULTURE, SURGICAL CULTURE    Diagnosis:  Patient Active Problem List   Diagnosis    [facility]    [facility]    Hypertension    Alcohol withdrawal ([facility]    Abdominal wall abscess    Abscess of sigmoid colon due to diverticulitis    Proteinuria    HIV (human immunodeficiency virus infection) ([facility]    Incisional hernia, without obstruction or gangrene       Active Problems  Active Problems:    Abdominal wall abscess    Abscess of sigmoid colon due to diverticulitis    Proteinuria    HIV (human immunodeficiency virus infection) ([facility]    Incisional hernia, without obstruction or gangrene  Resolved Problems:    * No resolved hospital problems. *      Impression and plan   Clinical status: Status post exploratory laparotomy, drainage of pericolonic abscess on 10/21/2020. Worsening, fever and elevated procalcitonin. Coverage been provided for VRE, CRE, anaerobes, fungi. Needs reevaluation to assure that he has been source control.  Therapeutic: continue linezolid 10/19, Avycaz 10/25-metronidazole 10/24-. Start Ammy Mynor   Completed antibiotics: Zosyn, meropenem 10/17-25.  Diagnostic: trend crp, and pct, abdominal sonogram. Check lipase   F/u: C diff pcr   Other:   Summary: [facility] since 2003, 10/22/2020 HIV VL undetected, CD4 859, RPR: 1:2 (previously treated for syphilis).        Electronically signed by: Electronically signed by Jaxon Lugo MD on 10/26/2020 at 6:55 AM

## 2020-10-26 NOTE — PLAN OF CARE
Problem: Pain:  Description: Pain management should include both nonpharmacologic and pharmacologic interventions.   Goal: Pain level will decrease  Description: Pain level will decrease  10/26/2020 0305 by Carmelo Drummond RN  Outcome: Ongoing  Goal: Control of acute pain  Description: Control of acute pain  10/26/2020 0305 by Carmelo Drummond RN  Outcome: Ongoing  Goal: Control of chronic pain  Description: Control of chronic pain  10/26/2020 0305 by Carmelo Drummond RN  Outcome: Ongoing

## 2020-10-26 NOTE — CONSULTS
[facility] Continence Nurse  Consult Note       Kumar London  AGE: 39 y.o. GENDER: male  : 1975  TODAY'S DATE:  10/26/2020    Subjective:     Reason for  Evaluation and Assessment: colostomy education      Kumar London is a 39 y.o. male referred by:   [x] Physician  [] Nursing  [] Other:     Wound Identification:  Wound Type: surgical incision/colostomy  Contributing Factors: decreased mobility and substance abuse        PAST MEDICAL HISTORY        Diagnosis Date    Abscess of sigmoid colon due to diverticulitis     Alcoholic ([facility]     Diverticulitis     [facility]     Hypertension     Incisional hernia, without obstruction or gangrene 10/22/2020    Pancreatitis        PAST SURGICAL HISTORY    Past Surgical History:   Procedure Laterality Date    CT DRAINAGE ABDOM ABSCESS OPEN  10/17/2020    CT DRAINAGE ABDOM ABSCESS OPEN 10/17/2020 [facility] CT SCAN    HERNIA REPAIR      LAPAROTOMY N/A 10/21/2020    LAPAROTOMY EXPLORATORY COLOSTOMY CREATION performed by Ramsey Lazo MD at [address] [place] [address]    History reviewed. No pertinent family history. SOCIAL HISTORY    Social History     Tobacco Use    Smoking status: Current Every Day Smoker     Packs/day: 2.00     Years: 18.00     Pack years: 36.00     Types: Cigarettes    Smokeless tobacco: Never Used   Substance Use Topics    Alcohol use: Not Currently    Drug use: Yes     Types: Marijuana       ALLERGIES    Allergies   Allergen Reactions    Pcn [Penicillins]        MEDICATIONS    No current facility-administered medications on file prior to encounter. Current Outpatient Medications on File Prior to Encounter   Medication Sig Dispense Refill    Abacavir-Dolutegravir-Lamivud 600- MG TABS Take by mouth      efavirenz (SUSTIVA) 600 MG tablet Take 600 mg by mouth nightly.              Objective:      BP (!) 134/93   Pulse 98   Temp 99.4 °F (37.4 °C) (Oral)   Resp 18   Ht 5' 10.98\" (1.803 m)   Wt 182 lb 4.8 oz (82.7 kg)   SpO2 98%   BMI 25.44 kg/m²   Ck Risk Score: Ck Scale Score: 19    LABS    CBC:   Lab Results   Component Value Date    WBC 32.8 10/26/2020    RBC 4.02 10/26/2020    HGB 12.0 10/26/2020    HCT 37.8 10/26/2020    MCV 94.0 10/26/2020    MCH 29.9 10/26/2020    MCHC 31.7 10/26/2020    RDW 13.5 10/26/2020     10/26/2020    MPV 8.9 10/26/2020     CMP:    Lab Results   Component Value Date     10/25/2020    K 3.5 10/25/2020    CL 96 10/25/2020    CO2 26 10/25/2020    BUN 2 10/25/2020    CREATININE 0.7 10/25/2020    GFRAA >60 10/25/2020    LABGLOM >60 10/25/2020    GLUCOSE 116 10/25/2020    PROT 5.2 10/25/2020    PROT 6.1 11/04/2012    LABALBU 2.3 10/25/2020    CALCIUM 7.1 10/25/2020    BILITOT 0.4 10/25/2020    ALKPHOS 66 10/25/2020    AST 10 10/25/2020    ALT 7 10/25/2020     Albumin:    Lab Results   Component Value Date    LABALBU 2.3 10/25/2020     PT/INR:    Lab Results   Component Value Date    PROTIME 16.5 10/17/2020    INR 1.36 10/17/2020     HgBA1c:  No results found for: LABA1C      Assessment:     Patient Active Problem List   Diagnosis    [facility]    Alcoholic ([facility]    Hypertension    Alcohol withdrawal ([facility]    Abdominal wall abscess    Abscess of sigmoid colon due to diverticulitis    Proteinuria    [facility] immunodeficiency virus infection) [facility]    Incisional hernia, without obstruction or gangrene       Measurements:       Response to treatment:  Well tolerated by patient. Pain Assessment:  Severity:  mild  Quality of pain: sharp  Wound Pain Timing/Severity: abdomen  Premedicated: yes    Plan:     Plan of Care:       Pt in bed. Agreeable to colostomy education. Already has educational packet but has not reviewed. General information regarding colostomy reviewed with pt. Educated on pouch emptying, measuring stoma, site care, and complete appliance change and demonstrated. Stoma red, moist and protruding. 50 mm round. 200 ml green mucus/watery stool emptied. Maryoj stomal skin intact. 2 11/16\" flat barrier and pouch applied avoiding abdominal incision. Pt stated family will help if needed with care. Warm blanket applied. Pt is generally not at risk for skin breakdown AEB nusrat. Will continue to follow for colostomy education. Agreeable to [facility] care program. Enrollment form faxed. Wound care- Ostomy Care:  Remove pouch and barrier. Cleanse stoma and peristomal skin with warm water. Dry thoroughly. Apply stoma powder prn to any reddened peristomal skin. Measure stoma and cut barrier opening to fit close around base of stoma. Apply coloplast barrier # [id number]  , size  2 11/16\"   and pouch # [id number] . Empty pouch when 1/3 to 1/2 full. Change every 3-7 days and prn. Review teaching folder with patient and caregivers when performing ostomy care. Patient to practice with assistance, frequent checking and emptying output. Specialty Bed Required : no  [] Low Air Loss   [] Pressure Redistribution  [] Fluid Immersion  [] Bariatric  [] Total Pressure Relief  [] Other:     Discharge Plan:  Placement for patient upon discharge: home with [address]: no  Patient appropriate for Outpatient [address]: follow up with Dr. Lauretha Lesches    Patient/Caregiver Teaching:  Level of patient/caregiver understanding able to:   Needs reinforcement.         Electronically signed by Reji Balderrama RN,  on 10/26/2020 at 3:47 PM

## 2020-10-26 NOTE — PROGRESS NOTES
POD 5 exploratory laparotomy with Tamez's procedure and drainage of intra-abdominal abscess. Notes from the weekend along with imaging studies and consultant's notes were reviewed. Still having some intermittent fevers. Excellent urinary output. MARI is minimal and serous. He is still reporting some bloating and heartburn secondary to a small bowel ileus which is to be expected. Reports no emesis. He also has some stool now in the colostomy. Edema in the bowel wall has also lessened. WBC is up to 32K. Left abdominal wall cellulitis has resolved. Minimal drainage in midline incision and titi have been changed. H/H stable. Blood cx's NGTD  Abd: mildly distended, stoma is pink in LLQ. Minimal drainage from incisional. NR, incisional tenderness. POI-follow clinical course. Monitor for sources of fever and leukocytosis. F/u on workup. Encouraged IS and ambulation. ID recs and abx coverage noted.

## 2020-10-26 NOTE — PROGRESS NOTES
Hospitalist Progress Note      Name:  Dulce Chaudhary /Age/Sex: 1975  (39 y.o. male)   MRN & CSN:  [id number] & [id number] Admission Date/Time: 10/16/2020 11:04 AM   Location:  [address] [place] [address]- PCP: Richmond Peace, RN, BSN, MSN, [facility]         Hospital Day: 11    Assessment and Plan:   Dulce Chaudhary is a 39 y.o.  male  who presents with <principal problem not specified>    #Sepsis  #Acute Diverticulitis  #Peritonitis  #Pneumonia? ?  -Patient is SIRS 3/4, leukocytosis, fever, tachycardia  -S/p IR drainage with drain placement  -S/p Exploratory laparotomy with colostomy creation, drainage of peritoneal abscess  -CT abdomen and pelvis with colitis, abscess, pancreatic ductal dilatation  -Blood cultures no growth so far. Repeat blood cultures 10/25/2020  -Surgical cultures with mixed organisms, including E Coli, Prevotella Denticola  -Linezolid, Avycaz,xereva and meropenem  -ID on board  -General surgery on board  -Pain control with PCA: Getting Dilaudid PCA, will switch from as needed Dilaudid for breakthrough pain and give morphine every 4 as needed.  -Encourage ambulation, PT OT ordered    #Postop ileus  -Patient has not been tolerating p.o.  -Repeat CT abdomen: Bilateral gland enlargement with small adrenal hemorrhages. Small bilateral pleural effusions and bilateral atelectasis with concerns of pneumonia.   Generalized ileus  -Abdominal ultrasound fluid collections.  -Patient just completed 8 doses of Reglan  -Monitor closely     #HIV   Continue home medications     #Proteinuria  -urine protein 10/16/20 2000  -rpeat ua pr 30  -urine pr /cr 0.3  -Nephrology on board     #Hypertension  -Uncontrolled  -Continue Metoprolol, clonidine, lisinopril, and amlodipine  -Nephrology on board     #Diet:  -Patient has been unable to tolerate p.o.  -Currently on clear liquids but unable to take  -Dietitian on board    Diet Diet NPO Effective Now Exceptions are: Ice Chips, Sips with Meds, Other (See Comment)   DVT Prophylaxis [] Lovenox, []  Heparin, [] SCDs, [] Ambulation   GI Prophylaxis [] PPI,  [] H2 Blocker,  [] Carafate,  [] Diet/Tube Feeds   Code Status Full Code   Disposition Patient requires continued admission due to postop ileus         History of Present Illness:     Chief Complaint: <principal problem not specified>  Jada Harrington is a 39 y.o.  male  who presents with abdominal pain       Ten point ROS reviewed negative, unless as noted above    Objective: Intake/Output Summary (Last 24 hours) at 10/26/2020 1404  Last data filed at 10/26/2020 1208  Gross per 24 hour   Intake 1821.67 ml   Output 2525 ml   Net -703.33 ml      Vitals:   Vitals:    10/26/20 0801   BP:    Pulse:    Resp:    Temp:    SpO2: 98%     Physical Exam:   GEN Awake male, sitting upright in bed in no apparent distress. Appears given age. EYES Pupils are equally round. No scleral erythema, discharge, or conjunctivitis. HENT Mucous membranes are moist. Oral pharynx without exudates, no evidence of thrush. NECK Supple, no apparent thyromegaly or masses. RESP Clear to auscultation, no wheezes, rales or rhonchi. Symmetric chest movement while on room air. CARDIO/VASC S1/S2 auscultated. Regular rate without appreciable murmurs, rubs, or gallops. No JVD or carotid bruits. Peripheral pulses equal bilaterally and palpable. No peripheral edema. GI colostomy in place, drain in place. Clean abdominal dressing. Abdomen is soft without significant tenderness, masses, or guarding. Bowel sounds are normoactive. Rectal exam deferred.  No costovertebral angle tenderness. Normal appearing external genitalia. Lackey catheter is not present. HEME/LYMPH No palpable cervical lymphadenopathy and no hepatosplenomegaly. No petechiae or ecchymoses. MSK No gross joint deformities. SKIN Normal coloration, warm, dry. NEURO Cranial nerves appear grossly intact, normal speech, no lateralizing weakness. PSYCH Awake, alert, oriented x 4.   Affect appropriate.     Medications:   Medications:    sodium chloride flush  10 mL Intravenous 2 times per day    lisinopril  40 mg Oral Daily    IVPB builder   Intravenous Q12H    metoprolol  125 mg Oral BID    cloNIDine  1 patch Transdermal Weekly    anidulafungin  100 mg Intravenous Q24H    ceftazidime-acibactam (AVYCAZ) infusion  2.5 g Intravenous Q8H    lipase-protease-amylase  30,000 Units Oral TID WC    amLODIPine  10 mg Oral Daily    metroNIDAZOLE  500 mg Intravenous Q8H    pantoprazole  40 mg Intravenous BID    enoxaparin  40 mg Subcutaneous Daily    linezolid  600 mg Intravenous Q12H    Abacavir-Dolutegravir-Lamivud  1 tablet Oral Daily    sodium chloride flush  10 mL Intravenous BID    sodium chloride flush  10 mL Intravenous 2 times per day      Infusions:    sodium chloride 25 mL/hr at 10/26/20 1124    HYDROmorphone HCl 0.2 mg (10/26/20 0947)     PRN Meds: sodium chloride flush, 10 mL, PRN  morphine, 2 mg, Q4H PRN  sodium chloride flush, 10 mL, PRN  promethazine, 12.5 mg, Q6H PRN  calcium carbonate, 500 mg, 4x Daily PRN  ondansetron, 4 mg, Q6H PRN  sodium chloride flush, 10 mL, PRN  acetaminophen, 650 mg, Q6H PRN    Or  acetaminophen, 650 mg, Q6H PRN          Electronically signed by Antonio Hernandez MD on 10/26/2020 at 2:04 PM

## 2020-10-26 NOTE — PROGRESS NOTES
Nephrology Progress Note  10/26/2020 9:14 AM        Subjective:   Admit Date: 10/16/2020  PCP: Manas Esparza, RN, BSN, MSN, FNP-C    Interval History: events leading  up to and since adm brief;y reviewed     Diet: some po     ROS:  No overt abd pain     Data:     Current meds:    lidocaine PF  5 mL Intradermal Once    sodium chloride flush  10 mL Intravenous 2 times per day    lisinopril  40 mg Oral Daily    metoprolol  125 mg Oral BID    cloNIDine  1 patch Transdermal Weekly    anidulafungin  100 mg Intravenous Q24H    ceftazidime-acibactam (AVYCAZ) infusion  2.5 g Intravenous Q8H    lipase-protease-amylase  30,000 Units Oral TID WC    amLODIPine  10 mg Oral Daily    metoclopramide  10 mg Intravenous Q6H    metroNIDAZOLE  500 mg Intravenous Q8H    pantoprazole  40 mg Intravenous BID    enoxaparin  40 mg Subcutaneous Daily    linezolid  600 mg Intravenous Q12H    Abacavir-Dolutegravir-Lamivud  1 tablet Oral Daily    sodium chloride flush  10 mL Intravenous BID    sodium chloride flush  10 mL Intravenous 2 times per day      sodium chloride 100 mL/hr at 10/26/20 0626    HYDROmorphone HCl 0.2 mg (10/25/20 0344)         I/O last 3 completed shifts:   In: 1821.7 [I.V.:1191.7; IV CIJREZGEK:701]  Out: 2397 [NFOIM:9940]    CBC:   Recent Labs     10/24/20  0854 10/25/20  0337 10/26/20  0539   WBC 28.0* 22.9* 32.8*   HGB 10.8* 10.2* 12.0*   * 469* 518*          Recent Labs     10/24/20  0854 10/25/20  0337   * 131*   K 4.4 3.5   CL 96* 96*   CO2 21 26   BUN 2* 2*   CREATININE 0.6* 0.7*   GLUCOSE 97 116*       Lab Results   Component Value Date    CALCIUM 7.1 (L) 10/25/2020    PHOS 2.8 10/25/2020       Objective:     Vitals: BP (!) 134/93   Pulse 98   Temp 99.4 °F (37.4 °C) (Oral)   Resp 18   Ht 5' 10.98\" (1.803 m)   Wt 182 lb 4.8 oz (82.7 kg)   SpO2 98%   BMI 25.44 kg/m²     General appearance:  No distress  HEENT:  Mild conj pallor  Neck:  supple  Lungs:  No gross crackles  Heart: Tachycardia during my exam - but he is also underlying wound care   Abdomen: ostomy and surgical scar   Extremities:  No  Overt  edema       Problem List :         Impression :     1. HTN- I have seen him last time in my office on 1/18/2013 his BP was rather low and acei was on hold - in general his BP was well controlled with acei alone - off cpusre this is all 7 yras ago- high now - with CCB/ clonidine - lowering or stopping naCl may lower Bp   2. abd abscess  after sigmoid  diverticulitis and perforation-  And sec peritonitis with abx   3. underling well controled HIV on ART    Recommendation/Plan  :     1. Ok with acei/CCB  2. clonidine ok for now  3. Manual BP  4. If possible lower or d/C NS  5. If  he can tolerate po food/ fluid   6. Follow clinically  7.  He  has risk  For  EDGAR so watch       Pavithra Sigala MD

## 2020-10-26 NOTE — PLAN OF CARE
Problem: Pain:  Goal: Pain level will decrease  Description: Pain level will decrease  10/26/2020 0305 by Suyapa Mac RN  Outcome: Ongoing  10/25/2020 1345 by Maryann Eisenmenger, RN  Outcome: Ongoing  Goal: Control of acute pain  Description: Control of acute pain  10/26/2020 0305 by Suyapa Mac RN  Outcome: Ongoing  10/25/2020 1345 by Maryann Eisenmenger, RN  Outcome: Ongoing  Goal: Control of chronic pain  Description: Control of chronic pain  10/26/2020 0305 by Suyapa Mac RN  Outcome: Ongoing  10/25/2020 1345 by Maryann Eisenmenger, RN  Outcome: Ongoing

## 2020-10-27 LAB
1,3 BETA-D-GLUCAN INTERP: NEGATIVE
1,3 BETA-D-GLUCAN: 46 PG/ML
ALBUMIN SERPL-MCNC: 2 GM/DL (ref 3.4–5)
ALP BLD-CCNC: 61 IU/L (ref 40–128)
ALT SERPL-CCNC: 6 U/L (ref 10–40)
ANION GAP SERPL CALCULATED.3IONS-SCNC: 9 MMOL/L (ref 4–16)
AST SERPL-CCNC: 11 IU/L (ref 15–37)
BACTERIA: NEGATIVE /HPF
BASOPHILS ABSOLUTE: 0.1 K/CU MM
BASOPHILS RELATIVE PERCENT: 0.4 % (ref 0–1)
BILIRUB SERPL-MCNC: 0.5 MG/DL (ref 0–1)
BILIRUBIN URINE: NEGATIVE MG/DL
BLOOD, URINE: NEGATIVE
BUN BLDV-MCNC: 7 MG/DL (ref 6–23)
CALCIUM SERPL-MCNC: 7.2 MG/DL (ref 8.3–10.6)
CHLORIDE BLD-SCNC: 92 MMOL/L (ref 99–110)
CLARITY: CLEAR
CO2: 27 MMOL/L (ref 21–32)
COLOR: YELLOW
CREAT SERPL-MCNC: 0.6 MG/DL (ref 0.9–1.3)
DIFFERENTIAL TYPE: ABNORMAL
EOSINOPHILS ABSOLUTE: 0.2 K/CU MM
EOSINOPHILS RELATIVE PERCENT: 0.8 % (ref 0–3)
GFR AFRICAN AMERICAN: >60 ML/MIN/1.73M2
GFR NON-AFRICAN AMERICAN: >60 ML/MIN/1.73M2
GLUCOSE BLD-MCNC: 83 MG/DL (ref 70–99)
GLUCOSE, URINE: NEGATIVE MG/DL
HCT VFR BLD CALC: 29.9 % (ref 42–52)
HEMOGLOBIN: 9.6 GM/DL (ref 13.5–18)
HIGH SENSITIVE C-REACTIVE PROTEIN: 191.7 MG/L
IMMATURE NEUTROPHIL %: 1.9 % (ref 0–0.43)
KETONES, URINE: ABNORMAL MG/DL
LEUKOCYTE ESTERASE, URINE: NEGATIVE
LYMPHOCYTES ABSOLUTE: 1.8 K/CU MM
LYMPHOCYTES RELATIVE PERCENT: 5.8 % (ref 24–44)
MAGNESIUM: 1.5 MG/DL (ref 1.8–2.4)
MCH RBC QN AUTO: 30.1 PG (ref 27–31)
MCHC RBC AUTO-ENTMCNC: 32.1 % (ref 32–36)
MCV RBC AUTO: 93.7 FL (ref 78–100)
MONOCYTES ABSOLUTE: 3.6 K/CU MM
MONOCYTES RELATIVE PERCENT: 11.8 % (ref 0–4)
MUCUS: ABNORMAL HPF
NITRITE URINE, QUANTITATIVE: NEGATIVE
NUCLEATED RBC %: 0 %
OSMOLALITY URINE: 320 MOS/L (ref 292–1090)
OSMOLALITY: 270 MOS/L (ref 280–300)
PDW BLD-RTO: 13.5 % (ref 11.7–14.9)
PH, URINE: 7 (ref 5–8)
PHOSPHORUS: 3 MG/DL (ref 2.5–4.9)
PLATELET # BLD: 425 K/CU MM (ref 140–440)
PMV BLD AUTO: 9.5 FL (ref 7.5–11.1)
POTASSIUM SERPL-SCNC: 3.6 MMOL/L (ref 3.5–5.1)
PROTEIN UA: NEGATIVE MG/DL
RBC # BLD: 3.19 M/CU MM (ref 4.6–6.2)
RBC URINE: <1 /HPF (ref 0–3)
SEGMENTED NEUTROPHILS ABSOLUTE COUNT: 24.2 K/CU MM
SEGMENTED NEUTROPHILS RELATIVE PERCENT: 79.3 % (ref 36–66)
SODIUM BLD-SCNC: 128 MMOL/L (ref 135–145)
SODIUM URINE: 76 MMOL/L (ref 35–167)
SPECIFIC GRAVITY UA: 1.01 (ref 1–1.03)
SQUAMOUS EPITHELIAL: <1 /HPF
TOTAL IMMATURE NEUTOROPHIL: 0.57 K/CU MM
TOTAL NUCLEATED RBC: 0 K/CU MM
TOTAL PROTEIN: 5 GM/DL (ref 6.4–8.2)
TRANSITIONAL EPITHELIAL: <1 /HPF
TRICHOMONAS: ABNORMAL /HPF
UROBILINOGEN, URINE: NORMAL MG/DL (ref 0.2–1)
WBC # BLD: 30.4 K/CU MM (ref 4–10.5)
WBC UA: 2 /HPF (ref 0–2)

## 2020-10-27 PROCEDURE — 99233 SBSQ HOSP IP/OBS HIGH 50: CPT | Performed by: INTERNAL MEDICINE

## 2020-10-27 PROCEDURE — 2580000003 HC RX 258: Performed by: SURGERY

## 2020-10-27 PROCEDURE — 2700000000 HC OXYGEN THERAPY PER DAY

## 2020-10-27 PROCEDURE — 86141 C-REACTIVE PROTEIN HS: CPT

## 2020-10-27 PROCEDURE — 85025 COMPLETE CBC W/AUTO DIFF WBC: CPT

## 2020-10-27 PROCEDURE — 83930 ASSAY OF BLOOD OSMOLALITY: CPT

## 2020-10-27 PROCEDURE — 6360000002 HC RX W HCPCS: Performed by: SURGERY

## 2020-10-27 PROCEDURE — 6370000000 HC RX 637 (ALT 250 FOR IP): Performed by: SURGERY

## 2020-10-27 PROCEDURE — 6370000000 HC RX 637 (ALT 250 FOR IP): Performed by: NURSE PRACTITIONER

## 2020-10-27 PROCEDURE — 2500000003 HC RX 250 WO HCPCS: Performed by: SURGERY

## 2020-10-27 PROCEDURE — 6370000000 HC RX 637 (ALT 250 FOR IP): Performed by: INTERNAL MEDICINE

## 2020-10-27 PROCEDURE — 6360000002 HC RX W HCPCS: Performed by: INTERNAL MEDICINE

## 2020-10-27 PROCEDURE — 84300 ASSAY OF URINE SODIUM: CPT

## 2020-10-27 PROCEDURE — 83735 ASSAY OF MAGNESIUM: CPT

## 2020-10-27 PROCEDURE — 2580000003 HC RX 258: Performed by: INTERNAL MEDICINE

## 2020-10-27 PROCEDURE — 84100 ASSAY OF PHOSPHORUS: CPT

## 2020-10-27 PROCEDURE — C9113 INJ PANTOPRAZOLE SODIUM, VIA: HCPCS | Performed by: SURGERY

## 2020-10-27 PROCEDURE — 83935 ASSAY OF URINE OSMOLALITY: CPT

## 2020-10-27 PROCEDURE — 80053 COMPREHEN METABOLIC PANEL: CPT

## 2020-10-27 PROCEDURE — 1200000000 HC SEMI PRIVATE

## 2020-10-27 PROCEDURE — 81001 URINALYSIS AUTO W/SCOPE: CPT

## 2020-10-27 PROCEDURE — 94761 N-INVAS EAR/PLS OXIMETRY MLT: CPT

## 2020-10-27 PROCEDURE — 97116 GAIT TRAINING THERAPY: CPT

## 2020-10-27 PROCEDURE — 97162 PT EVAL MOD COMPLEX 30 MIN: CPT

## 2020-10-27 RX ORDER — OXYCODONE HYDROCHLORIDE AND ACETAMINOPHEN 5; 325 MG/1; MG/1
1 TABLET ORAL EVERY 4 HOURS PRN
Status: DISCONTINUED | OUTPATIENT
Start: 2020-10-27 | End: 2020-11-03 | Stop reason: HOSPADM

## 2020-10-27 RX ADMIN — Medication: at 06:53

## 2020-10-27 RX ADMIN — METRONIDAZOLE 500 MG: 500 INJECTION, SOLUTION INTRAVENOUS at 03:06

## 2020-10-27 RX ADMIN — SODIUM CHLORIDE: 9 INJECTION, SOLUTION INTRAVENOUS at 04:53

## 2020-10-27 RX ADMIN — PANTOPRAZOLE SODIUM 40 MG: 40 INJECTION, POWDER, FOR SOLUTION INTRAVENOUS at 10:05

## 2020-10-27 RX ADMIN — GUAIFENESIN 600 MG: 600 TABLET, EXTENDED RELEASE ORAL at 21:11

## 2020-10-27 RX ADMIN — DEXTROSE MONOHYDRATE 100 MG: 50 INJECTION, SOLUTION INTRAVENOUS at 01:49

## 2020-10-27 RX ADMIN — ERAVACYCLINE: 50 INJECTION, POWDER, LYOPHILIZED, FOR SOLUTION INTRAVENOUS at 14:55

## 2020-10-27 RX ADMIN — SODIUM CHLORIDE, PRESERVATIVE FREE 10 ML: 5 INJECTION INTRAVENOUS at 21:27

## 2020-10-27 RX ADMIN — OXYCODONE HYDROCHLORIDE AND ACETAMINOPHEN 1 TABLET: 5; 325 TABLET ORAL at 13:55

## 2020-10-27 RX ADMIN — SODIUM CHLORIDE, PRESERVATIVE FREE 10 ML: 5 INJECTION INTRAVENOUS at 21:28

## 2020-10-27 RX ADMIN — PANCRELIPASE 30000 UNITS: 30000; 6000; 19000 CAPSULE, DELAYED RELEASE PELLETS ORAL at 18:03

## 2020-10-27 RX ADMIN — ONDANSETRON 4 MG: 2 INJECTION INTRAMUSCULAR; INTRAVENOUS at 18:04

## 2020-10-27 RX ADMIN — METOPROLOL TARTRATE 125 MG: 50 TABLET, FILM COATED ORAL at 21:11

## 2020-10-27 RX ADMIN — AMLODIPINE BESYLATE 10 MG: 10 TABLET ORAL at 10:07

## 2020-10-27 RX ADMIN — SODIUM CHLORIDE, PRESERVATIVE FREE 10 ML: 5 INJECTION INTRAVENOUS at 10:10

## 2020-10-27 RX ADMIN — LINEZOLID 600 MG: 600 INJECTION, SOLUTION INTRAVENOUS at 13:00

## 2020-10-27 RX ADMIN — MORPHINE SULFATE 2 MG: 2 INJECTION, SOLUTION INTRAMUSCULAR; INTRAVENOUS at 05:42

## 2020-10-27 RX ADMIN — CEFTAZIDIME, AVIBACTAM 2.5 G: 2; .5 POWDER, FOR SOLUTION INTRAVENOUS at 22:53

## 2020-10-27 RX ADMIN — CEFTAZIDIME, AVIBACTAM 2.5 G: 2; .5 POWDER, FOR SOLUTION INTRAVENOUS at 14:55

## 2020-10-27 RX ADMIN — LINEZOLID 600 MG: 600 INJECTION, SOLUTION INTRAVENOUS at 00:43

## 2020-10-27 RX ADMIN — ERAVACYCLINE: 50 INJECTION, POWDER, LYOPHILIZED, FOR SOLUTION INTRAVENOUS at 01:49

## 2020-10-27 RX ADMIN — OXYCODONE HYDROCHLORIDE AND ACETAMINOPHEN 1 TABLET: 5; 325 TABLET ORAL at 10:07

## 2020-10-27 RX ADMIN — METRONIDAZOLE 500 MG: 500 INJECTION, SOLUTION INTRAVENOUS at 10:10

## 2020-10-27 RX ADMIN — CEFTAZIDIME, AVIBACTAM 2.5 G: 2; .5 POWDER, FOR SOLUTION INTRAVENOUS at 06:10

## 2020-10-27 RX ADMIN — METOPROLOL TARTRATE 125 MG: 50 TABLET, FILM COATED ORAL at 10:06

## 2020-10-27 RX ADMIN — METRONIDAZOLE 500 MG: 500 INJECTION, SOLUTION INTRAVENOUS at 20:06

## 2020-10-27 RX ADMIN — ENOXAPARIN SODIUM 40 MG: 40 INJECTION SUBCUTANEOUS at 10:08

## 2020-10-27 RX ADMIN — MORPHINE SULFATE 2 MG: 2 INJECTION, SOLUTION INTRAMUSCULAR; INTRAVENOUS at 01:57

## 2020-10-27 RX ADMIN — MORPHINE SULFATE 2 MG: 2 INJECTION, SOLUTION INTRAMUSCULAR; INTRAVENOUS at 21:21

## 2020-10-27 RX ADMIN — LISINOPRIL 40 MG: 40 TABLET ORAL at 10:06

## 2020-10-27 RX ADMIN — GUAIFENESIN 600 MG: 600 TABLET, EXTENDED RELEASE ORAL at 10:06

## 2020-10-27 RX ADMIN — PANTOPRAZOLE SODIUM 40 MG: 40 INJECTION, POWDER, FOR SOLUTION INTRAVENOUS at 21:11

## 2020-10-27 RX ADMIN — PANCRELIPASE 30000 UNITS: 30000; 6000; 19000 CAPSULE, DELAYED RELEASE PELLETS ORAL at 13:00

## 2020-10-27 RX ADMIN — PANCRELIPASE 30000 UNITS: 30000; 6000; 19000 CAPSULE, DELAYED RELEASE PELLETS ORAL at 10:07

## 2020-10-27 ASSESSMENT — PAIN DESCRIPTION - ORIENTATION
ORIENTATION: LEFT;LOWER

## 2020-10-27 ASSESSMENT — PAIN SCALES - GENERAL
PAINLEVEL_OUTOF10: 8
PAINLEVEL_OUTOF10: 8
PAINLEVEL_OUTOF10: 7
PAINLEVEL_OUTOF10: 8
PAINLEVEL_OUTOF10: 10
PAINLEVEL_OUTOF10: 2
PAINLEVEL_OUTOF10: 7

## 2020-10-27 ASSESSMENT — PAIN - FUNCTIONAL ASSESSMENT
PAIN_FUNCTIONAL_ASSESSMENT: ACTIVITIES ARE NOT PREVENTED

## 2020-10-27 ASSESSMENT — PAIN DESCRIPTION - ONSET
ONSET: ON-GOING

## 2020-10-27 ASSESSMENT — PAIN DESCRIPTION - DESCRIPTORS
DESCRIPTORS: DISCOMFORT

## 2020-10-27 ASSESSMENT — PAIN DESCRIPTION - LOCATION
LOCATION: ABDOMEN

## 2020-10-27 ASSESSMENT — PAIN DESCRIPTION - PROGRESSION
CLINICAL_PROGRESSION: NOT CHANGED

## 2020-10-27 ASSESSMENT — PAIN SCALES - WONG BAKER
WONGBAKER_NUMERICALRESPONSE: 0

## 2020-10-27 ASSESSMENT — PAIN DESCRIPTION - PAIN TYPE
TYPE: ACUTE PAIN

## 2020-10-27 ASSESSMENT — PAIN DESCRIPTION - FREQUENCY
FREQUENCY: CONTINUOUS

## 2020-10-27 NOTE — PROGRESS NOTES
Evaluation:   Food/Nutrient Intake Outcomes:  Diet Advancement/Tolerance, Food and Nutrient Intake  Physical Signs/Symptoms Outcomes:  Skin, Weight, GI Status, Biochemical Data     Discharge Planning:     Too soon to determine     Electronically signed by Rachel Zapata RD, LD on 10/27/20 at 3:02 PM EDT    Contact: [phone]

## 2020-10-27 NOTE — CARE COORDINATION
ID stopped at [facility] desk, states pt will need iv Xerava iv q12, avycaz iv q8 and Eraxis qd day for at leat 2 weeks.   PS to [facility] to do benefit/cost for above medications if pt discharged home with [facility]..

## 2020-10-27 NOTE — PROGRESS NOTES
Disposition Patient not tolerating po     History of Present Illness:     Chief Complaint: <principal problem not specified>  Girma Sorensen is a 39 y.o.  male  who presents with sepsis       Ten point ROS reviewed negative, unless as noted above    Objective: Intake/Output Summary (Last 24 hours) at 10/27/2020 1322  Last data filed at 10/26/2020 1845  Gross per 24 hour   Intake 800 ml   Output 1400 ml   Net -600 ml      Vitals:   Vitals:    10/27/20 0807   BP: (!) 137/95   Pulse: 95   Resp: 17   Temp: 98.4 °F (36.9 °C)   SpO2: 97%     Physical Exam:   GEN Awake male, sitting upright in bed in no apparent distress. Appears given age. EYES Pupils are equally round. No scleral erythema, discharge, or conjunctivitis. HENT Mucous membranes are moist. Oral pharynx without exudates, no evidence of thrush. NECK Supple, no apparent thyromegaly or masses. RESP Clear to auscultation, no wheezes, rales or rhonchi. Symmetric chest movement while on room air. CARDIO/VASC S1/S2 auscultated. Regular rate without appreciable murmurs, rubs, or gallops. No JVD or carotid bruits. Peripheral pulses equal bilaterally and palpable. No peripheral edema. GI colostomy in place. Drain in place. Well-healing abdominal scar. Abdomen is soft without significant tenderness, masses, or guarding. Bowel sounds are normoactive. Rectal exam deferred.  No costovertebral angle tenderness. Normal appearing external genitalia. Lackey catheter is not present. HEME/LYMPH No palpable cervical lymphadenopathy and no hepatosplenomegaly. No petechiae or ecchymoses. MSK No gross joint deformities. SKIN Normal coloration, warm, dry. NEURO Cranial nerves appear grossly intact, normal speech, no lateralizing weakness. PSYCH Awake, alert, oriented x 4. Affect appropriate.     Medications:   Medications:    [START ON 10/28/2020] anidulafungin (ERAXIS) 100 mg IVPB  100 mg Intravenous Daily    ceftazidime-acibactam (AVYCAZ) infusion 2.5 g Intravenous Q8H    ceftazidime-acibactam (AVYCAZ) infusion  2.5 g Intravenous Q8H    sodium chloride flush  10 mL Intravenous 2 times per day    lisinopril  40 mg Oral Daily    IVPB builder   Intravenous Q12H    guaiFENesin  600 mg Oral BID    metoprolol  125 mg Oral BID    cloNIDine  1 patch Transdermal Weekly    lipase-protease-amylase  30,000 Units Oral TID WC    amLODIPine  10 mg Oral Daily    metroNIDAZOLE  500 mg Intravenous Q8H    pantoprazole  40 mg Intravenous BID    enoxaparin  40 mg Subcutaneous Daily    linezolid  600 mg Intravenous Q12H    Abacavir-Dolutegravir-Lamivud  1 tablet Oral Daily    sodium chloride flush  10 mL Intravenous BID    sodium chloride flush  10 mL Intravenous 2 times per day      Infusions:    sodium chloride 15 mL/hr at 10/27/20 1008    HYDROmorphone HCl 0.2 mg (10/26/20 0947)     PRN Meds: oxyCODONE-acetaminophen, 1 tablet, Q4H PRN  sodium chloride flush, 10 mL, PRN  morphine, 2 mg, Q4H PRN  sodium chloride flush, 10 mL, PRN  promethazine, 12.5 mg, Q6H PRN  calcium carbonate, 500 mg, 4x Daily PRN  ondansetron, 4 mg, Q6H PRN  sodium chloride flush, 10 mL, PRN  acetaminophen, 650 mg, Q6H PRN    Or  acetaminophen, 650 mg, Q6H PRN          Electronically signed by Henry Krishnan MD on 10/27/2020 at 1:22 PM

## 2020-10-27 NOTE — CONSULTS
Endocrinology   Consult Note    Dear Doctor Robyn Colvin for the Consult     Pt. Was Admitted for : Initially on 2/16 2020 for abdominal pain and colitis with abscess    Reason for Consult: Supple adrenal hemorrhages also possible adrenal insufficiency      History Obtained From:  Patient/ EMR       HISTORY OF PRESENT ILLNESS:                The patient is a 39 y.o. male with significant past medical history of  I was  consulted for better control of blood glucose. ROS:   Pt's ROS done in detail. Abnormal ROS are noted in Medical and Surgical History Section below: Other Medical History:        Diagnosis Date    Abscess of sigmoid colon due to diverticulitis 29/39/7471    [facility] ([facility] [address]     Diverticulitis     HIV positive ([facility] [address] 2003    Hypertension     Incisional hernia, without obstruction or gangrene 10/22/2020    Pancreatitis      Surgical History:        Procedure Laterality Date    CT DRAINAGE ABDOM ABSCESS OPEN  10/17/2020    CT DRAINAGE ABDOM ABSCESS OPEN 10/17/2020 [facility] CT SCAN    HERNIA REPAIR      LAPAROTOMY N/A 10/21/2020    LAPAROTOMY EXPLORATORY COLOSTOMY CREATION performed by Jesusita Ac MD at [address] [place] [address]      Dr Jackson Snyder       Allergies:  Pcn [penicillins]    Family History:   History reviewed. No pertinent family history. REVIEW OF SYSTEMS:  Review of System Done as noted above     PHYSICAL EXAM:      Vitals:    BP (!) 156/94   Pulse 112   Temp 101.4 °F (38.6 °C) (Oral)   Resp 17   Ht 5' 10.98\" (1.803 m)   Wt 182 lb 4.8 oz (82.7 kg)   SpO2 90%   BMI 25.44 kg/m²     CONSTITUTIONAL:  awake, alert, cooperative, appears stated age   EYES:  vision intact Fundoscopic Exam not performed   ENT:Normal  NECK:  Supple, No JVD.    Thyroid Exam:Normal   LUNGS:  Has Vesicular Breath Sounds,   CARDIOVASCULAR:  Normal apical impulse, regular rate and rhythm, normal S1 and S2, no S3 or S4, and has no  murmur   ABDOMEN:  No scars, normal bowel sounds, soft, non-distended, non-tender, no masses palpated, no hepatolienomegaly  Musculoskeletal: Normal  Extremities: Normal, peripheral pulses normal, , has no edema   NEUROLOGIC:  Awake, alert, oriented to name, place and time. Cranial nerves II-XII are grossly intact. Motor is  intact. Sensory is intact. ,  and gait is normal.    DATA:    CBC:   Recent Labs     10/24/20  0854 10/25/20  0337 10/26/20  0539   WBC 28.0* 22.9* 32.8*   HGB 10.8* 10.2* 12.0*   * 469* 518*    CMP:  Recent Labs     10/24/20  0854 10/25/20  0337   * 131*   K 4.4 3.5   CL 96* 96*   CO2 21 26   BUN 2* 2*   CREATININE 0.6* 0.7*   CALCIUM 7.6* 7.1*   PROT 5.5* 5.2*   LABALBU 2.4* 2.3*   BILITOT 0.4 0.4   ALKPHOS 75 66   AST 13* 10*   ALT 9* 7*     Lipids:   Lab Results   Component Value Date    CHOL 186 09/05/2012    HDL 85 09/05/2012    TRIG 82 09/05/2012     Glucose: No results for input(s): POCGLU in the last 72 hours. Hemoglobin A1C: No results found for: LABA1C  Free T4: No results found for: T4FREE  Free T3: No results found for: FT3  TSH High Sensitivity:   Lab Results   Component Value Date    TSHHS 0.678 11/03/2012       Xr Abdomen (kub) (single Ap View)    Result Date: 10/25/2020  EXAMINATION: ONE SUPINE XRAY VIEW(S) OF THE ABDOMEN 10/25/2020 12:28 am COMPARISON: CT abdomen and pelvis 10/16/2020 HISTORY: ORDERING SYSTEM PROVIDED HISTORY: PORTABLE, ileus TECHNOLOGIST PROVIDED HISTORY: Reason for exam:->PORTABLE, ileus Reason for Exam: PORTABLE, ileus Acuity: Acute Type of Exam: Subsequent/Follow-up FINDINGS: Midline surgical skin staples are present. There are multiple abnormally dilated loops of small bowel within the mid abdomen and upper pelvis. Multiple abnormally dilated loops of small bowel, likely an ileus given the recent surgery. A small bowel obstruction could also have this appearance.      Ct Abdomen Pelvis W Iv Contrast Additional Contrast? None    Result Date: 10/25/2020  EXAMINATION: CT OF THE ABDOMEN AND PELVIS WITH CONTRAST 10/25/2020 8:17 am TECHNIQUE: CT of the abdomen and pelvis was performed with the administration of intravenous contrast. Multiplanar reformatted images are provided for review. Dose modulation, iterative reconstruction, and/or weight based adjustment of the mA/kV was utilized to reduce the radiation dose to as low as reasonably achievable. COMPARISON: 10/17/2020, 10/16/2020 HISTORY: ORDERING SYSTEM PROVIDED HISTORY: Please give IV but not PO contrast re: 103 fever - had surgery for diverticular perf, has abscess TECHNOLOGIST PROVIDED HISTORY: Reason for exam:->Please give IV but not PO contrast re: 103 fever - had surgery for diverticular perf, has abscess Additional Contrast?->None Reason for Exam: Please give IV but not PO contrast re: 103 fever - had surgery for diverticular perf, has abscess Acuity: Acute Type of Exam: Initial FINDINGS: Lower Chest: There are small bilateral pleural effusions. Dependent atelectasis is noted in the lower lobes. Organs: There is mild prominence of the intrahepatic biliary ducts. The common duct measures approximately 8 mm at the alejandra hepatis. Gallbladder is mildly distended. Calcifications are noted in the uncinate process of the pancreas, as marker for chronic pancreatitis. The spleen is unremarkable. The adrenal glands have become mildly prominent. The kidneys enhance symmetrically. There is no hydronephrosis. 2.3 cm cyst of the superior pole of the right kidney, with simple features. GI/Bowel: The stomach is unremarkable. There is a generalized ileus pattern. An entero colonic anastomosis is noted in the right lower quadrant. There is a left lower quadrant colostomy. Rectal stump is unremarkable. Pelvis: Lackey catheter is in the urinary bladder which is decompressed. Peritoneum/Retroperitoneum: There is a scattered, small amount of pneumoperitoneum, considered postoperative.  A surgical drain is in the left lower quadrant with mild surrounding stranding. Focal areas of intraperitoneal fluid are identified. Anterior to the spleen there is a 2.3 x 3.8 cm collection with small amount of air. In the infrarenal retroperitoneum there is a phlegmonous area, sample measurement 4.3 x 1.1 cm. There is also a small amount of free intraperitoneal fluid, for instance in the perihepatic space. Bones/Soft Tissues: There is a healing laparotomy incision. Anasarca is present. No acute osseous abnormality is identified. Status post left lower quadrant colostomy, with postoperative changes including a small amount of pneumoperitoneum. The surgical drain in the left lower quadrant is in normal position position. Small fluid collections anterior to the spleen and in the infrarenal retroperitoneum are indeterminate for infection. No readily drainable collection is identified at this time. Mild bilateral adrenal gland enlargement, new since 10/16/2020. Small adrenal hemorrhages are suspected. Adrenal crisis is a differential concern. Generalized ileus. Stable mild dilation of the biliary tree. Chronic findings of pancreatitis. Small bilateral pleural effusions and bibasilar atelectasis. Pneumonia is a differential concern. Anasarca. Xr Chest Portable    Result Date: 10/25/2020  EXAMINATION: ONE XRAY VIEW OF THE CHEST 10/24/2020 11:28 pm COMPARISON: None. HISTORY: ORDERING SYSTEM PROVIDED HISTORY: fever TECHNOLOGIST PROVIDED HISTORY: Reason for exam:->fever Reason for Exam: fever Acuity: Acute Type of Exam: Initial FINDINGS: Segmental pneumonia was identified in the right lower lobe. No cardiomegaly or interstitial edema was seen. The regional skeleton was unremarkable. No hilar mass was seen. Segmental pneumonia in the right lower lobe. No cardiomegaly or interstitial edema. Us Abdomen Limited Specify Organ?  Liver, Spleen, Pancreas, Gallbladder    Result Date: 10/26/2020  EXAMINATION: Limited abdominal ultrasound 10/26/2020 11:40 am COMPARISON: 10/25/2020. HISTORY: ORDERING SYSTEM PROVIDED HISTORY: check for intra-abdominal abscess. TECHNOLOGIST PROVIDED HISTORY: Reason for exam:->check for intra-abdominal abscess. Specify organ?->LIVER Specify organ?->SPLEEN Specify organ?->PANCREAS Specify organ?->GALLBLADDER Reason for Exam: evaluate for abscess in RUQ and LUQ, fever Acuity: Unknown Type of Exam: Initial FINDINGS: Focused evaluation of the upper quadrants is performed. The liver and gallbladder appear grossly within normal limits. Trace ascites. Bilateral pleural effusions. The spleen appears grossly unremarkable. No abscess is visualized in the right or left upper quadrants. Please note that ultrasound is inferior to CT for evaluation of intra-abdominal abscesses. Trace ascites. Bilateral pleural effusions. Us Retroperitoneal Limited    Result Date: 10/23/2020  EXAMINATION: ULTRASOUND OF THE KIDNEYS 10/23/2020 5:10 pm COMPARISON: CT abdomen and pelvis 10/16/2020. HISTORY: ORDERING SYSTEM PROVIDED HISTORY: protenuria TECHNOLOGIST PROVIDED HISTORY: Reason for exam:->protenuria Reason for Exam: protenuria Type of Exam: Initial FINDINGS: Overall exam is somewhat technically difficult and mildly limited secondary to patient body habitus. . THe right kidney measures 10.1 cm in length and the left kidney measures 11.9 cm in length. Mildly increased renal echotexture to both kidneys. No hydronephrosis or intrarenal stones. No renal masses are identified. There is redemonstration of a benign cyst exophytic to the upper pole of the right kidney measuring 2.3 x 1.6 x 2.0 cm. No follow-up imaging is recommended. Benign cyst to the upper pole of the right kidney measuring 2.3 cm. No follow-up imaging recommended. Mildly increased renal echotexture compatible with medical renal disease.  Otherwise unremarkable exam.       Scheduled Medicines   Medications:    sodium chloride flush  10 mL Intravenous 2 times per day    lisinopril 40 mg Oral Daily    IVPB builder   Intravenous Q12H    guaiFENesin  600 mg Oral BID    metoprolol  125 mg Oral BID    cloNIDine  1 patch Transdermal Weekly    anidulafungin  100 mg Intravenous Q24H    ceftazidime-acibactam (AVYCAZ) infusion  2.5 g Intravenous Q8H    lipase-protease-amylase  30,000 Units Oral TID WC    amLODIPine  10 mg Oral Daily    metroNIDAZOLE  500 mg Intravenous Q8H    pantoprazole  40 mg Intravenous BID    enoxaparin  40 mg Subcutaneous Daily    linezolid  600 mg Intravenous Q12H    Abacavir-Dolutegravir-Lamivud  1 tablet Oral Daily    sodium chloride flush  10 mL Intravenous BID    sodium chloride flush  10 mL Intravenous 2 times per day      Infusions:    sodium chloride 25 mL/hr at 10/26/20 1124    HYDROmorphone HCl 0.2 mg (10/26/20 0947)         IMPRESSION    Patient Active Problem List   Diagnosis    [facility] positive ([facility] [address]    Alcoholic ([facility] [address]    Hypertension    Alcohol withdrawal ([facility] [address]    Abdominal wall abscess    Abscess of sigmoid colon due to diverticulitis    Proteinuria    HIV (human immunodeficiency virus infection) ([facility] [address]    Incisional hernia, without obstruction or gangrene         RECOMMENDATIONS:      1. Reviewed POC blood glucose . Labs and X ray results   2. Reviewed Home and Current Medicines   3. Will order serum cortisol stat. 4. Low cortisol consider cosyntropin stimulation test and also do start on near stress dose of steroid       Will follow with you  Again thank you for sharing pt's care with me.      Truly yours,       Anirudh Kyle MD

## 2020-10-27 NOTE — PROGRESS NOTES
POD 6 exploratory laparotomy with Tamez's procedure and drainage of intra-abdominal abscess. Still having some intermittent fevers. Tmax was 102.4. Tcurrent is 98.8 Excellent urinary output. MARI is minimal and serous. Colostomy with bile in appliance. Viable. PICC placed yesterday. He is still reporting some bloating and heartburn secondary to a small bowel ileus which is to be expected. Reports no emesis. He also has some stool now in the colostomy. Edema in the bowel wall has also lessened. WBC is still up at 30K. Left abdominal wall cellulitis has resolved. H/H stable. Blood cx's NGTD  Abd: mildly distended, stoma is pink in LLQ. Minimal drainage from incisional. NR, incisional tenderness. I removed the titi. POI-follow clinical course. Improved. Will start full liquid diet. Monitor for sources of fever and leukocytosis. F/u on workup. Encouraged IS and ambulation. ID recs and abx coverage noted. Hypocalcemia-being repleted with PO meds.

## 2020-10-27 NOTE — PROGRESS NOTES
Nephrology Progress Note  10/27/2020 8:11 AM        Subjective:   Admit Date: 10/16/2020  PCP: Matilde Espinoza, RN, BSN, MSN, FNP-C    Interval History: doing better     Diet: none yet    ROS:  No overt abd pain- UOP 1200/d , ostomy 500/d     Data:     Current meds:    sodium chloride flush  10 mL Intravenous 2 times per day    lisinopril  40 mg Oral Daily    IVPB builder   Intravenous Q12H    guaiFENesin  600 mg Oral BID    metoprolol  125 mg Oral BID    cloNIDine  1 patch Transdermal Weekly    anidulafungin  100 mg Intravenous Q24H    ceftazidime-acibactam (AVYCAZ) infusion  2.5 g Intravenous Q8H    lipase-protease-amylase  30,000 Units Oral TID WC    amLODIPine  10 mg Oral Daily    metroNIDAZOLE  500 mg Intravenous Q8H    pantoprazole  40 mg Intravenous BID    enoxaparin  40 mg Subcutaneous Daily    linezolid  600 mg Intravenous Q12H    Abacavir-Dolutegravir-Lamivud  1 tablet Oral Daily    sodium chloride flush  10 mL Intravenous BID    sodium chloride flush  10 mL Intravenous 2 times per day      sodium chloride 25 mL/hr at 10/27/20 0453    HYDROmorphone HCl 0.2 mg (10/26/20 0947)         I/O last 3 completed shifts:   In: 900 [I.V.:300; IV Piggyback:600]  Out: 1700 [Urine:1200; Stool:500]    CBC:   Recent Labs     10/25/20  0337 10/26/20  0539 10/27/20  0505   WBC 22.9* 32.8* 30.4*   HGB 10.2* 12.0* 9.6*   * 518* 425          Recent Labs     10/24/20  0854 10/25/20  0337 10/27/20  0505   * 131* 128*   K 4.4 3.5 3.6   CL 96* 96* 92*   CO2 21 26 27   BUN 2* 2* 7   CREATININE 0.6* 0.7* 0.6*   GLUCOSE 97 116* 83       Lab Results   Component Value Date    CALCIUM 7.2 (L) 10/27/2020    PHOS 3.0 10/27/2020       Objective:     Vitals: /89   Pulse 99   Temp 98.8 °F (37.1 °C) (Oral)   Resp 18   Ht 5' 10.98\" (1.803 m)   Wt 182 lb 4.8 oz (82.7 kg)   SpO2 93%   BMI 25.44 kg/m²     General appearance:  No ditress  HEENT:  No conj pallor  Neck:  supple  Lungs:  CTA  Heart:  Seems RRR  Abdomen: soft- ostomy  Extremities:  No overt edema   RUE PICC       Problem List :         Impression :     1. HTHN manual /85- with multiple regimes - acceptable - I did not see the clonidine patch - will conform with nurse   2. Low na - could be multiple process- hypotonic sol and pain, GI na loss   3. abd abscess / s/p surgery HIV well controled with ART    Recommendation/Plan  :     1. I have spoken to pharmacist to change all obligatory meds with NS  2. Pain ;/ nausea control   3. Also do ua and urinary indices   4.  He  Has  risk for  EDGAR\  5. follow closely       Ana Diaz MD

## 2020-10-27 NOTE — CARE COORDINATION
Reviewed cost of iv medications with Luca SILVERMAN and pt would have over 450 dollar/day copy. PS sent to Dr Akiko Donato about situation.

## 2020-10-27 NOTE — CONSULTS
[address] [facility] ACUTE CARE PHYSICAL THERAPY EVALUATION  Ar Cho, 1975, 4104/4104-A, 10/27/2020    History  [place]:  The primary encounter diagnosis was Proteinuria, unspecified type. Diagnoses of Diverticulitis of large intestine with abscess without bleeding and Leukocytosis, unspecified type were also pertinent to this visit. Patient  has a past medical history of Abscess of sigmoid colon due to diverticulitis, Alcoholic ([address] Diverticulitis, HIV positive ([facility] [address] Hypertension, Incisional hernia, without obstruction or gangrene, and Pancreatitis. Patient  has a past surgical history that includes hernia repair; Small intestine surgery; CT ABSCESS DRAINAGE PERITONEAL/PERITONITIS OPEN (10/17/2020); and laparotomy (N/A, 10/21/2020). Subjective:  Patient states:  \"I've already been up and walking by myself today\", \"I'm having a hard time breathing\". Pain:  C/o of back pain, did not rate. Communication with other providers:  Handoff to RN. Restrictions: Abdominal precautions     Home Setup/Prior level of function  Social/Functional History  Lives With: Significant other  Type of Home: House  Home Layout: One level  Home Access: Stairs to enter with rails  Entrance Stairs - Number of Steps: 6  Bathroom Shower/Tub: Tub only, Tub/Shower unit  ADL Assistance: Independent  Homemaking Assistance: Independent  Homemaking Responsibilities: Yes  Ambulation Assistance: Independent  Transfer Assistance: Independent  Active : Yes  Occupation: Full time employment    Examination of body systems (includes body structures/functions, activity/participation limitations):  · Observation:  Pt supine watching TV upon arrival, had already donned shorts by self. · [facility]  [facility] [place]  · [facility]:  [facility] [place]  · Cardiopulmonary:  On 2L n/c, not used for mobility, returned at end of session. · Cognition: WFL, conversational.     Musculoskeletal  · ROM R/L:  WFL.     · Strength R/L:  At least 4/5, decreased in function functional mobility tasks independently. Pt would benefit from skilled PT services to address deficits and increase endurance. PT to recommend d/c to home with [facility] and possible outpatient services as necessary. Complexity: Moderate  Prognosis: Good, no significant barriers to participation at this time.    Plan Times per week: 3-4/week, 1 week,      Equipment: Pt may require cane for AMB if balance and endurance does not improve    Goals:  Short term goals  Time Frame for Short term goals: 1 week  Short term goal 1: Pt will AMB x250 ft with LRAD and min fatigue  Short term goal 2: Pt will perform x3 STS from low surface with CGA  Short term goal 3: Pt will ascend/descend x6 stairs with good mechanics, CGA and min fatigue       Treatment plan:  Bed mobility, transfers, balance, gait, TA, TX, stairs    Recommendations for NURSING mobility: AMB with SPC or no AD     Time:   Time in: 13:48  Time out: 14:20  Timed treatment minutes: 18  Total time: 28    Electronically signed by:    Erich Angelucci  10/27/2020, 4:25 PM

## 2020-10-27 NOTE — PROGRESS NOTES
Infectious Disease Progress Note  10/27/2020   Patient Name: Ar Cho : 1975       Reason for visit: F/u sepsis secondary to diverticular abscess  History:? Interval history noted. Status post exploratory laparotomy, colostomy creation, drainage of peritoneal abscess, removal of catheter spleen on 10/21 for perforated sigmoid diverticulitis with pericolonic abscess, incisional hernias. Feels better but still has abdoimal pain   Physical Exam:  Vital Signs: BP (!) 137/95   Pulse 95   Temp 98.4 °F (36.9 °C) (Oral)   Resp 17   Ht 5' 10.98\" (1.803 m)   Wt 182 lb 4.8 oz (82.7 kg)   SpO2 97%   BMI 25.44 kg/m²     Gen: alert and oriented X3,discomfort  Skin: no stigmata of endocarditis  Wounds: C/D/I  HEMT: AT/NC Oropharynx pink, moist, and without lesions or exudates; dentition in good state of repair  Eyes: PERRLA, EOMI, conjunctiva pink, sclera anicteric. Neck: Supple. Trachea midline. No LAD. Chest: no distress and CTA. Good air movement. Heart: RRR and no MRG. Abd: LLQ colostomy and LLQ  left flank erythema and tenderness. RUq tenderness, soft, non-distended, no tenderness, no hepatomegaly. Ext: no clubbing, cyanosis, or edema  Catheter Site: without erythema or tenderness  Neuro: Mental status intact. CN 2-12 intact and no focal sensory or motor deficits     Radiologic / Imaging / TESTING  No results found.      Labs:    Recent Results (from the past 24 hour(s))   Lactate, Sepsis    Collection Time: 10/26/20  5:29 PM   Result Value Ref Range    Lactic Acid, Sepsis 1.4 0.5 - 1.9 mMOL/L   Cortisol Total    Collection Time: 10/26/20  5:30 PM   Result Value Ref Range    Cortisol, Plasma 16.8    CBC auto differential    Collection Time: 10/27/20  5:05 AM   Result Value Ref Range    WBC 30.4 (HH) 4.0 - 10.5 K/CU MM    RBC 3.19 (L) 4.6 - 6.2 M/CU MM    Hemoglobin 9.6 (L) 13.5 - 18.0 GM/DL    Hematocrit 29.9 (L) 42 - 52 %    MCV 93.7 78 - 100 FL    MCH 30.1 27 - 31 PG    MCHC 32.1 32.0 - 36.0 % RDW 13.5 11.7 - 14.9 %    Platelets 792 428 - 218 K/CU MM    MPV 9.5 7.5 - 11.1 FL    Differential Type AUTOMATED DIFFERENTIAL     Segs Relative 79.3 (H) 36 - 66 %    Lymphocytes % 5.8 (L) 24 - 44 %    Monocytes % 11.8 (H) 0 - 4 %    Eosinophils % 0.8 0 - 3 %    Basophils % 0.4 0 - 1 %    Segs Absolute 24.2 K/CU MM    Lymphocytes Absolute 1.8 K/CU MM    Monocytes Absolute 3.6 K/CU MM    Eosinophils Absolute 0.2 K/CU MM    Basophils Absolute 0.1 K/CU MM    Nucleated RBC % 0.0 %    Total Nucleated RBC 0.0 K/CU MM    Total Immature Neutrophil 0.57 K/CU MM    Immature Neutrophil % 1.9 (H) 0 - 0.43 %   C-Reactive Protein    Collection Time: 10/27/20  5:05 AM   Result Value Ref Range    CRP, High Sensitivity 191.7 mg/L   Comprehensive Metabolic Panel    Collection Time: 10/27/20  5:05 AM   Result Value Ref Range    Sodium 128 (L) 135 - 145 MMOL/L    Potassium 3.6 3.5 - 5.1 MMOL/L    Chloride 92 (L) 99 - 110 mMol/L    CO2 27 21 - 32 MMOL/L    BUN 7 6 - 23 MG/DL    CREATININE 0.6 (L) 0.9 - 1.3 MG/DL    Glucose 83 70 - 99 MG/DL    Calcium 7.2 (L) 8.3 - 10.6 MG/DL    Alb 2.0 (L) 3.4 - 5.0 GM/DL    Total Protein 5.0 (L) 6.4 - 8.2 GM/DL    Total Bilirubin 0.5 0.0 - 1.0 MG/DL    ALT 6 (L) 10 - 40 U/L    AST 11 (L) 15 - 37 IU/L    Alkaline Phosphatase 61 40 - 128 IU/L    GFR Non-African American >60 >60 mL/min/1.73m2    GFR African American >60 >60 mL/min/1.73m2    Anion Gap 9 4 - 16   Phosphorus    Collection Time: 10/27/20  5:05 AM   Result Value Ref Range    Phosphorus 3.0 2.5 - 4.9 MG/DL   Magnesium    Collection Time: 10/27/20  5:05 AM   Result Value Ref Range    Magnesium 1.5 (L) 1.8 - 2.4 mg/dl   Urinalysis    Collection Time: 10/27/20  8:33 AM   Result Value Ref Range    Color, UA YELLOW YELLOW    Clarity, UA CLEAR CLEAR    Glucose, Urine NEGATIVE NEGATIVE MG/DL    Bilirubin Urine NEGATIVE NEGATIVE MG/DL    Ketones, Urine MODERATE (A) NEGATIVE MG/DL    Specific Gravity, UA 1.012 1.001 - 1.035    Blood, Urine NEGATIVE

## 2020-10-28 LAB
ALBUMIN SERPL-MCNC: 1.8 GM/DL (ref 3.4–5)
ALP BLD-CCNC: 69 IU/L (ref 40–128)
ALT SERPL-CCNC: 6 U/L (ref 10–40)
ANION GAP SERPL CALCULATED.3IONS-SCNC: 8 MMOL/L (ref 4–16)
AST SERPL-CCNC: 13 IU/L (ref 15–37)
BASOPHILS ABSOLUTE: 0.1 K/CU MM
BASOPHILS RELATIVE PERCENT: 0.3 % (ref 0–1)
BILIRUB SERPL-MCNC: 0.4 MG/DL (ref 0–1)
BUN BLDV-MCNC: 6 MG/DL (ref 6–23)
CALCIUM SERPL-MCNC: 7.1 MG/DL (ref 8.3–10.6)
CHLORIDE BLD-SCNC: 93 MMOL/L (ref 99–110)
CO2: 27 MMOL/L (ref 21–32)
CREAT SERPL-MCNC: 0.6 MG/DL (ref 0.9–1.3)
DIFFERENTIAL TYPE: ABNORMAL
EOSINOPHILS ABSOLUTE: 0.3 K/CU MM
EOSINOPHILS RELATIVE PERCENT: 1.1 % (ref 0–3)
GFR AFRICAN AMERICAN: >60 ML/MIN/1.73M2
GFR NON-AFRICAN AMERICAN: >60 ML/MIN/1.73M2
GLUCOSE BLD-MCNC: 91 MG/DL (ref 70–99)
HCT VFR BLD CALC: 29 % (ref 42–52)
HEMOGLOBIN: 9.2 GM/DL (ref 13.5–18)
HIGH SENSITIVE C-REACTIVE PROTEIN: 153.8 MG/L
IMMATURE NEUTROPHIL %: 2.2 % (ref 0–0.43)
LYMPHOCYTES ABSOLUTE: 1.7 K/CU MM
LYMPHOCYTES RELATIVE PERCENT: 6 % (ref 24–44)
Lab: NORMAL
MAGNESIUM: 1.4 MG/DL (ref 1.8–2.4)
MCH RBC QN AUTO: 29.9 PG (ref 27–31)
MCHC RBC AUTO-ENTMCNC: 31.7 % (ref 32–36)
MCV RBC AUTO: 94.2 FL (ref 78–100)
MONOCYTES ABSOLUTE: 3.1 K/CU MM
MONOCYTES RELATIVE PERCENT: 10.9 % (ref 0–4)
NUCLEATED RBC %: 0 %
PDW BLD-RTO: 13.7 % (ref 11.7–14.9)
PHOSPHORUS: 2.9 MG/DL (ref 2.5–4.9)
PLATELET # BLD: 392 K/CU MM (ref 140–440)
PMV BLD AUTO: 9.3 FL (ref 7.5–11.1)
POTASSIUM SERPL-SCNC: 3.4 MMOL/L (ref 3.5–5.1)
PROCALCITONIN: 10.49
RBC # BLD: 3.08 M/CU MM (ref 4.6–6.2)
SEGMENTED NEUTROPHILS ABSOLUTE COUNT: 22.9 K/CU MM
SEGMENTED NEUTROPHILS RELATIVE PERCENT: 79.5 % (ref 36–66)
SODIUM BLD-SCNC: 128 MMOL/L (ref 135–145)
TEST NAME: NORMAL
TOTAL IMMATURE NEUTOROPHIL: 0.63 K/CU MM
TOTAL NUCLEATED RBC: 0 K/CU MM
TOTAL PROTEIN: 4.9 GM/DL (ref 6.4–8.2)
WBC # BLD: 28.8 K/CU MM (ref 4–10.5)

## 2020-10-28 PROCEDURE — 94761 N-INVAS EAR/PLS OXIMETRY MLT: CPT

## 2020-10-28 PROCEDURE — 85025 COMPLETE CBC W/AUTO DIFF WBC: CPT

## 2020-10-28 PROCEDURE — 86141 C-REACTIVE PROTEIN HS: CPT

## 2020-10-28 PROCEDURE — 2700000000 HC OXYGEN THERAPY PER DAY

## 2020-10-28 PROCEDURE — 6360000002 HC RX W HCPCS: Performed by: INTERNAL MEDICINE

## 2020-10-28 PROCEDURE — C9113 INJ PANTOPRAZOLE SODIUM, VIA: HCPCS | Performed by: SURGERY

## 2020-10-28 PROCEDURE — 80053 COMPREHEN METABOLIC PANEL: CPT

## 2020-10-28 PROCEDURE — 2580000003 HC RX 258: Performed by: INTERNAL MEDICINE

## 2020-10-28 PROCEDURE — 94010 BREATHING CAPACITY TEST: CPT

## 2020-10-28 PROCEDURE — 6370000000 HC RX 637 (ALT 250 FOR IP): Performed by: INTERNAL MEDICINE

## 2020-10-28 PROCEDURE — 1200000000 HC SEMI PRIVATE

## 2020-10-28 PROCEDURE — 83735 ASSAY OF MAGNESIUM: CPT

## 2020-10-28 PROCEDURE — 6370000000 HC RX 637 (ALT 250 FOR IP): Performed by: NURSE PRACTITIONER

## 2020-10-28 PROCEDURE — 84100 ASSAY OF PHOSPHORUS: CPT

## 2020-10-28 PROCEDURE — 6360000002 HC RX W HCPCS: Performed by: SURGERY

## 2020-10-28 PROCEDURE — 2500000003 HC RX 250 WO HCPCS: Performed by: SURGERY

## 2020-10-28 PROCEDURE — 94150 VITAL CAPACITY TEST: CPT

## 2020-10-28 PROCEDURE — 99232 SBSQ HOSP IP/OBS MODERATE 35: CPT | Performed by: INTERNAL MEDICINE

## 2020-10-28 PROCEDURE — 6370000000 HC RX 637 (ALT 250 FOR IP): Performed by: SURGERY

## 2020-10-28 PROCEDURE — 87324 CLOSTRIDIUM AG IA: CPT

## 2020-10-28 PROCEDURE — 2580000003 HC RX 258: Performed by: SURGERY

## 2020-10-28 PROCEDURE — 84145 PROCALCITONIN (PCT): CPT

## 2020-10-28 RX ORDER — POTASSIUM CHLORIDE 29.8 MG/ML
60 INJECTION INTRAVENOUS ONCE
Status: DISCONTINUED | OUTPATIENT
Start: 2020-10-28 | End: 2020-10-28

## 2020-10-28 RX ORDER — MAGNESIUM SULFATE 4 G/50ML
4 INJECTION INTRAVENOUS ONCE
Status: COMPLETED | OUTPATIENT
Start: 2020-10-28 | End: 2020-10-28

## 2020-10-28 RX ORDER — POTASSIUM CHLORIDE 20 MEQ/1
40 TABLET, EXTENDED RELEASE ORAL
Status: COMPLETED | OUTPATIENT
Start: 2020-10-28 | End: 2020-10-29

## 2020-10-28 RX ADMIN — OXYCODONE HYDROCHLORIDE AND ACETAMINOPHEN 1 TABLET: 5; 325 TABLET ORAL at 23:02

## 2020-10-28 RX ADMIN — ERAVACYCLINE: 50 INJECTION, POWDER, LYOPHILIZED, FOR SOLUTION INTRAVENOUS at 01:11

## 2020-10-28 RX ADMIN — LINEZOLID 600 MG: 600 INJECTION, SOLUTION INTRAVENOUS at 01:18

## 2020-10-28 RX ADMIN — GUAIFENESIN 600 MG: 600 TABLET, EXTENDED RELEASE ORAL at 21:05

## 2020-10-28 RX ADMIN — PANTOPRAZOLE SODIUM 40 MG: 40 INJECTION, POWDER, FOR SOLUTION INTRAVENOUS at 10:16

## 2020-10-28 RX ADMIN — METRONIDAZOLE 500 MG: 500 INJECTION, SOLUTION INTRAVENOUS at 04:55

## 2020-10-28 RX ADMIN — POTASSIUM CHLORIDE 40 MEQ: 1500 TABLET, EXTENDED RELEASE ORAL at 15:53

## 2020-10-28 RX ADMIN — METRONIDAZOLE 500 MG: 500 INJECTION, SOLUTION INTRAVENOUS at 13:20

## 2020-10-28 RX ADMIN — ONDANSETRON 4 MG: 2 INJECTION INTRAMUSCULAR; INTRAVENOUS at 23:06

## 2020-10-28 RX ADMIN — ERAVACYCLINE: 50 INJECTION, POWDER, LYOPHILIZED, FOR SOLUTION INTRAVENOUS at 18:54

## 2020-10-28 RX ADMIN — Medication: at 06:29

## 2020-10-28 RX ADMIN — OXYCODONE HYDROCHLORIDE AND ACETAMINOPHEN 1 TABLET: 5; 325 TABLET ORAL at 14:57

## 2020-10-28 RX ADMIN — LINEZOLID 600 MG: 600 INJECTION, SOLUTION INTRAVENOUS at 13:20

## 2020-10-28 RX ADMIN — SODIUM CHLORIDE 100 MG: 9 INJECTION, SOLUTION INTRAVENOUS at 02:32

## 2020-10-28 RX ADMIN — CEFTAZIDIME, AVIBACTAM 2.5 G: 2; .5 POWDER, FOR SOLUTION INTRAVENOUS at 23:02

## 2020-10-28 RX ADMIN — POTASSIUM CHLORIDE 40 MEQ: 1500 TABLET, EXTENDED RELEASE ORAL at 16:42

## 2020-10-28 RX ADMIN — POTASSIUM CHLORIDE 40 MEQ: 1500 TABLET, EXTENDED RELEASE ORAL at 10:15

## 2020-10-28 RX ADMIN — METRONIDAZOLE 500 MG: 500 INJECTION, SOLUTION INTRAVENOUS at 21:05

## 2020-10-28 RX ADMIN — MORPHINE SULFATE 2 MG: 2 INJECTION, SOLUTION INTRAMUSCULAR; INTRAVENOUS at 01:25

## 2020-10-28 RX ADMIN — OXYCODONE HYDROCHLORIDE AND ACETAMINOPHEN 1 TABLET: 5; 325 TABLET ORAL at 10:15

## 2020-10-28 RX ADMIN — AMLODIPINE BESYLATE 10 MG: 10 TABLET ORAL at 10:15

## 2020-10-28 RX ADMIN — SODIUM CHLORIDE, PRESERVATIVE FREE 10 ML: 5 INJECTION INTRAVENOUS at 10:16

## 2020-10-28 RX ADMIN — METOPROLOL TARTRATE 125 MG: 50 TABLET, FILM COATED ORAL at 21:05

## 2020-10-28 RX ADMIN — METOPROLOL TARTRATE 125 MG: 50 TABLET, FILM COATED ORAL at 10:14

## 2020-10-28 RX ADMIN — MAGNESIUM SULFATE 4 G: 4 INJECTION INTRAVENOUS at 10:46

## 2020-10-28 RX ADMIN — OXYCODONE HYDROCHLORIDE AND ACETAMINOPHEN 1 TABLET: 5; 325 TABLET ORAL at 18:52

## 2020-10-28 RX ADMIN — CEFTAZIDIME, AVIBACTAM 2.5 G: 2; .5 POWDER, FOR SOLUTION INTRAVENOUS at 06:43

## 2020-10-28 RX ADMIN — LISINOPRIL 40 MG: 40 TABLET ORAL at 10:14

## 2020-10-28 RX ADMIN — SODIUM CHLORIDE: 9 INJECTION, SOLUTION INTRAVENOUS at 23:09

## 2020-10-28 RX ADMIN — GUAIFENESIN 600 MG: 600 TABLET, EXTENDED RELEASE ORAL at 10:15

## 2020-10-28 RX ADMIN — PANTOPRAZOLE SODIUM 40 MG: 40 INJECTION, POWDER, FOR SOLUTION INTRAVENOUS at 21:05

## 2020-10-28 RX ADMIN — ENOXAPARIN SODIUM 40 MG: 40 INJECTION SUBCUTANEOUS at 10:15

## 2020-10-28 RX ADMIN — PANCRELIPASE 30000 UNITS: 30000; 6000; 19000 CAPSULE, DELAYED RELEASE PELLETS ORAL at 16:42

## 2020-10-28 RX ADMIN — MORPHINE SULFATE 2 MG: 2 INJECTION, SOLUTION INTRAMUSCULAR; INTRAVENOUS at 05:32

## 2020-10-28 ASSESSMENT — PAIN DESCRIPTION - LOCATION
LOCATION: ABDOMEN

## 2020-10-28 ASSESSMENT — PAIN SCALES - GENERAL
PAINLEVEL_OUTOF10: 9
PAINLEVEL_OUTOF10: 8
PAINLEVEL_OUTOF10: 0
PAINLEVEL_OUTOF10: 7
PAINLEVEL_OUTOF10: 8
PAINLEVEL_OUTOF10: 7
PAINLEVEL_OUTOF10: 9
PAINLEVEL_OUTOF10: 4
PAINLEVEL_OUTOF10: 9
PAINLEVEL_OUTOF10: 4
PAINLEVEL_OUTOF10: 6

## 2020-10-28 ASSESSMENT — PAIN DESCRIPTION - PROGRESSION

## 2020-10-28 ASSESSMENT — PAIN DESCRIPTION - PAIN TYPE
TYPE: ACUTE PAIN

## 2020-10-28 ASSESSMENT — PAIN DESCRIPTION - ONSET
ONSET: ON-GOING

## 2020-10-28 ASSESSMENT — PAIN SCALES - WONG BAKER

## 2020-10-28 ASSESSMENT — PAIN DESCRIPTION - FREQUENCY
FREQUENCY: CONTINUOUS

## 2020-10-28 ASSESSMENT — PAIN DESCRIPTION - ORIENTATION
ORIENTATION: LEFT;LOWER

## 2020-10-28 ASSESSMENT — PAIN DESCRIPTION - DESCRIPTORS
DESCRIPTORS: DISCOMFORT

## 2020-10-28 ASSESSMENT — PAIN - FUNCTIONAL ASSESSMENT
PAIN_FUNCTIONAL_ASSESSMENT: ACTIVITIES ARE NOT PREVENTED

## 2020-10-28 NOTE — PROGRESS NOTES
POD 7 exploratory laparotomy with Tamez's procedure and drainage of intra-abdominal abscess. Afebrile now. Voiding spontaneously. Excellent urinary output.  MARI is minimal and serous. Will remove. Colostomy with bile in appliance. Functional, pink, less edematous. Discussed at length yesterday with Dr. Parker Jackson. Labs pending. Left abdominal wall cellulitis has resolved. Procalcitonin is down. Blood cx's NGTD  Abd: mildly distended, stoma is pink in LLQ. Minimal drainage from incisional. NR, incisional tenderness. Will replace iodoform titi. POI-follow clinical course. Improved. Keep on full liquid diet. Monitor for sources of fever and leukocytosis. F/u on workup. Encouraged IS and ambulation. ID recs and abx coverage noted.   Hypocalcemia-being repleted with PO meds.

## 2020-10-28 NOTE — PLAN OF CARE
Problem: Pain:  Goal: Pain level will decrease  Description: Pain level will decrease  10/28/2020 0004 by Papi Ch RN  Outcome: Ongoing  10/27/2020 1254 by Jigna Wolfe RN  Outcome: Ongoing  Goal: Control of acute pain  Description: Control of acute pain  10/28/2020 0004 by Papi Ch RN  Outcome: Ongoing  10/27/2020 1254 by Jigna Wolfe RN  Outcome: Ongoing  Goal: Control of chronic pain  Description: Control of chronic pain  10/28/2020 0004 by Papi Ch RN  Outcome: Ongoing  10/27/2020 1254 by Jigna Wolfe RN  Outcome: Ongoing

## 2020-10-28 NOTE — CARE COORDINATION
Spoke with pt about need for 3 iv atb for next 2 weeks and cost if goes home with home care. He states he does not believe he needs the atb and very concerned about the cost.  We discussed Swing bed at [facility] and agreed for CM to send referral.  Message for referral sent to [facility]. He also requested that I run plan for 3 iv atb with Dr Carlyle Duverney and Mykel Lynn. To confirm he really needs them. Vm left for Dr Carlyle Duverney and PS to Dr Mykel Lynn. Cm will continue to follow.

## 2020-10-28 NOTE — PROGRESS NOTES
Peri Grady MD, [address]                Internal Medicine Hospitalist             Daily Progress  Note   Subjective:     Chief Complaint   Patient presents with    Abdominal Pain     reports abd pain for 2 weeks     Mr. [place] of being very frustrated with still having to be in the hospital.     Objective:    BP (!) 132/90   Pulse 106   Temp 98.1 °F (36.7 °C) (Oral)   Resp 14   Ht 5' 10.98\" (1.803 m)   Wt 182 lb 4.8 oz (82.7 kg)   SpO2 92%   BMI 25.44 kg/m²      Intake/Output Summary (Last 24 hours) at 10/28/2020 1106  Last data filed at 10/28/2020 0606  Gross per 24 hour   Intake 730 ml   Output 1530 ml   Net -800 ml      Physical Exam:  Heart:  Distant heart sounds. Lungs: Mostly clear to auscultation, decreased breath sounds at bases. No wheezes appreciated no crackles heard. Abdomen: Soft, no detailed exam done. Extremities: Non tender, no swelling noted, strength 5/5 both legs. CNS: Grossly intact.     Labs:  CBC with Differential:    Lab Results   Component Value Date    WBC 28.8 10/28/2020    RBC 3.08 10/28/2020    HGB 9.2 10/28/2020    HCT 29.0 10/28/2020     10/28/2020    MCV 94.2 10/28/2020    MCH 29.9 10/28/2020    MCHC 31.7 10/28/2020    RDW 13.7 10/28/2020    SEGSPCT 79.5 10/28/2020    BANDSPCT 2 10/26/2020    LYMPHOPCT 6.0 10/28/2020    MONOPCT 10.9 10/28/2020    MYELOPCT 1 10/21/2020    BASOPCT 0.3 10/28/2020    MONOSABS 3.1 10/28/2020    LYMPHSABS 1.7 10/28/2020    EOSABS 0.3 10/28/2020    BASOSABS 0.1 10/28/2020    DIFFTYPE AUTOMATED DIFFERENTIAL 10/28/2020     CMP:    Lab Results   Component Value Date     10/28/2020    K 3.4 10/28/2020    CL 93 10/28/2020    CO2 27 10/28/2020    BUN 6 10/28/2020    CREATININE 0.6 10/28/2020    GFRAA >60 10/28/2020    LABGLOM >60 10/28/2020    GLUCOSE 91 10/28/2020    PROT 4.9 10/28/2020    PROT 6.1 11/04/2012    LABALBU 1.8 10/28/2020    CALCIUM 7.1 10/28/2020    BILITOT 0.4 10/28/2020    ALKPHOS 69 10/28/2020    AST 13 10/28/2020    ALT 6 10/28/2020     No results for input(s): TROPONINT in the last 72 hours. Lab Results   Component Value Date    TSHHS 0.678 11/03/2012         sodium chloride 15 mL/hr at 10/27/20 1008    HYDROmorphone HCl 0.2 mg (10/26/20 0947)      magnesium sulfate  4 g Intravenous Once    potassium chloride  40 mEq Oral TID WC    anidulafungin (ERAXIS) 100 mg IVPB  100 mg Intravenous Daily    ceftazidime-acibactam (AVYCAZ) infusion  2.5 g Intravenous Q8H    sodium chloride flush  10 mL Intravenous 2 times per day    lisinopril  40 mg Oral Daily    IVPB builder   Intravenous Q12H    guaiFENesin  600 mg Oral BID    metoprolol  125 mg Oral BID    cloNIDine  1 patch Transdermal Weekly    lipase-protease-amylase  30,000 Units Oral TID WC    amLODIPine  10 mg Oral Daily    metroNIDAZOLE  500 mg Intravenous Q8H    pantoprazole  40 mg Intravenous BID    enoxaparin  40 mg Subcutaneous Daily    linezolid  600 mg Intravenous Q12H    Abacavir-Dolutegravir-Lamivud  1 tablet Oral Daily         Assessment:       Patient Active Problem List    Diagnosis Date Noted    Incisional hernia, without obstruction or gangrene 10/22/2020    HIV (human immunodeficiency virus infection) ([facility] 10/20/2020    Proteinuria     Abdominal wall abscess 10/16/2020    Abscess of sigmoid colon due to diverticulitis 10/16/2020    Alcohol withdrawal ([facility] 11/04/2012    HIV positive ([facility]     Alcoholic ([facility]     Hypertension        Plan:     Problems being addressed this admission:   Sepsis / acute diverticulitis with abscess s/p surgery 10/22/20  Ileus post op  PNA  HIV  HTN    Please see ID note from yesterday for detailed plan for now and what happened before. His wbc count is still high but slightly lower than before. CM is looking into swing bed for him in [place] if patient is agreeable. He has tolerated some fluids. His sodium and K is low, nephrology on the case may need to check osmolality ?  It was 270 on 10/27/20. Continue to monitor. PNA Right LL, as per CXR 10/25/20. HIV no change in his meds,   HTN is stable now 132/90. Consultants:  Surgery  ID  nephrology    General Orders:  Repeat basic labs again in am.  I have explained to the patient and discussed with him/her the treatment plan.   Roel Avila MD, [address]

## 2020-10-28 NOTE — PROGRESS NOTES
Patient instructed and educated on [employer]. Patient able to do 500 ml. Vital capacity. Patient's goal is 3200ml.  Electronically signed by Zahra Watkins RCP on 10/28/2020 at 12:31 PM

## 2020-10-28 NOTE — PROGRESS NOTES
Infectious Disease Progress Note  10/28/2020   Patient Name: Lisa Alvarez : 1975       Reason for visit: F/u sepsis secondary to diverticular abscess  History:? Interval history noted. Status post exploratory laparotomy, colostomy creation, drainage of peritoneal abscess, removal of catheter spleen on 10/21 for perforated sigmoid diverticulitis with pericolonic abscess, incisional hernias. Feels better, quite weak   Physical Exam:  Vital Signs: BP (!) 132/90   Pulse 106   Temp 98.1 °F (36.7 °C) (Oral)   Resp 14   Ht 5' 10.98\" (1.803 m)   Wt 182 lb 4.8 oz (82.7 kg)   SpO2 92%   BMI 25.44 kg/m²     Gen: alert and oriented X3,discomfort  Skin: no stigmata of endocarditis  Wounds: C/D/I  HEMT: AT/NC Oropharynx pink, moist, and without lesions or exudates; dentition in good state of repair  Eyes: PERRLA, EOMI, conjunctiva pink, sclera anicteric. Neck: Supple. Trachea midline. No LAD. Chest: no distress and CTA. Good air movement. Heart: RRR and no MRG. Abd: LLQ colostomy and LLQ  left flank erythema and tenderness. RUq tenderness, soft, non-distended, no tenderness, no hepatomegaly. Ext: no clubbing, cyanosis, or edema  Catheter Site: without erythema or tenderness  Neuro: Mental status intact. CN 2-12 intact and no focal sensory or motor deficits     Radiologic / Imaging / TESTING  No results found.      Labs:    Recent Results (from the past 24 hour(s))   Procalcitonin    Collection Time: 10/28/20  1:00 AM   Result Value Ref Range    Procalcitonin 10.49    CBC auto differential    Collection Time: 10/28/20  6:55 AM   Result Value Ref Range    WBC 28.8 (H) 4.0 - 10.5 K/CU MM    RBC 3.08 (L) 4.6 - 6.2 M/CU MM    Hemoglobin 9.2 (L) 13.5 - 18.0 GM/DL    Hematocrit 29.0 (L) 42 - 52 %    MCV 94.2 78 - 100 FL    MCH 29.9 27 - 31 PG    MCHC 31.7 (L) 32.0 - 36.0 %    RDW 13.7 11.7 - 14.9 %    Platelets 274 129 - 342 K/CU MM    MPV 9.3 7.5 - 11.1 FL    Differential Type AUTOMATED DIFFERENTIAL     Segs Relative 79.5 (H) 36 - 66 %    Lymphocytes % 6.0 (L) 24 - 44 %    Monocytes % 10.9 (H) 0 - 4 %    Eosinophils % 1.1 0 - 3 %    Basophils % 0.3 0 - 1 %    Segs Absolute 22.9 K/CU MM    Lymphocytes Absolute 1.7 K/CU MM    Monocytes Absolute 3.1 K/CU MM    Eosinophils Absolute 0.3 K/CU MM    Basophils Absolute 0.1 K/CU MM    Nucleated RBC % 0.0 %    Total Nucleated RBC 0.0 K/CU MM    Total Immature Neutrophil 0.63 K/CU MM    Immature Neutrophil % 2.2 (H) 0 - 0.43 %   C-Reactive Protein    Collection Time: 10/28/20  6:55 AM   Result Value Ref Range    CRP, High Sensitivity 153.8 mg/L   Comprehensive Metabolic Panel    Collection Time: 10/28/20  6:55 AM   Result Value Ref Range    Sodium 128 (L) 135 - 145 MMOL/L    Potassium 3.4 (L) 3.5 - 5.1 MMOL/L    Chloride 93 (L) 99 - 110 mMol/L    CO2 27 21 - 32 MMOL/L    BUN 6 6 - 23 MG/DL    CREATININE 0.6 (L) 0.9 - 1.3 MG/DL    Glucose 91 70 - 99 MG/DL    Calcium 7.1 (L) 8.3 - 10.6 MG/DL    Alb 1.8 (L) 3.4 - 5.0 GM/DL    Total Protein 4.9 (L) 6.4 - 8.2 GM/DL    Total Bilirubin 0.4 0.0 - 1.0 MG/DL    ALT 6 (L) 10 - 40 U/L    AST 13 (L) 15 - 37 IU/L    Alkaline Phosphatase 69 40 - 128 IU/L    GFR Non-African American >60 >60 mL/min/1.73m2    GFR African American >60 >60 mL/min/1.73m2    Anion Gap 8 4 - 16   Phosphorus    Collection Time: 10/28/20  6:55 AM   Result Value Ref Range    Phosphorus 2.9 2.5 - 4.9 MG/DL   Magnesium    Collection Time: 10/28/20  6:55 AM   Result Value Ref Range    Magnesium 1.4 (L) 1.8 - 2.4 mg/dl     CULTURE results: Invalid input(s): BLOOD CULTURE,  URINE CULTURE, SURGICAL CULTURE    Diagnosis:  Patient Active Problem List   Diagnosis    HIV positive ([address]    Alcoholic ([address]    Hypertension    Alcohol withdrawal ([facility]    Abdominal wall abscess    Abscess of sigmoid colon due to diverticulitis    Proteinuria    HIV (human immunodeficiency virus infection) ([facility]    Incisional hernia, without obstruction or gangrene       Active Problems  Active Problems:    Abdominal wall abscess    Abscess of sigmoid colon due to diverticulitis    Proteinuria    HIV (human immunodeficiency virus infection) ([facility] [address]    Incisional hernia, without obstruction or gangrene  Resolved Problems:    * No resolved hospital problems. *      Impression and plan   Clinical status: Status post exploratory laparotomy, drainage of pericolonic abscess on 10/21/2020. Slight clinical improvement   Coverage being provided for VRE, CRE, anaerobes, fungi.  Therapeutic: continue linezolid 10/19, Avycaz 10/25-,metronidazole 10/24-. Hazle Imam 10/26  , will need at least 2 weeks of IV abx.  Completed antibiotics: Zosyn, meropenem 10/17-25.  Diagnostic: trend crp, and pct, abdominal sonogram.   F/u: C diff pcr   Other:   Summary: [facility] since 2003, 10/22/2020 HIV VL undetected, CD4 859, RPR: 1:2 (previously treated for syphilis).  Lipase not elevated      Electronically signed by: Electronically signed by Erica Hurd MD on 10/28/2020 at 1:40 PM

## 2020-10-28 NOTE — PROGRESS NOTES
1. HTN acceptable - with multiple regimen   2. Low na true hypotonic  as his serum osm was 270 Mom/L - urinary indices  does not suggest  hypovolemia - so potential etiology appropriate ADH from pain - hypotonic obligatory sol- less likely  Na loss from  Ostomy - his out put  is low , also retaining water is a distant( he did not seem to gain wt )  possibility  - lab still pending  3. abd abscess well controlled HIV etc     Recommendation/Plan  :     1. His WBC downward   2. BP acceptable  3. Has risk for  EDGAR  4. Follow  Clinically    5.  Let me know when ready to d/c so I can make plan for out pt BP meds       Juan Jamil MD      Lab back- na still 128 but has low K    repletion will raise jovi level  So replete  K/ MG  Also pain controled   Na expected to come up

## 2020-10-29 LAB
ADRENOCORTICOTROPIC HORMONE: 223 PG/ML (ref 7.2–63.3)
BASOPHILS ABSOLUTE: 0.2 K/CU MM
BASOPHILS RELATIVE PERCENT: 0.6 % (ref 0–1)
DIFFERENTIAL TYPE: ABNORMAL
EOSINOPHILS ABSOLUTE: 0.4 K/CU MM
EOSINOPHILS RELATIVE PERCENT: 1.6 % (ref 0–3)
HCT VFR BLD CALC: 29.3 % (ref 42–52)
HEMOGLOBIN: 9.5 GM/DL (ref 13.5–18)
HIGH SENSITIVE C-REACTIVE PROTEIN: 124.4 MG/L
IMMATURE NEUTROPHIL %: 2.3 % (ref 0–0.43)
LYMPHOCYTES ABSOLUTE: 1.8 K/CU MM
LYMPHOCYTES RELATIVE PERCENT: 6.7 % (ref 24–44)
MCH RBC QN AUTO: 30.7 PG (ref 27–31)
MCHC RBC AUTO-ENTMCNC: 32.4 % (ref 32–36)
MCV RBC AUTO: 94.8 FL (ref 78–100)
MONOCYTES ABSOLUTE: 2.9 K/CU MM
MONOCYTES RELATIVE PERCENT: 10.8 % (ref 0–4)
NUCLEATED RBC %: 0 %
PDW BLD-RTO: 13.9 % (ref 11.7–14.9)
PLATELET # BLD: 464 K/CU MM (ref 140–440)
PMV BLD AUTO: 10.1 FL (ref 7.5–11.1)
RBC # BLD: 3.09 M/CU MM (ref 4.6–6.2)
SEGMENTED NEUTROPHILS ABSOLUTE COUNT: 20.9 K/CU MM
SEGMENTED NEUTROPHILS RELATIVE PERCENT: 78 % (ref 36–66)
TOTAL IMMATURE NEUTOROPHIL: 0.62 K/CU MM
TOTAL NUCLEATED RBC: 0 K/CU MM
WBC # BLD: 26.9 K/CU MM (ref 4–10.5)

## 2020-10-29 PROCEDURE — C9113 INJ PANTOPRAZOLE SODIUM, VIA: HCPCS | Performed by: SURGERY

## 2020-10-29 PROCEDURE — 6360000002 HC RX W HCPCS: Performed by: SURGERY

## 2020-10-29 PROCEDURE — 2500000003 HC RX 250 WO HCPCS: Performed by: SURGERY

## 2020-10-29 PROCEDURE — 94761 N-INVAS EAR/PLS OXIMETRY MLT: CPT

## 2020-10-29 PROCEDURE — 86141 C-REACTIVE PROTEIN HS: CPT

## 2020-10-29 PROCEDURE — 6370000000 HC RX 637 (ALT 250 FOR IP): Performed by: INTERNAL MEDICINE

## 2020-10-29 PROCEDURE — 94150 VITAL CAPACITY TEST: CPT

## 2020-10-29 PROCEDURE — 6370000000 HC RX 637 (ALT 250 FOR IP): Performed by: NURSE PRACTITIONER

## 2020-10-29 PROCEDURE — 2580000003 HC RX 258: Performed by: INTERNAL MEDICINE

## 2020-10-29 PROCEDURE — 6360000002 HC RX W HCPCS: Performed by: INTERNAL MEDICINE

## 2020-10-29 PROCEDURE — 6370000000 HC RX 637 (ALT 250 FOR IP): Performed by: SURGERY

## 2020-10-29 PROCEDURE — 1200000000 HC SEMI PRIVATE

## 2020-10-29 PROCEDURE — 85025 COMPLETE CBC W/AUTO DIFF WBC: CPT

## 2020-10-29 PROCEDURE — 99211 OFF/OP EST MAY X REQ PHY/QHP: CPT

## 2020-10-29 PROCEDURE — 99232 SBSQ HOSP IP/OBS MODERATE 35: CPT | Performed by: INTERNAL MEDICINE

## 2020-10-29 RX ORDER — CLOTRIMAZOLE AND BETAMETHASONE DIPROPIONATE 10; .64 MG/G; MG/G
CREAM TOPICAL 2 TIMES DAILY
Status: DISCONTINUED | OUTPATIENT
Start: 2020-10-29 | End: 2020-11-03 | Stop reason: HOSPADM

## 2020-10-29 RX ADMIN — METOPROLOL TARTRATE 125 MG: 50 TABLET, FILM COATED ORAL at 09:08

## 2020-10-29 RX ADMIN — OXYCODONE HYDROCHLORIDE AND ACETAMINOPHEN 1 TABLET: 5; 325 TABLET ORAL at 12:57

## 2020-10-29 RX ADMIN — PANCRELIPASE 30000 UNITS: 30000; 6000; 19000 CAPSULE, DELAYED RELEASE PELLETS ORAL at 12:55

## 2020-10-29 RX ADMIN — ENOXAPARIN SODIUM 40 MG: 40 INJECTION SUBCUTANEOUS at 09:09

## 2020-10-29 RX ADMIN — HYDROMORPHONE HYDROCHLORIDE 1 MG: 1 INJECTION, SOLUTION INTRAMUSCULAR; INTRAVENOUS; SUBCUTANEOUS at 11:01

## 2020-10-29 RX ADMIN — LISINOPRIL 40 MG: 40 TABLET ORAL at 09:10

## 2020-10-29 RX ADMIN — CEFTAZIDIME, AVIBACTAM 2.5 G: 2; .5 POWDER, FOR SOLUTION INTRAVENOUS at 06:21

## 2020-10-29 RX ADMIN — METOPROLOL TARTRATE 125 MG: 50 TABLET, FILM COATED ORAL at 20:51

## 2020-10-29 RX ADMIN — OXYCODONE HYDROCHLORIDE AND ACETAMINOPHEN 1 TABLET: 5; 325 TABLET ORAL at 05:39

## 2020-10-29 RX ADMIN — GUAIFENESIN 600 MG: 600 TABLET, EXTENDED RELEASE ORAL at 20:52

## 2020-10-29 RX ADMIN — Medication 0.2 MG: at 04:24

## 2020-10-29 RX ADMIN — POTASSIUM CHLORIDE 40 MEQ: 1500 TABLET, EXTENDED RELEASE ORAL at 09:07

## 2020-10-29 RX ADMIN — CEFTAZIDIME, AVIBACTAM 2.5 G: 2; .5 POWDER, FOR SOLUTION INTRAVENOUS at 22:30

## 2020-10-29 RX ADMIN — PANCRELIPASE 30000 UNITS: 30000; 6000; 19000 CAPSULE, DELAYED RELEASE PELLETS ORAL at 10:24

## 2020-10-29 RX ADMIN — ERAVACYCLINE: 50 INJECTION, POWDER, LYOPHILIZED, FOR SOLUTION INTRAVENOUS at 14:52

## 2020-10-29 RX ADMIN — HYDROMORPHONE HYDROCHLORIDE 1 MG: 1 INJECTION, SOLUTION INTRAMUSCULAR; INTRAVENOUS; SUBCUTANEOUS at 21:01

## 2020-10-29 RX ADMIN — SODIUM CHLORIDE, PRESERVATIVE FREE 10 ML: 5 INJECTION INTRAVENOUS at 20:52

## 2020-10-29 RX ADMIN — LINEZOLID 600 MG: 600 INJECTION, SOLUTION INTRAVENOUS at 00:51

## 2020-10-29 RX ADMIN — LINEZOLID 600 MG: 600 INJECTION, SOLUTION INTRAVENOUS at 12:54

## 2020-10-29 RX ADMIN — HYDROMORPHONE HYDROCHLORIDE 1 MG: 1 INJECTION, SOLUTION INTRAMUSCULAR; INTRAVENOUS; SUBCUTANEOUS at 16:15

## 2020-10-29 RX ADMIN — GUAIFENESIN 600 MG: 600 TABLET, EXTENDED RELEASE ORAL at 09:09

## 2020-10-29 RX ADMIN — ONDANSETRON 4 MG: 2 INJECTION INTRAMUSCULAR; INTRAVENOUS at 16:19

## 2020-10-29 RX ADMIN — METRONIDAZOLE 500 MG: 500 INJECTION, SOLUTION INTRAVENOUS at 20:52

## 2020-10-29 RX ADMIN — AMLODIPINE BESYLATE 10 MG: 10 TABLET ORAL at 09:09

## 2020-10-29 RX ADMIN — METRONIDAZOLE 500 MG: 500 INJECTION, SOLUTION INTRAVENOUS at 04:24

## 2020-10-29 RX ADMIN — PANCRELIPASE 30000 UNITS: 30000; 6000; 19000 CAPSULE, DELAYED RELEASE PELLETS ORAL at 17:59

## 2020-10-29 RX ADMIN — CEFTAZIDIME, AVIBACTAM 2.5 G: 2; .5 POWDER, FOR SOLUTION INTRAVENOUS at 14:52

## 2020-10-29 RX ADMIN — PANTOPRAZOLE SODIUM 40 MG: 40 INJECTION, POWDER, FOR SOLUTION INTRAVENOUS at 09:10

## 2020-10-29 RX ADMIN — SODIUM CHLORIDE 100 MG: 9 INJECTION, SOLUTION INTRAVENOUS at 10:45

## 2020-10-29 RX ADMIN — ERAVACYCLINE: 50 INJECTION, POWDER, LYOPHILIZED, FOR SOLUTION INTRAVENOUS at 00:56

## 2020-10-29 RX ADMIN — OXYCODONE HYDROCHLORIDE AND ACETAMINOPHEN 1 TABLET: 5; 325 TABLET ORAL at 22:36

## 2020-10-29 RX ADMIN — METRONIDAZOLE 500 MG: 500 INJECTION, SOLUTION INTRAVENOUS at 12:54

## 2020-10-29 RX ADMIN — CLOTRIMAZOLE AND BETAMETHASONE DIPROPIONATE: 10; .5 CREAM TOPICAL at 10:45

## 2020-10-29 RX ADMIN — CLOTRIMAZOLE AND BETAMETHASONE DIPROPIONATE: 10; .5 CREAM TOPICAL at 20:54

## 2020-10-29 RX ADMIN — PANTOPRAZOLE SODIUM 40 MG: 40 INJECTION, POWDER, FOR SOLUTION INTRAVENOUS at 20:51

## 2020-10-29 RX ADMIN — OXYCODONE HYDROCHLORIDE AND ACETAMINOPHEN 1 TABLET: 5; 325 TABLET ORAL at 17:59

## 2020-10-29 RX ADMIN — SODIUM CHLORIDE, PRESERVATIVE FREE 10 ML: 5 INJECTION INTRAVENOUS at 09:13

## 2020-10-29 ASSESSMENT — PAIN SCALES - GENERAL
PAINLEVEL_OUTOF10: 8
PAINLEVEL_OUTOF10: 8
PAINLEVEL_OUTOF10: 0
PAINLEVEL_OUTOF10: 7
PAINLEVEL_OUTOF10: 8
PAINLEVEL_OUTOF10: 0
PAINLEVEL_OUTOF10: 8
PAINLEVEL_OUTOF10: 0
PAINLEVEL_OUTOF10: 8
PAINLEVEL_OUTOF10: 0
PAINLEVEL_OUTOF10: 7
PAINLEVEL_OUTOF10: 0

## 2020-10-29 ASSESSMENT — PAIN DESCRIPTION - LOCATION
LOCATION: ABDOMEN

## 2020-10-29 ASSESSMENT — PAIN DESCRIPTION - FREQUENCY
FREQUENCY: CONTINUOUS

## 2020-10-29 ASSESSMENT — PAIN DESCRIPTION - PAIN TYPE
TYPE: SURGICAL PAIN

## 2020-10-29 ASSESSMENT — PAIN - FUNCTIONAL ASSESSMENT
PAIN_FUNCTIONAL_ASSESSMENT: ACTIVITIES ARE NOT PREVENTED
PAIN_FUNCTIONAL_ASSESSMENT: PREVENTS OR INTERFERES SOME ACTIVE ACTIVITIES AND ADLS
PAIN_FUNCTIONAL_ASSESSMENT: PREVENTS OR INTERFERES SOME ACTIVE ACTIVITIES AND ADLS

## 2020-10-29 ASSESSMENT — PAIN DESCRIPTION - DESCRIPTORS
DESCRIPTORS: ACHING;DISCOMFORT

## 2020-10-29 ASSESSMENT — PAIN SCALES - WONG BAKER
WONGBAKER_NUMERICALRESPONSE: 0

## 2020-10-29 ASSESSMENT — PAIN DESCRIPTION - ORIENTATION
ORIENTATION: LEFT;LOWER

## 2020-10-29 ASSESSMENT — PAIN DESCRIPTION - ONSET
ONSET: ON-GOING

## 2020-10-29 NOTE — H&P
Date:10/28/2020  Name:Jenny Torre   BC#:[id number]    SEX:male   Referring Physician:  Kenny Silverman  Primary Physician:  Evelin Brandon  Chief Complaint:  Pericolonic abscess  History of Present Illness:   Pericolonic abscess    HISTORY AND PHYSICAL  Abdominal wall abscess [L02.211]  Diverticulitis of large intestine with abscess without bleeding [K57.20]  Leukocytosis, unspecified type [D72.829]  Proteinuria, unspecified type [R80.9]  Abdominal wall abscess [L02.211]  Diverticulitis [K57.92]    Past Medical History:  Past Medical History:   Diagnosis Date    Abscess of sigmoid colon due to diverticulitis 48/88/1751    Alcoholic ([facility]     Diverticulitis     HIV positive ([facility] 2003    Hypertension     Incisional hernia, without obstruction or gangrene 10/22/2020    Pancreatitis        Past Surgical History:  Past Surgical History:   Procedure Laterality Date    CT DRAINAGE ABDOM ABSCESS OPEN  10/17/2020    CT DRAINAGE ABDOM ABSCESS OPEN 10/17/2020 [facility] CT SCAN    HERNIA REPAIR      LAPAROTOMY N/A 10/21/2020    LAPAROTOMY EXPLORATORY COLOSTOMY CREATION performed by Kole Marte MD at [facility]      Dr Lauretha Lesches       Social History:  Social History     Socioeconomic History    Marital status: Single     Spouse name: Not on file    Number of children: 0    Years of education: Not on file    Highest education level: Not on file   Occupational History    Not on file   Social Needs    Financial resource strain: Not on file    Food insecurity     Worry: Not on file     Inability: Not on file   [employer] needs     Medical: Not on file     Non-medical: Not on file   Tobacco Use    Smoking status: Current Every Day Smoker     Packs/day: 2.00     Years: 18.00     Pack years: 36.00     Types: Cigarettes    Smokeless tobacco: Never Used   Substance and Sexual Activity    Alcohol use: Not Currently    Drug use: Yes     Types: Marijuana    Sexual cyanosis  VASCULAR:  no ischemic changes  SKIN: no erythema, rubor or lesions noted  NEURO/PSYCH:Alert and oriented    EKG:    Imaging:    Chest: CTA    Heart: S1, S1    Impression:  Active Problems:    Abdominal wall abscess    Abscess of sigmoid colon due to diverticulitis    Proteinuria    HIV (human immunodeficiency virus infection) ([facility]    Incisional hernia, without obstruction or gangrene  Resolved Problems:    * No resolved hospital problems. *      Pericolonic abscess    Mallampati Score 2  ASA class 2    PLAN OF CARE/PLANNED PROCEDURE    CT guided abscess drainage    No admission procedures for hospital encounter.

## 2020-10-29 NOTE — PROGRESS NOTES
Nephrology Progress Note  10/29/2020 10:21 AM        Subjective:   Admit Date: 10/16/2020  PCP: Garry Conteh, RN, BSN, MSN, FNP-C    Interval History: some abd pain    Diet: some - tolerating     ROS:  No sob , BP manually  120/80  , stool 350 / d     Data:     Current meds:    clotrimazole-betamethasone   Topical BID    anidulafungin (ERAXIS) 100 mg IVPB  100 mg Intravenous Daily    ceftazidime-acibactam (AVYCAZ) infusion  2.5 g Intravenous Q8H    sodium chloride flush  10 mL Intravenous 2 times per day    lisinopril  40 mg Oral Daily    IVPB builder   Intravenous Q12H    guaiFENesin  600 mg Oral BID    metoprolol  125 mg Oral BID    cloNIDine  1 patch Transdermal Weekly    lipase-protease-amylase  30,000 Units Oral TID WC    amLODIPine  10 mg Oral Daily    metroNIDAZOLE  500 mg Intravenous Q8H    pantoprazole  40 mg Intravenous BID    enoxaparin  40 mg Subcutaneous Daily    linezolid  600 mg Intravenous Q12H    Abacavir-Dolutegravir-Lamivud  1 tablet Oral Daily           I/O last 3 completed shifts:   In: 905 [P.O.:180; I.V.:125; IV Piggyback:600]  Out: 775 [Urine:625; Stool:150]    CBC:   Recent Labs     10/27/20  0505 10/28/20  0655 10/29/20  0420   WBC 30.4* 28.8* 26.9*   HGB 9.6* 9.2* 9.5*    392 464*          Recent Labs     10/27/20  0505 10/28/20  0655   * 128*   K 3.6 3.4*   CL 92* 93*   CO2 27 27   BUN 7 6   CREATININE 0.6* 0.6*   GLUCOSE 83 91       Lab Results   Component Value Date    CALCIUM 7.1 (L) 10/28/2020    PHOS 2.9 10/28/2020       Objective:     Vitals: /78   Pulse 90   Temp 98.1 °F (36.7 °C) (Oral)   Resp 17   Ht 5' 10.98\" (1.803 m)   Wt 182 lb 4.8 oz (82.7 kg)   SpO2 93%   BMI 25.44 kg/m²     General appearance:  No ac distress    gross conj pallor  HEENT:  See above  Neck:  supple  Lungs:  No crackles  Heart:  RRR  soft ostomy tender   Abdomen: see above   no overt edema   Extremities:  See above       Problem List :         Impression : 1. HTN- acceptable with multiple regimens   2. Low na- should correct slowly- lab pending today- he has no sx   3. and abscess - S/p corrective surgery - wbc down vargas- on multiple abx   4. underlying well controled HIV-   5. He has risk for [employer] watch    Recommendation/Plan  :     1. CMP , mg , phos in am as he has some risk for  refeeding syndrome   2. Watch UOP  3. Manual BP  4. Follow clinically  5.  He is waiting for \"swing bed ;\"       Belinda Fowler MD

## 2020-10-29 NOTE — PROGRESS NOTES
POD 8 exploratory laparotomy with Tamez's procedure and drainage of intra-abdominal abscess. Afebrile. VSS. Voiding spontaneously. Good UOP. Tolerating liquids. Discussed at length yesterday with Dr. Leobardo Dejesus. Abd: mildly distended, stoma is pink in LLQ. Minimal drainage from incisional. NR, incisional tenderness. Will replace iodoform titi. POI-follow clinical course. Improving. ADAT  Monitor for sources of fever and leukocytosis. F/u on workup. Encouraged IS and ambulation. ID recs and abx coverage noted.   Remove PCA. Planning for possible swing bed due to the need for IV abx's.

## 2020-10-29 NOTE — PROGRESS NOTES
DIFFERENTIAL     Segs Relative 78.0 (H) 36 - 66 %    Lymphocytes % 6.7 (L) 24 - 44 %    Monocytes % 10.8 (H) 0 - 4 %    Eosinophils % 1.6 0 - 3 %    Basophils % 0.6 0 - 1 %    Segs Absolute 20.9 K/CU MM    Lymphocytes Absolute 1.8 K/CU MM    Monocytes Absolute 2.9 K/CU MM    Eosinophils Absolute 0.4 K/CU MM    Basophils Absolute 0.2 K/CU MM    Nucleated RBC % 0.0 %    Total Nucleated RBC 0.0 K/CU MM    Total Immature Neutrophil 0.62 K/CU MM    Immature Neutrophil % 2.3 (H) 0 - 0.43 %   C-Reactive Protein    Collection Time: 10/29/20  4:20 AM   Result Value Ref Range    CRP, High Sensitivity 124.4 mg/L     CULTURE results: Invalid input(s): BLOOD CULTURE,  URINE CULTURE, SURGICAL CULTURE    Diagnosis:  Patient Active Problem List   Diagnosis    [facility]    Alcoholic ([facility]    Hypertension    Alcohol withdrawal ([facility]    Abdominal wall abscess    Abscess of sigmoid colon due to diverticulitis    Proteinuria    HIV (human immunodeficiency virus infection) ([facility]    Incisional hernia, without obstruction or gangrene       Active Problems  Active Problems:    Abdominal wall abscess    Abscess of sigmoid colon due to diverticulitis    Proteinuria    HIV (human immunodeficiency virus infection) ([facility]    Incisional hernia, without obstruction or gangrene  Resolved Problems:    * No resolved hospital problems. *      Impression and plan   Clinical status: Status post exploratory laparotomy, drainage of pericolonic abscess on 10/21/2020. Slight clinical improvement   Coverage being provided for VRE, CRE, anaerobes, fungi.  Therapeutic: continue linezolid 10/19 (end-date:11/1/2020) , Avycaz 10/25-11/7,metronidazole 10/24-11/7. Coreen Cook 10/26-11/7  , will need at least 2 weeks of IV abx.  Completed antibiotics: Zosyn, meropenem 10/17-25.    Diagnostic: trend crp, and pct, abdominal sonogram.   F/u:    Other:   Summary: [facility] since 2003, 10/22/2020 HIV VL undetected, CD4 859, RPR: 1:2 (previously treated for syphilis).  Lipase not elevated      Electronically signed by: Electronically signed by Amie Rodriguez MD on 10/29/2020 at 1:29 PM

## 2020-10-29 NOTE — PROGRESS NOTES
Comprehensive Nutrition Assessment    Type and Reason for Visit:  Reassess    Nutrition Recommendations/Plan:   Continue general diet as tolerates   Will trial daily oral nutrition supplement and increase frequency as tolerates    Nutrition Assessment:  Diet advanced today to general diet. Able to eat some solids for breakfast, excited to have eggs with cheese. Agreeable to have oral nutrition supplement at this time. Remains high nutrition risk but improving. Noted patient monitored for refeeding. Malnutrition Assessment:  Malnutrition Status: At risk for malnutrition (Comment)    Context:  Acute Illness       Estimated Daily Nutrient Needs:  Energy (kcal):  9921-3456; Weight Used for Energy Requirements:  Current     Protein (g):  80-94; Weight Used for Protein Requirements:  Ideal        Fluid (ml/day):  2000; Method Used for Fluid Requirements:  1 ml/kcal      Nutrition Related Findings:  sitting up in bed on visit, tried solid meal for breakfast and tolerated to far, fluctuating electrolytes and monitored for refeeding      Wounds:  Surgical Wound       Current Nutrition Therapies:    DIET GENERAL; Anthropometric Measures:  · Height: 5' 10.98\" (180.3 cm)  · Current Body Weight: 182 lb 5.1 oz (82.7 kg)   · Admission Body Weight: 168 lb 14 oz (76.6 kg)    · Usual Body Weight: (no weight hx available)     · Ideal Body Weight: 172 lbs; % Ideal Body Weight 108.7 %   · BMI: 25.4  · Adjusted Body Weight:  ; No Adjustment   · BMI Categories: Overweight (BMI 25.0-29. 9)       Nutrition Diagnosis:   · Inadequate oral intake related to acute injury/trauma as evidenced by NPO or clear liquid status due to medical condition      Nutrition Interventions:   Food and/or Nutrient Delivery:  Continue Current Diet, Start Oral Nutrition Supplement  Nutrition Education/Counseling:  No recommendation at this time   Coordination of Nutrition Care:  Continue to monitor while inpatient    Goals:  pt will tolerate diet advancement or initiation of nutrition support within the next 24-48 hr       Nutrition Monitoring and Evaluation:   Food/Nutrient Intake Outcomes:  Diet Advancement/Tolerance, Food and Nutrient Intake  Physical Signs/Symptoms Outcomes:  Skin, Biochemical Data, Weight, Nausea or Vomiting, GI Status     Discharge Planning:    Continue current diet     Electronically signed by Alma Sharp RD, LD on 10/29/20 at 11:00 AM EDT    Contact: [phone]

## 2020-10-29 NOTE — CONSULTS
[address] [id number] Continence Nurse  Consult Note       Avni Christianson  AGE: 39 y.o. GENDER: male  : 1975  TODAY'S DATE:  10/29/2020    Subjective:     Reason for  Evaluation and Assessment:  Wound/stoma eval  for colostomy education. Avni Christianson is a 39 y.o. male referred by:   [x] Physician  [] Nursing  [] Other:     Wound Identification:  Wound Type: colostomy  Contributing Factors: diverticulitis        PAST MEDICAL HISTORY        Diagnosis Date    Abscess of sigmoid colon due to diverticulitis     [facility]     Diverticulitis     [facility]     Hypertension     Incisional hernia, without obstruction or gangrene 10/22/2020    Pancreatitis        PAST SURGICAL HISTORY    Past Surgical History:   Procedure Laterality Date    CT DRAINAGE ABDOM ABSCESS OPEN  10/17/2020    CT DRAINAGE ABDOM ABSCESS OPEN 10/17/2020 [facility] CT SCAN    HERNIA REPAIR      LAPAROTOMY N/A 10/21/2020    LAPAROTOMY EXPLORATORY COLOSTOMY CREATION performed by Daksha Cox MD at [address] [place] [address]      Dr Ghada Duran       FAMILY HISTORY    History reviewed. No pertinent family history. SOCIAL HISTORY    Social History     Tobacco Use    Smoking status: Current Every Day Smoker     Packs/day: 2.00     Years: 18.00     Pack years: 36.00     Types: Cigarettes    Smokeless tobacco: Never Used   Substance Use Topics    Alcohol use: Not Currently    Drug use: Yes     Types: Marijuana       ALLERGIES    Allergies   Allergen Reactions    Pcn [Penicillins]        MEDICATIONS    No current facility-administered medications on file prior to encounter. Current Outpatient Medications on File Prior to Encounter   Medication Sig Dispense Refill    Abacavir-Dolutegravir-Lamivud 600- MG TABS Take by mouth      efavirenz (SUSTIVA) 600 MG tablet Take 600 mg by mouth nightly.              Objective:      /78   Pulse 90   Temp 98.1 °F (36.7 °C) (Oral) Resp 17   Ht 5' 10.98\" (1.803 m)   Wt 182 lb 4.8 oz (82.7 kg)   SpO2 93%   BMI 25.44 kg/m²   Ck Risk Score: Ck Scale Score: 20    LABS    CBC:   Lab Results   Component Value Date    WBC 26.9 10/29/2020    RBC 3.09 10/29/2020    HGB 9.5 10/29/2020    HCT 29.3 10/29/2020    MCV 94.8 10/29/2020    MCH 30.7 10/29/2020    MCHC 32.4 10/29/2020    RDW 13.9 10/29/2020     10/29/2020    MPV 10.1 10/29/2020     CMP:    Lab Results   Component Value Date     10/28/2020    K 3.4 10/28/2020    CL 93 10/28/2020    CO2 27 10/28/2020    BUN 6 10/28/2020    CREATININE 0.6 10/28/2020    GFRAA >60 10/28/2020    LABGLOM >60 10/28/2020    GLUCOSE 91 10/28/2020    PROT 4.9 10/28/2020    PROT 6.1 11/04/2012    LABALBU 1.8 10/28/2020    CALCIUM 7.1 10/28/2020    BILITOT 0.4 10/28/2020    ALKPHOS 69 10/28/2020    AST 13 10/28/2020    ALT 6 10/28/2020     Albumin:    Lab Results   Component Value Date    LABALBU 1.8 10/28/2020     PT/INR:    Lab Results   Component Value Date    PROTIME 16.5 10/17/2020    INR 1.36 10/17/2020     HgBA1c:  No results found for: LABA1C      Assessment:     Patient Active Problem List   Diagnosis    [facility]    [facility]    Hypertension    Alcohol withdrawal ([facility]    Abdominal wall abscess    Abscess of sigmoid colon due to diverticulitis    Proteinuria    HIV (human immunodeficiency virus infection) ([facility]    Incisional hernia, without obstruction or gangrene       Measurements:       Response to treatment:  With complaints of pain. Pain Assessment:  Severity:  6  Quality of pain: sore to abdomen  Wound Pain Timing/Severity:   Premedicated: no    Plan:     Plan of Care:       Pt in bed agreeable to wound/ostomy for stoma education. Removed stoma appliance cleansed around stoma with warm water while instructing pt. Stoma is red/moist and protruding with some redness noted around applied stoma powder.   Instructed pt how to measure stoma cut to fit barrier coloplast applied barrier # [id number] size 2 11/16\" bag # [id number] how to close and open- pt said he has been emptying the bag is independent doing this part. Applied pressure for 2 minutes then placed warm blanket. Incision to abdomen with butch intact. Dr Luis Sutherland is following pt. Left pt a bag of supplies and he has education packet. Specialty Bed Required :  no  [] Low Air Loss   [] Pressure Redistribution  [] Fluid Immersion  [] Bariatric  [] Total Pressure Relief  [] Other:     Discharge Plan:  Placement for patient upon discharge: To swing bed  Hospice Care:  no  Patient appropriate for Outpatient [address]: no    Patient/Caregiver Teaching:  Level of patient/caregiver understanding able to:  Pt verbalized understanding of stoma care. Electronically signed by Zulma Kussmaul.  JUN Orona,  on 10/29/2020 at 10:39 AM

## 2020-10-29 NOTE — PROGRESS NOTES
Nohelia Kowalski MD, [facility]                Internal Medicine Hospitalist             Daily Progress  Note   Subjective:     Chief Complaint   Patient presents with    Abdominal Pain     reports abd pain for 2 weeks     Mr. Sherry Hammonds of nil, has agreed to go to the swing bed awaiting acceptance. Objective:    /78   Pulse 90   Temp 98.1 °F (36.7 °C) (Oral)   Resp 17   Ht 5' 10.98\" (1.803 m)   Wt 182 lb 4.8 oz (82.7 kg)   SpO2 93%   BMI 25.44 kg/m²      Intake/Output Summary (Last 24 hours) at 10/29/2020 1110  Last data filed at 10/29/2020 0629  Gross per 24 hour   Intake 905 ml   Output 775 ml   Net 130 ml      Physical Exam:  Heart:  Regular rate and rhythm, normal S1 and S2 in all 4 auscultatory areas. No rubs  Murmurs or gallops heard. Lungs: Mostly clear to auscultation, decreased breath sounds at bases. No wheezes appreciated no crackles heard. Abdomen: Soft, no detailed exam done, has a colostomy wound looks ok has a slight rash around it says that it is better. Extremities: Non tender, no swelling noted, strength 5/5 both legs. CNS: Grossly intact.     Labs:  CBC with Differential:    Lab Results   Component Value Date    WBC 26.9 10/29/2020    RBC 3.09 10/29/2020    HGB 9.5 10/29/2020    HCT 29.3 10/29/2020     10/29/2020    MCV 94.8 10/29/2020    MCH 30.7 10/29/2020    MCHC 32.4 10/29/2020    RDW 13.9 10/29/2020    SEGSPCT 78.0 10/29/2020    BANDSPCT 2 10/26/2020    LYMPHOPCT 6.7 10/29/2020    MONOPCT 10.8 10/29/2020    MYELOPCT 1 10/21/2020    BASOPCT 0.6 10/29/2020    MONOSABS 2.9 10/29/2020    LYMPHSABS 1.8 10/29/2020    EOSABS 0.4 10/29/2020    BASOSABS 0.2 10/29/2020    DIFFTYPE AUTOMATED DIFFERENTIAL 10/29/2020     BMP:    Lab Results   Component Value Date     10/28/2020    K 3.4 10/28/2020    CL 93 10/28/2020    CO2 27 10/28/2020    BUN 6 10/28/2020    LABALBU 1.8 10/28/2020    CREATININE 0.6 10/28/2020    CALCIUM 7.1 10/28/2020    GFRAA >60 10/28/2020 LABGLOM >60 10/28/2020    GLUCOSE 91 10/28/2020     No results for input(s): TROPONINT in the last 72 hours. Lab Results   Component Value Date    TSHHS 0.678 11/03/2012           clotrimazole-betamethasone   Topical BID    anidulafungin (ERAXIS) 100 mg IVPB  100 mg Intravenous Daily    ceftazidime-acibactam (AVYCAZ) infusion  2.5 g Intravenous Q8H    sodium chloride flush  10 mL Intravenous 2 times per day    lisinopril  40 mg Oral Daily    IVPB builder   Intravenous Q12H    guaiFENesin  600 mg Oral BID    metoprolol  125 mg Oral BID    cloNIDine  1 patch Transdermal Weekly    lipase-protease-amylase  30,000 Units Oral TID WC    amLODIPine  10 mg Oral Daily    metroNIDAZOLE  500 mg Intravenous Q8H    pantoprazole  40 mg Intravenous BID    enoxaparin  40 mg Subcutaneous Daily    linezolid  600 mg Intravenous Q12H    Abacavir-Dolutegravir-Lamivud  1 tablet Oral Daily         Assessment:       Patient Active Problem List    Diagnosis Date Noted    Incisional hernia, without obstruction or gangrene 10/22/2020    [facility] 10/20/2020    Proteinuria     Abdominal wall abscess 10/16/2020    Abscess of sigmoid colon due to diverticulitis 10/16/2020    Alcohol withdrawal ([facility] 11/04/2012    HIV positive ([facility]     Al[facility]olic ([facility]     Hypertension        Plan:     Problems being addressed this admission:   Sepsis / acute diverticulitis with abscess s/p surgery 10/22/20  Ileus post op resolving  PNA  HIV  HTN  Hyponatremia / hypokalemia    No new suggestions for his infection, he is agreeable with the plan. His ileus is resolving. Being treated for PNA. Elevated procalcitonin at 10.49 on 10/28/20. HIV continue to treat as before. B/p is 116/78 today.    Continue to monitor his lytes.     Consultants:  Surgery  ID  nephrology    General Orders:  Repeat basic labs again in am.  I have explained to the patient and discussed with him/her the treatment Neena Pinedo MD, Tresa Zacarias

## 2020-10-30 LAB
ALBUMIN SERPL-MCNC: 1.9 GM/DL (ref 3.4–5)
ALP BLD-CCNC: 91 IU/L (ref 40–128)
ALT SERPL-CCNC: 6 U/L (ref 10–40)
ANION GAP SERPL CALCULATED.3IONS-SCNC: 7 MMOL/L (ref 4–16)
AST SERPL-CCNC: 13 IU/L (ref 15–37)
BASOPHILS ABSOLUTE: 0.1 K/CU MM
BASOPHILS RELATIVE PERCENT: 0.6 % (ref 0–1)
BILIRUB SERPL-MCNC: 0.4 MG/DL (ref 0–1)
BUN BLDV-MCNC: 4 MG/DL (ref 6–23)
CALCIUM SERPL-MCNC: 7.4 MG/DL (ref 8.3–10.6)
CHLORIDE BLD-SCNC: 99 MMOL/L (ref 99–110)
CLOSTRIDIUM DIFFICILE, PCR: NORMAL
CO2: 27 MMOL/L (ref 21–32)
CREAT SERPL-MCNC: 0.6 MG/DL (ref 0.9–1.3)
CULTURE: NORMAL
CULTURE: NORMAL
DIFFERENTIAL TYPE: ABNORMAL
EOSINOPHILS ABSOLUTE: 0.4 K/CU MM
EOSINOPHILS RELATIVE PERCENT: 1.8 % (ref 0–3)
GFR AFRICAN AMERICAN: >60 ML/MIN/1.73M2
GFR NON-AFRICAN AMERICAN: >60 ML/MIN/1.73M2
GLUCOSE BLD-MCNC: 94 MG/DL (ref 70–99)
HCT VFR BLD CALC: 29.4 % (ref 42–52)
HEMOGLOBIN: 9.2 GM/DL (ref 13.5–18)
HIGH SENSITIVE C-REACTIVE PROTEIN: 95 MG/L
IMMATURE NEUTROPHIL %: 2.1 % (ref 0–0.43)
LYMPHOCYTES ABSOLUTE: 2.3 K/CU MM
LYMPHOCYTES RELATIVE PERCENT: 10.8 % (ref 24–44)
Lab: NORMAL
Lab: NORMAL
MAGNESIUM: 1.6 MG/DL (ref 1.8–2.4)
MCH RBC QN AUTO: 29.6 PG (ref 27–31)
MCHC RBC AUTO-ENTMCNC: 31.3 % (ref 32–36)
MCV RBC AUTO: 94.5 FL (ref 78–100)
MONOCYTES ABSOLUTE: 2.4 K/CU MM
MONOCYTES RELATIVE PERCENT: 11 % (ref 0–4)
NUCLEATED RBC %: 0 %
PDW BLD-RTO: 14.2 % (ref 11.7–14.9)
PHOSPHORUS: 3.2 MG/DL (ref 2.5–4.9)
PLATELET # BLD: 480 K/CU MM (ref 140–440)
PMV BLD AUTO: 9.6 FL (ref 7.5–11.1)
POTASSIUM SERPL-SCNC: 4.5 MMOL/L (ref 3.5–5.1)
RBC # BLD: 3.11 M/CU MM (ref 4.6–6.2)
SEGMENTED NEUTROPHILS ABSOLUTE COUNT: 15.9 K/CU MM
SEGMENTED NEUTROPHILS RELATIVE PERCENT: 73.7 % (ref 36–66)
SODIUM BLD-SCNC: 133 MMOL/L (ref 135–145)
SPECIMEN: NORMAL
SPECIMEN: NORMAL
TOTAL IMMATURE NEUTOROPHIL: 0.46 K/CU MM
TOTAL NUCLEATED RBC: 0 K/CU MM
TOTAL PROTEIN: 4.9 GM/DL (ref 6.4–8.2)
WBC # BLD: 21.6 K/CU MM (ref 4–10.5)

## 2020-10-30 PROCEDURE — 86141 C-REACTIVE PROTEIN HS: CPT

## 2020-10-30 PROCEDURE — 99232 SBSQ HOSP IP/OBS MODERATE 35: CPT | Performed by: INTERNAL MEDICINE

## 2020-10-30 PROCEDURE — 6360000002 HC RX W HCPCS: Performed by: INTERNAL MEDICINE

## 2020-10-30 PROCEDURE — 94761 N-INVAS EAR/PLS OXIMETRY MLT: CPT

## 2020-10-30 PROCEDURE — 2500000003 HC RX 250 WO HCPCS: Performed by: SURGERY

## 2020-10-30 PROCEDURE — 6360000002 HC RX W HCPCS: Performed by: SURGERY

## 2020-10-30 PROCEDURE — 84100 ASSAY OF PHOSPHORUS: CPT

## 2020-10-30 PROCEDURE — 6370000000 HC RX 637 (ALT 250 FOR IP): Performed by: NURSE PRACTITIONER

## 2020-10-30 PROCEDURE — C9113 INJ PANTOPRAZOLE SODIUM, VIA: HCPCS | Performed by: SURGERY

## 2020-10-30 PROCEDURE — 80053 COMPREHEN METABOLIC PANEL: CPT

## 2020-10-30 PROCEDURE — 6370000000 HC RX 637 (ALT 250 FOR IP): Performed by: INTERNAL MEDICINE

## 2020-10-30 PROCEDURE — 2580000003 HC RX 258: Performed by: INTERNAL MEDICINE

## 2020-10-30 PROCEDURE — 83735 ASSAY OF MAGNESIUM: CPT

## 2020-10-30 PROCEDURE — 6370000000 HC RX 637 (ALT 250 FOR IP): Performed by: SURGERY

## 2020-10-30 PROCEDURE — 1200000000 HC SEMI PRIVATE

## 2020-10-30 PROCEDURE — 85025 COMPLETE CBC W/AUTO DIFF WBC: CPT

## 2020-10-30 RX ORDER — MAGNESIUM SULFATE 4 G/50ML
4 INJECTION INTRAVENOUS ONCE
Status: COMPLETED | OUTPATIENT
Start: 2020-10-30 | End: 2020-10-30

## 2020-10-30 RX ADMIN — HYDROMORPHONE HYDROCHLORIDE 1 MG: 1 INJECTION, SOLUTION INTRAMUSCULAR; INTRAVENOUS; SUBCUTANEOUS at 07:44

## 2020-10-30 RX ADMIN — GUAIFENESIN 600 MG: 600 TABLET, EXTENDED RELEASE ORAL at 21:10

## 2020-10-30 RX ADMIN — HYDROMORPHONE HYDROCHLORIDE 1 MG: 1 INJECTION, SOLUTION INTRAMUSCULAR; INTRAVENOUS; SUBCUTANEOUS at 19:05

## 2020-10-30 RX ADMIN — HYDROMORPHONE HYDROCHLORIDE 1 MG: 1 INJECTION, SOLUTION INTRAMUSCULAR; INTRAVENOUS; SUBCUTANEOUS at 22:49

## 2020-10-30 RX ADMIN — HYDROMORPHONE HYDROCHLORIDE 1 MG: 1 INJECTION, SOLUTION INTRAMUSCULAR; INTRAVENOUS; SUBCUTANEOUS at 00:35

## 2020-10-30 RX ADMIN — OXYCODONE HYDROCHLORIDE AND ACETAMINOPHEN 1 TABLET: 5; 325 TABLET ORAL at 21:10

## 2020-10-30 RX ADMIN — METRONIDAZOLE 500 MG: 500 INJECTION, SOLUTION INTRAVENOUS at 12:17

## 2020-10-30 RX ADMIN — OXYCODONE HYDROCHLORIDE AND ACETAMINOPHEN 1 TABLET: 5; 325 TABLET ORAL at 13:30

## 2020-10-30 RX ADMIN — OXYCODONE HYDROCHLORIDE AND ACETAMINOPHEN 1 TABLET: 5; 325 TABLET ORAL at 09:07

## 2020-10-30 RX ADMIN — CEFTAZIDIME, AVIBACTAM 2.5 G: 2; .5 POWDER, FOR SOLUTION INTRAVENOUS at 21:10

## 2020-10-30 RX ADMIN — METOPROLOL TARTRATE 125 MG: 50 TABLET, FILM COATED ORAL at 09:07

## 2020-10-30 RX ADMIN — ONDANSETRON 4 MG: 2 INJECTION INTRAMUSCULAR; INTRAVENOUS at 14:53

## 2020-10-30 RX ADMIN — OXYCODONE HYDROCHLORIDE AND ACETAMINOPHEN 1 TABLET: 5; 325 TABLET ORAL at 02:42

## 2020-10-30 RX ADMIN — CEFTAZIDIME, AVIBACTAM 2.5 G: 2; .5 POWDER, FOR SOLUTION INTRAVENOUS at 14:59

## 2020-10-30 RX ADMIN — HYDROMORPHONE HYDROCHLORIDE 1 MG: 1 INJECTION, SOLUTION INTRAMUSCULAR; INTRAVENOUS; SUBCUTANEOUS at 14:42

## 2020-10-30 RX ADMIN — HYDROMORPHONE HYDROCHLORIDE 1 MG: 1 INJECTION, SOLUTION INTRAMUSCULAR; INTRAVENOUS; SUBCUTANEOUS at 11:19

## 2020-10-30 RX ADMIN — LISINOPRIL 40 MG: 40 TABLET ORAL at 09:08

## 2020-10-30 RX ADMIN — OXYCODONE HYDROCHLORIDE AND ACETAMINOPHEN 1 TABLET: 5; 325 TABLET ORAL at 17:32

## 2020-10-30 RX ADMIN — LINEZOLID 600 MG: 600 INJECTION, SOLUTION INTRAVENOUS at 12:17

## 2020-10-30 RX ADMIN — PANTOPRAZOLE SODIUM 40 MG: 40 INJECTION, POWDER, FOR SOLUTION INTRAVENOUS at 21:13

## 2020-10-30 RX ADMIN — PANCRELIPASE 30000 UNITS: 30000; 6000; 19000 CAPSULE, DELAYED RELEASE PELLETS ORAL at 09:07

## 2020-10-30 RX ADMIN — ERAVACYCLINE: 50 INJECTION, POWDER, LYOPHILIZED, FOR SOLUTION INTRAVENOUS at 00:35

## 2020-10-30 RX ADMIN — ERAVACYCLINE: 50 INJECTION, POWDER, LYOPHILIZED, FOR SOLUTION INTRAVENOUS at 13:33

## 2020-10-30 RX ADMIN — SODIUM CHLORIDE 100 MG: 9 INJECTION, SOLUTION INTRAVENOUS at 09:12

## 2020-10-30 RX ADMIN — CLOTRIMAZOLE AND BETAMETHASONE DIPROPIONATE: 10; .5 CREAM TOPICAL at 09:08

## 2020-10-30 RX ADMIN — PANTOPRAZOLE SODIUM 40 MG: 40 INJECTION, POWDER, FOR SOLUTION INTRAVENOUS at 09:08

## 2020-10-30 RX ADMIN — CEFTAZIDIME, AVIBACTAM 2.5 G: 2; .5 POWDER, FOR SOLUTION INTRAVENOUS at 06:49

## 2020-10-30 RX ADMIN — PANCRELIPASE 30000 UNITS: 30000; 6000; 19000 CAPSULE, DELAYED RELEASE PELLETS ORAL at 17:32

## 2020-10-30 RX ADMIN — HYDROMORPHONE HYDROCHLORIDE 1 MG: 1 INJECTION, SOLUTION INTRAMUSCULAR; INTRAVENOUS; SUBCUTANEOUS at 03:56

## 2020-10-30 RX ADMIN — ENOXAPARIN SODIUM 40 MG: 40 INJECTION SUBCUTANEOUS at 09:11

## 2020-10-30 RX ADMIN — MAGNESIUM SULFATE HEPTAHYDRATE 4 G: 80 INJECTION, SOLUTION INTRAVENOUS at 11:19

## 2020-10-30 RX ADMIN — METRONIDAZOLE 500 MG: 500 INJECTION, SOLUTION INTRAVENOUS at 03:56

## 2020-10-30 RX ADMIN — METOPROLOL TARTRATE 125 MG: 50 TABLET, FILM COATED ORAL at 21:10

## 2020-10-30 RX ADMIN — METRONIDAZOLE 500 MG: 500 INJECTION, SOLUTION INTRAVENOUS at 21:16

## 2020-10-30 RX ADMIN — GUAIFENESIN 600 MG: 600 TABLET, EXTENDED RELEASE ORAL at 09:07

## 2020-10-30 RX ADMIN — AMLODIPINE BESYLATE 10 MG: 10 TABLET ORAL at 09:07

## 2020-10-30 RX ADMIN — LINEZOLID 600 MG: 600 INJECTION, SOLUTION INTRAVENOUS at 00:35

## 2020-10-30 ASSESSMENT — PAIN DESCRIPTION - LOCATION
LOCATION: ABDOMEN

## 2020-10-30 ASSESSMENT — PAIN SCALES - GENERAL
PAINLEVEL_OUTOF10: 9
PAINLEVEL_OUTOF10: 0
PAINLEVEL_OUTOF10: 0
PAINLEVEL_OUTOF10: 9
PAINLEVEL_OUTOF10: 8
PAINLEVEL_OUTOF10: 8
PAINLEVEL_OUTOF10: 9
PAINLEVEL_OUTOF10: 9
PAINLEVEL_OUTOF10: 8
PAINLEVEL_OUTOF10: 9
PAINLEVEL_OUTOF10: 0

## 2020-10-30 ASSESSMENT — PAIN DESCRIPTION - DESCRIPTORS
DESCRIPTORS: DISCOMFORT
DESCRIPTORS: ACHING;DISCOMFORT

## 2020-10-30 ASSESSMENT — PAIN DESCRIPTION - PAIN TYPE
TYPE: SURGICAL PAIN

## 2020-10-30 ASSESSMENT — PAIN DESCRIPTION - ORIENTATION
ORIENTATION: LEFT;LOWER

## 2020-10-30 ASSESSMENT — PAIN DESCRIPTION - ONSET
ONSET: ON-GOING

## 2020-10-30 ASSESSMENT — PAIN DESCRIPTION - FREQUENCY
FREQUENCY: CONTINUOUS

## 2020-10-30 NOTE — PROGRESS NOTES
Nephrology Progress Note  10/30/2020 9:22 AM        Subjective:   Admit Date: 10/16/2020  PCP: Neftali Torre, RN, BSN, MSN, FNP-C    Interval History: slow improvement     Diet: some    ROS:  abd -pain - unable to Icelandic long full sentence     Data:     Current meds:    magnesium sulfate  4 g Intravenous Once    clotrimazole-betamethasone   Topical BID    anidulafungin (ERAXIS) 100 mg IVPB  100 mg Intravenous Daily    ceftazidime-acibactam (AVYCAZ) infusion  2.5 g Intravenous Q8H    sodium chloride flush  10 mL Intravenous 2 times per day    lisinopril  40 mg Oral Daily    IVPB builder   Intravenous Q12H    guaiFENesin  600 mg Oral BID    metoprolol  125 mg Oral BID    cloNIDine  1 patch Transdermal Weekly    lipase-protease-amylase  30,000 Units Oral TID WC    amLODIPine  10 mg Oral Daily    metroNIDAZOLE  500 mg Intravenous Q8H    pantoprazole  40 mg Intravenous BID    enoxaparin  40 mg Subcutaneous Daily    linezolid  600 mg Intravenous Q12H    Abacavir-Dolutegravir-Lamivud  1 tablet Oral Daily           I/O last 3 completed shifts: In: 880 [IV Piggyback:880]  Out: -     CBC:   Recent Labs     10/28/20  0655 10/29/20  0420 10/30/20  0520   WBC 28.8* 26.9* 21.6*   HGB 9.2* 9.5* 9.2*    464* 480*          Recent Labs     10/28/20  0655 10/30/20  0520   * 133*   K 3.4* 4.5   CL 93* 99   CO2 27 27   BUN 6 4*   CREATININE 0.6* 0.6*   GLUCOSE 91 94       Lab Results   Component Value Date    CALCIUM 7.4 (L) 10/30/2020    PHOS 3.2 10/30/2020       Objective:     Vitals: BP (!) 145/85   Pulse 86   Temp 97.4 °F (36.3 °C) (Oral)   Resp 14   Ht 5' 10.98\" (1.803 m)   Wt 182 lb 4.8 oz (82.7 kg)   SpO2 94%   BMI 25.44 kg/m²     General appearance:  No ac distress  HEENT:  No conj pallor  Neck:  supple  Lungs:  CTA  Heart:  Seems RRR  Abdomen: soft- tender- Ostomy bag   Extremities:  No overt  LE edema   PICC RUE       Problem List :         Impression :     1.  Low na - getting better As expected  2. HTHN manual 138/ 80 Lt brachial as he  Has  P[facility] in [facility]  3. Intra abd abscess after perforated  Diverticular  s/p surgical tx - with abx   4. Underlying well controlled with ART-0 HIV   5. Anemia mainly from ac infection/ illness  6. Low mg- some from GI loss - ostomy and perhaps poor total body store now     Recommendation/Plan  :     1. Replete  IV mg to replenish  the store  2. May keep  all BP meds for now   3. He may not need all in the future   4. As he is on multiple abx - need CMP , mg weekly X 3   5. Out pt f/u after d/c   6.  Good diet       Cecilia Mejia MD

## 2020-10-30 NOTE — PROGRESS NOTES
POD 9 exploratory laparotomy with Tamez's procedure and drainage of intra-abdominal abscess. Afebrile. VSS. Good UOP. Tolerating diet. WBC trending down, 21K now. H/H stable, anemia of chronic disease. Abd: ND, NR, NG, stoma is pink in LLQ. Minimal drainage from incision. incisional tenderness. POI-resolving. Monitor for sources of fever and leukocytosis. Encouraged IS and ambulation. ID recs and abx coverage noted, needs abx's x 2 wks.   Planning for possible swing bed due to the need for IV abx's. Ok to discharge from a surgical standpoint. Dr. Mabel Chaves will be on call for me this weekend. If pt discharged, f/u with me in 7-10 days in the office.

## 2020-10-30 NOTE — PROGRESS NOTES
Amaury Dorsey MD, [address]                Internal Medicine Hospitalist             Daily Progress  Note   Subjective:     Chief Complaint   Patient presents with    Abdominal Pain     reports abd pain for 2 weeks     Mr. Pulido Ringer of nil, awaiting transfer to swing bed. Objective:    BP (!) 145/85   Pulse 86   Temp 97.4 °F (36.3 °C) (Oral)   Resp 14   Ht 5' 10.98\" (1.803 m)   Wt 182 lb 4.8 oz (82.7 kg)   SpO2 94%   BMI 25.44 kg/m²      Intake/Output Summary (Last 24 hours) at 10/30/2020 1645  Last data filed at 10/30/2020 0751  Gross per 24 hour   Intake 2300 ml   Output 1650 ml   Net 650 ml      Physical Exam:  Heart:  Regular rate and rhythm, normal S1 and S2 in all 4 auscultatory areas. No rubs  Murmurs or gallops heard. Lungs: Mostly clear to auscultation, decreased breath sounds at bases. No wheezes appreciated no crackles heard. Abdomen: Soft, non distended. Bowel sounds appreciated. No obvious liver or spleen enlargement. Non tender, no rebound noted. Colostomy working. Extremities: Non tender, no swelling noted, strength 5/5 both legs. CNS: Grossly intact.     Labs:  CBC with Differential:    Lab Results   Component Value Date    WBC 21.6 10/30/2020    RBC 3.11 10/30/2020    HGB 9.2 10/30/2020    HCT 29.4 10/30/2020     10/30/2020    MCV 94.5 10/30/2020    MCH 29.6 10/30/2020    MCHC 31.3 10/30/2020    RDW 14.2 10/30/2020    SEGSPCT 73.7 10/30/2020    BANDSPCT 2 10/26/2020    LYMPHOPCT 10.8 10/30/2020    MONOPCT 11.0 10/30/2020    MYELOPCT 1 10/21/2020    BASOPCT 0.6 10/30/2020    MONOSABS 2.4 10/30/2020    LYMPHSABS 2.3 10/30/2020    EOSABS 0.4 10/30/2020    BASOSABS 0.1 10/30/2020    DIFFTYPE AUTOMATED DIFFERENTIAL 10/30/2020     BMP:    Lab Results   Component Value Date     10/30/2020    K 4.5 10/30/2020    CL 99 10/30/2020    CO2 27 10/30/2020    BUN 4 10/30/2020    LABALBU 1.9 10/30/2020    CREATININE 0.6 10/30/2020    CALCIUM 7.4 10/30/2020    GFRAA >60 10/30/2020    LABGLOM >60 10/30/2020    GLUCOSE 94 10/30/2020     No results for input(s): TROPONINT in the last 72 hours. Lab Results   Component Value Date    TSHHS 0.678 11/03/2012           clotrimazole-betamethasone   Topical BID    anidulafungin (ERAXIS) 100 mg IVPB  100 mg Intravenous Daily    ceftazidime-acibactam (AVYCAZ) infusion  2.5 g Intravenous Q8H    sodium chloride flush  10 mL Intravenous 2 times per day    lisinopril  40 mg Oral Daily    IVPB builder   Intravenous Q12H    guaiFENesin  600 mg Oral BID    metoprolol  125 mg Oral BID    cloNIDine  1 patch Transdermal Weekly    lipase-protease-amylase  30,000 Units Oral TID WC    amLODIPine  10 mg Oral Daily    metroNIDAZOLE  500 mg Intravenous Q8H    pantoprazole  40 mg Intravenous BID    enoxaparin  40 mg Subcutaneous Daily    linezolid  600 mg Intravenous Q12H    Abacavir-Dolutegravir-Lamivud  1 tablet Oral Daily         Assessment:       Patient Active Problem List    Diagnosis Date Noted    Incisional hernia, without obstruction or gangrene 10/22/2020    [facility] 10/20/2020    Proteinuria     Abdominal wall abscess 10/16/2020    Abscess of sigmoid colon due to diverticulitis 10/16/2020    Alcohol withdrawal ([facility] 11/04/2012    HIV positive ([facility]     [facility]     Hypertension        Plan:     Problems being addressed this admission:   Sepsis / acute diverticulitis with abscess s/p surgery 10/22/20  Ileus post op resolving  PNA  HIV  HTN  Hyponatremia / hypokalemia     Awaiting transfer to swing bed to complete his course of antibiotics. No new suggestions. Sodium is 133 improving, k is normal.   Continue rest as before. Consultants:  Surgery  ID  nephrology    General Orders:  Repeat basic labs again in am.  I have explained to the patient and discussed with him/her the treatment plan.   Peri Grady MD, [address]

## 2020-10-30 NOTE — PROGRESS NOTES
Infectious Disease Progress Note  10/30/2020   Patient Name: Deepthi Norwood : 1975       Reason for visit: F/u sepsis secondary to diverticular abscess, complicated intra-abdominal infection  History:? Interval history noted. Status post exploratory laparotomy, colostomy creation, drainage of peritoneal abscess, removal of catheter spleen on 10/21 for perforated sigmoid diverticulitis with pericolonic abscess, incisional hernias. Feels better, quite weak   Physical Exam:  Vital Signs: BP (!) 145/85   Pulse 86   Temp 97.4 °F (36.3 °C) (Oral)   Resp 14   Ht 5' 10.98\" (1.803 m)   Wt 182 lb 4.8 oz (82.7 kg)   SpO2 94%   BMI 25.44 kg/m²     Gen: alert and oriented X3,discomfort  Skin: no stigmata of endocarditis  Wounds: C/D/I  HEMT: AT/NC Oropharynx pink, moist, and without lesions or exudates; dentition in good state of repair  Eyes: PERRLA, EOMI, conjunctiva pink, sclera anicteric. Neck: Supple. Trachea midline. No LAD. Chest: no distress and CTA. Good air movement. Heart: RRR and no MRG. Abd: LLQ colostomy and LLQ  left flank erythema and tenderness lessening. Ext: no clubbing, cyanosis, or edema  Catheter Site: without erythema or tenderness  Neuro: Mental status intact. CN 2-12 intact and no focal sensory or motor deficits     Radiologic / Imaging / TESTING  USS  No abscess is visualized in the right or left upper quadrants. Please note that ultrasound is inferior to CT for evaluation of intra-abdominal abscesses. Trace ascites.  Bilateral pleural effusions        Labs:    Recent Results (from the past 24 hour(s))   CBC auto differential    Collection Time: 10/30/20  5:20 AM   Result Value Ref Range    WBC 21.6 (H) 4.0 - 10.5 K/CU MM    RBC 3.11 (L) 4.6 - 6.2 M/CU MM    Hemoglobin 9.2 (L) 13.5 - 18.0 GM/DL    Hematocrit 29.4 (L) 42 - 52 %    MCV 94.5 78 - 100 FL    MCH 29.6 27 - 31 PG    MCHC 31.3 (L) 32.0 - 36.0 %    RDW 14.2 11.7 - 14.9 %    Platelets 151 (H) 358 - 440 K/CU MM    MPV 9.6 7.5 - 11.1 FL    Differential Type AUTOMATED DIFFERENTIAL     Segs Relative 73.7 (H) 36 - 66 %    Lymphocytes % 10.8 (L) 24 - 44 %    Monocytes % 11.0 (H) 0 - 4 %    Eosinophils % 1.8 0 - 3 %    Basophils % 0.6 0 - 1 %    Segs Absolute 15.9 K/CU MM    Lymphocytes Absolute 2.3 K/CU MM    Monocytes Absolute 2.4 K/CU MM    Eosinophils Absolute 0.4 K/CU MM    Basophils Absolute 0.1 K/CU MM    Nucleated RBC % 0.0 %    Total Nucleated RBC 0.0 K/CU MM    Total Immature Neutrophil 0.46 K/CU MM    Immature Neutrophil % 2.1 (H) 0 - 0.43 %   C-Reactive Protein    Collection Time: 10/30/20  5:20 AM   Result Value Ref Range    CRP, High Sensitivity 95.0 mg/L   Comprehensive Metabolic Panel    Collection Time: 10/30/20  5:20 AM   Result Value Ref Range    Sodium 133 (L) 135 - 145 MMOL/L    Potassium 4.5 3.5 - 5.1 MMOL/L    Chloride 99 99 - 110 mMol/L    CO2 27 21 - 32 MMOL/L    BUN 4 (L) 6 - 23 MG/DL    CREATININE 0.6 (L) 0.9 - 1.3 MG/DL    Glucose 94 70 - 99 MG/DL    Calcium 7.4 (L) 8.3 - 10.6 MG/DL    Alb 1.9 (L) 3.4 - 5.0 GM/DL    Total Protein 4.9 (L) 6.4 - 8.2 GM/DL    Total Bilirubin 0.4 0.0 - 1.0 MG/DL    ALT 6 (L) 10 - 40 U/L    AST 13 (L) 15 - 37 IU/L    Alkaline Phosphatase 91 40 - 128 IU/L    GFR Non-African American >60 >60 mL/min/1.73m2    GFR African American >60 >60 mL/min/1.73m2    Anion Gap 7 4 - 16   Phosphorus    Collection Time: 10/30/20  5:20 AM   Result Value Ref Range    Phosphorus 3.2 2.5 - 4.9 MG/DL   Magnesium    Collection Time: 10/30/20  5:20 AM   Result Value Ref Range    Magnesium 1.6 (L) 1.8 - 2.4 mg/dl     CULTURE results: Invalid input(s): BLOOD CULTURE,  URINE CULTURE, SURGICAL CULTURE    Diagnosis:  Patient Active Problem List   Diagnosis    [facility] [address]    Alcoholic ([facility] [address]    Hypertension    Alcohol withdrawal ([facility] [address]    Abdominal wall abscess    Abscess of sigmoid colon due to diverticulitis    Proteinuria    HIV (human immunodeficiency virus infection) (HCC)    Incisional hernia, without obstruction or gangrene       Active Problems  Active Problems:    Abdominal wall abscess    Abscess of sigmoid colon due to diverticulitis    Proteinuria    HIV (human immunodeficiency virus infection) ([facility] [address]    Incisional hernia, without obstruction or gangrene  Resolved Problems:    * No resolved hospital problems. *      Impression and plan   Clinical status: Status post exploratory laparotomy, drainage of pericolonic abscess on 10/21/2020. Improving, leukocytosis on the downward trend coverage being provided for VRE, CRE, anaerobes, fungi.  Therapeutic: continue linezolid 10/19 (end-date:11/1/2020) , Avycaz 10/25-11/7,metronidazole 10/24-11/7. Lana Nakul 10/26-11/7  , will need at least 2 weeks of IV abx. After discharge the following should be done:  Weekly labs drawn on Monday during the course of treatment  CBC with differential, CMP, ESR, CRP,  Fax results to Attn: [id number] Infectious Diseases Staff  # [phone]  See in clinic within one week after discharge  Disposition: Swing bed   Completed antibiotics: Zosyn, meropenem 10/17-25.  Diagnostic: trend crp, and pct.  F/u:    Other:   Summary: [facility] since 2003, 10/22/2020 HIV VL undetected, CD4 859, RPR: 1:2 (previously treated for syphilis).  Lipase not elevated      Electronically signed by: Electronically signed by David Pike MD on 10/30/2020 at 11:42 AM

## 2020-10-31 LAB
BASOPHILS ABSOLUTE: 0.1 K/CU MM
BASOPHILS RELATIVE PERCENT: 0.6 % (ref 0–1)
DIFFERENTIAL TYPE: ABNORMAL
EOSINOPHILS ABSOLUTE: 0.3 K/CU MM
EOSINOPHILS RELATIVE PERCENT: 1.7 % (ref 0–3)
HCT VFR BLD CALC: 30 % (ref 42–52)
HEMOGLOBIN: 9.5 GM/DL (ref 13.5–18)
HIGH SENSITIVE C-REACTIVE PROTEIN: 63.1 MG/L
IMMATURE NEUTROPHIL %: 2.5 % (ref 0–0.43)
LYMPHOCYTES ABSOLUTE: 2.3 K/CU MM
LYMPHOCYTES RELATIVE PERCENT: 12.7 % (ref 24–44)
MCH RBC QN AUTO: 29.7 PG (ref 27–31)
MCHC RBC AUTO-ENTMCNC: 31.7 % (ref 32–36)
MCV RBC AUTO: 93.8 FL (ref 78–100)
MONOCYTES ABSOLUTE: 1.8 K/CU MM
MONOCYTES RELATIVE PERCENT: 10.3 % (ref 0–4)
NUCLEATED RBC %: 0 %
PDW BLD-RTO: 14 % (ref 11.7–14.9)
PLATELET # BLD: 463 K/CU MM (ref 140–440)
PMV BLD AUTO: 9.8 FL (ref 7.5–11.1)
RBC # BLD: 3.2 M/CU MM (ref 4.6–6.2)
SEGMENTED NEUTROPHILS ABSOLUTE COUNT: 12.8 K/CU MM
SEGMENTED NEUTROPHILS RELATIVE PERCENT: 72.2 % (ref 36–66)
TOTAL IMMATURE NEUTOROPHIL: 0.45 K/CU MM
TOTAL NUCLEATED RBC: 0 K/CU MM
WBC # BLD: 17.7 K/CU MM (ref 4–10.5)

## 2020-10-31 PROCEDURE — 6360000002 HC RX W HCPCS: Performed by: SURGERY

## 2020-10-31 PROCEDURE — 2500000003 HC RX 250 WO HCPCS: Performed by: SURGERY

## 2020-10-31 PROCEDURE — 6360000002 HC RX W HCPCS: Performed by: INTERNAL MEDICINE

## 2020-10-31 PROCEDURE — 6370000000 HC RX 637 (ALT 250 FOR IP): Performed by: INTERNAL MEDICINE

## 2020-10-31 PROCEDURE — 94761 N-INVAS EAR/PLS OXIMETRY MLT: CPT

## 2020-10-31 PROCEDURE — 85025 COMPLETE CBC W/AUTO DIFF WBC: CPT

## 2020-10-31 PROCEDURE — 6370000000 HC RX 637 (ALT 250 FOR IP): Performed by: SURGERY

## 2020-10-31 PROCEDURE — 2580000003 HC RX 258: Performed by: INTERNAL MEDICINE

## 2020-10-31 PROCEDURE — 86141 C-REACTIVE PROTEIN HS: CPT

## 2020-10-31 PROCEDURE — C9113 INJ PANTOPRAZOLE SODIUM, VIA: HCPCS | Performed by: SURGERY

## 2020-10-31 PROCEDURE — 1200000000 HC SEMI PRIVATE

## 2020-10-31 PROCEDURE — 99232 SBSQ HOSP IP/OBS MODERATE 35: CPT | Performed by: INTERNAL MEDICINE

## 2020-10-31 PROCEDURE — 6370000000 HC RX 637 (ALT 250 FOR IP): Performed by: NURSE PRACTITIONER

## 2020-10-31 RX ADMIN — HYDROMORPHONE HYDROCHLORIDE 1 MG: 1 INJECTION, SOLUTION INTRAMUSCULAR; INTRAVENOUS; SUBCUTANEOUS at 18:21

## 2020-10-31 RX ADMIN — CLOTRIMAZOLE AND BETAMETHASONE DIPROPIONATE: 10; .5 CREAM TOPICAL at 09:41

## 2020-10-31 RX ADMIN — OXYCODONE HYDROCHLORIDE AND ACETAMINOPHEN 1 TABLET: 5; 325 TABLET ORAL at 17:22

## 2020-10-31 RX ADMIN — LINEZOLID 600 MG: 600 INJECTION, SOLUTION INTRAVENOUS at 00:07

## 2020-10-31 RX ADMIN — SODIUM CHLORIDE 100 MG: 9 INJECTION, SOLUTION INTRAVENOUS at 09:37

## 2020-10-31 RX ADMIN — CLOTRIMAZOLE AND BETAMETHASONE DIPROPIONATE: 10; .5 CREAM TOPICAL at 20:28

## 2020-10-31 RX ADMIN — ONDANSETRON 4 MG: 2 INJECTION INTRAMUSCULAR; INTRAVENOUS at 14:44

## 2020-10-31 RX ADMIN — AMLODIPINE BESYLATE 10 MG: 10 TABLET ORAL at 09:09

## 2020-10-31 RX ADMIN — LISINOPRIL 40 MG: 40 TABLET ORAL at 09:09

## 2020-10-31 RX ADMIN — OXYCODONE HYDROCHLORIDE AND ACETAMINOPHEN 1 TABLET: 5; 325 TABLET ORAL at 13:29

## 2020-10-31 RX ADMIN — ENOXAPARIN SODIUM 40 MG: 40 INJECTION SUBCUTANEOUS at 09:11

## 2020-10-31 RX ADMIN — SODIUM CHLORIDE, PRESERVATIVE FREE 10 ML: 5 INJECTION INTRAVENOUS at 09:37

## 2020-10-31 RX ADMIN — HYDROMORPHONE HYDROCHLORIDE 1 MG: 1 INJECTION, SOLUTION INTRAMUSCULAR; INTRAVENOUS; SUBCUTANEOUS at 04:18

## 2020-10-31 RX ADMIN — METRONIDAZOLE 500 MG: 500 INJECTION, SOLUTION INTRAVENOUS at 04:28

## 2020-10-31 RX ADMIN — METRONIDAZOLE 500 MG: 500 INJECTION, SOLUTION INTRAVENOUS at 20:27

## 2020-10-31 RX ADMIN — ONDANSETRON 4 MG: 2 INJECTION INTRAMUSCULAR; INTRAVENOUS at 21:43

## 2020-10-31 RX ADMIN — SODIUM CHLORIDE, PRESERVATIVE FREE 10 ML: 5 INJECTION INTRAVENOUS at 08:35

## 2020-10-31 RX ADMIN — OXYCODONE HYDROCHLORIDE AND ACETAMINOPHEN 1 TABLET: 5; 325 TABLET ORAL at 22:52

## 2020-10-31 RX ADMIN — HYDROMORPHONE HYDROCHLORIDE 1 MG: 1 INJECTION, SOLUTION INTRAMUSCULAR; INTRAVENOUS; SUBCUTANEOUS at 08:35

## 2020-10-31 RX ADMIN — CEFTAZIDIME, AVIBACTAM 2.5 G: 2; .5 POWDER, FOR SOLUTION INTRAVENOUS at 21:44

## 2020-10-31 RX ADMIN — OXYCODONE HYDROCHLORIDE AND ACETAMINOPHEN 1 TABLET: 5; 325 TABLET ORAL at 01:22

## 2020-10-31 RX ADMIN — CEFTAZIDIME, AVIBACTAM 2.5 G: 2; .5 POWDER, FOR SOLUTION INTRAVENOUS at 13:20

## 2020-10-31 RX ADMIN — HYDROMORPHONE HYDROCHLORIDE 1 MG: 1 INJECTION, SOLUTION INTRAMUSCULAR; INTRAVENOUS; SUBCUTANEOUS at 21:43

## 2020-10-31 RX ADMIN — GUAIFENESIN 600 MG: 600 TABLET, EXTENDED RELEASE ORAL at 20:27

## 2020-10-31 RX ADMIN — METOPROLOL TARTRATE 125 MG: 50 TABLET, FILM COATED ORAL at 09:09

## 2020-10-31 RX ADMIN — HYDROMORPHONE HYDROCHLORIDE 1 MG: 1 INJECTION, SOLUTION INTRAMUSCULAR; INTRAVENOUS; SUBCUTANEOUS at 12:06

## 2020-10-31 RX ADMIN — PANCRELIPASE 30000 UNITS: 30000; 6000; 19000 CAPSULE, DELAYED RELEASE PELLETS ORAL at 09:10

## 2020-10-31 RX ADMIN — LINEZOLID 600 MG: 600 INJECTION, SOLUTION INTRAVENOUS at 12:06

## 2020-10-31 RX ADMIN — HYDROMORPHONE HYDROCHLORIDE 1 MG: 1 INJECTION, SOLUTION INTRAMUSCULAR; INTRAVENOUS; SUBCUTANEOUS at 15:21

## 2020-10-31 RX ADMIN — PANTOPRAZOLE SODIUM 40 MG: 40 INJECTION, POWDER, FOR SOLUTION INTRAVENOUS at 21:49

## 2020-10-31 RX ADMIN — ERAVACYCLINE: 50 INJECTION, POWDER, LYOPHILIZED, FOR SOLUTION INTRAVENOUS at 13:26

## 2020-10-31 RX ADMIN — ERAVACYCLINE: 50 INJECTION, POWDER, LYOPHILIZED, FOR SOLUTION INTRAVENOUS at 00:07

## 2020-10-31 RX ADMIN — METOPROLOL TARTRATE 125 MG: 50 TABLET, FILM COATED ORAL at 20:27

## 2020-10-31 RX ADMIN — PANCRELIPASE 30000 UNITS: 30000; 6000; 19000 CAPSULE, DELAYED RELEASE PELLETS ORAL at 17:21

## 2020-10-31 RX ADMIN — OXYCODONE HYDROCHLORIDE AND ACETAMINOPHEN 1 TABLET: 5; 325 TABLET ORAL at 09:33

## 2020-10-31 RX ADMIN — PANCRELIPASE 30000 UNITS: 30000; 6000; 19000 CAPSULE, DELAYED RELEASE PELLETS ORAL at 12:06

## 2020-10-31 RX ADMIN — PANTOPRAZOLE SODIUM 40 MG: 40 INJECTION, POWDER, FOR SOLUTION INTRAVENOUS at 08:35

## 2020-10-31 RX ADMIN — LINEZOLID 600 MG: 600 INJECTION, SOLUTION INTRAVENOUS at 23:55

## 2020-10-31 RX ADMIN — CEFTAZIDIME, AVIBACTAM 2.5 G: 2; .5 POWDER, FOR SOLUTION INTRAVENOUS at 06:33

## 2020-10-31 RX ADMIN — GUAIFENESIN 600 MG: 600 TABLET, EXTENDED RELEASE ORAL at 09:12

## 2020-10-31 RX ADMIN — METRONIDAZOLE 500 MG: 500 INJECTION, SOLUTION INTRAVENOUS at 12:06

## 2020-10-31 ASSESSMENT — PAIN DESCRIPTION - ONSET
ONSET: ON-GOING

## 2020-10-31 ASSESSMENT — PAIN DESCRIPTION - DESCRIPTORS
DESCRIPTORS: DISCOMFORT

## 2020-10-31 ASSESSMENT — PAIN DESCRIPTION - ORIENTATION
ORIENTATION: MID

## 2020-10-31 ASSESSMENT — PAIN DESCRIPTION - FREQUENCY
FREQUENCY: CONTINUOUS

## 2020-10-31 ASSESSMENT — PAIN DESCRIPTION - PROGRESSION
CLINICAL_PROGRESSION: GRADUALLY WORSENING
CLINICAL_PROGRESSION: GRADUALLY WORSENING
CLINICAL_PROGRESSION: GRADUALLY IMPROVING
CLINICAL_PROGRESSION: GRADUALLY WORSENING

## 2020-10-31 ASSESSMENT — PAIN DESCRIPTION - PAIN TYPE
TYPE: SURGICAL PAIN

## 2020-10-31 ASSESSMENT — PAIN DESCRIPTION - LOCATION
LOCATION: ABDOMEN

## 2020-10-31 ASSESSMENT — PAIN SCALES - GENERAL
PAINLEVEL_OUTOF10: 0
PAINLEVEL_OUTOF10: 2
PAINLEVEL_OUTOF10: 9
PAINLEVEL_OUTOF10: 10
PAINLEVEL_OUTOF10: 8
PAINLEVEL_OUTOF10: 4
PAINLEVEL_OUTOF10: 8
PAINLEVEL_OUTOF10: 10
PAINLEVEL_OUTOF10: 9
PAINLEVEL_OUTOF10: 8
PAINLEVEL_OUTOF10: 9
PAINLEVEL_OUTOF10: 8
PAINLEVEL_OUTOF10: 8
PAINLEVEL_OUTOF10: 10
PAINLEVEL_OUTOF10: 9
PAINLEVEL_OUTOF10: 9
PAINLEVEL_OUTOF10: 10

## 2020-10-31 ASSESSMENT — PAIN SCALES - WONG BAKER: WONGBAKER_NUMERICALRESPONSE: 0

## 2020-10-31 NOTE — PROGRESS NOTES
Infectious Disease Progress Note  10/31/2020   Patient Name: Petey Fuentes : 1975       Reason for visit: F/u sepsis secondary to diverticular abscess, complicated intra-abdominal infection  History:? Interval history noted. Status post exploratory laparotomy, colostomy creation, drainage of peritoneal abscess, removal of catheter spleen on 10/21 for perforated sigmoid diverticulitis with pericolonic abscess, incisional hernias. Feels better, quite weak   Physical Exam:  Vital Signs: /87   Pulse 78   Temp 98.9 °F (37.2 °C) (Oral)   Resp 18   Ht 5' 10.98\" (1.803 m)   Wt 182 lb 4.8 oz (82.7 kg)   SpO2 96%   BMI 25.44 kg/m²     Gen: alert and oriented X3,discomfort  Skin: no stigmata of endocarditis  Wounds: C/D/I  HEMT: AT/NC Oropharynx pink, moist, and without lesions or exudates; dentition in good state of repair  Eyes: PERRLA, EOMI, conjunctiva pink, sclera anicteric. Neck: Supple. Trachea midline. No LAD. Chest: no distress and CTA. Good air movement. Heart: RRR and no MRG. Abd: LLQ colostomy and LLQ  left flank erythema and tenderness lessening. Ext: no clubbing, cyanosis, or edema  Catheter Site: without erythema or tenderness  Neuro: Mental status intact. CN 2-12 intact and no focal sensory or motor deficits     Radiologic / Imaging / TESTING  USS  No abscess is visualized in the right or left upper quadrants. Please note that ultrasound is inferior to CT for evaluation of intra-abdominal abscesses. Trace ascites.  Bilateral pleural effusions        Labs:    Recent Results (from the past 24 hour(s))   CBC auto differential    Collection Time: 10/31/20  5:10 AM   Result Value Ref Range    WBC 17.7 (H) 4.0 - 10.5 K/CU MM    RBC 3.20 (L) 4.6 - 6.2 M/CU MM    Hemoglobin 9.5 (L) 13.5 - 18.0 GM/DL    Hematocrit 30.0 (L) 42 - 52 %    MCV 93.8 78 - 100 FL    MCH 29.7 27 - 31 PG    MCHC 31.7 (L) 32.0 - 36.0 %    RDW 14.0 11.7 - 14.9 %    Platelets 016 (H) 479 - 440 K/CU MM    MPV 9.8 7.5 - 11.1 FL    Differential Type AUTOMATED DIFFERENTIAL     Segs Relative 72.2 (H) 36 - 66 %    Lymphocytes % 12.7 (L) 24 - 44 %    Monocytes % 10.3 (H) 0 - 4 %    Eosinophils % 1.7 0 - 3 %    Basophils % 0.6 0 - 1 %    Segs Absolute 12.8 K/CU MM    Lymphocytes Absolute 2.3 K/CU MM    Monocytes Absolute 1.8 K/CU MM    Eosinophils Absolute 0.3 K/CU MM    Basophils Absolute 0.1 K/CU MM    Nucleated RBC % 0.0 %    Total Nucleated RBC 0.0 K/CU MM    Total Immature Neutrophil 0.45 K/CU MM    Immature Neutrophil % 2.5 (H) 0 - 0.43 %   C-Reactive Protein    Collection Time: 10/31/20  5:10 AM   Result Value Ref Range    CRP, High Sensitivity 63.1 mg/L     CULTURE results: Invalid input(s): BLOOD CULTURE,  URINE CULTURE, SURGICAL CULTURE    Diagnosis:  Patient Active Problem List   Diagnosis    [facility] positive ([facility] [address]    Alcoholic ([facility] [address]    Hypertension    Alcohol withdrawal ([facility] [address]    Abdominal wall abscess    Abscess of sigmoid colon due to diverticulitis    Proteinuria    HIV (human immunodeficiency virus infection) ([facility] [address]    Incisional hernia, without obstruction or gangrene       Active Problems  Active Problems:    Abdominal wall abscess    Abscess of sigmoid colon due to diverticulitis    Proteinuria    HIV (human immunodeficiency virus infection) (HCC)    Incisional hernia, without obstruction or gangrene  Resolved Problems:    * No resolved hospital problems. *      Impression and plan   Clinical status: Status post exploratory laparotomy, drainage of pericolonic abscess on 10/21/2020. Improving, leukocytosis on the downward trend coverage being provided for VRE, CRE, anaerobes, fungi.  Therapeutic: continue linezolid 10/19 (end-date:11/1/2020) , Avycaz 10/25-11/7,metronidazole 10/24-11/7. Louvella Pukalani 10/26-11/7  , will need at least 2 weeks of IV abx.    After discharge the following should be done:  Weekly labs drawn on Monday during the course of treatment  CBC with differential, CMP, ESR, CRP,  Fax results to [facility] Infectious Diseases Staff  # [phone]  See in clinic within one week after discharge  Disposition: Swing bed   Completed antibiotics: Zosyn, meropenem 10/17-25.  Diagnostic: trend crp, and pct.  F/u:    Other:   Summary: [facility] since 2003, 10/22/2020 HIV VL undetected, CD4 859, RPR: 1:2 (previously treated for syphilis).  Lipase not elevated      Electronically signed by: Electronically signed by Kasie Horton MD on 10/31/2020 at 4:05 PM

## 2020-10-31 NOTE — PROGRESS NOTES
Nephrology Progress Note  10/31/2020 2:07 PM        Subjective:   Admit Date: 10/16/2020  PCP: Tracie Rai, RN, BSN, MSN, FNP-C    Interval History: pt seen in early am     Diet: saome    ROS:  abd  Pain , no f/c/r     Data:     Current meds:    clotrimazole-betamethasone   Topical BID    anidulafungin (ERAXIS) 100 mg IVPB  100 mg Intravenous Daily    ceftazidime-acibactam (AVYCAZ) infusion  2.5 g Intravenous Q8H    sodium chloride flush  10 mL Intravenous 2 times per day    lisinopril  40 mg Oral Daily    IVPB builder   Intravenous Q12H    guaiFENesin  600 mg Oral BID    metoprolol  125 mg Oral BID    cloNIDine  1 patch Transdermal Weekly    lipase-protease-amylase  30,000 Units Oral TID WC    amLODIPine  10 mg Oral Daily    metroNIDAZOLE  500 mg Intravenous Q8H    pantoprazole  40 mg Intravenous BID    enoxaparin  40 mg Subcutaneous Daily    linezolid  600 mg Intravenous Q12H    Abacavir-Dolutegravir-Lamivud  1 tablet Oral Daily           I/O last 3 completed shifts: In: 7737 [P.O.:720; IV Piggyback:700]  Out: 8600 [Urine:1650; Stool:100]    CBC:   Recent Labs     10/29/20  0420 10/30/20  0520 10/31/20  0510   WBC 26.9* 21.6* 17.7*   HGB 9.5* 9.2* 9.5*   * 480* 463*          Recent Labs     10/30/20  0520   *   K 4.5   CL 99   CO2 27   BUN 4*   CREATININE 0.6*   GLUCOSE 94       Lab Results   Component Value Date    CALCIUM 7.4 (L) 10/30/2020    PHOS 3.2 10/30/2020       Objective:     Vitals: /87   Pulse 78   Temp 98.9 °F (37.2 °C) (Oral)   Resp 18   Ht 5' 10.98\" (1.803 m)   Wt 182 lb 4.8 oz (82.7 kg)   SpO2 96%   BMI 25.44 kg/m²     General appearance:  Mild  distress from pain , he just use bath room   HEENT:  Mild conj pallor  Neck:  supple  Lungs:  CTA  Heart:  RRR  Abdomen: tender, + ostomy  Extremities:  dependent edema back - / LE      Problem List :         Impression :     1.  Low na - he is probably retaining  water- but has polyuria so his own kidney will get rid of it   2. HIV/ abd abscess etc - better   3. HTN manual 140/90   4.  acceptable      Recommendation/Plan  :     1. Manual BP  2. He is  Waiting for swing bed   3. Wbc is down vargas   4. He is on multiple abx so has risk for  EDGAR   5.  Follow clinically       Marie Leo MD

## 2020-10-31 NOTE — PROGRESS NOTES
Progress Note    Subjective:      Jenny Rucker   Feeling well  WBC 17 K     Objective:     BP (!) 143/90   Pulse 80   Temp 98.1 °F (36.7 °C) (Oral)   Resp 16   Ht 5' 10.98\" (1.803 m)   Wt 182 lb 4.8 oz (82.7 kg)   SpO2 96%   BMI 25.44 kg/m²     In: -   Out: 100          General: NAD  Abdomen: soft colostomy working  Lungs: clear  Other: Midline incision OK some resolving redness from yeast?    Labs:   CBC:   Lab Results   Component Value Date    WBC 17.7 10/31/2020    RBC 3.20 10/31/2020    HGB 9.5 10/31/2020    HCT 30.0 10/31/2020    MCV 93.8 10/31/2020    MCH 29.7 10/31/2020    MCHC 31.7 10/31/2020    RDW 14.0 10/31/2020     10/31/2020    MPV 9.8 10/31/2020        Assessment:     Doing well  Improving     Plan:   Plan to transfer to swing bed  Dr. Smitha Guardado back Monday Call me if needed

## 2020-10-31 NOTE — PROGRESS NOTES
Hospitalist Progress Note         Admit Date: 10/16/2020    PCP: Mikel Peralta, RN, BSN, MSN, FNP-C     Chief Complaint   Patient presents with    Abdominal Pain     reports abd pain for 2 weeks        Assessment and Plan:     #Sepsis  #Acute Diverticulitis  #Peritonitis  #Pneumonia? ?  -Patient is SIRS 2/4, leukocytosis, tachycardia  -S/p IR drainage with drain placement  -S/p Exploratory laparotomy with colostomy creation, drainage of peritoneal abscess  -CT abdomen and pelvis with colitis, abscess, pancreatic ductal dilatation  -Blood cultures no growth so far.  Repeat blood cultures 10/25/2020 negative  -Surgical cultures with mixed organisms, including E Coli, Prevotella Denticola  -Linezolid, Avycaz,xereva and meropenem as per ID recommendation.     #Postop ileus  -Patient has not been tolerating p.o.  -Repeat CT abdomen: Bilateral gland enlargement with small adrenal hemorrhages. Small bilateral pleural effusions and bilateral atelectasis with concerns of pneumonia.    -Patient just completed 8 doses of Reglan and currently much better.     #HIV   Continue home medications     #Proteinuria  -urine protein 10/16/20 2000  -rpeat ua pr 30  -urine pr /cr 0.3  -Nephrology on board     #Hypertension  -Uncontrolled  -Controlled on metoprolol, clonidine, lisinopril, and amlodipine  -Nephrology on board     #Diet:  -Patient has been unable to tolerate p.o.  -Currently on clear liquids but unable to take  -Dietitian on board    VTE prophylaxis LMWH. Placement to swing bed pending.     Current Facility-Administered Medications   Medication Dose Route Frequency Provider Last Rate Last Dose    clotrimazole-betamethasone (LOTRISONE) cream   Topical BID Nessa Daivs MD   Stopped at 10/30/20 2111    HYDROmorphone (DILAUDID) injection 1 mg  1 mg Intravenous Q3H PRN Nessa Davis MD   1 mg at 10/31/20 0835    oxyCODONE-acetaminophen (PERCOCET) 5-325 MG per tablet 1 tablet  1 tablet Oral Q4H PRN Rupert Roberts Annemarie Beal MD   1 tablet at 10/31/20 0122    anidulafungin (ERAXIS) 100 mg in sodium chloride 0.9 % 130 mL IVPB  100 mg Intravenous Daily Tori Jones MD   Stopped at 10/30/20 1045    ceftazidime-avibactam (AVYCAZ) 2.5 g in sodium chloride 0.9 % 100 mL IVPB  2.5 g Intravenous Q8H Tori Jones MD   Stopped at 10/31/20 4785    sodium chloride flush 0.9 % injection 10 mL  10 mL Intravenous 2 times per day Jane Libman, MD   10 mL at 10/31/20 0835    sodium chloride flush 0.9 % injection 10 mL  10 mL Intravenous PRN Jane Libman, MD        lisinopril (PRINIVIL;ZESTRIL) tablet 40 mg  40 mg Oral Daily Jane Libman, MD   40 mg at 10/31/20 0909    Eravacycline Dihydrochloride 80 mg in sodium chloride 0.9 % 250 mL IVPB   Intravenous Q12H Ambrocio Hollingsworth MD   Stopped at 10/31/20 0107    guaiFENesin [facility]) extended release tablet 600 mg  600 mg Oral BID Marlahernan Locke, APRN - CNP   600 mg at 10/31/20 0912    metoprolol tartrate (LOPRESSOR) tablet 125 mg  125 mg Oral BID Hamilton Bermeo MD   125 mg at 10/31/20 0909    sodium chloride flush 0.9 % injection 10 mL  10 mL Intravenous PRN Agustina Castellon MD   10 mL at 10/25/20 0831    cloNIDine (CATAPRES) 0.1 MG/24HR 1 patch  1 patch Transdermal Weekly Davijose a Burgos MD   1 patch at 10/25/20 1315    lipase-protease-amylase (CREON) delayed release capsule 30,000 Units  30,000 Units Oral TID WC Jane Libman, MD   30,000 Units at 10/31/20 0910    amLODIPine (NORVASC) tablet 10 mg  10 mg Oral Daily Jane Libman, MD   10 mg at 10/31/20 0909    metronidazole (FLAGYL) 500 mg in NaCl 100 mL IVPB premix  500 mg Intravenous Q8H Mis Brown MD   Stopped at 10/31/20 0528    promethazine (PHENERGAN) injection 12.5 mg  12.5 mg Intravenous Q6H PRN Mis Brown MD   12.5 mg at 10/24/20 1733    pantoprazole (PROTONIX) injection 40 mg  40 mg Intravenous BID Antonio Jaffe MD   40 mg at 10/31/20 0835    enoxaparin (LOVENOX) injection 40 mg  40 mg Subcutaneous Daily Zahira Lucero MD   40 mg at 10/31/20 0911    linezolid (ZYVOX) IVPB 600 mg  600 mg Intravenous Q12H Zahira Lucero MD   Stopped at 10/31/20 0107    Abacavir-Dolutegravir-Lamivud 600- MG TABS 600 mg  1 tablet Oral Daily Zahira Lucero MD   600 mg at 10/30/20 0906    ondansetron (ZOFRAN) injection 4 mg  4 mg Intravenous Q6H PRN Zahira Lucero MD   4 mg at 10/30/20 1453    acetaminophen (TYLENOL) tablet 650 mg  650 mg Oral Q6H PRN Zahira Lucero MD   650 mg at 10/26/20 2044       Subjective:     Patient denied any complaints. No acute overnight events. Says he was up while awake. Objective: Intake/Output Summary (Last 24 hours) at 10/31/2020 6678  Last data filed at 10/30/2020 1806  Gross per 24 hour   Intake --   Output 100 ml   Net -100 ml      Vitals:   Vitals:    10/31/20 0827   BP:    Pulse: 80   Resp:    Temp:    SpO2:      Physical Exam:  General Appearance:    Alert, cooperative, no distress  Head:      Normocephalic, without obvious abnormality, atraumatic  Eyes:       Conjunctiva/corneas clear, EOM's intact  Lungs:    Clear to auscultation bilaterally, respirations unlabored  Heart:                Regular rate and rhythm, S1 and S2 normal, no murmur,   rub or gallop  Abdomen:     LLQ colostomy and LLQ left flank erythema and tenderness lessening   Extremities:   Extremities normal, atraumatic, no cyanosis or edema  Neurological:   Grossly Intact. Significant Diagnostic Studies:   DATA:    CBC   Recent Labs     10/29/20  0420 10/30/20  0520 10/31/20  0510   WBC 26.9* 21.6* 17.7*   HGB 9.5* 9.2* 9.5*   HCT 29.3* 29.4* 30.0*   * 480* 463*      BMP   Recent Labs     10/30/20  0520   *   K 4.5   CL 99   CO2 27   PHOS 3.2   BUN 4*   CREATININE 0.6*     LFT'S   Recent Labs     10/30/20  0520   AST 13*   ALT 6*   BILITOT 0.4   ALKPHOS 91     COAG No results for input(s): INR in the last 72 hours.   POC: Lab Results   Component Value Date    POCGLU 96 11/05/2012     [id number]:No results found for: LABA1C  CARDIAC ENZYMES  No results for input(s): CKTOTAL, CKMB, CKMBINDEX, TROPONINI in the last 72 hours. Troponin: No results for input(s): TROPONINT in the last 72 hours. BNP: No results for input(s): PROBNP in the last 72 hours. U/A:    Lab Results   Component Value Date    COLORU YELLOW 10/27/2020    WBCUA 2 10/27/2020    RBCUA <1 10/27/2020    MUCUS RARE 10/27/2020    BACTERIA NEGATIVE 10/27/2020    CLARITYU CLEAR 10/27/2020    SPECGRAV 1.012 10/27/2020    LEUKOCYTESUR NEGATIVE 10/27/2020    BLOODU NEGATIVE 10/27/2020       Xr Abdomen (kub) (single Ap View)    Result Date: 10/25/2020  EXAMINATION: ONE SUPINE XRAY VIEW(S) OF THE ABDOMEN 10/25/2020 12:28 am COMPARISON: CT abdomen and pelvis 10/16/2020 HISTORY: ORDERING SYSTEM PROVIDED HISTORY: PORTABLE, ileus TECHNOLOGIST PROVIDED HISTORY: Reason for exam:->PORTABLE, ileus Reason for Exam: PORTABLE, ileus Acuity: Acute Type of Exam: Subsequent/Follow-up FINDINGS: Midline surgical skin staples are present. There are multiple abnormally dilated loops of small bowel within the mid abdomen and upper pelvis. Multiple abnormally dilated loops of small bowel, likely an ileus given the recent surgery. A small bowel obstruction could also have this appearance. Ct Abdomen Pelvis W Iv Contrast Additional Contrast? None    Result Date: 10/25/2020  EXAMINATION: CT OF THE ABDOMEN AND PELVIS WITH CONTRAST 10/25/2020 8:17 am TECHNIQUE: CT of the abdomen and pelvis was performed with the administration of intravenous contrast. Multiplanar reformatted images are provided for review. Dose modulation, iterative reconstruction, and/or weight based adjustment of the mA/kV was utilized to reduce the radiation dose to as low as reasonably achievable.  COMPARISON: 10/17/2020, 10/16/2020 HISTORY: ORDERING SYSTEM PROVIDED HISTORY: Please give IV but not PO contrast re: 103 fever - had surgery for diverticular perf, has abscess TECHNOLOGIST PROVIDED HISTORY: Reason for exam:->Please give IV but not PO contrast re: 103 fever - had surgery for diverticular perf, has abscess Additional Contrast?->None Reason for Exam: Please give IV but not PO contrast re: 103 fever - had surgery for diverticular perf, has abscess Acuity: Acute Type of Exam: Initial FINDINGS: Lower Chest: There are small bilateral pleural effusions. Dependent atelectasis is noted in the lower lobes. Organs: There is mild prominence of the intrahepatic biliary ducts. The common duct measures approximately 8 mm at the alejandra hepatis. Gallbladder is mildly distended. Calcifications are noted in the uncinate process of the pancreas, as marker for chronic pancreatitis. The spleen is unremarkable. The adrenal glands have become mildly prominent. The kidneys enhance symmetrically. There is no hydronephrosis. 2.3 cm cyst of the superior pole of the right kidney, with simple features. GI/Bowel: The stomach is unremarkable. There is a generalized ileus pattern. An entero colonic anastomosis is noted in the right lower quadrant. There is a left lower quadrant colostomy. Rectal stump is unremarkable. Pelvis: Lackey catheter is in the urinary bladder which is decompressed. Peritoneum/Retroperitoneum: There is a scattered, small amount of pneumoperitoneum, considered postoperative. A surgical drain is in the left lower quadrant with mild surrounding stranding. Focal areas of intraperitoneal fluid are identified. Anterior to the spleen there is a 2.3 x 3.8 cm collection with small amount of air. In the infrarenal retroperitoneum there is a phlegmonous area, sample measurement 4.3 x 1.1 cm. There is also a small amount of free intraperitoneal fluid, for instance in the perihepatic space. Bones/Soft Tissues: There is a healing laparotomy incision. Anasarca is present. No acute osseous abnormality is identified.      Status post left lower quadrant colostomy, with postoperative changes including a small amount of pneumoperitoneum. The surgical drain in the left lower quadrant is in normal position position. Small fluid collections anterior to the spleen and in the infrarenal retroperitoneum are indeterminate for infection. No readily drainable collection is identified at this time. Mild bilateral adrenal gland enlargement, new since 10/16/2020. Small adrenal hemorrhages are suspected. Adrenal crisis is a differential concern. Generalized ileus. Stable mild dilation of the biliary tree. Chronic findings of pancreatitis. Small bilateral pleural effusions and bibasilar atelectasis. Pneumonia is a differential concern. Anasarca. Ct Abdomen Pelvis W Iv Contrast Additional Contrast? None    Result Date: 10/16/2020  EXAMINATION: CT OF THE ABDOMEN AND PELVIS WITH CONTRAST 10/16/2020 11:54 am TECHNIQUE: CT of the abdomen and pelvis was performed with the administration of intravenous contrast. Multiplanar reformatted images are provided for review. Dose modulation, iterative reconstruction, and/or weight based adjustment of the mA/kV was utilized to reduce the radiation dose to as low as reasonably achievable. COMPARISON: 01/08/2016 HISTORY: ORDERING SYSTEM PROVIDED HISTORY: left sided abd pain TECHNOLOGIST PROVIDED HISTORY: Reason for exam:->left sided abd pain Additional Contrast?->None Reason for Exam: left sided abd pain Acuity: Acute Type of Exam: Initial Additional signs and symptoms: LLQ pain, nausea, vomiting, constipation, diarrhea x 2 weeks Relevant Medical/Surgical History: Hx Diverticulitis, Pancreatitis, Hernia repair, Small bowel surg / 75cc [id number] FINDINGS: Lower Chest: The lung bases are clear. Organs: The spleen, adrenal glands and kidneys are unremarkable. There are right simple renal cysts.  There is improved with residual mild-to-moderate intrahepatic ductal dilatation without evidence of a radiopaque obstructing nephrolithiasis. Coarse calcifications are present within the pancreatic head, likely sequela pancreatitis. There is worsening moderate pancreatic ductal dilatation, likely from stenosis from chronic pancreatitis. GI/Bowel: There is no bowel obstruction. Status post ileocecectomy. However, there is a large complex air-fluid level in close association to the descending colon measuring 6.3 x 6.6 cm in maximal dimension consistent with an abscess. Scattered diverticular present along the descending colon. There is moderate to severe continuous circumferential wall thickening of the descending colon with moderate pericolonic inflammation likely due to colitis. Pelvis: There is mild to moderate perivesicular inflammation due to secondary cystitis. Peritoneum/Retroperitoneum: There is no free fluid. Reactive mesenteric lymph nodes are present in the left lower quadrant. An index lymph node measures 1.1 x 1.8 cm. There is no pneumoperitoneum. There are unchanged small fat containing supraumbilical hernias, likely incisional.  Mild atherosclerosis involves the abdominal aorta. Bones/Soft Tissues: Degenerative changes involve the thoracolumbar spine. 1. Multi-septated complex fluid collection within the left mid abdomen along the course of the descending colon consistent with an abscess. This is likely caused from colitis causing secondary cystitis, but cannot exclude underlying malignancy; correlate with colonoscopy once the acute symptoms have resolved. 2. Improved with residual mild-to-moderate intrahepatic, but worsening moderate pancreatic ductal dilatation likely from stenosis of chronic pancreatitis. Xr Chest Portable    Result Date: 10/25/2020  EXAMINATION: ONE XRAY VIEW OF THE CHEST 10/24/2020 11:28 pm COMPARISON: None.  HISTORY: ORDERING SYSTEM PROVIDED HISTORY: fever TECHNOLOGIST PROVIDED HISTORY: Reason for exam:->fever Reason for Exam: fever Acuity: Acute Type of Exam: Initial FINDINGS: Segmental pneumonia was identified in the right lower lobe. No cardiomegaly or interstitial edema was seen. The regional skeleton was unremarkable. No hilar mass was seen. Segmental pneumonia in the right lower lobe. No cardiomegaly or interstitial edema. Us Abdomen Limited Specify Organ? Liver, Spleen, Pancreas, Gallbladder    Result Date: 10/30/2020  EXAMINATION: Limited abdominal ultrasound 10/26/2020 11:40 am COMPARISON: 10/25/2020. HISTORY: ORDERING SYSTEM PROVIDED HISTORY: check for intra-abdominal abscess. TECHNOLOGIST PROVIDED HISTORY: Reason for exam:->check for intra-abdominal abscess. Specify organ?->LIVER Specify organ?->SPLEEN Specify organ?->PANCREAS Specify organ?->GALLBLADDER Reason for Exam: evaluate for abscess in RUQ and LUQ, fever Acuity: Unknown Type of Exam: Initial FINDINGS: Focused evaluation of the upper quadrants is performed. The liver and gallbladder appear grossly within normal limits. Trace ascites. Bilateral pleural effusions. The spleen appears grossly unremarkable. No abscess is visualized in the right or left upper quadrants. Please note that ultrasound is inferior to CT for evaluation of intra-abdominal abscesses. Trace ascites. Bilateral pleural effusions. Us Retroperitoneal Limited    Result Date: 10/23/2020  EXAMINATION: ULTRASOUND OF THE KIDNEYS 10/23/2020 5:10 pm COMPARISON: CT abdomen and pelvis 10/16/2020. HISTORY: ORDERING SYSTEM PROVIDED HISTORY: protenuria TECHNOLOGIST PROVIDED HISTORY: Reason for exam:->protenuria Reason for Exam: protenuria Type of Exam: Initial FINDINGS: Overall exam is somewhat technically difficult and mildly limited secondary to patient body habitus. . THe right kidney measures 10.1 cm in length and the left kidney measures 11.9 cm in length. Mildly increased renal echotexture to both kidneys. No hydronephrosis or intrarenal stones. No renal masses are identified.   There is redemonstration of a benign cyst exophytic to the upper pole of the right kidney measuring 2.3 x 1.6 x 2.0 cm. No follow-up imaging is recommended. Benign cyst to the upper pole of the right kidney measuring 2.3 cm. No follow-up imaging recommended. Mildly increased renal echotexture compatible with medical renal disease. Otherwise unremarkable exam.     Ct Abscess Drainage Peritoneal/peritonitis Open    Result Date: 10/17/2020  EXAMINATION: CT guided left lower quadrant abscess drainage. 10/17/2020 3:12 pm TECHNIQUE: After obtaining informed consent, the patient was placed in the supine position on the CT table. The skin superficial to the fluid collection was identified and cleansed using a sterile technique. Next, a 10 F locking pigtail drainage catheter was percutaneously placed into the collection using a standard Seldinger technique over stiff angled tip Glidewire with 8 and 10 [place] serial fascial dilatation. Catheter positioning was verified with CT guidance. As much fluid was aspirated as possible. A post procedural CT scan revealed complete drainage of the abscess. 310 cc of foul-smelling brownish green pus was removed and 10 cc was sent for culture and sensitivity. The drain was attached to a MARI suction bulb and sutured in place. The patient tolerated the procedure well with no immediate complications. The patient received 1.0 mg of Versed and 100 mcg fentanyl intravenously for sedation pain control. Sedation was provided by Ayleen Pichardo RN. Sedation time: 33 minutes. Moderate sedation was monitored under the physician's direction. The patient's vital signs were monitored throughout the procedure and recorded in the patient's medical record by the nurse. Dose modulation, iterative reconstruction, and/or weight based adjustment of the mA/kV was utilized to reduce the radiation dose to as low as reasonably achievable.   DLP: 702.37 COMPARISON: CT of the abdomen and pelvis with IV contrast 10/16/2020 HISTORY: Patient with multi septated complex fluid collection within the left mid abdomen along the course of the descending colon consistent with an abscess. A request has been made for percutaneous drainage. FINDINGS: Adequate drain placement within the abscess with complete drainage of the abscess. Successful CT guided left lower quadrant pericolonic abscess drainage as described above. Approximately 310 cc of purulent fluid was removed. The catheter was left to MARI suction bulb drainage.            Caesar Cooper  [facility]

## 2020-11-01 LAB
ALBUMIN SERPL-MCNC: 2.2 GM/DL (ref 3.4–5)
ALP BLD-CCNC: 111 IU/L (ref 40–128)
ALT SERPL-CCNC: 6 U/L (ref 10–40)
ANION GAP SERPL CALCULATED.3IONS-SCNC: 10 MMOL/L (ref 4–16)
AST SERPL-CCNC: 13 IU/L (ref 15–37)
BACTERIA: NEGATIVE /HPF
BASOPHILS ABSOLUTE: 0.1 K/CU MM
BASOPHILS RELATIVE PERCENT: 0.7 % (ref 0–1)
BILIRUB SERPL-MCNC: 0.3 MG/DL (ref 0–1)
BILIRUBIN URINE: NEGATIVE MG/DL
BLOOD, URINE: NEGATIVE
BUN BLDV-MCNC: 5 MG/DL (ref 6–23)
CALCIUM SERPL-MCNC: 7.7 MG/DL (ref 8.3–10.6)
CHLORIDE BLD-SCNC: 95 MMOL/L (ref 99–110)
CLARITY: CLEAR
CO2: 28 MMOL/L (ref 21–32)
COLOR: ABNORMAL
CREAT SERPL-MCNC: 0.7 MG/DL (ref 0.9–1.3)
DIFFERENTIAL TYPE: ABNORMAL
EOSINOPHILS ABSOLUTE: 0.4 K/CU MM
EOSINOPHILS RELATIVE PERCENT: 2.5 % (ref 0–3)
GFR AFRICAN AMERICAN: >60 ML/MIN/1.73M2
GFR NON-AFRICAN AMERICAN: >60 ML/MIN/1.73M2
GLUCOSE BLD-MCNC: 102 MG/DL (ref 70–99)
GLUCOSE, URINE: NEGATIVE MG/DL
HCT VFR BLD CALC: 30 % (ref 42–52)
HEMOGLOBIN: 9.6 GM/DL (ref 13.5–18)
HIGH SENSITIVE C-REACTIVE PROTEIN: 46.2 MG/L
IMMATURE NEUTROPHIL %: 1.7 % (ref 0–0.43)
KETONES, URINE: NEGATIVE MG/DL
LEUKOCYTE ESTERASE, URINE: NEGATIVE
LYMPHOCYTES ABSOLUTE: 2.6 K/CU MM
LYMPHOCYTES RELATIVE PERCENT: 15.3 % (ref 24–44)
MAGNESIUM: 1.6 MG/DL (ref 1.8–2.4)
MCH RBC QN AUTO: 29.8 PG (ref 27–31)
MCHC RBC AUTO-ENTMCNC: 32 % (ref 32–36)
MCV RBC AUTO: 93.2 FL (ref 78–100)
MONOCYTES ABSOLUTE: 1.8 K/CU MM
MONOCYTES RELATIVE PERCENT: 10.5 % (ref 0–4)
NITRITE URINE, QUANTITATIVE: NEGATIVE
NUCLEATED RBC %: 0 %
PDW BLD-RTO: 14.4 % (ref 11.7–14.9)
PH, URINE: 7 (ref 5–8)
PHOSPHORUS: 4.4 MG/DL (ref 2.5–4.9)
PLATELET # BLD: 472 K/CU MM (ref 140–440)
PMV BLD AUTO: 9.8 FL (ref 7.5–11.1)
POTASSIUM SERPL-SCNC: 4.6 MMOL/L (ref 3.5–5.1)
PROTEIN UA: NEGATIVE MG/DL
RBC # BLD: 3.22 M/CU MM (ref 4.6–6.2)
RBC URINE: <1 /HPF (ref 0–3)
SEGMENTED NEUTROPHILS ABSOLUTE COUNT: 11.6 K/CU MM
SEGMENTED NEUTROPHILS RELATIVE PERCENT: 69.3 % (ref 36–66)
SODIUM BLD-SCNC: 133 MMOL/L (ref 135–145)
SPECIFIC GRAVITY UA: 1 (ref 1–1.03)
SQUAMOUS EPITHELIAL: <1 /HPF
TOTAL IMMATURE NEUTOROPHIL: 0.29 K/CU MM
TOTAL NUCLEATED RBC: 0 K/CU MM
TOTAL PROTEIN: 5.8 GM/DL (ref 6.4–8.2)
TRICHOMONAS: ABNORMAL /HPF
UROBILINOGEN, URINE: NORMAL MG/DL (ref 0.2–1)
WBC # BLD: 16.7 K/CU MM (ref 4–10.5)
WBC UA: <1 /HPF (ref 0–2)

## 2020-11-01 PROCEDURE — 83735 ASSAY OF MAGNESIUM: CPT

## 2020-11-01 PROCEDURE — 84100 ASSAY OF PHOSPHORUS: CPT

## 2020-11-01 PROCEDURE — 81001 URINALYSIS AUTO W/SCOPE: CPT

## 2020-11-01 PROCEDURE — 1200000000 HC SEMI PRIVATE

## 2020-11-01 PROCEDURE — 2500000003 HC RX 250 WO HCPCS: Performed by: SURGERY

## 2020-11-01 PROCEDURE — 6360000002 HC RX W HCPCS: Performed by: INTERNAL MEDICINE

## 2020-11-01 PROCEDURE — 6360000002 HC RX W HCPCS: Performed by: SURGERY

## 2020-11-01 PROCEDURE — 85025 COMPLETE CBC W/AUTO DIFF WBC: CPT

## 2020-11-01 PROCEDURE — 86141 C-REACTIVE PROTEIN HS: CPT

## 2020-11-01 PROCEDURE — 2580000003 HC RX 258: Performed by: INTERNAL MEDICINE

## 2020-11-01 PROCEDURE — 80053 COMPREHEN METABOLIC PANEL: CPT

## 2020-11-01 PROCEDURE — 6370000000 HC RX 637 (ALT 250 FOR IP): Performed by: SURGERY

## 2020-11-01 PROCEDURE — 6370000000 HC RX 637 (ALT 250 FOR IP): Performed by: NURSE PRACTITIONER

## 2020-11-01 PROCEDURE — 6370000000 HC RX 637 (ALT 250 FOR IP): Performed by: INTERNAL MEDICINE

## 2020-11-01 PROCEDURE — C9113 INJ PANTOPRAZOLE SODIUM, VIA: HCPCS | Performed by: SURGERY

## 2020-11-01 PROCEDURE — 94761 N-INVAS EAR/PLS OXIMETRY MLT: CPT

## 2020-11-01 RX ORDER — MAGNESIUM SULFATE IN WATER 40 MG/ML
2 INJECTION, SOLUTION INTRAVENOUS ONCE
Status: COMPLETED | OUTPATIENT
Start: 2020-11-01 | End: 2020-11-01

## 2020-11-01 RX ADMIN — PANTOPRAZOLE SODIUM 40 MG: 40 INJECTION, POWDER, FOR SOLUTION INTRAVENOUS at 10:43

## 2020-11-01 RX ADMIN — CEFTAZIDIME, AVIBACTAM 2.5 G: 2; .5 POWDER, FOR SOLUTION INTRAVENOUS at 20:33

## 2020-11-01 RX ADMIN — OXYCODONE HYDROCHLORIDE AND ACETAMINOPHEN 1 TABLET: 5; 325 TABLET ORAL at 07:07

## 2020-11-01 RX ADMIN — LISINOPRIL 40 MG: 40 TABLET ORAL at 10:43

## 2020-11-01 RX ADMIN — ENOXAPARIN SODIUM 40 MG: 40 INJECTION SUBCUTANEOUS at 10:43

## 2020-11-01 RX ADMIN — PANCRELIPASE 30000 UNITS: 30000; 6000; 19000 CAPSULE, DELAYED RELEASE PELLETS ORAL at 17:20

## 2020-11-01 RX ADMIN — OXYCODONE HYDROCHLORIDE AND ACETAMINOPHEN 1 TABLET: 5; 325 TABLET ORAL at 12:37

## 2020-11-01 RX ADMIN — LINEZOLID 600 MG: 600 INJECTION, SOLUTION INTRAVENOUS at 12:38

## 2020-11-01 RX ADMIN — HYDROMORPHONE HYDROCHLORIDE 1 MG: 1 INJECTION, SOLUTION INTRAMUSCULAR; INTRAVENOUS; SUBCUTANEOUS at 00:56

## 2020-11-01 RX ADMIN — OXYCODONE HYDROCHLORIDE AND ACETAMINOPHEN 1 TABLET: 5; 325 TABLET ORAL at 19:16

## 2020-11-01 RX ADMIN — PANTOPRAZOLE SODIUM 40 MG: 40 INJECTION, POWDER, FOR SOLUTION INTRAVENOUS at 20:34

## 2020-11-01 RX ADMIN — OXYCODONE HYDROCHLORIDE AND ACETAMINOPHEN 1 TABLET: 5; 325 TABLET ORAL at 01:29

## 2020-11-01 RX ADMIN — PANCRELIPASE 30000 UNITS: 30000; 6000; 19000 CAPSULE, DELAYED RELEASE PELLETS ORAL at 10:40

## 2020-11-01 RX ADMIN — METOPROLOL TARTRATE 125 MG: 50 TABLET, FILM COATED ORAL at 20:34

## 2020-11-01 RX ADMIN — CEFTAZIDIME, AVIBACTAM 2.5 G: 2; .5 POWDER, FOR SOLUTION INTRAVENOUS at 10:41

## 2020-11-01 RX ADMIN — METRONIDAZOLE 500 MG: 500 INJECTION, SOLUTION INTRAVENOUS at 12:37

## 2020-11-01 RX ADMIN — METRONIDAZOLE 500 MG: 500 INJECTION, SOLUTION INTRAVENOUS at 04:24

## 2020-11-01 RX ADMIN — GUAIFENESIN 600 MG: 600 TABLET, EXTENDED RELEASE ORAL at 10:42

## 2020-11-01 RX ADMIN — HYDROMORPHONE HYDROCHLORIDE 1 MG: 1 INJECTION, SOLUTION INTRAMUSCULAR; INTRAVENOUS; SUBCUTANEOUS at 03:01

## 2020-11-01 RX ADMIN — SODIUM CHLORIDE 100 MG: 9 INJECTION, SOLUTION INTRAVENOUS at 10:41

## 2020-11-01 RX ADMIN — ERAVACYCLINE: 50 INJECTION, POWDER, LYOPHILIZED, FOR SOLUTION INTRAVENOUS at 12:37

## 2020-11-01 RX ADMIN — GUAIFENESIN 600 MG: 600 TABLET, EXTENDED RELEASE ORAL at 20:34

## 2020-11-01 RX ADMIN — HYDROMORPHONE HYDROCHLORIDE 1 MG: 1 INJECTION, SOLUTION INTRAMUSCULAR; INTRAVENOUS; SUBCUTANEOUS at 10:48

## 2020-11-01 RX ADMIN — METRONIDAZOLE 500 MG: 500 INJECTION, SOLUTION INTRAVENOUS at 20:33

## 2020-11-01 RX ADMIN — METOPROLOL TARTRATE 125 MG: 50 TABLET, FILM COATED ORAL at 10:41

## 2020-11-01 RX ADMIN — PANCRELIPASE 30000 UNITS: 30000; 6000; 19000 CAPSULE, DELAYED RELEASE PELLETS ORAL at 12:37

## 2020-11-01 RX ADMIN — AMLODIPINE BESYLATE 10 MG: 10 TABLET ORAL at 10:40

## 2020-11-01 RX ADMIN — HYDROMORPHONE HYDROCHLORIDE 1 MG: 1 INJECTION, SOLUTION INTRAMUSCULAR; INTRAVENOUS; SUBCUTANEOUS at 23:42

## 2020-11-01 RX ADMIN — ONDANSETRON 4 MG: 2 INJECTION INTRAMUSCULAR; INTRAVENOUS at 14:14

## 2020-11-01 RX ADMIN — MAGNESIUM SULFATE HEPTAHYDRATE 2 G: 40 INJECTION, SOLUTION INTRAVENOUS at 12:37

## 2020-11-01 RX ADMIN — HYDROMORPHONE HYDROCHLORIDE 1 MG: 1 INJECTION, SOLUTION INTRAMUSCULAR; INTRAVENOUS; SUBCUTANEOUS at 20:22

## 2020-11-01 RX ADMIN — CEFTAZIDIME, AVIBACTAM 2.5 G: 2; .5 POWDER, FOR SOLUTION INTRAVENOUS at 12:37

## 2020-11-01 RX ADMIN — HYDROMORPHONE HYDROCHLORIDE 1 MG: 1 INJECTION, SOLUTION INTRAMUSCULAR; INTRAVENOUS; SUBCUTANEOUS at 05:59

## 2020-11-01 RX ADMIN — HYDROMORPHONE HYDROCHLORIDE 1 MG: 1 INJECTION, SOLUTION INTRAMUSCULAR; INTRAVENOUS; SUBCUTANEOUS at 14:08

## 2020-11-01 RX ADMIN — CLOTRIMAZOLE AND BETAMETHASONE DIPROPIONATE: 10; .5 CREAM TOPICAL at 20:34

## 2020-11-01 RX ADMIN — ERAVACYCLINE: 50 INJECTION, POWDER, LYOPHILIZED, FOR SOLUTION INTRAVENOUS at 01:23

## 2020-11-01 RX ADMIN — HYDROMORPHONE HYDROCHLORIDE 1 MG: 1 INJECTION, SOLUTION INTRAMUSCULAR; INTRAVENOUS; SUBCUTANEOUS at 17:24

## 2020-11-01 ASSESSMENT — PAIN SCALES - WONG BAKER

## 2020-11-01 ASSESSMENT — PAIN SCALES - GENERAL
PAINLEVEL_OUTOF10: 9
PAINLEVEL_OUTOF10: 9
PAINLEVEL_OUTOF10: 3
PAINLEVEL_OUTOF10: 9
PAINLEVEL_OUTOF10: 10
PAINLEVEL_OUTOF10: 3
PAINLEVEL_OUTOF10: 3
PAINLEVEL_OUTOF10: 8
PAINLEVEL_OUTOF10: 9
PAINLEVEL_OUTOF10: 8
PAINLEVEL_OUTOF10: 3

## 2020-11-01 ASSESSMENT — PAIN DESCRIPTION - PROGRESSION
CLINICAL_PROGRESSION: GRADUALLY WORSENING
CLINICAL_PROGRESSION: GRADUALLY WORSENING

## 2020-11-01 ASSESSMENT — PAIN DESCRIPTION - PAIN TYPE
TYPE: SURGICAL PAIN
TYPE: SURGICAL PAIN

## 2020-11-01 ASSESSMENT — PAIN DESCRIPTION - LOCATION
LOCATION: ABDOMEN
LOCATION: ABDOMEN

## 2020-11-01 ASSESSMENT — PAIN DESCRIPTION - ORIENTATION
ORIENTATION: MID
ORIENTATION: MID

## 2020-11-01 ASSESSMENT — PAIN DESCRIPTION - DESCRIPTORS
DESCRIPTORS: DISCOMFORT
DESCRIPTORS: DISCOMFORT

## 2020-11-01 ASSESSMENT — PAIN DESCRIPTION - ONSET
ONSET: ON-GOING
ONSET: ON-GOING

## 2020-11-01 ASSESSMENT — PAIN DESCRIPTION - FREQUENCY
FREQUENCY: CONTINUOUS
FREQUENCY: CONTINUOUS

## 2020-11-01 NOTE — PROGRESS NOTES
Hospitalist Progress Note         Admit Date: 10/16/2020    PCP: Marianela Alicia, RN, BSN, MSN, FNP-C     Chief Complaint   Patient presents with    Abdominal Pain     reports abd pain for 2 weeks        Assessment and Plan:     #Sepsis  #Acute Diverticulitis  #Peritonitis  #Pneumonia? ?  -Patient is SIRS 2/4, leukocytosis, tachycardia  -S/p IR drainage with drain placement  -S/p Exploratory laparotomy with colostomy creation, drainage of peritoneal abscess  -CT abdomen and pelvis with colitis, abscess, pancreatic ductal dilatation  -Blood cultures no growth so far.  Repeat blood cultures 10/25/2020 negative  -Surgical cultures with mixed organisms, including E Coli, Prevotella Denticola  -Linezolid, Avycaz,xereva and meropenem as per ID recommendation.     #Postop ileus  -Patient has not been tolerating p.o.  -Repeat CT abdomen: Bilateral gland enlargement with small adrenal hemorrhages. Small bilateral pleural effusions and bilateral atelectasis with concerns of pneumonia.    -Patient just completed 8 doses of Reglan and currently much better.     #HIV   Continue home medications     #Proteinuria  -urine protein 10/16/20 2000  -rpeat ua pr 30  -urine pr /cr 0.3  -Nephrology on board     #Hypertension  -Uncontrolled  -Controlled on metoprolol, clonidine, lisinopril, and amlodipine  -Nephrology on board     #Diet:  -Patient has been unable to tolerate p.o.  -Currently on clear liquids but unable to take  -Dietitian on board    VTE prophylaxis LMWH. Placement to swing bed pending.     Current Facility-Administered Medications   Medication Dose Route Frequency Provider Last Rate Last Dose    clotrimazole-betamethasone (LOTRISONE) cream   Topical BID Ethan Narayanan MD        HYDROmorphone (DILAUDID) injection 1 mg  1 mg Intravenous Q3H PRN Ethan Narayanan MD   1 mg at 11/01/20 0529    oxyCODONE-acetaminophen (PERCOCET) 5-325 MG per tablet 1 tablet  1 tablet Oral Q4H PRN Ethan Narayanan MD   1 tablet at 11/01/20 0707    anidulafungin (ERAXIS) 100 mg in sodium chloride 0.9 % 130 mL IVPB  100 mg Intravenous Daily Jennifer Barrios MD   Stopped at 10/31/20 1107    ceftazidime-avibactam (AVYCAZ) 2.5 g in sodium chloride 0.9 % 100 mL IVPB  2.5 g Intravenous Q8H Jennifer Barrios MD   Stopped at 10/31/20 2244    sodium chloride flush 0.9 % injection 10 mL  10 mL Intravenous 2 times per day Landon Caldwell MD   Stopped at 10/31/20 2252    sodium chloride flush 0.9 % injection 10 mL  10 mL Intravenous PRN Landon Caldwell MD        lisinopril (PRINIVIL;ZESTRIL) tablet 40 mg  40 mg Oral Daily Landon Caldwell MD   40 mg at 10/31/20 0909    Eravacycline Dihydrochloride 80 mg in sodium chloride 0.9 % 250 mL IVPB   Intravenous Q12H Yareli Fitzpatrick MD   Stopped at 11/01/20 0223    guaiFENesin (MUCINEX) extended release tablet 600 mg  600 mg Oral BID Елена Rocio APRN - CNP   600 mg at 10/31/20 2027    metoprolol tartrate (LOPRESSOR) tablet 125 mg  125 mg Oral BID Samantha Dixon MD   125 mg at 10/31/20 2027    sodium chloride flush 0.9 % injection 10 mL  10 mL Intravenous PRN Cr Daniel MD   10 mL at 10/31/20 1989    cloNIDine (CATAPRES) 0.1 MG/24HR 1 patch  1 patch Transdermal Weekly Davi Mckenzie MD   1 patch at 10/25/20 1315    lipase-protease-amylase (CREON) delayed release capsule 30,000 Units  30,000 Units Oral TID WC Landon Caldwell MD   30,000 Units at 10/31/20 1721    amLODIPine (NORVASC) tablet 10 mg  10 mg Oral Daily Landon Caldwell MD   10 mg at 10/31/20 0909    metronidazole (FLAGYL) 500 mg in NaCl 100 mL IVPB premix  500 mg Intravenous Q8H Ashish Orourke MD   Stopped at 11/01/20 0524    promethazine (PHENERGAN) injection 12.5 mg  12.5 mg Intravenous Q6H PRN Ashish Orourke MD   12.5 mg at 10/24/20 1733    pantoprazole (PROTONIX) injection 40 mg  40 mg Intravenous BID Theodore Amaro MD   40 mg at 10/31/20 2149    enoxaparin (LOVENOX) injection 40 mg  40 mg Subcutaneous Daily Joni Weems MD   40 mg at 10/31/20 0911    linezolid (ZYVOX) IVPB 600 mg  600 mg Intravenous Q12H Joni Weems  mL/hr at 10/31/20 2355 600 mg at 10/31/20 2355    Abacavir-Dolutegravir-Lamivud 600- MG TABS 600 mg  1 tablet Oral Daily Joni Weems MD   600 mg at 10/31/20 0932    ondansetron (ZOFRAN) injection 4 mg  4 mg Intravenous Q6H PRN Joni Weems MD   4 mg at 10/31/20 2143    acetaminophen (TYLENOL) tablet 650 mg  650 mg Oral Q6H PRN Joni Weems MD   650 mg at 10/26/20 2044       Subjective:     Patient denied any complaints. No acute overnight events. Slept well last night    Objective:     No intake or output data in the 24 hours ending 11/01/20 1031   Vitals:   Vitals:    10/31/20 2252   BP: 134/86   Pulse: 80   Resp: 16   Temp: 98.6 °F (37 °C)   SpO2:      Physical Exam:  General Appearance:    Alert, cooperative, no distress  Head:      Normocephalic, without obvious abnormality, atraumatic  Eyes:       Conjunctiva/corneas clear, EOM's intact  Lungs:    Clear to auscultation bilaterally, respirations unlabored  Heart:                Regular rate and rhythm, S1 and S2 normal, no murmur,   rub or gallop  Abdomen:     LLQ colostomy and LLQ left flank erythema and tenderness lessening   Extremities:   Extremities normal, atraumatic, no cyanosis or edema  Neurological:   Grossly Intact. Significant Diagnostic Studies:   DATA:    CBC   Recent Labs     10/30/20  0520 10/31/20  0510 11/01/20  0500   WBC 21.6* 17.7* 16.7*   HGB 9.2* 9.5* 9.6*   HCT 29.4* 30.0* 30.0*   * 463* 472*      BMP   Recent Labs     10/30/20  0520 11/01/20  0500   * 133*   K 4.5 4.6   CL 99 95*   CO2 27 28   PHOS 3.2 4.4   BUN 4* 5*   CREATININE 0.6* 0.7*     LFT'S   Recent Labs     10/30/20  0520 11/01/20  0500   AST 13* 13*   ALT 6* 6*   BILITOT 0.4 0.3   ALKPHOS 91 111     COAG No results for input(s): INR in the last 72 hours.   POC: fever - had surgery for diverticular perf, has abscess TECHNOLOGIST PROVIDED HISTORY: Reason for exam:->Please give IV but not PO contrast re: 103 fever - had surgery for diverticular perf, has abscess Additional Contrast?->None Reason for Exam: Please give IV but not PO contrast re: 103 fever - had surgery for diverticular perf, has abscess Acuity: Acute Type of Exam: Initial FINDINGS: Lower Chest: There are small bilateral pleural effusions. Dependent atelectasis is noted in the lower lobes. Organs: There is mild prominence of the intrahepatic biliary ducts. The common duct measures approximately 8 mm at the alejandra hepatis. Gallbladder is mildly distended. Calcifications are noted in the uncinate process of the pancreas, as marker for chronic pancreatitis. The spleen is unremarkable. The adrenal glands have become mildly prominent. The kidneys enhance symmetrically. There is no hydronephrosis. 2.3 cm cyst of the superior pole of the right kidney, with simple features. GI/Bowel: The stomach is unremarkable. There is a generalized ileus pattern. An entero colonic anastomosis is noted in the right lower quadrant. There is a left lower quadrant colostomy. Rectal stump is unremarkable. Pelvis: Lackey catheter is in the urinary bladder which is decompressed. Peritoneum/Retroperitoneum: There is a scattered, small amount of pneumoperitoneum, considered postoperative. A surgical drain is in the left lower quadrant with mild surrounding stranding. Focal areas of intraperitoneal fluid are identified. Anterior to the spleen there is a 2.3 x 3.8 cm collection with small amount of air. In the infrarenal retroperitoneum there is a phlegmonous area, sample measurement 4.3 x 1.1 cm. There is also a small amount of free intraperitoneal fluid, for instance in the perihepatic space. Bones/Soft Tissues: There is a healing laparotomy incision. Anasarca is present. No acute osseous abnormality is identified.      Status post left lower quadrant colostomy, with postoperative changes including a small amount of pneumoperitoneum. The surgical drain in the left lower quadrant is in normal position position. Small fluid collections anterior to the spleen and in the infrarenal retroperitoneum are indeterminate for infection. No readily drainable collection is identified at this time. Mild bilateral adrenal gland enlargement, new since 10/16/2020. Small adrenal hemorrhages are suspected. Adrenal crisis is a differential concern. Generalized ileus. Stable mild dilation of the biliary tree. Chronic findings of pancreatitis. Small bilateral pleural effusions and bibasilar atelectasis. Pneumonia is a differential concern. Anasarca. Ct Abdomen Pelvis W Iv Contrast Additional Contrast? None    Result Date: 10/16/2020  EXAMINATION: CT OF THE ABDOMEN AND PELVIS WITH CONTRAST 10/16/2020 11:54 am TECHNIQUE: CT of the abdomen and pelvis was performed with the administration of intravenous contrast. Multiplanar reformatted images are provided for review. Dose modulation, iterative reconstruction, and/or weight based adjustment of the mA/kV was utilized to reduce the radiation dose to as low as reasonably achievable. COMPARISON: 01/08/2016 HISTORY: ORDERING SYSTEM PROVIDED HISTORY: left sided abd pain TECHNOLOGIST PROVIDED HISTORY: Reason for exam:->left sided abd pain Additional Contrast?->None Reason for Exam: left sided abd pain Acuity: Acute Type of Exam: Initial Additional signs and symptoms: LLQ pain, nausea, vomiting, constipation, diarrhea x 2 weeks Relevant Medical/Surgical History: Hx Diverticulitis, Pancreatitis, Hernia repair, Small bowel surg / 75cc [id number] FINDINGS: Lower Chest: The lung bases are clear. Organs: The spleen, adrenal glands and kidneys are unremarkable. There are right simple renal cysts.  There is improved with residual mild-to-moderate intrahepatic ductal dilatation without evidence of a radiopaque obstructing fluid collection within the left mid abdomen along the course of the descending colon consistent with an abscess. A request has been made for percutaneous drainage. FINDINGS: Adequate drain placement within the abscess with complete drainage of the abscess. Successful CT guided left lower quadrant pericolonic abscess drainage as described above. Approximately 310 cc of purulent fluid was removed. The catheter was left to MARI suction bulb drainage.            [facility] Hospitalist

## 2020-11-01 NOTE — PROGRESS NOTES
Nephrology Progress Note  11/1/2020 11:27 AM        Subjective:   Admit Date: 10/16/2020  PCP: Thomas Aparicio, RN, BSN, MSN, FNP-C    Interval History: abd discomfort/ pain needing round the clock opioids     Diet: some po    ROS:  abd  pain , dysuria     Data:     Current med's:    clotrimazole-betamethasone   Topical BID    anidulafungin (ERAXIS) 100 mg IVPB  100 mg Intravenous Daily    ceftazidime-acibactam (AVYCAZ) infusion  2.5 g Intravenous Q8H    sodium chloride flush  10 mL Intravenous 2 times per day    lisinopril  40 mg Oral Daily    IVPB builder   Intravenous Q12H    guaiFENesin  600 mg Oral BID    metoprolol  125 mg Oral BID    cloNIDine  1 patch Transdermal Weekly    lipase-protease-amylase  30,000 Units Oral TID WC    amLODIPine  10 mg Oral Daily    metroNIDAZOLE  500 mg Intravenous Q8H    pantoprazole  40 mg Intravenous BID    enoxaparin  40 mg Subcutaneous Daily    linezolid  600 mg Intravenous Q12H    Abacavir-Dolutegravir-Lamivud  1 tablet Oral Daily           No intake/output data recorded. CBC:   Recent Labs     10/30/20  0520 10/31/20  0510 11/01/20  0500   WBC 21.6* 17.7* 16.7*   HGB 9.2* 9.5* 9.6*   * 463* 472*          Recent Labs     10/30/20  0520 11/01/20  0500   * 133*   K 4.5 4.6   CL 99 95*   CO2 27 28   BUN 4* 5*   CREATININE 0.6* 0.7*   GLUCOSE 94 102*       Lab Results   Component Value Date    CALCIUM 7.7 (L) 11/01/2020    PHOS 4.4 11/01/2020       Objective:     Vitals: BP (!) 133/93   Pulse 89   Temp 97.9 °F (36.6 °C) (Oral)   Resp 24   Ht 5' 10.98\" (1.803 m)   Wt 182 lb 4.8 oz (82.7 kg)   SpO2 96%   BMI 25.44 kg/m²     General appearance:  No ac distress   HEENT:  Mild conj pallor  Neck:  supple  Lungs:  CTA   Heart:  Seems RRr  Abdomen: tender- ostomy  Extremities:  No edema of LE  He  Has  some dependent  Edema       Problem List :         Impression :     1.  Low na - better still 133 - I suspect he has high ADH 9 appropriate ) from pain - and difficult with free later clearance - also low protein diet may have been playing a role- but Na 133 - so acceptable - his urine is very diluted and likely will clear free water   2. No cystitis  per UA but he si on several abx   3. HIN with ART well controled and recent abd abscess for perforated diverticular dz   4. HTN - manual  /80 this am on Rt brachial artery - accept lucio albeit with multiple  meds   5. Low mg partly from / PPI and ? GI loss - replete - does he needs PPI ? Recommendation/Plan  :     1. So manual BP daily  2. He is with ccb/aacei/ clonidine patch- ok for now   3. He is waiting for placement   4.  Out pt f/U  5. will try wean off clonidine and use different agent as an out pt       Anderson Crawford MD

## 2020-11-02 LAB
BASOPHILS ABSOLUTE: 0.1 K/CU MM
BASOPHILS RELATIVE PERCENT: 0.7 % (ref 0–1)
DIFFERENTIAL TYPE: ABNORMAL
EOSINOPHILS ABSOLUTE: 0.5 K/CU MM
EOSINOPHILS RELATIVE PERCENT: 3.3 % (ref 0–3)
HCT VFR BLD CALC: 30.9 % (ref 42–52)
HEMOGLOBIN: 10.2 GM/DL (ref 13.5–18)
HIGH SENSITIVE C-REACTIVE PROTEIN: 36.7 MG/L
IMMATURE NEUTROPHIL %: 1.5 % (ref 0–0.43)
LYMPHOCYTES ABSOLUTE: 2.6 K/CU MM
LYMPHOCYTES RELATIVE PERCENT: 16.1 % (ref 24–44)
MCH RBC QN AUTO: 30.9 PG (ref 27–31)
MCHC RBC AUTO-ENTMCNC: 33 % (ref 32–36)
MCV RBC AUTO: 93.6 FL (ref 78–100)
MONOCYTES ABSOLUTE: 1.6 K/CU MM
MONOCYTES RELATIVE PERCENT: 10 % (ref 0–4)
NUCLEATED RBC %: 0 %
PDW BLD-RTO: 14.8 % (ref 11.7–14.9)
PLATELET # BLD: 451 K/CU MM (ref 140–440)
PMV BLD AUTO: 9.6 FL (ref 7.5–11.1)
RBC # BLD: 3.3 M/CU MM (ref 4.6–6.2)
SEGMENTED NEUTROPHILS ABSOLUTE COUNT: 11 K/CU MM
SEGMENTED NEUTROPHILS RELATIVE PERCENT: 68.4 % (ref 36–66)
TOTAL IMMATURE NEUTOROPHIL: 0.24 K/CU MM
TOTAL NUCLEATED RBC: 0 K/CU MM
WBC # BLD: 16.1 K/CU MM (ref 4–10.5)

## 2020-11-02 PROCEDURE — 6360000002 HC RX W HCPCS: Performed by: SURGERY

## 2020-11-02 PROCEDURE — 2580000003 HC RX 258: Performed by: INTERNAL MEDICINE

## 2020-11-02 PROCEDURE — 6370000000 HC RX 637 (ALT 250 FOR IP): Performed by: SURGERY

## 2020-11-02 PROCEDURE — 6360000002 HC RX W HCPCS: Performed by: INTERNAL MEDICINE

## 2020-11-02 PROCEDURE — 6370000000 HC RX 637 (ALT 250 FOR IP): Performed by: INTERNAL MEDICINE

## 2020-11-02 PROCEDURE — 2700000000 HC OXYGEN THERAPY PER DAY

## 2020-11-02 PROCEDURE — 94761 N-INVAS EAR/PLS OXIMETRY MLT: CPT

## 2020-11-02 PROCEDURE — 94150 VITAL CAPACITY TEST: CPT

## 2020-11-02 PROCEDURE — 1200000000 HC SEMI PRIVATE

## 2020-11-02 PROCEDURE — 86141 C-REACTIVE PROTEIN HS: CPT

## 2020-11-02 PROCEDURE — 6370000000 HC RX 637 (ALT 250 FOR IP): Performed by: NURSE PRACTITIONER

## 2020-11-02 PROCEDURE — 99232 SBSQ HOSP IP/OBS MODERATE 35: CPT | Performed by: INTERNAL MEDICINE

## 2020-11-02 PROCEDURE — 2500000003 HC RX 250 WO HCPCS: Performed by: SURGERY

## 2020-11-02 PROCEDURE — C9113 INJ PANTOPRAZOLE SODIUM, VIA: HCPCS | Performed by: SURGERY

## 2020-11-02 PROCEDURE — 85025 COMPLETE CBC W/AUTO DIFF WBC: CPT

## 2020-11-02 RX ADMIN — CLOTRIMAZOLE AND BETAMETHASONE DIPROPIONATE: 10; .5 CREAM TOPICAL at 08:38

## 2020-11-02 RX ADMIN — PANCRELIPASE 30000 UNITS: 30000; 6000; 19000 CAPSULE, DELAYED RELEASE PELLETS ORAL at 12:17

## 2020-11-02 RX ADMIN — CEFTAZIDIME, AVIBACTAM 2.5 G: 2; .5 POWDER, FOR SOLUTION INTRAVENOUS at 14:55

## 2020-11-02 RX ADMIN — METRONIDAZOLE 500 MG: 500 INJECTION, SOLUTION INTRAVENOUS at 20:31

## 2020-11-02 RX ADMIN — GUAIFENESIN 600 MG: 600 TABLET, EXTENDED RELEASE ORAL at 20:31

## 2020-11-02 RX ADMIN — SODIUM CHLORIDE 100 MG: 9 INJECTION, SOLUTION INTRAVENOUS at 08:36

## 2020-11-02 RX ADMIN — ONDANSETRON 4 MG: 2 INJECTION INTRAMUSCULAR; INTRAVENOUS at 18:57

## 2020-11-02 RX ADMIN — METOPROLOL TARTRATE 125 MG: 50 TABLET, FILM COATED ORAL at 20:31

## 2020-11-02 RX ADMIN — OXYCODONE HYDROCHLORIDE AND ACETAMINOPHEN 1 TABLET: 5; 325 TABLET ORAL at 23:00

## 2020-11-02 RX ADMIN — OXYCODONE HYDROCHLORIDE AND ACETAMINOPHEN 1 TABLET: 5; 325 TABLET ORAL at 10:31

## 2020-11-02 RX ADMIN — AMLODIPINE BESYLATE 10 MG: 10 TABLET ORAL at 08:36

## 2020-11-02 RX ADMIN — PANTOPRAZOLE SODIUM 40 MG: 40 INJECTION, POWDER, FOR SOLUTION INTRAVENOUS at 08:44

## 2020-11-02 RX ADMIN — OXYCODONE HYDROCHLORIDE AND ACETAMINOPHEN 1 TABLET: 5; 325 TABLET ORAL at 18:57

## 2020-11-02 RX ADMIN — HYDROMORPHONE HYDROCHLORIDE 1 MG: 1 INJECTION, SOLUTION INTRAMUSCULAR; INTRAVENOUS; SUBCUTANEOUS at 04:31

## 2020-11-02 RX ADMIN — SODIUM CHLORIDE, PRESERVATIVE FREE 10 ML: 5 INJECTION INTRAVENOUS at 12:16

## 2020-11-02 RX ADMIN — METRONIDAZOLE 500 MG: 500 INJECTION, SOLUTION INTRAVENOUS at 04:31

## 2020-11-02 RX ADMIN — OXYCODONE HYDROCHLORIDE AND ACETAMINOPHEN 1 TABLET: 5; 325 TABLET ORAL at 00:41

## 2020-11-02 RX ADMIN — CEFTAZIDIME, AVIBACTAM 2.5 G: 2; .5 POWDER, FOR SOLUTION INTRAVENOUS at 23:00

## 2020-11-02 RX ADMIN — OXYCODONE HYDROCHLORIDE AND ACETAMINOPHEN 1 TABLET: 5; 325 TABLET ORAL at 05:33

## 2020-11-02 RX ADMIN — PANTOPRAZOLE SODIUM 40 MG: 40 INJECTION, POWDER, FOR SOLUTION INTRAVENOUS at 20:32

## 2020-11-02 RX ADMIN — METOPROLOL TARTRATE 125 MG: 50 TABLET, FILM COATED ORAL at 08:37

## 2020-11-02 RX ADMIN — LINEZOLID 600 MG: 600 INJECTION, SOLUTION INTRAVENOUS at 12:17

## 2020-11-02 RX ADMIN — ONDANSETRON 4 MG: 2 INJECTION INTRAMUSCULAR; INTRAVENOUS at 04:37

## 2020-11-02 RX ADMIN — PANCRELIPASE 30000 UNITS: 30000; 6000; 19000 CAPSULE, DELAYED RELEASE PELLETS ORAL at 08:36

## 2020-11-02 RX ADMIN — GUAIFENESIN 600 MG: 600 TABLET, EXTENDED RELEASE ORAL at 08:36

## 2020-11-02 RX ADMIN — HYDROMORPHONE HYDROCHLORIDE 1 MG: 1 INJECTION, SOLUTION INTRAMUSCULAR; INTRAVENOUS; SUBCUTANEOUS at 17:22

## 2020-11-02 RX ADMIN — CEFTAZIDIME, AVIBACTAM 2.5 G: 2; .5 POWDER, FOR SOLUTION INTRAVENOUS at 08:35

## 2020-11-02 RX ADMIN — CLOTRIMAZOLE AND BETAMETHASONE DIPROPIONATE: 10; .5 CREAM TOPICAL at 20:33

## 2020-11-02 RX ADMIN — ERAVACYCLINE: 50 INJECTION, POWDER, LYOPHILIZED, FOR SOLUTION INTRAVENOUS at 12:16

## 2020-11-02 RX ADMIN — LINEZOLID 600 MG: 600 INJECTION, SOLUTION INTRAVENOUS at 01:31

## 2020-11-02 RX ADMIN — HYDROMORPHONE HYDROCHLORIDE 1 MG: 1 INJECTION, SOLUTION INTRAMUSCULAR; INTRAVENOUS; SUBCUTANEOUS at 20:32

## 2020-11-02 RX ADMIN — HYDROMORPHONE HYDROCHLORIDE 1 MG: 1 INJECTION, SOLUTION INTRAMUSCULAR; INTRAVENOUS; SUBCUTANEOUS at 08:36

## 2020-11-02 RX ADMIN — SODIUM CHLORIDE, PRESERVATIVE FREE 10 ML: 5 INJECTION INTRAVENOUS at 20:32

## 2020-11-02 RX ADMIN — METRONIDAZOLE 500 MG: 500 INJECTION, SOLUTION INTRAVENOUS at 12:17

## 2020-11-02 RX ADMIN — LISINOPRIL 40 MG: 40 TABLET ORAL at 08:37

## 2020-11-02 RX ADMIN — PANCRELIPASE 30000 UNITS: 30000; 6000; 19000 CAPSULE, DELAYED RELEASE PELLETS ORAL at 16:22

## 2020-11-02 RX ADMIN — SODIUM CHLORIDE, PRESERVATIVE FREE 10 ML: 5 INJECTION INTRAVENOUS at 08:37

## 2020-11-02 RX ADMIN — ERAVACYCLINE: 50 INJECTION, POWDER, LYOPHILIZED, FOR SOLUTION INTRAVENOUS at 01:59

## 2020-11-02 RX ADMIN — SODIUM CHLORIDE, PRESERVATIVE FREE 10 ML: 5 INJECTION INTRAVENOUS at 17:22

## 2020-11-02 RX ADMIN — HYDROMORPHONE HYDROCHLORIDE 1 MG: 1 INJECTION, SOLUTION INTRAMUSCULAR; INTRAVENOUS; SUBCUTANEOUS at 12:16

## 2020-11-02 RX ADMIN — OXYCODONE HYDROCHLORIDE AND ACETAMINOPHEN 1 TABLET: 5; 325 TABLET ORAL at 14:55

## 2020-11-02 ASSESSMENT — PAIN SCALES - WONG BAKER
WONGBAKER_NUMERICALRESPONSE: 0

## 2020-11-02 ASSESSMENT — PAIN SCALES - GENERAL
PAINLEVEL_OUTOF10: 8
PAINLEVEL_OUTOF10: 8
PAINLEVEL_OUTOF10: 9
PAINLEVEL_OUTOF10: 8
PAINLEVEL_OUTOF10: 10
PAINLEVEL_OUTOF10: 9
PAINLEVEL_OUTOF10: 8
PAINLEVEL_OUTOF10: 9
PAINLEVEL_OUTOF10: 3
PAINLEVEL_OUTOF10: 8
PAINLEVEL_OUTOF10: 9

## 2020-11-02 ASSESSMENT — PAIN DESCRIPTION - PROGRESSION: CLINICAL_PROGRESSION: GRADUALLY WORSENING

## 2020-11-02 ASSESSMENT — PAIN DESCRIPTION - PAIN TYPE: TYPE: SURGICAL PAIN

## 2020-11-02 ASSESSMENT — PAIN DESCRIPTION - DESCRIPTORS: DESCRIPTORS: DISCOMFORT

## 2020-11-02 ASSESSMENT — PAIN DESCRIPTION - FREQUENCY: FREQUENCY: CONTINUOUS

## 2020-11-02 ASSESSMENT — PAIN DESCRIPTION - ORIENTATION: ORIENTATION: MID

## 2020-11-02 ASSESSMENT — PAIN - FUNCTIONAL ASSESSMENT: PAIN_FUNCTIONAL_ASSESSMENT: PREVENTS OR INTERFERES SOME ACTIVE ACTIVITIES AND ADLS

## 2020-11-02 ASSESSMENT — PAIN DESCRIPTION - LOCATION: LOCATION: ABDOMEN

## 2020-11-02 ASSESSMENT — PAIN DESCRIPTION - ONSET: ONSET: ON-GOING

## 2020-11-02 NOTE — PROGRESS NOTES
POD 12 exploratory laparotomy with Tamez's procedure and drainage of intra-abdominal abscess. Notes reviewed from the weekend. Awaiting approval for swing bed for IV abx's. Afebrile.  VSS. Good UOP.  Tolerating diet. Colostomy is functional. Tolerating diet. WBC trending down, 16K now. H/H stable, anemia of chronic disease. Abd: ND, NR, NG, stoma is pink in LLQ. Minimal drainage from incision. incisional tenderness. ID recs and abx coverage noted, needs abx's x 2 wks.   Mobilize.

## 2020-11-02 NOTE — PROGRESS NOTES
Infectious Disease Progress Note  2020   Patient Name: Girma Sorensen : 1975       Reason for visit: F/u sepsis secondary to diverticular abscess, complicated intra-abdominal infection  History:? Interval history noted. Status post exploratory laparotomy, colostomy creation, drainage of peritoneal abscess, removal of catheter spleen on 10/21 for perforated sigmoid diverticulitis with pericolonic abscess, incisional hernias. Feels better, quite weak   Physical Exam:  Vital Signs: BP (!) 134/92   Pulse 78   Temp 96 °F (35.6 °C) (Oral)   Resp 22   Ht 5' 10.98\" (1.803 m)   Wt 182 lb 4.8 oz (82.7 kg)   SpO2 98%   BMI 25.44 kg/m²     Gen: alert and oriented X3,discomfort  Skin: no stigmata of endocarditis  Wounds: C/D/I  HEMT: AT/NC Oropharynx pink, moist, and without lesions or exudates; dentition in good state of repair  Eyes: PERRLA, EOMI, conjunctiva pink, sclera anicteric. Neck: Supple. Trachea midline. No LAD. Chest: no distress and CTA. Good air movement. Heart: RRR and no MRG. Abd: LLQ colostomy and LLQ  left flank erythema and tenderness lessening. Ext: no clubbing, cyanosis, or edema  Catheter Site: without erythema or tenderness  Neuro: Mental status intact. CN 2-12 intact and no focal sensory or motor deficits     Radiologic / Imaging / TESTING  USS  No abscess is visualized in the right or left upper quadrants. Please note that ultrasound is inferior to CT for evaluation of intra-abdominal abscesses. Trace ascites.  Bilateral pleural effusions        Labs:    Recent Results (from the past 24 hour(s))   CBC auto differential    Collection Time: 20  5:00 AM   Result Value Ref Range    WBC 16.1 (H) 4.0 - 10.5 K/CU MM    RBC 3.30 (L) 4.6 - 6.2 M/CU MM    Hemoglobin 10.2 (L) 13.5 - 18.0 GM/DL    Hematocrit 30.9 (L) 42 - 52 %    MCV 93.6 78 - 100 FL    MCH 30.9 27 - 31 PG    MCHC 33.0 32.0 - 36.0 %    RDW 14.8 11.7 - 14.9 %    Platelets 334 (H) 645 - 440 K/CU MM    MPV 9.6 7.5 - 11.1 FL    Differential Type AUTOMATED DIFFERENTIAL     Segs Relative 68.4 (H) 36 - 66 %    Lymphocytes % 16.1 (L) 24 - 44 %    Monocytes % 10.0 (H) 0 - 4 %    Eosinophils % 3.3 (H) 0 - 3 %    Basophils % 0.7 0 - 1 %    Segs Absolute 11.0 K/CU MM    Lymphocytes Absolute 2.6 K/CU MM    Monocytes Absolute 1.6 K/CU MM    Eosinophils Absolute 0.5 K/CU MM    Basophils Absolute 0.1 K/CU MM    Nucleated RBC % 0.0 %    Total Nucleated RBC 0.0 K/CU MM    Total Immature Neutrophil 0.24 K/CU MM    Immature Neutrophil % 1.5 (H) 0 - 0.43 %   C-Reactive Protein    Collection Time: 11/02/20  5:00 AM   Result Value Ref Range    CRP, High Sensitivity 36.7 mg/L     CULTURE results: Invalid input(s): BLOOD CULTURE,  URINE CULTURE, SURGICAL CULTURE    Diagnosis:  Patient Active Problem List   Diagnosis    HIV positive ([facility])    Alcoholic ([facility])    Hypertension    Alcohol withdrawal ([facility])    Abdominal wall abscess    Abscess of sigmoid colon due to diverticulitis    Proteinuria    HIV (human immunodeficiency virus infection) ([facility])    Incisional hernia, without obstruction or gangrene       Active Problems  Active Problems:    Abdominal wall abscess    Abscess of sigmoid colon due to diverticulitis    Proteinuria    HIV (human immunodeficiency virus infection) ([facility])    Incisional hernia, without obstruction or gangrene  Resolved Problems:    * No resolved hospital problems. *      Impression and plan   Clinical status: Status post exploratory laparotomy, drainage of pericolonic abscess on 10/21/2020. Improving, leukocytosis on the downward trend coverage being provided for VRE, CRE, anaerobes, fungi.  Therapeutic: continue linezolid 10/19 (end-date:11/1/2020) , Avycaz 10/25-11/7,metronidazole 10/24-11/7. Mckinley Garcia 10/26-11/7  , will need at least 2 weeks of IV abx.    After discharge the following should be done:  Weekly labs drawn on Monday during the course of treatment  CBC with differential, CMP, ESR, CRP,  Fax results to [facility] Infectious Diseases Staff  # [phone]  See in clinic within one week after discharge  Disposition: Swing bed   Completed antibiotics: Zosyn, meropenem 10/17-25.  Diagnostic: trend crp, and pct.  F/u:    Other:   Summary: [facility] since 2003, 10/22/2020 HIV VL undetected, CD4 859, RPR: 1:2 (previously treated for syphilis).  Lipase not elevated      Electronically signed by: Electronically signed by Reid Portillo MD on 11/2/2020 at 12:40 PM

## 2020-11-02 NOTE — PROGRESS NOTES
1. He is waiting for placement  2. As he is on abx - remains in risk for EDGAR  3. CMP , mg weekly x 4   4. F/U with me in 1 mo  5.  May d/C with existing BP med's for now - I will re arrange as an out pt       Precious Correa MD

## 2020-11-02 NOTE — CARE COORDINATION
Message to [facility]

## 2020-11-02 NOTE — PROGRESS NOTES
Comprehensive Nutrition Assessment    Type and Reason for Visit:  Reassess    Nutrition Recommendations/Plan:   · Continue current diet and oral supplements, between meals    Nutrition Assessment:  POD 12 s/p exploratory laparotomy with Tamez's procedure and drainage of intra-abdominal abscess. Tolerating less than half of General Diet with standard supplements. Will continue to follow as high nutrition risk. Malnutrition Assessment:  Malnutrition Status: At risk for malnutrition   Context:  Acute Illness       Estimated Daily Nutrient Needs:  Energy (kcal):  3518-9206; Weight Used for Energy Requirements:  Current     Protein (g):  80-94; Weight Used for Protein Requirements:  Ideal        Fluid (ml/day):  2000; Method Used for Fluid Requirements:  1 ml/kcal      Nutrition Related Findings:  Mg 1.6      Wounds:  Surgical Wound       Current Nutrition Therapies:    DIET GENERAL;  Dietary Nutrition Supplements: Standard High Calorie Oral Supplement    Anthropometric Measures:  · Height: 5' 10.98\" (180.3 cm)  · Current Body Weight: 182 lb 4.8 oz (82.7 kg)   · Admission Body Weight: 168 lb 14 oz (76.6 kg)    · Usual Body Weight: (no weight hx available)     · Ideal Body Weight: 172 lbs; % Ideal Body Weight 108.7 %   · BMI: 25.4  · BMI Categories: Overweight (BMI 25.0-29. 9)       Nutrition Diagnosis:   · Inadequate oral intake related to acute injury/trauma as evidenced by intake 26-50%    Nutrition Interventions:   Food and/or Nutrient Delivery:  Continue Current Diet, Continue Oral Nutrition Supplement  Nutrition Education/Counseling:  No recommendation at this time   Coordination of Nutrition Care:  Continue to monitor while inpatient    Goals:  Pt will consume greater than 50% of his meals and supplements       Nutrition Monitoring and Evaluation:   Food/Nutrient Intake Outcomes:  Diet Advancement/Tolerance, Food and Nutrient Intake  Physical Signs/Symptoms Outcomes:  Skin, Biochemical Data, Weight, Nausea or Vomiting, GI Status     Discharge Planning:    Continue Oral Nutrition Supplement     Electronically signed by Bessy Kelley RD, LD on 11/2/20 at 12:35 PM EST    Contact: [phone]

## 2020-11-03 ENCOUNTER — HOSPITAL ENCOUNTER (INPATIENT)
Age: 45
LOS: 4 days | Discharge: HOME HEALTH | DRG: 372 | End: 2020-11-07
Attending: INTERNAL MEDICINE | Admitting: INTERNAL MEDICINE
Payer: COMMERCIAL

## 2020-11-03 VITALS
TEMPERATURE: 98.5 F | DIASTOLIC BLOOD PRESSURE: 86 MMHG | HEART RATE: 82 BPM | SYSTOLIC BLOOD PRESSURE: 126 MMHG | WEIGHT: 160.9 LBS | RESPIRATION RATE: 16 BRPM | BODY MASS INDEX: 22.52 KG/M2 | HEIGHT: 71 IN | OXYGEN SATURATION: 100 %

## 2020-11-03 LAB
BASOPHILS ABSOLUTE: 0.1 K/CU MM
BASOPHILS RELATIVE PERCENT: 0.7 % (ref 0–1)
DIFFERENTIAL TYPE: ABNORMAL
EOSINOPHILS ABSOLUTE: 0.5 K/CU MM
EOSINOPHILS RELATIVE PERCENT: 3.3 % (ref 0–3)
HCT VFR BLD CALC: 32.3 % (ref 42–52)
HEMOGLOBIN: 10.2 GM/DL (ref 13.5–18)
HIGH SENSITIVE C-REACTIVE PROTEIN: 19.3 MG/L
IMMATURE NEUTROPHIL %: 1.5 % (ref 0–0.43)
LYMPHOCYTES ABSOLUTE: 2.5 K/CU MM
LYMPHOCYTES RELATIVE PERCENT: 17.9 % (ref 24–44)
MCH RBC QN AUTO: 29.5 PG (ref 27–31)
MCHC RBC AUTO-ENTMCNC: 31.6 % (ref 32–36)
MCV RBC AUTO: 93.4 FL (ref 78–100)
MONOCYTES ABSOLUTE: 1.6 K/CU MM
MONOCYTES RELATIVE PERCENT: 11.4 % (ref 0–4)
NUCLEATED RBC %: 0 %
PDW BLD-RTO: 14.7 % (ref 11.7–14.9)
PLATELET # BLD: 482 K/CU MM (ref 140–440)
PMV BLD AUTO: 9.2 FL (ref 7.5–11.1)
RBC # BLD: 3.46 M/CU MM (ref 4.6–6.2)
SEGMENTED NEUTROPHILS ABSOLUTE COUNT: 8.9 K/CU MM
SEGMENTED NEUTROPHILS RELATIVE PERCENT: 65.2 % (ref 36–66)
TOTAL IMMATURE NEUTOROPHIL: 0.21 K/CU MM
TOTAL NUCLEATED RBC: 0 K/CU MM
TOTAL RETICULOCYTE COUNT: 0.11 K/CU MM
WBC # BLD: 13.7 K/CU MM (ref 4–10.5)

## 2020-11-03 PROCEDURE — 6370000000 HC RX 637 (ALT 250 FOR IP): Performed by: NURSE PRACTITIONER

## 2020-11-03 PROCEDURE — 2700000000 HC OXYGEN THERAPY PER DAY

## 2020-11-03 PROCEDURE — 2580000003 HC RX 258: Performed by: INTERNAL MEDICINE

## 2020-11-03 PROCEDURE — C9113 INJ PANTOPRAZOLE SODIUM, VIA: HCPCS | Performed by: SURGERY

## 2020-11-03 PROCEDURE — 99232 SBSQ HOSP IP/OBS MODERATE 35: CPT | Performed by: INTERNAL MEDICINE

## 2020-11-03 PROCEDURE — 99211 OFF/OP EST MAY X REQ PHY/QHP: CPT

## 2020-11-03 PROCEDURE — 94761 N-INVAS EAR/PLS OXIMETRY MLT: CPT

## 2020-11-03 PROCEDURE — 2500000003 HC RX 250 WO HCPCS: Performed by: INTERNAL MEDICINE

## 2020-11-03 PROCEDURE — 2500000003 HC RX 250 WO HCPCS: Performed by: SURGERY

## 2020-11-03 PROCEDURE — 6370000000 HC RX 637 (ALT 250 FOR IP): Performed by: INTERNAL MEDICINE

## 2020-11-03 PROCEDURE — 6360000002 HC RX W HCPCS: Performed by: INTERNAL MEDICINE

## 2020-11-03 PROCEDURE — 86141 C-REACTIVE PROTEIN HS: CPT

## 2020-11-03 PROCEDURE — 94150 VITAL CAPACITY TEST: CPT

## 2020-11-03 PROCEDURE — 97116 GAIT TRAINING THERAPY: CPT

## 2020-11-03 PROCEDURE — 6370000000 HC RX 637 (ALT 250 FOR IP): Performed by: SURGERY

## 2020-11-03 PROCEDURE — 6360000002 HC RX W HCPCS: Performed by: SURGERY

## 2020-11-03 PROCEDURE — 85025 COMPLETE CBC W/AUTO DIFF WBC: CPT

## 2020-11-03 PROCEDURE — 1200000002 HC SEMI PRIVATE SWING BED

## 2020-11-03 PROCEDURE — C9113 INJ PANTOPRAZOLE SODIUM, VIA: HCPCS | Performed by: INTERNAL MEDICINE

## 2020-11-03 RX ORDER — PANTOPRAZOLE SODIUM 40 MG/10ML
40 INJECTION, POWDER, LYOPHILIZED, FOR SOLUTION INTRAVENOUS 2 TIMES DAILY
Status: CANCELLED | OUTPATIENT
Start: 2020-11-03

## 2020-11-03 RX ORDER — SODIUM CHLORIDE 0.9 % (FLUSH) 0.9 %
10 SYRINGE (ML) INJECTION PRN
Status: DISCONTINUED | OUTPATIENT
Start: 2020-11-03 | End: 2020-11-07 | Stop reason: HOSPADM

## 2020-11-03 RX ORDER — CLONIDINE 0.1 MG/24H
1 PATCH, EXTENDED RELEASE TRANSDERMAL WEEKLY
Status: DISCONTINUED | OUTPATIENT
Start: 2020-11-08 | End: 2020-11-07 | Stop reason: HOSPADM

## 2020-11-03 RX ORDER — AMLODIPINE BESYLATE 10 MG/1
10 TABLET ORAL DAILY
Status: DISCONTINUED | OUTPATIENT
Start: 2020-11-04 | End: 2020-11-07 | Stop reason: HOSPADM

## 2020-11-03 RX ORDER — GUAIFENESIN 600 MG/1
600 TABLET, EXTENDED RELEASE ORAL 2 TIMES DAILY
Status: CANCELLED | OUTPATIENT
Start: 2020-11-03

## 2020-11-03 RX ORDER — OXYCODONE HYDROCHLORIDE AND ACETAMINOPHEN 5; 325 MG/1; MG/1
1 TABLET ORAL EVERY 4 HOURS PRN
Status: CANCELLED | OUTPATIENT
Start: 2020-11-03

## 2020-11-03 RX ORDER — SODIUM CHLORIDE 0.9 % (FLUSH) 0.9 %
10 SYRINGE (ML) INJECTION EVERY 12 HOURS SCHEDULED
Status: CANCELLED | OUTPATIENT
Start: 2020-11-03

## 2020-11-03 RX ORDER — SODIUM CHLORIDE 0.9 % (FLUSH) 0.9 %
10 SYRINGE (ML) INJECTION PRN
Status: CANCELLED | OUTPATIENT
Start: 2020-11-03

## 2020-11-03 RX ORDER — CLOTRIMAZOLE AND BETAMETHASONE DIPROPIONATE 10; .64 MG/G; MG/G
CREAM TOPICAL 2 TIMES DAILY
Status: DISCONTINUED | OUTPATIENT
Start: 2020-11-03 | End: 2020-11-07 | Stop reason: HOSPADM

## 2020-11-03 RX ORDER — PANTOPRAZOLE SODIUM 40 MG/10ML
40 INJECTION, POWDER, LYOPHILIZED, FOR SOLUTION INTRAVENOUS 2 TIMES DAILY
Status: DISCONTINUED | OUTPATIENT
Start: 2020-11-03 | End: 2020-11-06

## 2020-11-03 RX ORDER — GUAIFENESIN 600 MG/1
600 TABLET, EXTENDED RELEASE ORAL 2 TIMES DAILY
Status: DISCONTINUED | OUTPATIENT
Start: 2020-11-03 | End: 2020-11-07 | Stop reason: HOSPADM

## 2020-11-03 RX ORDER — OXYCODONE HYDROCHLORIDE AND ACETAMINOPHEN 5; 325 MG/1; MG/1
1 TABLET ORAL EVERY 4 HOURS PRN
Status: DISCONTINUED | OUTPATIENT
Start: 2020-11-03 | End: 2020-11-04

## 2020-11-03 RX ORDER — CLOTRIMAZOLE AND BETAMETHASONE DIPROPIONATE 10; .64 MG/G; MG/G
CREAM TOPICAL 2 TIMES DAILY
Status: CANCELLED | OUTPATIENT
Start: 2020-11-03

## 2020-11-03 RX ORDER — LISINOPRIL 40 MG/1
40 TABLET ORAL DAILY
Status: CANCELLED | OUTPATIENT
Start: 2020-11-04

## 2020-11-03 RX ORDER — PROMETHAZINE HYDROCHLORIDE 25 MG/ML
12.5 INJECTION, SOLUTION INTRAMUSCULAR; INTRAVENOUS EVERY 6 HOURS PRN
Status: DISCONTINUED | OUTPATIENT
Start: 2020-11-03 | End: 2020-11-07 | Stop reason: HOSPADM

## 2020-11-03 RX ORDER — ACETAMINOPHEN 325 MG/1
650 TABLET ORAL EVERY 6 HOURS PRN
Status: DISCONTINUED | OUTPATIENT
Start: 2020-11-03 | End: 2020-11-07 | Stop reason: HOSPADM

## 2020-11-03 RX ORDER — AMLODIPINE BESYLATE 10 MG/1
10 TABLET ORAL DAILY
Status: CANCELLED | OUTPATIENT
Start: 2020-11-04

## 2020-11-03 RX ORDER — ACETAMINOPHEN 325 MG/1
650 TABLET ORAL EVERY 6 HOURS PRN
Status: CANCELLED | OUTPATIENT
Start: 2020-11-03

## 2020-11-03 RX ORDER — LISINOPRIL 40 MG/1
40 TABLET ORAL DAILY
Status: DISCONTINUED | OUTPATIENT
Start: 2020-11-04 | End: 2020-11-07 | Stop reason: HOSPADM

## 2020-11-03 RX ORDER — PROMETHAZINE HYDROCHLORIDE 25 MG/ML
12.5 INJECTION, SOLUTION INTRAMUSCULAR; INTRAVENOUS EVERY 6 HOURS PRN
Status: CANCELLED | OUTPATIENT
Start: 2020-11-03

## 2020-11-03 RX ORDER — CLONIDINE 0.1 MG/24H
1 PATCH, EXTENDED RELEASE TRANSDERMAL WEEKLY
Status: CANCELLED | OUTPATIENT
Start: 2020-11-08

## 2020-11-03 RX ORDER — CLOTRIMAZOLE AND BETAMETHASONE DIPROPIONATE 10; .64 MG/G; MG/G
CREAM TOPICAL DAILY
COMMUNITY
End: 2020-12-01

## 2020-11-03 RX ORDER — SODIUM CHLORIDE 0.9 % (FLUSH) 0.9 %
10 SYRINGE (ML) INJECTION EVERY 12 HOURS SCHEDULED
Status: DISCONTINUED | OUTPATIENT
Start: 2020-11-03 | End: 2020-11-07 | Stop reason: HOSPADM

## 2020-11-03 RX ADMIN — CEFTAZIDIME, AVIBACTAM 2.5 G: 2; .5 POWDER, FOR SOLUTION INTRAVENOUS at 06:03

## 2020-11-03 RX ADMIN — OXYCODONE HYDROCHLORIDE AND ACETAMINOPHEN 1 TABLET: 5; 325 TABLET ORAL at 04:58

## 2020-11-03 RX ADMIN — OXYCODONE AND ACETAMINOPHEN 1 TABLET: 5; 325 TABLET ORAL at 19:47

## 2020-11-03 RX ADMIN — PANTOPRAZOLE SODIUM 40 MG: 40 INJECTION, POWDER, FOR SOLUTION INTRAVENOUS at 21:18

## 2020-11-03 RX ADMIN — METOPROLOL TARTRATE 125 MG: 50 TABLET, FILM COATED ORAL at 09:13

## 2020-11-03 RX ADMIN — SODIUM CHLORIDE 100 MG: 9 INJECTION, SOLUTION INTRAVENOUS at 09:19

## 2020-11-03 RX ADMIN — PANCRELIPASE 30000 UNITS: 30000; 6000; 19000 CAPSULE, DELAYED RELEASE PELLETS ORAL at 12:16

## 2020-11-03 RX ADMIN — AMLODIPINE BESYLATE 10 MG: 10 TABLET ORAL at 09:14

## 2020-11-03 RX ADMIN — METRONIDAZOLE 500 MG: 500 INJECTION, SOLUTION INTRAVENOUS at 12:16

## 2020-11-03 RX ADMIN — HYDROMORPHONE HYDROCHLORIDE 1 MG: 1 INJECTION, SOLUTION INTRAMUSCULAR; INTRAVENOUS; SUBCUTANEOUS at 13:28

## 2020-11-03 RX ADMIN — OXYCODONE HYDROCHLORIDE AND ACETAMINOPHEN 1 TABLET: 5; 325 TABLET ORAL at 09:13

## 2020-11-03 RX ADMIN — CLOTRIMAZOLE AND BETAMETHASONE DIPROPIONATE: 10; .5 CREAM TOPICAL at 21:18

## 2020-11-03 RX ADMIN — ERAVACYCLINE: 50 INJECTION, POWDER, LYOPHILIZED, FOR SOLUTION INTRAVENOUS at 13:33

## 2020-11-03 RX ADMIN — ONDANSETRON 4 MG: 2 INJECTION INTRAMUSCULAR; INTRAVENOUS at 00:41

## 2020-11-03 RX ADMIN — HYDROMORPHONE HYDROCHLORIDE 1 MG: 1 INJECTION, SOLUTION INTRAMUSCULAR; INTRAVENOUS; SUBCUTANEOUS at 10:31

## 2020-11-03 RX ADMIN — OXYCODONE HYDROCHLORIDE AND ACETAMINOPHEN 1 TABLET: 5; 325 TABLET ORAL at 14:27

## 2020-11-03 RX ADMIN — CLOTRIMAZOLE AND BETAMETHASONE DIPROPIONATE: 10; .5 CREAM TOPICAL at 10:34

## 2020-11-03 RX ADMIN — LINEZOLID 600 MG: 600 INJECTION, SOLUTION INTRAVENOUS at 00:04

## 2020-11-03 RX ADMIN — HYDROMORPHONE HYDROCHLORIDE 1 MG: 1 INJECTION, SOLUTION INTRAMUSCULAR; INTRAVENOUS; SUBCUTANEOUS at 07:06

## 2020-11-03 RX ADMIN — GUAIFENESIN 600 MG: 600 TABLET, EXTENDED RELEASE ORAL at 09:14

## 2020-11-03 RX ADMIN — HYDROMORPHONE HYDROCHLORIDE 1 MG: 1 INJECTION, SOLUTION INTRAMUSCULAR; INTRAVENOUS; SUBCUTANEOUS at 03:19

## 2020-11-03 RX ADMIN — ERAVACYCLINE: 50 INJECTION, POWDER, LYOPHILIZED, FOR SOLUTION INTRAVENOUS at 01:31

## 2020-11-03 RX ADMIN — GUAIFENESIN 600 MG: 600 TABLET, EXTENDED RELEASE ORAL at 21:19

## 2020-11-03 RX ADMIN — PANTOPRAZOLE SODIUM 40 MG: 40 INJECTION, POWDER, FOR SOLUTION INTRAVENOUS at 09:13

## 2020-11-03 RX ADMIN — HYDROMORPHONE HYDROCHLORIDE 1 MG: 1 INJECTION, SOLUTION INTRAMUSCULAR; INTRAVENOUS; SUBCUTANEOUS at 00:04

## 2020-11-03 RX ADMIN — CEFTAZIDIME, AVIBACTAM 2.5 G: 2; .5 POWDER, FOR SOLUTION INTRAVENOUS at 14:27

## 2020-11-03 RX ADMIN — SODIUM CHLORIDE, PRESERVATIVE FREE 10 ML: 5 INJECTION INTRAVENOUS at 09:14

## 2020-11-03 RX ADMIN — ONDANSETRON 4 MG: 2 INJECTION INTRAMUSCULAR; INTRAVENOUS at 09:13

## 2020-11-03 RX ADMIN — PANCRELIPASE 30000 UNITS: 30000; 6000; 19000 CAPSULE, DELAYED RELEASE PELLETS ORAL at 09:19

## 2020-11-03 RX ADMIN — METRONIDAZOLE 500 MG: 500 INJECTION, SOLUTION INTRAVENOUS at 21:19

## 2020-11-03 RX ADMIN — LISINOPRIL 40 MG: 40 TABLET ORAL at 09:13

## 2020-11-03 RX ADMIN — METRONIDAZOLE 500 MG: 500 INJECTION, SOLUTION INTRAVENOUS at 04:44

## 2020-11-03 RX ADMIN — ENOXAPARIN SODIUM 40 MG: 40 INJECTION SUBCUTANEOUS at 09:14

## 2020-11-03 RX ADMIN — HYDROMORPHONE HYDROCHLORIDE 1 MG: 1 INJECTION, SOLUTION INTRAMUSCULAR; INTRAVENOUS; SUBCUTANEOUS at 17:22

## 2020-11-03 RX ADMIN — SODIUM CHLORIDE, PRESERVATIVE FREE 10 ML: 5 INJECTION INTRAVENOUS at 21:19

## 2020-11-03 ASSESSMENT — PAIN SCALES - GENERAL
PAINLEVEL_OUTOF10: 10
PAINLEVEL_OUTOF10: 9
PAINLEVEL_OUTOF10: 10
PAINLEVEL_OUTOF10: 8
PAINLEVEL_OUTOF10: 8
PAINLEVEL_OUTOF10: 7
PAINLEVEL_OUTOF10: 9
PAINLEVEL_OUTOF10: 7
PAINLEVEL_OUTOF10: 8
PAINLEVEL_OUTOF10: 9
PAINLEVEL_OUTOF10: 9
PAINLEVEL_OUTOF10: 8
PAINLEVEL_OUTOF10: 8
PAINLEVEL_OUTOF10: 9

## 2020-11-03 ASSESSMENT — PAIN DESCRIPTION - ONSET: ONSET: ON-GOING

## 2020-11-03 ASSESSMENT — PAIN DESCRIPTION - PAIN TYPE: TYPE: SURGICAL PAIN

## 2020-11-03 ASSESSMENT — PAIN SCALES - WONG BAKER
WONGBAKER_NUMERICALRESPONSE: 0

## 2020-11-03 ASSESSMENT — PAIN DESCRIPTION - FREQUENCY: FREQUENCY: CONTINUOUS

## 2020-11-03 ASSESSMENT — PAIN DESCRIPTION - DESCRIPTORS: DESCRIPTORS: ACHING;BURNING;STABBING

## 2020-11-03 ASSESSMENT — PAIN - FUNCTIONAL ASSESSMENT: PAIN_FUNCTIONAL_ASSESSMENT: PREVENTS OR INTERFERES SOME ACTIVE ACTIVITIES AND ADLS

## 2020-11-03 ASSESSMENT — PAIN DESCRIPTION - LOCATION: LOCATION: ABDOMEN

## 2020-11-03 ASSESSMENT — PAIN DESCRIPTION - ORIENTATION: ORIENTATION: RIGHT;MID

## 2020-11-03 ASSESSMENT — PAIN DESCRIPTION - PROGRESSION: CLINICAL_PROGRESSION: GRADUALLY WORSENING

## 2020-11-03 NOTE — PROGRESS NOTES
482 (H) 140 - 440 K/CU MM    MPV 9.2 7.5 - 11.1 FL    Differential Type AUTOMATED DIFFERENTIAL     Segs Relative 65.2 36 - 66 %    Lymphocytes % 17.9 (L) 24 - 44 %    Monocytes % 11.4 (H) 0 - 4 %    Eosinophils % 3.3 (H) 0 - 3 %    Basophils % 0.7 0 - 1 %    Segs Absolute 8.9 K/CU MM    Lymphocytes Absolute 2.5 K/CU MM    Monocytes Absolute 1.6 K/CU MM    Eosinophils Absolute 0.5 K/CU MM    Basophils Absolute 0.1 K/CU MM    Nucleated RBC % 0.0 %    Total Nucleated RBC 0.0 K/CU MM    TRC 0.1076 K/CU MM    Total Immature Neutrophil 0.21 K/CU MM    Immature Neutrophil % 1.5 (H) 0 - 0.43 %   C-Reactive Protein    Collection Time: 11/03/20  5:00 AM   Result Value Ref Range    CRP, High Sensitivity 19.3 mg/L     CULTURE results: Invalid input(s): BLOOD CULTURE,  URINE CULTURE, SURGICAL CULTURE    Diagnosis:  Patient Active Problem List   Diagnosis    [facility] positive ([facility]    Alcoholic ([facility]    Hypertension    Alcohol withdrawal ([facility]    Abdominal wall abscess    Abscess of sigmoid colon due to diverticulitis    Proteinuria    HIV (human immunodeficiency virus infection) ([facility]    Incisional hernia, without obstruction or gangrene       Active Problems  Active Problems:    Abdominal wall abscess    Abscess of sigmoid colon due to diverticulitis    Proteinuria    HIV (human immunodeficiency virus infection) ([facility]    Incisional hernia, without obstruction or gangrene  Resolved Problems:    * No resolved hospital problems. *      Impression and plan   Clinical status: Status post exploratory laparotomy, drainage of pericolonic abscess on 10/21/2020. Improving, leukocytosis on the downward trend coverage being provided for VRE, CRE, anaerobes, fungi.  Therapeutic: Avycaz 10/25-11/7,metronidazole 10/24-11/7. Piero Sudhakar 10/26-11/7, inclined to increase duration to 4 weeks since he is showing slow improvement.  New end-date 11/21/2020  · discontinue linezolid 10/19 (end-date:11/1/2020)   After discharge the following should be done:  Weekly labs drawn on Monday during the course of treatment  CBC with differential, CMP, ESR, CRP,  Fax results to Attn: Delanoberry Guerrero Infectious Diseases Staff  # [phone]  See in clinic within one week after discharge  Disposition: Swing bed   Completed antibiotics: Zosyn, meropenem 10/17-25.  Diagnostic: trend crp, and pct.  F/u:    Other:   Summary: [facility] since 2003, 10/22/2020 HIV VL undetected, CD4 859, RPR: 1:2 (previously treated for syphilis).  Lipase not elevated      Electronically signed by: Electronically signed by Shmuel Ahmadi MD on 11/3/2020 at 10:59 AM

## 2020-11-03 NOTE — DISCHARGE SUMMARY
S1 and S2 normal, no murmur,   rub or gallop  Abdomen:     Soft, non-tender, bowel sounds active, no masses, no organomegaly  Extremities:   Extremities normal, atraumatic, no cyanosis or edema    Consults:  IP CONSULT TO GENERAL SURGERY  IP CONSULT TO GENERAL SURGERY  IP CONSULT TO PHARMACY  IP CONSULT TO INTERVENTIONAL RADIOLOGY  IP CONSULT TO INFECTIOUS DISEASES  IP CONSULT TO NEPHROLOGY  IP CONSULT TO NEPHROLOGY  IP CONSULT TO ENDOCRINOLOGY    Patient Instructions:   Yfn Lynch Medication Instructions AXN:[id number]    Printed on:11/03/20 1633   Medication Information                      Abacavir-Dolutegravir-Lamivud 600- MG TABS  Take by mouth             efavirenz (SUSTIVA) 600 MG tablet  Take 600 mg by mouth nightly. Diet:  DIET GENERAL;  Dietary Nutrition Supplements: Standard High Calorie Oral Supplement    Activity:   activity as tolerated     Discharge Condition:   good    Disposition:  Swing bed with plan to readmit    Follow-up    Kassidy Sawyer MD  In 1 week    [place] [id number]   Amie Rodriguez MD  In 1 week    [address]  [phone]       Time spent on discharge in the examination, evaluation, counseling and review of medications and discharge plan: 34 minutes       Discharge Physician Signed: Laine

## 2020-11-03 NOTE — PROGRESS NOTES
Nephrology Progress Note  11/3/2020 4:53 PM        Subjective:   Admit Date: 10/16/2020  PCP: Matilde Espinoza, RN, BSN, MSN, FNP-C    Interval History: this is a late e. Pt seen in early  am     Diet: better     ROS:  abd  Pain , UOP better , no dysuria, no sob    Data:     Current meds:    clotrimazole-betamethasone   Topical BID    anidulafungin (ERAXIS) 100 mg IVPB  100 mg Intravenous Daily    ceftazidime-acibactam (AVYCAZ) infusion  2.5 g Intravenous Q8H    sodium chloride flush  10 mL Intravenous 2 times per day    lisinopril  40 mg Oral Daily    IVPB builder   Intravenous Q12H    guaiFENesin  600 mg Oral BID    metoprolol  125 mg Oral BID    cloNIDine  1 patch Transdermal Weekly    lipase-protease-amylase  30,000 Units Oral TID WC    amLODIPine  10 mg Oral Daily    metroNIDAZOLE  500 mg Intravenous Q8H    pantoprazole  40 mg Intravenous BID    enoxaparin  40 mg Subcutaneous Daily    Abacavir-Dolutegravir-Lamivud  1 tablet Oral Daily           I/O last 3 completed shifts: In: 56 [P.O.:600; I.V.:10]  Out: 875 [Urine:875]    CBC:   Recent Labs     11/01/20  0500 11/02/20  0500 11/03/20  0500   WBC 16.7* 16.1* 13.7*   HGB 9.6* 10.2* 10.2*   * 451* 482*          Recent Labs     11/01/20  0500   *   K 4.6   CL 95*   CO2 28   BUN 5*   CREATININE 0.7*   GLUCOSE 102*       Lab Results   Component Value Date    CALCIUM 7.7 (L) 11/01/2020    PHOS 4.4 11/01/2020       Objective:     Vitals: /86   Pulse 82   Temp 98.5 °F (36.9 °C) (Oral)   Resp 16   Ht 5' 10.98\" (1.803 m)   Wt 160 lb 14.4 oz (73 kg)   SpO2 100%   BMI 22.45 kg/m²     General appearance: Thin , mild distress due to pain  HEENT:  No conj pallor  Neck:  supple  Lungs:  No gross crackles  Heart:  Seems RRR  Abdomen: soft , tender , +  Ostomy   Extremities:  No overt edema       Problem List :         Impression :     1. HTN acceptable - 120/80 well controlled I should say  2.  HIV/ abd adscess- well controled with ART and better with abx , wbc down  3. Na was 133 - acceptable     Recommendation/Plan  :     1. Ok to /C with same BP meds for now   2. I will re arrange as an  out pt   3. Also CMP , mg , phos weekly x 4  Audi with ongoing infection and abx   4.  F/U with me in 1 mo       Marie Leo MD

## 2020-11-03 NOTE — PROGRESS NOTES
1031    oxyCODONE-acetaminophen (PERCOCET) 5-325 MG per tablet 1 tablet  1 tablet Oral Q4H PRN Ramsey Lazo MD   1 tablet at 11/03/20 0913    anidulafungin (ERAXIS) 100 mg in sodium chloride 0.9 % 130 mL IVPB  100 mg Intravenous Daily Krishna Gillette MD   Stopped at 11/03/20 1117    ceftazidime-avibactam (AVYCAZ) 2.5 g in sodium chloride 0.9 % 100 mL IVPB  2.5 g Intravenous Q8H Krishna Gillette MD   Stopped at 11/03/20 0920    sodium chloride flush 0.9 % injection 10 mL  10 mL Intravenous 2 times per day Krishan Garber MD   10 mL at 11/03/20 0914    sodium chloride flush 0.9 % injection 10 mL  10 mL Intravenous PRN Krishan Garber MD   10 mL at 11/02/20 1722    lisinopril (PRINIVIL;ZESTRIL) tablet 40 mg  40 mg Oral Daily Krishan Garber MD   40 mg at 11/03/20 0913    Eravacycline Dihydrochloride 80 mg in sodium chloride 0.9 % 250 mL IVPB   Intravenous Q12H Arian Whalen MD   Stopped at 11/03/20 0235    guaiFENesin (MUCINEX) extended release tablet 600 mg  600 mg Oral BID KEANU Chong - CNP   600 mg at 11/03/20 0914    metoprolol tartrate (LOPRESSOR) tablet 125 mg  125 mg Oral BID Davi Herron MD   125 mg at 11/03/20 0913    sodium chloride flush 0.9 % injection 10 mL  10 mL Intravenous PRN Enrriquekeith Pritchett MD   10 mL at 10/31/20 1120    cloNIDine (CATAPRES) 0.1 MG/24HR 1 patch  1 patch Transdermal Weekly Davi Feldman MD   1 patch at 11/01/20 1043    lipase-protease-amylase (CREON) delayed release capsule 30,000 Units  30,000 Units Oral TID WC Krishan Garber MD   30,000 Units at 11/03/20 1216    amLODIPine (NORVASC) tablet 10 mg  10 mg Oral Daily Krishan Garber MD   10 mg at 11/03/20 0914    metronidazole (FLAGYL) 500 mg in NaCl 100 mL IVPB premix  500 mg Intravenous Q8H Cas Londono  mL/hr at 11/03/20 1216 500 mg at 11/03/20 1216    promethazine (PHENERGAN) injection 12.5 mg  12.5 mg Intravenous Q6H PRN Cas Londono MD   12.5 mg at 10/24/20 1733    pantoprazole (PROTONIX) injection 40 mg  40 mg Intravenous BID Raven Andrew MD   40 mg at 11/03/20 0913    enoxaparin (LOVENOX) injection 40 mg  40 mg Subcutaneous Daily Raven Andrew MD   40 mg at 11/03/20 0914    Abacavir-Dolutegravir-Lamivud 600- MG TABS 600 mg  1 tablet Oral Daily Raven Andrew MD   600 mg at 11/03/20 0919    ondansetron (ZOFRAN) injection 4 mg  4 mg Intravenous Q6H PRN Raven Andrew MD   4 mg at 11/03/20 0913    acetaminophen (TYLENOL) tablet 650 mg  650 mg Oral Q6H PRN Raven Andrew MD   650 mg at 10/26/20 2044       Subjective:     Patient denied any complaints. No acute overnight events. Slept well last night    Objective: Intake/Output Summary (Last 24 hours) at 11/3/2020 1240  Last data filed at 11/3/2020 1221  Gross per 24 hour   Intake 610 ml   Output 1000 ml   Net -390 ml      Vitals:   Vitals:    11/03/20 0911   BP: 126/86   Pulse: 82   Resp: 16   Temp: 98.5 °F (36.9 °C)   SpO2: 100%     Physical Exam:  General Appearance:    Alert, cooperative, no distress  Head:      Normocephalic, without obvious abnormality, atraumatic  Eyes:       Conjunctiva/corneas clear, EOM's intact  Lungs:    Clear to auscultation bilaterally, respirations unlabored  Heart:                Regular rate and rhythm, S1 and S2 normal, no murmur,   rub or gallop  Abdomen:     LLQ colostomy and LLQ left flank erythema and tenderness lessening   Extremities:   Extremities normal, atraumatic, no cyanosis or edema  Neurological:   Grossly Intact.     Significant Diagnostic Studies:   DATA:    CBC   Recent Labs     11/01/20  0500 11/02/20  0500 11/03/20  0500   WBC 16.7* 16.1* 13.7*   HGB 9.6* 10.2* 10.2*   HCT 30.0* 30.9* 32.3*   * 451* 482*      BMP   Recent Labs     11/01/20  0500   *   K 4.6   CL 95*   CO2 28   PHOS 4.4   BUN 5*   CREATININE 0.7*     LFT'S   Recent Labs     11/01/20  0500   AST 13*   ALT 6*   BILITOT 0.3   ALKPHOS 111 COAG No results for input(s): INR in the last 72 hours. POC:   Lab Results   Component Value Date    POCGLU 96 11/05/2012     [id number]:No results found for: LABA1C  CARDIAC ENZYMES  No results for input(s): CKTOTAL, CKMB, CKMBINDEX, TROPONINI in the last 72 hours. Troponin: No results for input(s): TROPONINT in the last 72 hours. BNP: No results for input(s): PROBNP in the last 72 hours. U/A:    Lab Results   Component Value Date    COLORU STRAW 11/01/2020    WBCUA <1 11/01/2020    RBCUA <1 11/01/2020    MUCUS RARE 10/27/2020    BACTERIA NEGATIVE 11/01/2020    CLARITYU CLEAR 11/01/2020    SPECGRAV 1.003 11/01/2020    LEUKOCYTESUR NEGATIVE 11/01/2020    BLOODU NEGATIVE 11/01/2020       Xr Abdomen (kub) (single Ap View)    Result Date: 10/25/2020  EXAMINATION: ONE SUPINE XRAY VIEW(S) OF THE ABDOMEN 10/25/2020 12:28 am COMPARISON: CT abdomen and pelvis 10/16/2020 HISTORY: ORDERING SYSTEM PROVIDED HISTORY: PORTABLE, ileus TECHNOLOGIST PROVIDED HISTORY: Reason for exam:->PORTABLE, ileus Reason for Exam: PORTABLE, ileus Acuity: Acute Type of Exam: Subsequent/Follow-up FINDINGS: Midline surgical skin staples are present. There are multiple abnormally dilated loops of small bowel within the mid abdomen and upper pelvis. Multiple abnormally dilated loops of small bowel, likely an ileus given the recent surgery. A small bowel obstruction could also have this appearance. Ct Abdomen Pelvis W Iv Contrast Additional Contrast? None    Result Date: 10/25/2020  EXAMINATION: CT OF THE ABDOMEN AND PELVIS WITH CONTRAST 10/25/2020 8:17 am TECHNIQUE: CT of the abdomen and pelvis was performed with the administration of intravenous contrast. Multiplanar reformatted images are provided for review. Dose modulation, iterative reconstruction, and/or weight based adjustment of the mA/kV was utilized to reduce the radiation dose to as low as reasonably achievable.  COMPARISON: 10/17/2020, 10/16/2020 HISTORY: ORDERING SYSTEM PROVIDED HISTORY: Please give IV but not PO contrast re: 103 fever - had surgery for diverticular perf, has abscess TECHNOLOGIST PROVIDED HISTORY: Reason for exam:->Please give IV but not PO contrast re: 103 fever - had surgery for diverticular perf, has abscess Additional Contrast?->None Reason for Exam: Please give IV but not PO contrast re: 103 fever - had surgery for diverticular perf, has abscess Acuity: Acute Type of Exam: Initial FINDINGS: Lower Chest: There are small bilateral pleural effusions. Dependent atelectasis is noted in the lower lobes. Organs: There is mild prominence of the intrahepatic biliary ducts. The common duct measures approximately 8 mm at the alejandra hepatis. Gallbladder is mildly distended. Calcifications are noted in the uncinate process of the pancreas, as marker for chronic pancreatitis. The spleen is unremarkable. The adrenal glands have become mildly prominent. The kidneys enhance symmetrically. There is no hydronephrosis. 2.3 cm cyst of the superior pole of the right kidney, with simple features. GI/Bowel: The stomach is unremarkable. There is a generalized ileus pattern. An entero colonic anastomosis is noted in the right lower quadrant. There is a left lower quadrant colostomy. Rectal stump is unremarkable. Pelvis: Lackey catheter is in the urinary bladder which is decompressed. Peritoneum/Retroperitoneum: There is a scattered, small amount of pneumoperitoneum, considered postoperative. A surgical drain is in the left lower quadrant with mild surrounding stranding. Focal areas of intraperitoneal fluid are identified. Anterior to the spleen there is a 2.3 x 3.8 cm collection with small amount of air. In the infrarenal retroperitoneum there is a phlegmonous area, sample measurement 4.3 x 1.1 cm. There is also a small amount of free intraperitoneal fluid, for instance in the perihepatic space. Bones/Soft Tissues: There is a healing laparotomy incision.   Anasarca is present. No acute osseous abnormality is identified. Status post left lower quadrant colostomy, with postoperative changes including a small amount of pneumoperitoneum. The surgical drain in the left lower quadrant is in normal position position. Small fluid collections anterior to the spleen and in the infrarenal retroperitoneum are indeterminate for infection. No readily drainable collection is identified at this time. Mild bilateral adrenal gland enlargement, new since 10/16/2020. Small adrenal hemorrhages are suspected. Adrenal crisis is a differential concern. Generalized ileus. Stable mild dilation of the biliary tree. Chronic findings of pancreatitis. Small bilateral pleural effusions and bibasilar atelectasis. Pneumonia is a differential concern. Anasarca. Ct Abdomen Pelvis W Iv Contrast Additional Contrast? None    Result Date: 10/16/2020  EXAMINATION: CT OF THE ABDOMEN AND PELVIS WITH CONTRAST 10/16/2020 11:54 am TECHNIQUE: CT of the abdomen and pelvis was performed with the administration of intravenous contrast. Multiplanar reformatted images are provided for review. Dose modulation, iterative reconstruction, and/or weight based adjustment of the mA/kV was utilized to reduce the radiation dose to as low as reasonably achievable. COMPARISON: 01/08/2016 HISTORY: ORDERING SYSTEM PROVIDED HISTORY: left sided abd pain TECHNOLOGIST PROVIDED HISTORY: Reason for exam:->left sided abd pain Additional Contrast?->None Reason for Exam: left sided abd pain Acuity: Acute Type of Exam: Initial Additional signs and symptoms: LLQ pain, nausea, vomiting, constipation, diarrhea x 2 weeks Relevant Medical/Surgical History: Hx Diverticulitis, Pancreatitis, Hernia repair, Small bowel surg / 75cc [id number] FINDINGS: Lower Chest: The lung bases are clear. Organs: The spleen, adrenal glands and kidneys are unremarkable. There are right simple renal cysts.  There is improved with residual mild-to-moderate intrahepatic ductal dilatation without evidence of a radiopaque obstructing nephrolithiasis. Coarse calcifications are present within the pancreatic head, likely sequela pancreatitis. There is worsening moderate pancreatic ductal dilatation, likely from stenosis from chronic pancreatitis. GI/Bowel: There is no bowel obstruction. Status post ileocecectomy. However, there is a large complex air-fluid level in close association to the descending colon measuring 6.3 x 6.6 cm in maximal dimension consistent with an abscess. Scattered diverticular present along the descending colon. There is moderate to severe continuous circumferential wall thickening of the descending colon with moderate pericolonic inflammation likely due to colitis. Pelvis: There is mild to moderate perivesicular inflammation due to secondary cystitis. Peritoneum/Retroperitoneum: There is no free fluid. Reactive mesenteric lymph nodes are present in the left lower quadrant. An index lymph node measures 1.1 x 1.8 cm. There is no pneumoperitoneum. There are unchanged small fat containing supraumbilical hernias, likely incisional.  Mild atherosclerosis involves the abdominal aorta. Bones/Soft Tissues: Degenerative changes involve the thoracolumbar spine. 1. Multi-septated complex fluid collection within the left mid abdomen along the course of the descending colon consistent with an abscess. This is likely caused from colitis causing secondary cystitis, but cannot exclude underlying malignancy; correlate with colonoscopy once the acute symptoms have resolved. 2. Improved with residual mild-to-moderate intrahepatic, but worsening moderate pancreatic ductal dilatation likely from stenosis of chronic pancreatitis. Xr Chest Portable    Result Date: 10/25/2020  EXAMINATION: ONE XRAY VIEW OF THE CHEST 10/24/2020 11:28 pm COMPARISON: None.  HISTORY: ORDERING SYSTEM PROVIDED HISTORY: fever TECHNOLOGIST PROVIDED HISTORY: Reason for exam:->fever Reason for Exam: fever Acuity: Acute Type of Exam: Initial FINDINGS: Segmental pneumonia was identified in the right lower lobe. No cardiomegaly or interstitial edema was seen. The regional skeleton was unremarkable. No hilar mass was seen. Segmental pneumonia in the right lower lobe. No cardiomegaly or interstitial edema. Us Abdomen Limited Specify Organ? Liver, Spleen, Pancreas, Gallbladder    Result Date: 10/30/2020  EXAMINATION: Limited abdominal ultrasound 10/26/2020 11:40 am COMPARISON: 10/25/2020. HISTORY: ORDERING SYSTEM PROVIDED HISTORY: check for intra-abdominal abscess. TECHNOLOGIST PROVIDED HISTORY: Reason for exam:->check for intra-abdominal abscess. Specify organ?->LIVER Specify organ?->SPLEEN Specify organ?->PANCREAS Specify organ?->GALLBLADDER Reason for Exam: evaluate for abscess in RUQ and LUQ, fever Acuity: Unknown Type of Exam: Initial FINDINGS: Focused evaluation of the upper quadrants is performed. The liver and gallbladder appear grossly within normal limits. Trace ascites. Bilateral pleural effusions. The spleen appears grossly unremarkable. No abscess is visualized in the right or left upper quadrants. Please note that ultrasound is inferior to CT for evaluation of intra-abdominal abscesses. Trace ascites. Bilateral pleural effusions. Us Retroperitoneal Limited    Result Date: 10/23/2020  EXAMINATION: ULTRASOUND OF THE KIDNEYS 10/23/2020 5:10 pm COMPARISON: CT abdomen and pelvis 10/16/2020. HISTORY: ORDERING SYSTEM PROVIDED HISTORY: protenuria TECHNOLOGIST PROVIDED HISTORY: Reason for exam:->protenuria Reason for Exam: protenuria Type of Exam: Initial FINDINGS: Overall exam is somewhat technically difficult and mildly limited secondary to patient body habitus. . THe right kidney measures 10.1 cm in length and the left kidney measures 11.9 cm in length. Mildly increased renal echotexture to both kidneys. No hydronephrosis or intrarenal stones.   No renal masses are identified. There is redemonstration of a benign cyst exophytic to the upper pole of the right kidney measuring 2.3 x 1.6 x 2.0 cm. No follow-up imaging is recommended. Benign cyst to the upper pole of the right kidney measuring 2.3 cm. No follow-up imaging recommended. Mildly increased renal echotexture compatible with medical renal disease. Otherwise unremarkable exam.     Ct Abscess Drainage Peritoneal/peritonitis Open    Result Date: 10/17/2020  EXAMINATION: CT guided left lower quadrant abscess drainage. 10/17/2020 3:12 pm TECHNIQUE: After obtaining informed consent, the patient was placed in the supine position on the CT table. The skin superficial to the fluid collection was identified and cleansed using a sterile technique. Next, a 10 F locking pigtail drainage catheter was percutaneously placed into the collection using a standard Seldinger technique over stiff angled tip Glidewire with 8 and 10 [place] serial fascial dilatation. Catheter positioning was verified with CT guidance. As much fluid was aspirated as possible. A post procedural CT scan revealed complete drainage of the abscess. 310 cc of foul-smelling brownish green pus was removed and 10 cc was sent for culture and sensitivity. The drain was attached to a MARI suction bulb and sutured in place. The patient tolerated the procedure well with no immediate complications. The patient received 1.0 mg of Versed and 100 mcg fentanyl intravenously for sedation pain control. Sedation was provided by Cameron Long RN. Sedation time: 33 minutes. Moderate sedation was monitored under the physician's direction. The patient's vital signs were monitored throughout the procedure and recorded in the patient's medical record by the nurse. Dose modulation, iterative reconstruction, and/or weight based adjustment of the mA/kV was utilized to reduce the radiation dose to as low as reasonably achievable.   DLP: 702.37 COMPARISON: CT of the abdomen and pelvis with IV contrast 10/16/2020 HISTORY: Patient with multi septated complex fluid collection within the left mid abdomen along the course of the descending colon consistent with an abscess. A request has been made for percutaneous drainage. FINDINGS: Adequate drain placement within the abscess with complete drainage of the abscess. Successful CT guided left lower quadrant pericolonic abscess drainage as described above. Approximately 310 cc of purulent fluid was removed. The catheter was left to MARI suction bulb drainage.            [place] Calvillo  [facility]

## 2020-11-03 NOTE — CONSULTS
[place] [id number] Continence Nurse  Consult Note       Marianela Umaña  AGE: 39 y.o. GENDER: male  : 1975  TODAY'S DATE:  11/3/2020    Subjective:     Reason for  Evaluation and Assessment:  Wound care for stoma education. Marianela Umaña is a 39 y.o. 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[facility] [address]     Diverticulitis     [facility] positive ([facility]     Hypertension     Incisional hernia, without obstruction or gangrene 10/22/2020    Pancreatitis        PAST SURGICAL HISTORY    Past Surgical History:   Procedure Laterality Date    CT DRAINAGE ABDOM ABSCESS OPEN  10/17/2020    CT DRAINAGE ABDOM ABSCESS OPEN 10/17/2020 [facility] CT SCAN    HERNIA REPAIR      LAPAROTOMY N/A 10/21/2020    LAPAROTOMY EXPLORATORY COLOSTOMY CREATION performed by Taylor Carrasco MD at [address] [place] [address]      Dr Nusrat Posey       FAMILY HISTORY    History reviewed. No pertinent family history. SOCIAL HISTORY    Social History     Tobacco Use    Smoking status: Current Every Day Smoker     Packs/day: 2.00     Years: 18.00     Pack years: 36.00     Types: Cigarettes    Smokeless tobacco: Never Used   Substance Use Topics    Alcohol use: Not Currently    Drug use: Yes     Types: Marijuana       ALLERGIES    Allergies   Allergen Reactions    Pcn [Penicillins]        MEDICATIONS    No current facility-administered medications on file prior to encounter. Current Outpatient Medications on File Prior to Encounter   Medication Sig Dispense Refill    Abacavir-Dolutegravir-Lamivud 600- MG TABS Take by mouth      efavirenz (SUSTIVA) 600 MG tablet Take 600 mg by mouth nightly.              Objective:      /86   Pulse 82   Temp 98.5 °F (36.9 and cut to fit with coloplast barrier # [id number] bag# [id number] held for 2 minutes. Warm blanket applied. Educated pt as this was done. Pt has been emptying his bag. Midline incision dressing removed cleansed with NS has large amount of drainage applied aquacel ag and island dressing. Pt is being transferred to the swing bed in [facility] today. Wound care to follow pt in [facility] and  continue to do education with stoma. Pt is generally not at risk for skin breakdown AEB nusrat score. Specialty Bed Required : no  [] Low Air Loss   [] Pressure Redistribution  [] Fluid Immersion  [] Bariatric  [] Total Pressure Relief  [] Other:     Discharge Plan:  Placement for patient upon discharge:  Pt is going to the swing bed today. Hospice Care: no  Patient appropriate for Outpatient [address]:  no    Patient/Caregiver Teaching:  Level of patient/caregiver understanding able to:  Pt verbalized understanding of stoma education. Electronically signed by Randi Orona RN,  on 11/3/2020 at 3:34 PM

## 2020-11-03 NOTE — PROGRESS NOTES
Physical Therapy    Physical Therapy Treatment Note  Name: Bony Lopez MRN: [id number] :   1975   Date:  11/3/2020   Admission Date: 10/16/2020 Room:  [address] [place], [place] [address]   Restrictions/Precautions:        general, abdominal precautions  Communication with other providers:    Subjective:  Patient states:  \"I just got back in bed I've already been up and got a full bath and been around the room\", \"I've got four more days of antibiotics\"  Pain:   Location, Type, Intensity (0/10 to 10/10):  Pt c/o pain in abdomen and \"shooting up the back\", did not rate pain level. Objective:    Observation:  Pt supine in bed, awake watching TV. Bandage and colostomy bag LLQ. Treatment, including education/measures:  Supine to sit: Pt performed 1x Supine to sit with Mod I, demonstrated adequate strength and mobility for completion of transfer without physical assist.     Sitting balance: Pt maintains good midline posture and balance at EOB Mod I x1 min during preparation for gait. Pt requests to remain in room for session. Sit to stand: Gait belt placed under axilla. Performed 1x STS from EOB, SBA. Adequate LE power and anterior weight shift. Noted pt reaches for IV pole to assist STS, PT educated on proper UE placement. Standing balance: Pt maintains good standing balance with CGA-SBA for safety only x1 min EOB during preparation for gait. Gait: Pt AMB x50 ft in room with CGA-SBA for safety only. Pt AMB to doorway, opened door Mod I maintaining good balance. After 25 ft PT added dynamic gait challenge by instructing pt in head turns during gait. Pt maintains good balance throughout x25 ft. Assessment / Impression:       Patient's tolerance of treatment:  Good   Adverse Reaction: No  Significant change in status and impact:  No  Barriers to improvement:  no  Plan for Next Session:    Pt demonstrates adequate mobility, no longer requires PT services, plan to d/c.   Time in:  11:45  Time out:  12:00  Timed treatment minutes:  15  Total treatment time:  15    Previously filed items:  Social/Functional History  Lives With: Significant other  Type of Home: House  Home Layout: One level  Home Access: Stairs to enter with rails  Entrance Stairs - Number of Steps: 6  Bathroom Shower/Tub: Tub only, Tub/Shower unit  ADL Assistance: Independent  Homemaking Assistance: Independent  Homemaking Responsibilities: Yes  Ambulation Assistance: Independent  Transfer Assistance: Independent  Active : Yes  Occupation: Full time employment  Short term goals  Time Frame for Short term goals: 1 week  Short term goal 1: Pt will AMB x250 ft with LRAD and min fatigue  Short term goal 2: Pt will perform x3 STS from low surface with CGA  Short term goal 3: Pt will ascend/descend x6 stairs with good mechanics, CGA and min fatigue       Electronically signed by:    Debbi Man  11/3/2020, 3:47 PM

## 2020-11-03 NOTE — PROGRESS NOTES
Hospitalist Progress Note         Admit Date: 10/16/2020    PCP: Dang Lemus, RN, BSN, MSN, FNP-C     Chief Complaint   Patient presents with    Abdominal Pain     reports abd pain for 2 weeks        Assessment and Plan:     #Sepsis  #Acute Diverticulitis  #Peritonitis  #Pneumonia? ?  -Patient is SIRS 2/4, leukocytosis, tachycardia  -S/p IR drainage with drain placement  -S/p Exploratory laparotomy with colostomy creation, drainage of peritoneal abscess  -CT abdomen and pelvis with colitis, abscess, pancreatic ductal dilatation  -Blood cultures no growth so far.  Repeat blood cultures 10/25/2020 negative  -Surgical cultures with mixed organisms, including E Coli, Prevotella Denticola  -Continue  linezolid 10/19 (end-date:11/1/2020) , Avycaz 10/25-11/7,metronidazole 10/24-11/7. Lana Nakul 10/26-11/7 as per ID recommendation. Need outpatient follow-up with ID in 1 week.     #Postop ileus  -Patient has not been tolerating p.o.  -Repeat CT abdomen: Bilateral gland enlargement with small adrenal hemorrhages. Small bilateral pleural effusions and bilateral atelectasis with concerns of pneumonia.    -Patient just completed 8 doses of Reglan and currently much better.     #HIV   Continue home medications     #Proteinuria  -urine protein 10/16/20 2000  -rpeat ua pr 30  -urine pr /cr 0.3  -Nephrology on board     #Hypertension  -Uncontrolled  -Controlled on metoprolol, clonidine, lisinopril, and amlodipine  -Nephrology on board     #Diet:  -Patient has been unable to tolerate p.o.  -Currently on clear liquids but unable to take  -Dietitian on board    VTE prophylaxis LMWH. Placement to swing bed still pending.     Current Facility-Administered Medications   Medication Dose Route Frequency Provider Last Rate Last Dose    clotrimazole-betamethasone (LOTRISONE) cream   Topical BID Heriberto Peraza MD        HYDROmorphone (DILAUDID) injection 1 mg  1 mg Intravenous Q3H PRN Heriberto Peraza MD   1 mg at 11/02/20 2032  oxyCODONE-acetaminophen (PERCOCET) 5-325 MG per tablet 1 tablet  1 tablet Oral Q4H PRN Joni Weems MD   1 tablet at 11/02/20 1857    anidulafungin (ERAXIS) 100 mg in sodium chloride 0.9 % 130 mL IVPB  100 mg Intravenous Daily Wilfrido Mantilla MD   Stopped at 11/02/20 1029    ceftazidime-avibactam (AVYCAZ) 2.5 g in sodium chloride 0.9 % 100 mL IVPB  2.5 g Intravenous Q8H Wilfrido Mantilla MD   Stopped at 11/02/20 1654    sodium chloride flush 0.9 % injection 10 mL  10 mL Intravenous 2 times per day Mary Plunkett MD   10 mL at 11/02/20 2032    sodium chloride flush 0.9 % injection 10 mL  10 mL Intravenous PRN Mary Plunkett MD   10 mL at 11/02/20 1722    lisinopril (PRINIVIL;ZESTRIL) tablet 40 mg  40 mg Oral Daily Mary Plunkett MD   40 mg at 11/02/20 0837    Eravacycline Dihydrochloride 80 mg in sodium chloride 0.9 % 250 mL IVPB   Intravenous Q12H Angella Rico MD   Stopped at 11/02/20 1327    guaiFENesin (MUCINEX) extended release tablet 600 mg  600 mg Oral BID KEANU Chong - CNP   600 mg at 11/02/20 2031    metoprolol tartrate (LOPRESSOR) tablet 125 mg  125 mg Oral BID Davi Atkins MD   125 mg at 11/02/20 2031    sodium chloride flush 0.9 % injection 10 mL  10 mL Intravenous PRN Marizol Meza MD   10 mL at 10/31/20 1473    cloNIDine (CATAPRES) 0.1 MG/24HR 1 patch  1 patch Transdermal Weekly Davi Bailey MD   1 patch at 11/01/20 1043    lipase-protease-amylase (CREON) delayed release capsule 30,000 Units  30,000 Units Oral TID WC Mary Plunkett MD   30,000 Units at 11/02/20 1622    amLODIPine (NORVASC) tablet 10 mg  10 mg Oral Daily Mary Plunkett MD   10 mg at 11/02/20 0836    metronidazole (FLAGYL) 500 mg in NaCl 100 mL IVPB premix  500 mg Intravenous Q8H Gordo Kemp  mL/hr at 11/02/20 2031 500 mg at 11/02/20 2031    promethazine (PHENERGAN) injection 12.5 mg  12.5 mg Intravenous Q6H PRN Gordo Kemp MD   12.5 mg at 10/24/20 [id number]  pantoprazole (PROTONIX) injection 40 mg  40 mg Intravenous BID Darell Roe MD   40 mg at 11/02/20 2032    enoxaparin (LOVENOX) injection 40 mg  40 mg Subcutaneous Daily Darell Roe MD   40 mg at 11/01/20 1043    linezolid (ZYVOX) IVPB 600 mg  600 mg Intravenous Q12H Darell Roe MD   Stopped at 11/02/20 1355    Abacavir-Dolutegravir-Lamivud 600- MG TABS 600 mg  1 tablet Oral Daily Darell Roe MD   600 mg at 11/02/20 0836    ondansetron (ZOFRAN) injection 4 mg  4 mg Intravenous Q6H PRN Darell Roe MD   4 mg at 11/02/20 1857    acetaminophen (TYLENOL) tablet 650 mg  650 mg Oral Q6H PRN Darell Roe MD   650 mg at 10/26/20 2044       Subjective:     Patient denied any complaints. No acute overnight events. Slept well last night    Objective: Intake/Output Summary (Last 24 hours) at 11/2/2020 2034  Last data filed at 11/2/2020 1726  Gross per 24 hour   Intake 20 ml   Output 1925 ml   Net -1905 ml      Vitals:   Vitals:    11/02/20 2008   BP: 124/84   Pulse: 80   Resp: 18   Temp: 97.8 °F (36.6 °C)   SpO2:      Physical Exam:  General Appearance:    Alert, cooperative, no distress  Head:      Normocephalic, without obvious abnormality, atraumatic  Eyes:       Conjunctiva/corneas clear, EOM's intact  Lungs:    Clear to auscultation bilaterally, respirations unlabored  Heart:                Regular rate and rhythm, S1 and S2 normal, no murmur,   rub or gallop  Abdomen:     LLQ colostomy and LLQ left flank erythema and tenderness lessening   Extremities:   Extremities normal, atraumatic, no cyanosis or edema  Neurological:   Grossly Intact.     Significant Diagnostic Studies:   DATA:    CBC   Recent Labs     10/31/20  0510 11/01/20  0500 11/02/20  0500   WBC 17.7* 16.7* 16.1*   HGB 9.5* 9.6* 10.2*   HCT 30.0* 30.0* 30.9*   * 472* 451*      BMP   Recent Labs     11/01/20  0500   *   K 4.6   CL 95*   CO2 28   PHOS 4.4   BUN 5* CREATININE 0.7*     LFT'S   Recent Labs     11/01/20  0500   AST 13*   ALT 6*   BILITOT 0.3   ALKPHOS 111     COAG No results for input(s): INR in the last 72 hours. POC:   Lab Results   Component Value Date    POCGLU 96 11/05/2012     LumbcqoznyC2P:No results found for: LABA1C  CARDIAC ENZYMES  No results for input(s): CKTOTAL, CKMB, CKMBINDEX, TROPONINI in the last 72 hours. Troponin: No results for input(s): TROPONINT in the last 72 hours. BNP: No results for input(s): PROBNP in the last 72 hours. U/A:    Lab Results   Component Value Date    COLORU STRAW 11/01/2020    WBCUA <1 11/01/2020    RBCUA <1 11/01/2020    MUCUS RARE 10/27/2020    BACTERIA NEGATIVE 11/01/2020    CLARITYU CLEAR 11/01/2020    SPECGRAV 1.003 11/01/2020    LEUKOCYTESUR NEGATIVE 11/01/2020    BLOODU NEGATIVE 11/01/2020       Xr Abdomen (kub) (single Ap View)    Result Date: 10/25/2020  EXAMINATION: ONE SUPINE XRAY VIEW(S) OF THE ABDOMEN 10/25/2020 12:28 am COMPARISON: CT abdomen and pelvis 10/16/2020 HISTORY: ORDERING SYSTEM PROVIDED HISTORY: PORTABLE, ileus TECHNOLOGIST PROVIDED HISTORY: Reason for exam:->PORTABLE, ileus Reason for Exam: PORTABLE, ileus Acuity: Acute Type of Exam: Subsequent/Follow-up FINDINGS: Midline surgical skin staples are present. There are multiple abnormally dilated loops of small bowel within the mid abdomen and upper pelvis. Multiple abnormally dilated loops of small bowel, likely an ileus given the recent surgery. A small bowel obstruction could also have this appearance. Ct Abdomen Pelvis W Iv Contrast Additional Contrast? None    Result Date: 10/25/2020  EXAMINATION: CT OF THE ABDOMEN AND PELVIS WITH CONTRAST 10/25/2020 8:17 am TECHNIQUE: CT of the abdomen and pelvis was performed with the administration of intravenous contrast. Multiplanar reformatted images are provided for review.  Dose modulation, iterative reconstruction, and/or weight based adjustment of the mA/kV was utilized to reduce the radiation dose to as low as reasonably achievable. COMPARISON: 10/17/2020, 10/16/2020 HISTORY: ORDERING SYSTEM PROVIDED HISTORY: Please give IV but not PO contrast re: 103 fever - had surgery for diverticular perf, has abscess TECHNOLOGIST PROVIDED HISTORY: Reason for exam:->Please give IV but not PO contrast re: 103 fever - had surgery for diverticular perf, has abscess Additional Contrast?->None Reason for Exam: Please give IV but not PO contrast re: 103 fever - had surgery for diverticular perf, has abscess Acuity: Acute Type of Exam: Initial FINDINGS: Lower Chest: There are small bilateral pleural effusions. Dependent atelectasis is noted in the lower lobes. Organs: There is mild prominence of the intrahepatic biliary ducts. The common duct measures approximately 8 mm at the alejandra hepatis. Gallbladder is mildly distended. Calcifications are noted in the uncinate process of the pancreas, as marker for chronic pancreatitis. The spleen is unremarkable. The adrenal glands have become mildly prominent. The kidneys enhance symmetrically. There is no hydronephrosis. 2.3 cm cyst of the superior pole of the right kidney, with simple features. GI/Bowel: The stomach is unremarkable. There is a generalized ileus pattern. An entero colonic anastomosis is noted in the right lower quadrant. There is a left lower quadrant colostomy. Rectal stump is unremarkable. Pelvis: Lackey catheter is in the urinary bladder which is decompressed. Peritoneum/Retroperitoneum: There is a scattered, small amount of pneumoperitoneum, considered postoperative. A surgical drain is in the left lower quadrant with mild surrounding stranding. Focal areas of intraperitoneal fluid are identified. Anterior to the spleen there is a 2.3 x 3.8 cm collection with small amount of air. In the infrarenal retroperitoneum there is a phlegmonous area, sample measurement 4.3 x 1.1 cm.  There is also a small amount of free intraperitoneal fluid, for instance in the perihepatic space. Bones/Soft Tissues: There is a healing laparotomy incision. Anasarca is present. No acute osseous abnormality is identified. Status post left lower quadrant colostomy, with postoperative changes including a small amount of pneumoperitoneum. The surgical drain in the left lower quadrant is in normal position position. Small fluid collections anterior to the spleen and in the infrarenal retroperitoneum are indeterminate for infection. No readily drainable collection is identified at this time. Mild bilateral adrenal gland enlargement, new since 10/16/2020. Small adrenal hemorrhages are suspected. Adrenal crisis is a differential concern. Generalized ileus. Stable mild dilation of the biliary tree. Chronic findings of pancreatitis. Small bilateral pleural effusions and bibasilar atelectasis. Pneumonia is a differential concern. Anasarca. Ct Abdomen Pelvis W Iv Contrast Additional Contrast? None    Result Date: 10/16/2020  EXAMINATION: CT OF THE ABDOMEN AND PELVIS WITH CONTRAST 10/16/2020 11:54 am TECHNIQUE: CT of the abdomen and pelvis was performed with the administration of intravenous contrast. Multiplanar reformatted images are provided for review. Dose modulation, iterative reconstruction, and/or weight based adjustment of the mA/kV was utilized to reduce the radiation dose to as low as reasonably achievable. COMPARISON: 01/08/2016 HISTORY: ORDERING SYSTEM PROVIDED HISTORY: left sided abd pain TECHNOLOGIST PROVIDED HISTORY: Reason for exam:->left sided abd pain Additional Contrast?->None Reason for Exam: left sided abd pain Acuity: Acute Type of Exam: Initial Additional signs and symptoms: LLQ pain, nausea, vomiting, constipation, diarrhea x 2 weeks Relevant Medical/Surgical History: Hx Diverticulitis, Pancreatitis, Hernia repair, Small bowel surg / 75cc [id number] FINDINGS: Lower Chest: The lung bases are clear. Organs:  The spleen, adrenal glands and kidneys are unremarkable. There are right simple renal cysts. There is improved with residual mild-to-moderate intrahepatic ductal dilatation without evidence of a radiopaque obstructing nephrolithiasis. Coarse calcifications are present within the pancreatic head, likely sequela pancreatitis. There is worsening moderate pancreatic ductal dilatation, likely from stenosis from chronic pancreatitis. GI/Bowel: There is no bowel obstruction. Status post ileocecectomy. However, there is a large complex air-fluid level in close association to the descending colon measuring 6.3 x 6.6 cm in maximal dimension consistent with an abscess. Scattered diverticular present along the descending colon. There is moderate to severe continuous circumferential wall thickening of the descending colon with moderate pericolonic inflammation likely due to colitis. Pelvis: There is mild to moderate perivesicular inflammation due to secondary cystitis. Peritoneum/Retroperitoneum: There is no free fluid. Reactive mesenteric lymph nodes are present in the left lower quadrant. An index lymph node measures 1.1 x 1.8 cm. There is no pneumoperitoneum. There are unchanged small fat containing supraumbilical hernias, likely incisional.  Mild atherosclerosis involves the abdominal aorta. Bones/Soft Tissues: Degenerative changes involve the thoracolumbar spine. 1. Multi-septated complex fluid collection within the left mid abdomen along the course of the descending colon consistent with an abscess. This is likely caused from colitis causing secondary cystitis, but cannot exclude underlying malignancy; correlate with colonoscopy once the acute symptoms have resolved. 2. Improved with residual mild-to-moderate intrahepatic, but worsening moderate pancreatic ductal dilatation likely from stenosis of chronic pancreatitis. Xr Chest Portable    Result Date: 10/25/2020  EXAMINATION: ONE XRAY VIEW OF THE CHEST 10/24/2020 11:28 pm COMPARISON: None.  HISTORY: renal echotexture to both kidneys. No hydronephrosis or intrarenal stones. No renal masses are identified. There is redemonstration of a benign cyst exophytic to the upper pole of the right kidney measuring 2.3 x 1.6 x 2.0 cm. No follow-up imaging is recommended. Benign cyst to the upper pole of the right kidney measuring 2.3 cm. No follow-up imaging recommended. Mildly increased renal echotexture compatible with medical renal disease. Otherwise unremarkable exam.     Ct Abscess Drainage Peritoneal/peritonitis Open    Result Date: 10/17/2020  EXAMINATION: CT guided left lower quadrant abscess drainage. 10/17/2020 3:12 pm TECHNIQUE: After obtaining informed consent, the patient was placed in the supine position on the CT table. The skin superficial to the fluid collection was identified and cleansed using a sterile technique. Next, a 10 F locking pigtail drainage catheter was percutaneously placed into the collection using a standard Seldinger technique over stiff angled tip Glidewire with 8 and 10 [place] serial fascial dilatation. Catheter positioning was verified with CT guidance. As much fluid was aspirated as possible. A post procedural CT scan revealed complete drainage of the abscess. 310 cc of foul-smelling brownish green pus was removed and 10 cc was sent for culture and sensitivity. The drain was attached to a MARI suction bulb and sutured in place. The patient tolerated the procedure well with no immediate complications. The patient received 1.0 mg of Versed and 100 mcg fentanyl intravenously for sedation pain control. Sedation was provided by Jonathan Carbajal RN. Sedation time: 33 minutes. Moderate sedation was monitored under the physician's direction. The patient's vital signs were monitored throughout the procedure and recorded in the patient's medical record by the nurse.  Dose modulation, iterative reconstruction, and/or weight based adjustment of the mA/kV was utilized to reduce the radiation dose to as low as reasonably achievable. DLP: 702.37 COMPARISON: CT of the abdomen and pelvis with IV contrast 10/16/2020 HISTORY: Patient with multi septated complex fluid collection within the left mid abdomen along the course of the descending colon consistent with an abscess. A request has been made for percutaneous drainage. FINDINGS: Adequate drain placement within the abscess with complete drainage of the abscess. Successful CT guided left lower quadrant pericolonic abscess drainage as described above. Approximately 310 cc of purulent fluid was removed. The catheter was left to MARI suction bulb drainage.            Nan Salguero  [facility] Hospitalist

## 2020-11-03 NOTE — CARE COORDINATION
Received call from Myla Bautista stating pt approved for Swing bed and can come today. Called approved to Dr Patsy Burris. And plan d/c today. Set up with [email or web address] for 1700.

## 2020-11-04 PROBLEM — K65.9 ABDOMINAL INFECTION (HCC): Status: ACTIVE | Noted: 2020-11-04

## 2020-11-04 PROCEDURE — 6370000000 HC RX 637 (ALT 250 FOR IP): Performed by: INTERNAL MEDICINE

## 2020-11-04 PROCEDURE — 2580000003 HC RX 258: Performed by: INTERNAL MEDICINE

## 2020-11-04 PROCEDURE — 2500000003 HC RX 250 WO HCPCS: Performed by: INTERNAL MEDICINE

## 2020-11-04 PROCEDURE — 6360000002 HC RX W HCPCS: Performed by: INTERNAL MEDICINE

## 2020-11-04 PROCEDURE — 94010 BREATHING CAPACITY TEST: CPT

## 2020-11-04 PROCEDURE — C9113 INJ PANTOPRAZOLE SODIUM, VIA: HCPCS | Performed by: INTERNAL MEDICINE

## 2020-11-04 PROCEDURE — 1200000002 HC SEMI PRIVATE SWING BED

## 2020-11-04 RX ORDER — OXYCODONE HYDROCHLORIDE AND ACETAMINOPHEN 5; 325 MG/1; MG/1
1 TABLET ORAL EVERY 6 HOURS PRN
Status: DISCONTINUED | OUTPATIENT
Start: 2020-11-04 | End: 2020-11-07 | Stop reason: HOSPADM

## 2020-11-04 RX ORDER — LANOLIN ALCOHOL/MO/W.PET/CERES
400 CREAM (GRAM) TOPICAL 2 TIMES DAILY
Status: DISCONTINUED | OUTPATIENT
Start: 2020-11-04 | End: 2020-11-07 | Stop reason: HOSPADM

## 2020-11-04 RX ADMIN — CEFTAZIDIME, AVIBACTAM 2.5 G: 2; .5 POWDER, FOR SOLUTION INTRAVENOUS at 14:19

## 2020-11-04 RX ADMIN — METRONIDAZOLE 500 MG: 500 INJECTION, SOLUTION INTRAVENOUS at 20:58

## 2020-11-04 RX ADMIN — HYDROMORPHONE HYDROCHLORIDE 1 MG: 1 INJECTION, SOLUTION INTRAMUSCULAR; INTRAVENOUS; SUBCUTANEOUS at 06:11

## 2020-11-04 RX ADMIN — METOPROLOL TARTRATE 125 MG: 25 TABLET, FILM COATED ORAL at 20:58

## 2020-11-04 RX ADMIN — Medication 400 MG: at 20:58

## 2020-11-04 RX ADMIN — PANCRELIPASE 30000 UNITS: 30000; 6000; 19000 CAPSULE, DELAYED RELEASE PELLETS ORAL at 12:30

## 2020-11-04 RX ADMIN — LISINOPRIL 40 MG: 40 TABLET ORAL at 09:47

## 2020-11-04 RX ADMIN — OXYCODONE AND ACETAMINOPHEN 1 TABLET: 5; 325 TABLET ORAL at 05:10

## 2020-11-04 RX ADMIN — PANTOPRAZOLE SODIUM 40 MG: 40 INJECTION, POWDER, FOR SOLUTION INTRAVENOUS at 20:58

## 2020-11-04 RX ADMIN — AMLODIPINE BESYLATE 10 MG: 10 TABLET ORAL at 09:47

## 2020-11-04 RX ADMIN — METRONIDAZOLE 500 MG: 500 INJECTION, SOLUTION INTRAVENOUS at 13:07

## 2020-11-04 RX ADMIN — HYDROMORPHONE HYDROCHLORIDE 1 MG: 1 INJECTION, SOLUTION INTRAMUSCULAR; INTRAVENOUS; SUBCUTANEOUS at 02:35

## 2020-11-04 RX ADMIN — SODIUM CHLORIDE, PRESERVATIVE FREE 10 ML: 5 INJECTION INTRAVENOUS at 09:46

## 2020-11-04 RX ADMIN — SODIUM CHLORIDE, PRESERVATIVE FREE 10 ML: 5 INJECTION INTRAVENOUS at 21:00

## 2020-11-04 RX ADMIN — OXYCODONE AND ACETAMINOPHEN 1 TABLET: 5; 325 TABLET ORAL at 11:08

## 2020-11-04 RX ADMIN — OXYCODONE AND ACETAMINOPHEN 1 TABLET: 5; 325 TABLET ORAL at 01:04

## 2020-11-04 RX ADMIN — PANTOPRAZOLE SODIUM 40 MG: 40 INJECTION, POWDER, FOR SOLUTION INTRAVENOUS at 09:46

## 2020-11-04 RX ADMIN — METRONIDAZOLE 500 MG: 500 INJECTION, SOLUTION INTRAVENOUS at 05:10

## 2020-11-04 RX ADMIN — ENOXAPARIN SODIUM 40 MG: 40 INJECTION SUBCUTANEOUS at 09:47

## 2020-11-04 RX ADMIN — METOPROLOL TARTRATE 125 MG: 25 TABLET, FILM COATED ORAL at 09:47

## 2020-11-04 RX ADMIN — PANCRELIPASE 30000 UNITS: 30000; 6000; 19000 CAPSULE, DELAYED RELEASE PELLETS ORAL at 17:41

## 2020-11-04 RX ADMIN — CLOTRIMAZOLE AND BETAMETHASONE DIPROPIONATE: 10; .5 CREAM TOPICAL at 21:05

## 2020-11-04 RX ADMIN — CEFTAZIDIME, AVIBACTAM 2.5 G: 2; .5 POWDER, FOR SOLUTION INTRAVENOUS at 22:32

## 2020-11-04 RX ADMIN — CLOTRIMAZOLE AND BETAMETHASONE DIPROPIONATE: 10; .5 CREAM TOPICAL at 09:48

## 2020-11-04 RX ADMIN — OXYCODONE AND ACETAMINOPHEN 1 TABLET: 5; 325 TABLET ORAL at 17:42

## 2020-11-04 RX ADMIN — PANCRELIPASE 30000 UNITS: 30000; 6000; 19000 CAPSULE, DELAYED RELEASE PELLETS ORAL at 09:50

## 2020-11-04 RX ADMIN — SODIUM CHLORIDE 100 MG: 9 INJECTION, SOLUTION INTRAVENOUS at 09:47

## 2020-11-04 RX ADMIN — GUAIFENESIN 600 MG: 600 TABLET, EXTENDED RELEASE ORAL at 09:47

## 2020-11-04 RX ADMIN — Medication 400 MG: at 13:07

## 2020-11-04 RX ADMIN — ERAVACYCLINE: 50 INJECTION, POWDER, LYOPHILIZED, FOR SOLUTION INTRAVENOUS at 13:07

## 2020-11-04 RX ADMIN — GUAIFENESIN 600 MG: 600 TABLET, EXTENDED RELEASE ORAL at 20:58

## 2020-11-04 ASSESSMENT — PAIN DESCRIPTION - LOCATION
LOCATION: ABDOMEN

## 2020-11-04 ASSESSMENT — PAIN DESCRIPTION - PAIN TYPE
TYPE: SURGICAL PAIN

## 2020-11-04 ASSESSMENT — PAIN DESCRIPTION - DESCRIPTORS
DESCRIPTORS: ACHING;BURNING
DESCRIPTORS: ACHING;BURNING;SHARP
DESCRIPTORS: ACHING;BURNING

## 2020-11-04 ASSESSMENT — PAIN SCALES - GENERAL
PAINLEVEL_OUTOF10: 6
PAINLEVEL_OUTOF10: 1
PAINLEVEL_OUTOF10: 10
PAINLEVEL_OUTOF10: 10
PAINLEVEL_OUTOF10: 6
PAINLEVEL_OUTOF10: 2
PAINLEVEL_OUTOF10: 6
PAINLEVEL_OUTOF10: 0

## 2020-11-04 ASSESSMENT — PAIN DESCRIPTION - ONSET
ONSET: ON-GOING

## 2020-11-04 ASSESSMENT — PAIN DESCRIPTION - FREQUENCY
FREQUENCY: CONTINUOUS

## 2020-11-04 ASSESSMENT — PAIN DESCRIPTION - ORIENTATION
ORIENTATION: LEFT;MID
ORIENTATION: LEFT;MID
ORIENTATION: MID

## 2020-11-04 ASSESSMENT — PAIN DESCRIPTION - PROGRESSION
CLINICAL_PROGRESSION: GRADUALLY WORSENING
CLINICAL_PROGRESSION: GRADUALLY WORSENING

## 2020-11-04 ASSESSMENT — PAIN SCALES - WONG BAKER: WONGBAKER_NUMERICALRESPONSE: 0

## 2020-11-04 NOTE — H&P
History and Physical  Paco Prieto MD    11/4/2020  Corine Tovar  1975    PCP: Dang Lemus, RN, BSN, MSN, FNP-C    ASSESSMENT AND PLAN:      1. Peritoneal abscess sp exlap needs IV abx  - titrate down pain medication; has been on this for 14 days, dc dilaudid  - encourge ambulation  - diet as tolerated    2. htn  - bp well controlled    3. HIV, without AIDS  - monitor for fever    4. Chronic pancreatitis  - no dm2    5. Protein calorie malnutrition  - encourage oral itnake    6. Hypomagnesemia  -replace    DVT prophy -  ambulation    Expected LOS 3-4 days      Cc: needs IV antibiotics    HPI: Corine Tovar is a 39 y.o. male presents to [facility] for IV antibiotics. He had exlap at [facility] for finding of peritoneal abscess. He has a few more days of antibiotics to continue. He stats he is getting pain medication almost scheduled and had been receiving IV dilaudid overnight. No constipation, difficulty swallowing, fever or chills. No nausea or vomiting. PMHX:   Past Medical History:   Diagnosis Date    Abscess of sigmoid colon due to diverticulitis 29/17/2576    [facility] [address]     Diverticulitis     HIV positive ([facility] [address] 2003    Hypertension     Incisional hernia, without obstruction or gangrene 10/22/2020    Pancreatitis      PSHX:  has a past surgical history that includes hernia repair; Small intestine surgery; CT ABSCESS DRAINAGE PERITONEAL/PERITONITIS OPEN (10/17/2020); laparotomy (N/A, 10/21/2020); and colostomy (Left). Meds - list of home medications reviewed in electronic chart and confirmed  Allergies:    Allergies   Allergen Reactions    Pcn [Penicillins]        FAM HX: htn  Soc HX:   Social History     Socioeconomic History    Marital status: Single     Spouse name: Not on file    Number of children: 0    Years of education: Not on file    Highest education level: Not on file   Occupational History    Not on file   Social Needs    Financial resource strain: Not on file distended normal bs; colostomy cdi with output  MUSCULOSKELETAL:  ROM of all extremities grossly wnl  NEUROLOGIC: AOx 3,  Cranial nerves II-XII are grossly intact. Motor is 5 out of 5 bilaterally. Sensory is intact  SKIN:  no bruising or bleeding, normal skin color, texture, turgor and no redness, warmth, or swelling        LABS in ER reviewed    Xr Abdomen (kub) (single Ap View)    Result Date: 10/25/2020  EXAMINATION: ONE SUPINE XRAY VIEW(S) OF THE ABDOMEN 10/25/2020 12:28 am COMPARISON: CT abdomen and pelvis 10/16/2020 HISTORY: ORDERING SYSTEM PROVIDED HISTORY: PORTABLE, ileus TECHNOLOGIST PROVIDED HISTORY: Reason for exam:->PORTABLE, ileus Reason for Exam: PORTABLE, ileus Acuity: Acute Type of Exam: Subsequent/Follow-up FINDINGS: Midline surgical skin staples are present. There are multiple abnormally dilated loops of small bowel within the mid abdomen and upper pelvis. Multiple abnormally dilated loops of small bowel, likely an ileus given the recent surgery. A small bowel obstruction could also have this appearance. Ct Abdomen Pelvis W Iv Contrast Additional Contrast? None    Result Date: 10/25/2020  EXAMINATION: CT OF THE ABDOMEN AND PELVIS WITH CONTRAST 10/25/2020 8:17 am TECHNIQUE: CT of the abdomen and pelvis was performed with the administration of intravenous contrast. Multiplanar reformatted images are provided for review. Dose modulation, iterative reconstruction, and/or weight based adjustment of the mA/kV was utilized to reduce the radiation dose to as low as reasonably achievable.  COMPARISON: 10/17/2020, 10/16/2020 HISTORY: ORDERING SYSTEM PROVIDED HISTORY: Please give IV but not PO contrast re: 103 fever - had surgery for diverticular perf, has abscess TECHNOLOGIST PROVIDED HISTORY: Reason for exam:->Please give IV but not PO contrast re: 103 fever - had surgery for diverticular perf, has abscess Additional Contrast?->None Reason for Exam: Please give IV but not PO contrast re: 103 fever - had surgery for diverticular perf, has abscess Acuity: Acute Type of Exam: Initial FINDINGS: Lower Chest: There are small bilateral pleural effusions. Dependent atelectasis is noted in the lower lobes. Organs: There is mild prominence of the intrahepatic biliary ducts. The common duct measures approximately 8 mm at the alejandra hepatis. Gallbladder is mildly distended. Calcifications are noted in the uncinate process of the pancreas, as marker for chronic pancreatitis. The spleen is unremarkable. The adrenal glands have become mildly prominent. The kidneys enhance symmetrically. There is no hydronephrosis. 2.3 cm cyst of the superior pole of the right kidney, with simple features. GI/Bowel: The stomach is unremarkable. There is a generalized ileus pattern. An entero colonic anastomosis is noted in the right lower quadrant. There is a left lower quadrant colostomy. Rectal stump is unremarkable. Pelvis: Lackey catheter is in the urinary bladder which is decompressed. Peritoneum/Retroperitoneum: There is a scattered, small amount of pneumoperitoneum, considered postoperative. A surgical drain is in the left lower quadrant with mild surrounding stranding. Focal areas of intraperitoneal fluid are identified. Anterior to the spleen there is a 2.3 x 3.8 cm collection with small amount of air. In the infrarenal retroperitoneum there is a phlegmonous area, sample measurement 4.3 x 1.1 cm. There is also a small amount of free intraperitoneal fluid, for instance in the perihepatic space. Bones/Soft Tissues: There is a healing laparotomy incision. Anasarca is present. No acute osseous abnormality is identified. Status post left lower quadrant colostomy, with postoperative changes including a small amount of pneumoperitoneum. The surgical drain in the left lower quadrant is in normal position position.   Small fluid collections anterior to the spleen and in the infrarenal retroperitoneum are indeterminate for infection. No readily drainable collection is identified at this time. Mild bilateral adrenal gland enlargement, new since 10/16/2020. Small adrenal hemorrhages are suspected. Adrenal crisis is a differential concern. Generalized ileus. Stable mild dilation of the biliary tree. Chronic findings of pancreatitis. Small bilateral pleural effusions and bibasilar atelectasis. Pneumonia is a differential concern. Anasarca. Ct Abdomen Pelvis W Iv Contrast Additional Contrast? None    Result Date: 10/16/2020  EXAMINATION: CT OF THE ABDOMEN AND PELVIS WITH CONTRAST 10/16/2020 11:54 am TECHNIQUE: CT of the abdomen and pelvis was performed with the administration of intravenous contrast. Multiplanar reformatted images are provided for review. Dose modulation, iterative reconstruction, and/or weight based adjustment of the mA/kV was utilized to reduce the radiation dose to as low as reasonably achievable. COMPARISON: 01/08/2016 HISTORY: ORDERING SYSTEM PROVIDED HISTORY: left sided abd pain TECHNOLOGIST PROVIDED HISTORY: Reason for exam:->left sided abd pain Additional Contrast?->None Reason for Exam: left sided abd pain Acuity: Acute Type of Exam: Initial Additional signs and symptoms: LLQ pain, nausea, vomiting, constipation, diarrhea x 2 weeks Relevant Medical/Surgical History: Hx Diverticulitis, Pancreatitis, Hernia repair, Small bowel surg / 75cc [id number] FINDINGS: Lower Chest: The lung bases are clear. Organs: The spleen, adrenal glands and kidneys are unremarkable. There are right simple renal cysts. There is improved with residual mild-to-moderate intrahepatic ductal dilatation without evidence of a radiopaque obstructing nephrolithiasis. Coarse calcifications are present within the pancreatic head, likely sequela pancreatitis. There is worsening moderate pancreatic ductal dilatation, likely from stenosis from chronic pancreatitis. GI/Bowel: There is no bowel obstruction. Status post ileocecectomy. However, there is a large complex air-fluid level in close association to the descending colon measuring 6.3 x 6.6 cm in maximal dimension consistent with an abscess. Scattered diverticular present along the descending colon. There is moderate to severe continuous circumferential wall thickening of the descending colon with moderate pericolonic inflammation likely due to colitis. Pelvis: There is mild to moderate perivesicular inflammation due to secondary cystitis. Peritoneum/Retroperitoneum: There is no free fluid. Reactive mesenteric lymph nodes are present in the left lower quadrant. An index lymph node measures 1.1 x 1.8 cm. There is no pneumoperitoneum. There are unchanged small fat containing supraumbilical hernias, likely incisional.  Mild atherosclerosis involves the abdominal aorta. Bones/Soft Tissues: Degenerative changes involve the thoracolumbar spine. 1. Multi-septated complex fluid collection within the left mid abdomen along the course of the descending colon consistent with an abscess. This is likely caused from colitis causing secondary cystitis, but cannot exclude underlying malignancy; correlate with colonoscopy once the acute symptoms have resolved. 2. Improved with residual mild-to-moderate intrahepatic, but worsening moderate pancreatic ductal dilatation likely from stenosis of chronic pancreatitis. Xr Chest Portable    Result Date: 10/25/2020  EXAMINATION: ONE XRAY VIEW OF THE CHEST 10/24/2020 11:28 pm COMPARISON: None. HISTORY: ORDERING SYSTEM PROVIDED HISTORY: fever TECHNOLOGIST PROVIDED HISTORY: Reason for exam:->fever Reason for Exam: fever Acuity: Acute Type of Exam: Initial FINDINGS: Segmental pneumonia was identified in the right lower lobe. No cardiomegaly or interstitial edema was seen. The regional skeleton was unremarkable. No hilar mass was seen. Segmental pneumonia in the right lower lobe. No cardiomegaly or interstitial edema.      Us Abdomen Limited Specify Organ? Liver, Spleen, Pancreas, Gallbladder    Result Date: 10/30/2020  EXAMINATION: Limited abdominal ultrasound 10/26/2020 11:40 am COMPARISON: 10/25/2020. HISTORY: ORDERING SYSTEM PROVIDED HISTORY: check for intra-abdominal abscess. TECHNOLOGIST PROVIDED HISTORY: Reason for exam:->check for intra-abdominal abscess. Specify organ?->LIVER Specify organ?->SPLEEN Specify organ?->PANCREAS Specify organ?->GALLBLADDER Reason for Exam: evaluate for abscess in RUQ and LUQ, fever Acuity: Unknown Type of Exam: Initial FINDINGS: Focused evaluation of the upper quadrants is performed. The liver and gallbladder appear grossly within normal limits. Trace ascites. Bilateral pleural effusions. The spleen appears grossly unremarkable. No abscess is visualized in the right or left upper quadrants. Please note that ultrasound is inferior to CT for evaluation of intra-abdominal abscesses. Trace ascites. Bilateral pleural effusions. Us Retroperitoneal Limited    Result Date: 10/23/2020  EXAMINATION: ULTRASOUND OF THE KIDNEYS 10/23/2020 5:10 pm COMPARISON: CT abdomen and pelvis 10/16/2020. HISTORY: ORDERING SYSTEM PROVIDED HISTORY: protenuria TECHNOLOGIST PROVIDED HISTORY: Reason for exam:->protenuria Reason for Exam: protenuria Type of Exam: Initial FINDINGS: Overall exam is somewhat technically difficult and mildly limited secondary to patient body habitus. . THe right kidney measures 10.1 cm in length and the left kidney measures 11.9 cm in length. Mildly increased renal echotexture to both kidneys. No hydronephrosis or intrarenal stones. No renal masses are identified. There is redemonstration of a benign cyst exophytic to the upper pole of the right kidney measuring 2.3 x 1.6 x 2.0 cm. No follow-up imaging is recommended. Benign cyst to the upper pole of the right kidney measuring 2.3 cm. No follow-up imaging recommended. Mildly increased renal echotexture compatible with medical renal disease.  Otherwise unremarkable exam.     Ct Abscess Drainage Peritoneal/peritonitis Open    Result Date: 10/17/2020  EXAMINATION: CT guided left lower quadrant abscess drainage. 10/17/2020 3:12 pm TECHNIQUE: After obtaining informed consent, the patient was placed in the supine position on the CT table. The skin superficial to the fluid collection was identified and cleansed using a sterile technique. Next, a 10 F locking pigtail drainage catheter was percutaneously placed into the collection using a standard Seldinger technique over stiff angled tip Glidewire with 8 and 10 [place] serial fascial dilatation. Catheter positioning was verified with CT guidance. As much fluid was aspirated as possible. A post procedural CT scan revealed complete drainage of the abscess. 310 cc of foul-smelling brownish green pus was removed and 10 cc was sent for culture and sensitivity. The drain was attached to a MARI suction bulb and sutured in place. The patient tolerated the procedure well with no immediate complications. The patient received 1.0 mg of Versed and 100 mcg fentanyl intravenously for sedation pain control. Sedation was provided by Brian Diaz RN. Sedation time: 33 minutes. Moderate sedation was monitored under the physician's direction. The patient's vital signs were monitored throughout the procedure and recorded in the patient's medical record by the nurse. Dose modulation, iterative reconstruction, and/or weight based adjustment of the mA/kV was utilized to reduce the radiation dose to as low as reasonably achievable. DLP: 702.37 COMPARISON: CT of the abdomen and pelvis with IV contrast 10/16/2020 HISTORY: Patient with multi septated complex fluid collection within the left mid abdomen along the course of the descending colon consistent with an abscess. A request has been made for percutaneous drainage. FINDINGS: Adequate drain placement within the abscess with complete drainage of the abscess.      Successful CT guided left lower quadrant pericolonic abscess drainage as described above. Approximately 310 cc of purulent fluid was removed.  The catheter was left to MARI suction bulb drainage.   -  Patient Active Problem List   Diagnosis    [facility] positive ([facility] [address]    Alcoholic ([facility] [address]    Hypertension    Alcohol withdrawal ([facility] [address]    Abdominal wall abscess    Abscess of sigmoid colon due to diverticulitis    Proteinuria    [facility]    Incisional hernia, without obstruction or gangrene     ______________________________________________________________    Electronically signed by David Key MD on 11/4/2020 at 7:56 AM

## 2020-11-04 NOTE — PROGRESS NOTES
Comprehensive Nutrition Assessment    Type and Reason for Visit:  Initial(Swing Bed)    Nutrition Recommendations/Plan: Monitor tolerance of high calorie ensure/frozen supplment, if unable to tolerate, will change to Liquacel protein supplement. Nutrition Assessment:  Pt status post exploratory lap with Tamez's procedure & drainage of intra-abdominal abcess. Sleeping on visit, per nursing pain meds given.   Intakes remain variable at this time % will recommend protein supplementation    Malnutrition Assessment:  Malnutrition Status:  Mild malnutrition    Context:  Acute Illness     Findings of the 6 clinical characteristics of malnutrition:      Estimated Daily Nutrient Needs:  Energy (kcal):  5672-6854; Weight Used for Energy Requirements:  Ideal     Protein (g):  ; Weight Used for Protein Requirements:  Ideal(1.2-1.4)        Fluid (ml/day):  7480-4239; Method Used for Fluid Requirements:  1 ml/kcal      Nutrition Related Findings:  Alb 2.2, H/H low but showing positive improvement      Wounds:  Surgical Incision       Current Nutrition Therapies:    DIET GENERAL;  Dietary Nutrition Supplements: Standard High Calorie Oral Supplement    Anthropometric Measures:  · Height: 5' 10\" (177.8 cm)  · Current Body Weight: 161 lb (73 kg)   · Admission Body Weight: 161 lb (73 kg)    · Usual Body Weight: 168 lb (76.2 kg)(prior admit weight on surgical encounter 168#)     · Ideal Body Weight: 166 lbs; % Ideal Body Weight 97 %   · BMI: 23.1  · Adjusted Body Weight:  ; No Adjustment   · Adjusted BMI:      · BMI Categories: Normal Weight (BMI 22.0 to 24.9) age over 72       Nutrition Diagnosis:   · Increased nutrient needs, Inadequate energy intake, In context of acute illness or injury related to acute injury/trauma, altered GI structure, altered GI function as evidenced by intake 26-50%, wounds      Nutrition Interventions:   Food and/or Nutrient Delivery:  Continue Current Diet, Continue Oral Nutrition Supplement  Nutrition Education/Counseling:  No recommendation at this time   Coordination of Nutrition Care:  Continue to monitor while inpatient    Goals:  Pt's intakes will show improvement to 50-75% of most meals and supplements       Nutrition Monitoring and Evaluation:   Behavioral-Environmental Outcomes:  None Identified   Food/Nutrient Intake Outcomes:  Food and Nutrient Intake, Supplement Intake  Physical Signs/Symptoms Outcomes:  Biochemical Data, GI Status, Skin, Weight     Discharge Planning:    Continue Oral Nutrition Supplement     Electronically signed by Adeline Alfaro RD, LD on 11/4/20 at 12:31 PM EST    Contact: [phone]

## 2020-11-04 NOTE — PROGRESS NOTES
Skin assessment completed by this RN and Riri BARAHONA. Surgical incision lower mid abdomen and ostomy lower left abdomen. No other skin issues noted.

## 2020-11-04 NOTE — PLAN OF CARE
Nutrition Problem: Decrease intakes, surgical wound, increased demand for nutrients  Intervention: Food and/or Nutrient Delivery: modify oral supplement  Nutrition Goals: Intakes will improve to 50-75% of meals and supplements

## 2020-11-04 NOTE — CARE COORDINATION
.Chart reviewed. The patient is to return home at discharge. His IV antibiotics will be completed on 11/7/20. He has a colostomy that he states he is learning to care for on his colostomy on his own. The nurse confirms he is caring for it on his own. The patient states he is tired and in pain. He was on IV dilaudid that got d/c today after being on it it for his whole hospital stay. The patient states that the primary support system is his girlfriend who he lives with. The patient has a PCP (at the [facility] and insurance. . The patient can afford and take their medications as prescribed. He states he has oxygen at home that he uses at night. The patient states he is independent in ADL's and any other needs at this time.

## 2020-11-04 NOTE — CARE COORDINATION
[facility] BED WEEKLY TEAM SHEET     Ar Garciadoroteo   11/4/2020   WEEK # 1    Care Management    Issues to be resolved  before discharge finish IV antx on 11/7/20    Family Education: patient and staff to update    Discharge Plan: home with girlfriend  Patient/Family Adjustment     Goals of previous week:   [] 1st team   [] met   [] partially met    [x] not met             Why goals were not met IV antx are not completed until 11/7/20  Skilled Level of Care Remains   [x] yes   [] no    Estimated length of stay: 1 week   Patient/Family Concerns/input: none  Patient/Family  Signature _________________  11/4/2020       Care Management Signature:  Lauren Ochoa RN

## 2020-11-04 NOTE — ADT AUTH CERT
Diverticulitis, Acute - Care Day 10 (10/25/2020) by Ashley Marte RN         Review Status  Review Entered    Completed  11/2/2020 09:49        Criteria Review       Care Day: 10 Care Date: 10/25/2020 Level of Care: Inpatient Floor    Guideline Day 2    Level Of Care    (X) Floor    11/2/2020 9:49 AM EST by Eddie Lane      m/s    Clinical Status    ( )  Hemodynamic stability    11/2/2020 9:49 AM EST by Cris King      99.5 104 18 174/103 91% RA    ( )  Pain absent or managed    (X)  Vomiting absent or reduced    (X)  Stable Hgb/Hct    Activity    ( )  Ambulatory    11/2/2020 9:49 AM EST by Cris King      up as tolerated    Routes    ( )  Liquid or advanced diet    11/2/2020 9:49 AM EST by Cris King      NPO--ice chips, sips with meds    (X) IV fluids, medications    11/2/2020 9:49 AM EST by Eddie Lane      IVF @ 50ml/hr; IVF volus 500ml x1; protonix 40mg IV bid; mag sulfate 2g IV x1; labetalol 20mg IVPB x1; lovenox sq qd    Interventions    (X) CBC    11/2/2020 9:49 AM EST by Cris King      WBC: 22.9 (H)  RBC: 3.28 (L)  Hemoglobin Quant: 10.2 (L)  Hematocrit: 30.4 (L)  Platelet Count: 336 (H)    Medications    (X) Possible IV antibiotics    11/2/2020 9:49 AM EST by Cris King      flagyl 500mg IV q8h; merrem IV 1g q8h; zyvox IV 600mg q12h; eraxis 200mg IV x1; avycaz 2.5g IV q8h    (X) Possible analgesics    11/2/2020 9:49 AM EST by Cris King      dilaudid IV gtt; dilaudid 1mg q3h prn x6     Milestone    Additional Notes    10/25/20       Sodium: 131 (L)    Potassium: 3.5    Chloride: 96 (L)    CO2: 26    BUN: 2 (L)    Creatinine: 0.7 (L)    Anion Gap: 9    Magnesium: 1.5 (L)    Lactate, ser/plas: 0.9    Glucose: 116 (H)    Calcium: 7.1 (L)    Phosphorus: 2.8    Total Protein: 5.2 (L)    CRP, High Sensitivity: 106.0    Albumin: 2.3 (L)    Alk Phos: 66    ALT: 7 (L)    AST: 10 (L)    Bilirubin: 0.4    Procalcitonin: 80.38          CXR: Prevotella Denticola    -Continue Meropenem and Zyvox, flagyl added per ID with concerns for resistance    -patient not tolerating clear liquids. monitor and advance diet slowly    -ID on board    -General surgery on board    -Pain control with PCA         HIV    Continue home medications         #Proteinuria    -urine protein 10/16/20 2000    -rpeat ua pr 30    -urine pr /cr 0.3    -Nephrology on board         #Hypertension    Continue Metoprolol         #Diet:    -Patient has been unable to tolerate p.o.    -Currently on clear liquids but unable to take    -Dietitian on board         Diet Diet NPO Effective Now Exceptions are: Ice Chips, Sips with Meds, Disposition Patient postop with poor oral intake           Physical Exam:    GEN    Awake male, sitting upright in bed in no apparent distress. Appears given age. EYES   Pupils are equally round.  No scleral erythema, discharge, or conjunctivitis. HENT  Mucous membranes are moist. Oral pharynx without exudates, no evidence of thrush. NECK  Supple, no apparent thyromegaly or masses. RESP  Clear to auscultation, no wheezes, rales or rhonchi.  Symmetric chest movement while on room air. CARDIO/VASC           S1/S2 auscultated. Regular rate without appreciable murmurs, rubs, or gallops. No JVD or carotid bruits. Peripheral pulses equal bilaterally and palpable. No peripheral edema. GI        colostomy in place, abdomen is soft without significant tenderness, masses, or guarding. Bowel sounds sluggish. Rectal exam deferred.        No costovertebral angle tenderness. Normal appearing external genitalia. Lackey catheter present. HEME/LYMPH            No palpable cervical lymphadenopathy and no hepatosplenomegaly. No petechiae or ecchymoses. MSK    No gross joint deformities. SKIN    Normal coloration, warm, dry. NEURO           Cranial nerves appear grossly intact, normal speech, no lateralizing weakness.     PSYCH            Awake, alert, oriented x 4.  Affect appropriate. Gen Surg:    Patient tired today, denies nausea currently. No burping now, no flatus in appliance    Fever to 103 last night, I was not notified. Patient states he just wants to rest today, still having pain but currently controlled         PE:    Vitals    Vitals:      10/24/20 2242 10/25/20 0045 10/25/20 0545 10/25/20 0839    BP: (!) 178/104 (!) 174/98 (!) 167/91 (!) 174/103    Pulse: 118 112 98 97    Resp: 19 18 18 18    Temp: 103 °F (39.4 °C) 102.2 °F (39 °C) 98.7 °F (37.1 °C) 99.5 °F (37.5 °C)    TempSrc: Oral Oral Oral Oral    SpO2: 93% 93% 96% 95%    Weight:            Height:                  Abd: soft, min d/c on dressing, titi advanced yesterday    Stoma pink and viable, edematous, no stool    No cellulitis or redness of abdominal wall or flank, no crepitus, +body wall edema         Labs reviewed    CT scan films and report reviewed         A/P:    LUQ fluid likely hematoma    Some dilation of bowel c/w ileus, patient without nausea currently so will hold off on NG    abx adjusted and anti-fungal added last night after fever spike, cultures pending    Continue current treatment plan          Nephro:    Assessment and Plan:              IMP:    ckd 2 with proteinuria    Abscess sigmoid colon    hiv    htn    hyponatremia         Plan      1 renal function was stable and ct scan with contrast done. Monitor post contrast    2 with fever sp ct scan and fu surgery eval and maintain on abx therapy    3 maintain therapy for hiv    4 as bp staying high tried xanax X1 but not sure if able take po and if taking po bp meds. Will place clonidine patch and monitor bp    5 na low stable monitor    Will follow and monitor surgery rec    [place] bp increase with pain and agitation but today more lethargic and has pain pump so will try carefully with clonidine patch    Hold lisinopril till more stable       ID:    Overnight events were reviewed.  Patient still had a fever spike.  CRP is elevated.  Taking all things into consideration: Presence intra-abdominal collections anterior to the spleen which cannot be drained, antecedent Zosyn exposure which does increase the risk of CRE.      Appreciate CT of the abdomen and pelvis performed by the hospitalist service.  A dose of Eraxis was given.     Plan: Continue linezolid and metronidazole.  Discontinue meropenem.  Start Avycaz and Eraxis.  Check beta-1 3D glucan.  Expect that he will be elevated due to intra-abdominal surgery.  However it could be tracked.  Follow-up repeat blood cultures from 10/24/2020.              Diverticulitis, Acute - Care Day 9 (10/24/2020) by Maverick Roth RN         Review Status  Review Entered    Completed  11/2/2020 09:26        Criteria Review       Care Day: 9 Care Date: 10/24/2020 Level of Care: Inpatient Floor    Guideline Day 2    Level Of Care    (X) Floor    11/2/2020 9:26 AM EST by Melissa De Guzman      m/s    Clinical Status    ( )  Hemodynamic stability    11/2/2020 9:26 AM EST by Cris King      100.3 124 19 201/100 93% RA    ( )  Pain absent or managed    (X)  Vomiting absent or reduced    (X)  Stable Hgb/Hct    11/2/2020 9:26 AM EST by Cris King      WBC: 28.0 (H)  RBC: 3.59 (L)  Hemoglobin Quant: 10.8 (L)  Hematocrit: 34.5 (L)    Activity    ( )  Ambulatory    11/2/2020 9:26 AM EST by Cris King      up as tolerated    Routes    ( )  Liquid or advanced diet    11/2/2020 9:26 AM EST by Cris King      NPO--ice chips, sips with meds    (X) IV fluids, medications    11/2/2020 9:26 AM EST by Melissa De Guzman      IVF @ 15ml/hr-IVF @ 50ml/hr with PCA pump  zofran 4mg IV x1; phenergan IV 12.5mg x1    Interventions    (X) CBC    Medications    (X) Possible IV antibiotics    11/2/2020 9:26 AM EST by Cris King      flagyl 500mg IV q8h; merrem IV 1g q8h; zyvox IV 600mg q12h    (X) Possible analgesics    11/2/2020 9:26 AM EST by Melissa rodríguez 650mg po x2; dilaudid IV gtt; dilaudid 1mg q3h prn x6     Milestone    Additional Notes    10/24/20       Platelet Count: 140 (H)    Sodium: 132 (L)    Potassium: 4.4    Chloride: 96 (L)    CO2: 21    BUN: 2 (L)    Creatinine: 0.6 (L)    Anion Gap: 15    Glucose: 97    Calcium: 7.6 (L)    Total Protein: 5.5 (L)    Procalcitonin: 0.245    CRP, High Sensitivity: 95.2    Albumin: 2.4 (L)    Alk Phos: 75    ALT: 9 (L)    AST: 13 (L)    Bilirubin: 0.4       IM:    Physical Exam:    GEN    Awake male, sitting upright in bed in no apparent distress. Appears given age. EYES   Pupils are equally round.  No scleral erythema, discharge, or conjunctivitis. HENT  Mucous membranes are moist. Oral pharynx without exudates, no evidence of thrush. NECK  Supple, no apparent thyromegaly or masses. RESP  Clear to auscultation, no wheezes, rales or rhonchi.  Symmetric chest movement while on room air. CARDIO/VASC           S1/S2 auscultated. Regular rate without appreciable murmurs, rubs, or gallops. No JVD or carotid bruits. Peripheral pulses equal bilaterally and palpable. No peripheral edema. GI        Colostomy in place,drain in place. Dressing on abdomen clean. Abdomen is soft, Bowel sounds are decreased 4q. Rectal exam deferred.        No costovertebral angle tenderness. Normal appearing external genitalia. Lackey catheter is not present. HEME/LYMPH            No palpable cervical lymphadenopathy and no hepatosplenomegaly. No petechiae or ecchymoses. MSK    No gross joint deformities. SKIN    Normal coloration, warm, dry. NEURO           Cranial nerves appear grossly intact, normal speech, no lateralizing weakness. PSYCH            Awake, alert, oriented x 4.  Affect appropriate.        Assessment and Plan:    Lindsay Rucker is a 39 y.o.  male  who presents with abdominal pain         #Sepsis    #Acute Diverticulitis    #Peritonitis    -SIRS 2/4 leukocytosis and tachycardia    -S/p IR drainage with drain placement    -S/p Exploratory laparotomy with colostomy creation, drainage of peritoneal abscess    -CT abdomen and pelvis with colitis, abscess, pancreatic ductal dilatation    -Blood cultures no growth so far    -Surgical cultures with mixed organisms, including E Coli, Prevotella Denticola    -Continue Meropenem and Zyvox, flagyl added per ID with concerns for resistance    -patient not tolerating clear liquids. monitor and advance diet slowly    -ID on board    -General surgery on board    -Pain control with PCA         HIV    Continue home medications         #Proteinuria    -urine protein 10/16/20 2000    -rpeat ua pr 30    -urine pr /cr 0.3    -Nephrology on board         #Hypertension    Continue Metoprolol       Gen Surg:    A/P:    -POD#3 s/p Yazan's    -post-op ileus, patient drinking a fair bit of water, drank several times while I was in the room. Will change to limited ice chips only, if emesis will need NG tube    -ambulate    -advance titi today    -WBC count increased, low grade fever. Meropenum d#8, Zyvox d#6, add flagyl today (patient with provotella denticola and fusobacterium nucleatum from abdominal abscess, recommended abx is flagyl)    -Patient asked me to call his father Alessandro Calhoun, [phone], I did call and left an update on Jenny as a message at that number.        Nephro:    Assessment and Plan:              IMP:    ckd 2 with proteinuria    Abscess sigmoid colon    hiv    htn         Plan      Renal us not large kidney that would see with hiv nephropathy and mild proteinuria monitor    Noted hematuria and with vargas , does have renal cyst but monitor as urine clear vargas    Care abd and on abx therapy    Maintain hiv therapy    Adjust bp meds but felt increase bp from pain and agitation       ID:    Impression and plan    · Clinical status: Status post exploratory laparotomy, drainage of pericolonic abscess on 10/21/2020.  Still has considerable pain.  Unfortunately sediment.    -baseline serum creatinine with normal: Na / K / CO2    2.    -followed by ID: meropenem and linezolid    3.    -currently on NS at 125 / hour    -followed by Dr. Jayro Parmar    -on dilaudid PCA    4.    -followed by ID and on anti-retrovirals    5. -BP trend: systolic BP's have recently been 151 - 166    -on metoprolol with addition of amlodipine    6.      -latest Hb: 11.1; follow hemoglobin trend           Diverticulitis, Acute - Care Day 7 (10/22/2020) by Adryan Parra RN         Review Status  Review Entered    Completed  10/30/2020 15:54        Criteria Review       Care Day: 7 Care Date: 10/22/2020 Level of Care: Inpatient Floor    Guideline Day 2    Level Of Care    (X) Floor    Clinical Status    (X)  Hemodynamic stability    10/30/2020 3:54 PM EDT by Dena Roque      BP:(!) 167/111  Pulse:99  Resp:15  Temp:97.4 °F (36.3 °C)  SpO2:96%    ( )  Pain absent or managed    (X)  Vomiting absent or reduced    (X)  Stable Hgb/Hct    Activity    ( ) Ambulatory    Routes    ( )  Liquid or advanced diet    (X) IV fluids, medications    10/30/2020 3:54 PM EDT by Dena Roque      NS @ 50 ml/hr    Interventions    (X) CBC    Medications    (X) Possible IV antibiotics    10/30/2020 3:54 PM EDT by Dena Roque      zyvox 600mg ivpb q12hr    (X) Possible analgesics     Milestone    Additional Notes    10/22  Day 7       MEDS    START ON 10/23/2020] enoxaparin 40 mg Subcutaneous Daily    · linezolid 600 mg Intravenous Q12H    · metoprolol 100 mg Oral BID    · meropenem 1 g Intravenous Q8H    · Abacavir-Dolutegravir-Lamivud 1 tablet Oral Daily    · pantoprazole 40 mg Intravenous Daily    · sodium chloride flush 10 mL Intravenous BID    · sodium chloride flush 10 mL Intravenous 2 times per day    sodium chloride 125 mL/hr    Dilaudid PCA continuous    Dilaudid 1mg ivp x4 doses             Internal MD note    Assessment and Plan:    Bradley Bray is a 39 y. oOmer Cons presents with abdominal pain Sepsis    Acute Diverticulitis    S/p IR drainage with drain tube placement    Peritonitis    S/p Exploratory laparotomy with colostomy creation, drainage of peritoneal abscess    Elevated white count>>currently trending down    CT abdomen and pelvis with colitis, abscess, pancreatic ductal dilatation    Blood cultures no growth so far    Surgical cultures with mixed organisms, including E Coli, Prevotella Denticola    Continue Meropenem(no side effects noted) and Zyvox    ID consulted, recommendations appreciated    General surgery recommendations appreciated    Pain control with PCA    HIV    Continue home medications    Proteinuria    Not sure if it is related to antivirals    Hypertension    Continue Metoprolol       Surgeon note    POD 1 Yazan's procedure, IHR. AF VSS. On PCA. MARI drain is 100/35, serosanginous. Adequate UOP.    abd is soft, colostomy is pink, edematous. IVF's, abx's, PCA, lovenox, check labs, mobilize. ID note    Impression and plan    · Clinical status: Status post exploratory laparotomy, drainage of pericolonic abscess on 10/21/2020.  Patient's procalcitonin is down 2.79.  After having a downtrending leukocytosis it is up to 19.6K today.  Likely postsurgical stress reaction.     · Therapeutic: continue linezolid 10/19 and meropenem 10/17    · Diagnostic: trend crp, and pct

## 2020-11-05 PROCEDURE — 6360000002 HC RX W HCPCS: Performed by: INTERNAL MEDICINE

## 2020-11-05 PROCEDURE — C9113 INJ PANTOPRAZOLE SODIUM, VIA: HCPCS | Performed by: INTERNAL MEDICINE

## 2020-11-05 PROCEDURE — 1200000002 HC SEMI PRIVATE SWING BED

## 2020-11-05 PROCEDURE — 2580000003 HC RX 258: Performed by: INTERNAL MEDICINE

## 2020-11-05 PROCEDURE — 6370000000 HC RX 637 (ALT 250 FOR IP): Performed by: INTERNAL MEDICINE

## 2020-11-05 PROCEDURE — 2500000003 HC RX 250 WO HCPCS: Performed by: INTERNAL MEDICINE

## 2020-11-05 RX ADMIN — CLOTRIMAZOLE AND BETAMETHASONE DIPROPIONATE: 10; .5 CREAM TOPICAL at 09:07

## 2020-11-05 RX ADMIN — ERAVACYCLINE: 50 INJECTION, POWDER, LYOPHILIZED, FOR SOLUTION INTRAVENOUS at 12:32

## 2020-11-05 RX ADMIN — AMLODIPINE BESYLATE 10 MG: 10 TABLET ORAL at 09:06

## 2020-11-05 RX ADMIN — GUAIFENESIN 600 MG: 600 TABLET, EXTENDED RELEASE ORAL at 20:47

## 2020-11-05 RX ADMIN — METRONIDAZOLE 500 MG: 500 INJECTION, SOLUTION INTRAVENOUS at 12:32

## 2020-11-05 RX ADMIN — PANCRELIPASE 30000 UNITS: 30000; 6000; 19000 CAPSULE, DELAYED RELEASE PELLETS ORAL at 12:32

## 2020-11-05 RX ADMIN — SODIUM CHLORIDE 100 MG: 9 INJECTION, SOLUTION INTRAVENOUS at 09:05

## 2020-11-05 RX ADMIN — ERAVACYCLINE: 50 INJECTION, POWDER, LYOPHILIZED, FOR SOLUTION INTRAVENOUS at 01:00

## 2020-11-05 RX ADMIN — CEFTAZIDIME, AVIBACTAM 2.5 G: 2; .5 POWDER, FOR SOLUTION INTRAVENOUS at 21:50

## 2020-11-05 RX ADMIN — OXYCODONE AND ACETAMINOPHEN 1 TABLET: 5; 325 TABLET ORAL at 00:04

## 2020-11-05 RX ADMIN — METRONIDAZOLE 500 MG: 500 INJECTION, SOLUTION INTRAVENOUS at 05:44

## 2020-11-05 RX ADMIN — OXYCODONE AND ACETAMINOPHEN 1 TABLET: 5; 325 TABLET ORAL at 07:20

## 2020-11-05 RX ADMIN — SODIUM CHLORIDE, PRESERVATIVE FREE 10 ML: 5 INJECTION INTRAVENOUS at 09:05

## 2020-11-05 RX ADMIN — OXYCODONE AND ACETAMINOPHEN 1 TABLET: 5; 325 TABLET ORAL at 20:47

## 2020-11-05 RX ADMIN — GUAIFENESIN 600 MG: 600 TABLET, EXTENDED RELEASE ORAL at 09:06

## 2020-11-05 RX ADMIN — Medication 400 MG: at 20:47

## 2020-11-05 RX ADMIN — PANTOPRAZOLE SODIUM 40 MG: 40 INJECTION, POWDER, FOR SOLUTION INTRAVENOUS at 09:06

## 2020-11-05 RX ADMIN — PANCRELIPASE 30000 UNITS: 30000; 6000; 19000 CAPSULE, DELAYED RELEASE PELLETS ORAL at 16:58

## 2020-11-05 RX ADMIN — PANCRELIPASE 30000 UNITS: 30000; 6000; 19000 CAPSULE, DELAYED RELEASE PELLETS ORAL at 09:05

## 2020-11-05 RX ADMIN — METOPROLOL TARTRATE 125 MG: 25 TABLET, FILM COATED ORAL at 20:47

## 2020-11-05 RX ADMIN — LISINOPRIL 40 MG: 40 TABLET ORAL at 09:06

## 2020-11-05 RX ADMIN — Medication 400 MG: at 09:08

## 2020-11-05 RX ADMIN — SODIUM CHLORIDE, PRESERVATIVE FREE 10 ML: 5 INJECTION INTRAVENOUS at 20:48

## 2020-11-05 RX ADMIN — OXYCODONE AND ACETAMINOPHEN 1 TABLET: 5; 325 TABLET ORAL at 13:44

## 2020-11-05 RX ADMIN — METOPROLOL TARTRATE 125 MG: 25 TABLET, FILM COATED ORAL at 09:06

## 2020-11-05 RX ADMIN — CEFTAZIDIME, AVIBACTAM 2.5 G: 2; .5 POWDER, FOR SOLUTION INTRAVENOUS at 15:06

## 2020-11-05 RX ADMIN — METRONIDAZOLE 500 MG: 500 INJECTION, SOLUTION INTRAVENOUS at 20:48

## 2020-11-05 RX ADMIN — ERAVACYCLINE: 50 INJECTION, POWDER, LYOPHILIZED, FOR SOLUTION INTRAVENOUS at 23:56

## 2020-11-05 RX ADMIN — PANTOPRAZOLE SODIUM 40 MG: 40 INJECTION, POWDER, FOR SOLUTION INTRAVENOUS at 20:48

## 2020-11-05 RX ADMIN — CEFTAZIDIME, AVIBACTAM 2.5 G: 2; .5 POWDER, FOR SOLUTION INTRAVENOUS at 06:46

## 2020-11-05 ASSESSMENT — PAIN SCALES - GENERAL
PAINLEVEL_OUTOF10: 8
PAINLEVEL_OUTOF10: 8
PAINLEVEL_OUTOF10: 1
PAINLEVEL_OUTOF10: 8
PAINLEVEL_OUTOF10: 9
PAINLEVEL_OUTOF10: 9
PAINLEVEL_OUTOF10: 6
PAINLEVEL_OUTOF10: 9

## 2020-11-06 PROCEDURE — 2500000003 HC RX 250 WO HCPCS: Performed by: INTERNAL MEDICINE

## 2020-11-06 PROCEDURE — 2580000003 HC RX 258: Performed by: INTERNAL MEDICINE

## 2020-11-06 PROCEDURE — 6370000000 HC RX 637 (ALT 250 FOR IP): Performed by: INTERNAL MEDICINE

## 2020-11-06 PROCEDURE — 6360000002 HC RX W HCPCS: Performed by: INTERNAL MEDICINE

## 2020-11-06 PROCEDURE — 1200000002 HC SEMI PRIVATE SWING BED

## 2020-11-06 PROCEDURE — C9113 INJ PANTOPRAZOLE SODIUM, VIA: HCPCS | Performed by: INTERNAL MEDICINE

## 2020-11-06 PROCEDURE — 99213 OFFICE O/P EST LOW 20 MIN: CPT

## 2020-11-06 RX ORDER — PANTOPRAZOLE SODIUM 40 MG/1
40 TABLET, DELAYED RELEASE ORAL
Status: DISCONTINUED | OUTPATIENT
Start: 2020-11-07 | End: 2020-11-07 | Stop reason: HOSPADM

## 2020-11-06 RX ADMIN — SODIUM CHLORIDE, PRESERVATIVE FREE 10 ML: 5 INJECTION INTRAVENOUS at 09:22

## 2020-11-06 RX ADMIN — PANTOPRAZOLE SODIUM 40 MG: 40 INJECTION, POWDER, FOR SOLUTION INTRAVENOUS at 21:29

## 2020-11-06 RX ADMIN — METRONIDAZOLE 500 MG: 500 INJECTION, SOLUTION INTRAVENOUS at 21:30

## 2020-11-06 RX ADMIN — METOPROLOL TARTRATE 125 MG: 25 TABLET, FILM COATED ORAL at 09:04

## 2020-11-06 RX ADMIN — GUAIFENESIN 600 MG: 600 TABLET, EXTENDED RELEASE ORAL at 21:28

## 2020-11-06 RX ADMIN — SODIUM CHLORIDE 100 MG: 9 INJECTION, SOLUTION INTRAVENOUS at 09:21

## 2020-11-06 RX ADMIN — OXYCODONE AND ACETAMINOPHEN 1 TABLET: 5; 325 TABLET ORAL at 21:28

## 2020-11-06 RX ADMIN — SODIUM CHLORIDE, PRESERVATIVE FREE 10 ML: 5 INJECTION INTRAVENOUS at 21:30

## 2020-11-06 RX ADMIN — GUAIFENESIN 600 MG: 600 TABLET, EXTENDED RELEASE ORAL at 09:05

## 2020-11-06 RX ADMIN — CEFTAZIDIME, AVIBACTAM 2.5 G: 2; .5 POWDER, FOR SOLUTION INTRAVENOUS at 05:46

## 2020-11-06 RX ADMIN — OXYCODONE AND ACETAMINOPHEN 1 TABLET: 5; 325 TABLET ORAL at 02:54

## 2020-11-06 RX ADMIN — LISINOPRIL 40 MG: 40 TABLET ORAL at 09:05

## 2020-11-06 RX ADMIN — CEFTAZIDIME, AVIBACTAM 2.5 G: 2; .5 POWDER, FOR SOLUTION INTRAVENOUS at 15:21

## 2020-11-06 RX ADMIN — METOPROLOL TARTRATE 125 MG: 25 TABLET, FILM COATED ORAL at 21:28

## 2020-11-06 RX ADMIN — PANCRELIPASE 30000 UNITS: 30000; 6000; 19000 CAPSULE, DELAYED RELEASE PELLETS ORAL at 11:58

## 2020-11-06 RX ADMIN — METRONIDAZOLE 500 MG: 500 INJECTION, SOLUTION INTRAVENOUS at 13:54

## 2020-11-06 RX ADMIN — PANTOPRAZOLE SODIUM 40 MG: 40 INJECTION, POWDER, FOR SOLUTION INTRAVENOUS at 09:21

## 2020-11-06 RX ADMIN — Medication 400 MG: at 21:28

## 2020-11-06 RX ADMIN — CLOTRIMAZOLE AND BETAMETHASONE DIPROPIONATE: 10; .5 CREAM TOPICAL at 09:21

## 2020-11-06 RX ADMIN — CLOTRIMAZOLE AND BETAMETHASONE DIPROPIONATE: 10; .5 CREAM TOPICAL at 04:50

## 2020-11-06 RX ADMIN — AMLODIPINE BESYLATE 10 MG: 10 TABLET ORAL at 09:04

## 2020-11-06 RX ADMIN — OXYCODONE AND ACETAMINOPHEN 1 TABLET: 5; 325 TABLET ORAL at 09:04

## 2020-11-06 RX ADMIN — METRONIDAZOLE 500 MG: 500 INJECTION, SOLUTION INTRAVENOUS at 04:46

## 2020-11-06 RX ADMIN — CEFTAZIDIME, AVIBACTAM 2.5 G: 2; .5 POWDER, FOR SOLUTION INTRAVENOUS at 22:38

## 2020-11-06 RX ADMIN — PANCRELIPASE 30000 UNITS: 30000; 6000; 19000 CAPSULE, DELAYED RELEASE PELLETS ORAL at 09:04

## 2020-11-06 RX ADMIN — OXYCODONE AND ACETAMINOPHEN 1 TABLET: 5; 325 TABLET ORAL at 15:19

## 2020-11-06 RX ADMIN — Medication 400 MG: at 09:05

## 2020-11-06 RX ADMIN — ERAVACYCLINE: 50 INJECTION, POWDER, LYOPHILIZED, FOR SOLUTION INTRAVENOUS at 11:59

## 2020-11-06 RX ADMIN — PANCRELIPASE 30000 UNITS: 30000; 6000; 19000 CAPSULE, DELAYED RELEASE PELLETS ORAL at 17:28

## 2020-11-06 ASSESSMENT — PAIN DESCRIPTION - ORIENTATION
ORIENTATION: LEFT;MID

## 2020-11-06 ASSESSMENT — PAIN - FUNCTIONAL ASSESSMENT
PAIN_FUNCTIONAL_ASSESSMENT: PREVENTS OR INTERFERES SOME ACTIVE ACTIVITIES AND ADLS

## 2020-11-06 ASSESSMENT — PAIN DESCRIPTION - DESCRIPTORS
DESCRIPTORS: ACHING;BURNING

## 2020-11-06 ASSESSMENT — PAIN DESCRIPTION - FREQUENCY
FREQUENCY: CONTINUOUS

## 2020-11-06 ASSESSMENT — PAIN SCALES - GENERAL
PAINLEVEL_OUTOF10: 6
PAINLEVEL_OUTOF10: 10
PAINLEVEL_OUTOF10: 6
PAINLEVEL_OUTOF10: 9
PAINLEVEL_OUTOF10: 6
PAINLEVEL_OUTOF10: 6
PAINLEVEL_OUTOF10: 5
PAINLEVEL_OUTOF10: 6
PAINLEVEL_OUTOF10: 4

## 2020-11-06 ASSESSMENT — PAIN DESCRIPTION - PROGRESSION
CLINICAL_PROGRESSION: GRADUALLY IMPROVING
CLINICAL_PROGRESSION: NOT CHANGED
CLINICAL_PROGRESSION: GRADUALLY IMPROVING
CLINICAL_PROGRESSION: GRADUALLY WORSENING

## 2020-11-06 ASSESSMENT — PAIN DESCRIPTION - ONSET
ONSET: ON-GOING

## 2020-11-06 ASSESSMENT — PAIN DESCRIPTION - LOCATION
LOCATION: ABDOMEN

## 2020-11-06 ASSESSMENT — PAIN DESCRIPTION - PAIN TYPE
TYPE: SURGICAL PAIN

## 2020-11-06 NOTE — CONSULTS
mouth            Objective:      /77   Pulse 87   Temp 98 °F (36.7 °C)   Resp 16   Ht 5' 10\" (1.778 m)   Wt 161 lb 4 oz (73.1 kg)   SpO2 99%   BMI 23.14 kg/m²   Ck Risk Score: Ck Scale Score: 21    LABS    CBC:   Lab Results   Component Value Date    WBC 13.7 11/03/2020    RBC 3.46 11/03/2020    HGB 10.2 11/03/2020    HCT 32.3 11/03/2020    MCV 93.4 11/03/2020    MCH 29.5 11/03/2020    MCHC 31.6 11/03/2020    RDW 14.7 11/03/2020     11/03/2020    MPV 9.2 11/03/2020     CMP:    Lab Results   Component Value Date     11/01/2020    K 4.6 11/01/2020    CL 95 11/01/2020    CO2 28 11/01/2020    BUN 5 11/01/2020    CREATININE 0.7 11/01/2020    GFRAA >60 11/01/2020    LABGLOM >60 11/01/2020    GLUCOSE 102 11/01/2020    PROT 5.8 11/01/2020    PROT 6.1 11/04/2012    LABALBU 2.2 11/01/2020    CALCIUM 7.7 11/01/2020    BILITOT 0.3 11/01/2020    ALKPHOS 111 11/01/2020    AST 13 11/01/2020    ALT 6 11/01/2020     Albumin:    Lab Results   Component Value Date    LABALBU 2.2 11/01/2020     PT/INR:    Lab Results   Component Value Date    PROTIME 16.5 10/17/2020    INR 1.36 10/17/2020     HgBA1c:  No results found for: LABA1C      Assessment:     Patient Active Problem List   Diagnosis    HIV positive ([address]    Alcoholic ([address]    Hypertension    Alcohol withdrawal ([address]    Abdominal wall abscess    Abscess of sigmoid colon due to diverticulitis    Proteinuria    HIV (human immunodeficiency virus infection) ([address]    Incisional hernia, without obstruction or gangrene    Abdominal infection ([address]       Measurements:       Response to treatment:  Well tolerated by patient. Pain Assessment:  Severity:  none  Quality of pain: na  Wound Pain Timing/Severity: na  Premedicated: no    Plan:     Plan of Care:       Pt in bed. Agreeable to colostomy education. Stated has been emptying pouch without difficulty. No leakage since last change on Tuesday. Medium loose brown stool emptied.  Abdominal incision with staples. Moderate serous drainage. Cleansed with NS. Pt able to verbalize steps in complete change of colostomy appliance but reluctant to participate. Stated his family will assist at home and he can instruct. Old pouching system removed. Stoma red, moist and protruding. Maryjo stomal skin slightly pink. Cleansed with water. Stoma powder applied. Measures 40 mm. Barrier and pouch applied. Pt able to visualize with mirror provided. Aquacel, gauze, and clear film applied to incision. Pt tolerated well. Pt already has home colostomy supplies, educational materials, and set up with [facility]. Plan for home with [place] [address] tomorrow. Denies further questions or concerns. Specialty Bed Required : no  [] Low Air Loss   [] Pressure Redistribution  [] Fluid Immersion  [] Bariatric  [] Total Pressure Relief  [] Other:     Discharge Plan:  Placement for patient upon discharge: home with [address]: no  Patient appropriate for Outpatient [address]: follow up with Dr. Callie Burgess    Patient/Caregiver Teaching:  Level of patient/caregiver understanding able to:   Voiced understanding.         Electronically signed by Serina Gomez RN,  on 11/6/2020 at 3:21 PM

## 2020-11-06 NOTE — PLAN OF CARE
Patient states plan is to go home tomorrow. Patient is apprehensive about taking care of colostomy but denies any additional needs at this time.    Problem: Discharge Planning:  Goal: Discharged to appropriate level of care  Description: Discharged to appropriate level of care  Outcome: Ongoing

## 2020-11-06 NOTE — CARE COORDINATION
NURSING: When the patient is discharged Saturday please fax the discharge instructions to [facility] (fax: [phone]) and call (phone: [phone]) to notify of the discharge.

## 2020-11-06 NOTE — PLAN OF CARE
Problem: Discharge Planning:  Goal: Discharged to appropriate level of care  Description: Discharged to appropriate level of care  Outcome: Ongoing     Problem: Pain:  Goal: Pain level will decrease  Description: Pain level will decrease  Outcome: Ongoing  Goal: Control of acute pain  Description: Control of acute pain  Outcome: Ongoing  Goal: Control of chronic pain  Description: Control of chronic pain  Outcome: Ongoing     Problem: Infection - Surgical Site:  Goal: Will show no infection signs and symptoms  Description: Will show no infection signs and symptoms  Outcome: Ongoing     Problem: Nutrition  Goal: Optimal nutrition therapy  Outcome: Ongoing  Goal: Understanding of nutritional guidelines  Outcome: Ongoing

## 2020-11-06 NOTE — PROGRESS NOTES
This RN spoke with David Boogie Do to inform that patient was being discharged on 11/7/2020. Mayra stated that would try to see patient and change ostomy while at [facility] this afternoon.

## 2020-11-07 VITALS
RESPIRATION RATE: 16 BRPM | WEIGHT: 161.25 LBS | TEMPERATURE: 97.7 F | OXYGEN SATURATION: 100 % | HEART RATE: 90 BPM | BODY MASS INDEX: 23.08 KG/M2 | SYSTOLIC BLOOD PRESSURE: 126 MMHG | HEIGHT: 70 IN | DIASTOLIC BLOOD PRESSURE: 93 MMHG

## 2020-11-07 PROCEDURE — 2500000003 HC RX 250 WO HCPCS: Performed by: INTERNAL MEDICINE

## 2020-11-07 PROCEDURE — 6360000002 HC RX W HCPCS: Performed by: INTERNAL MEDICINE

## 2020-11-07 PROCEDURE — 2580000003 HC RX 258: Performed by: INTERNAL MEDICINE

## 2020-11-07 PROCEDURE — 6370000000 HC RX 637 (ALT 250 FOR IP): Performed by: INTERNAL MEDICINE

## 2020-11-07 RX ORDER — CLONIDINE 0.1 MG/24H
1 PATCH, EXTENDED RELEASE TRANSDERMAL WEEKLY
Qty: 4 PATCH | Refills: 0 | Status: SHIPPED | OUTPATIENT
Start: 2020-11-08 | End: 2020-11-11

## 2020-11-07 RX ORDER — LISINOPRIL 40 MG/1
40 TABLET ORAL DAILY
Qty: 30 TABLET | Refills: 3 | Status: SHIPPED | OUTPATIENT
Start: 2020-11-07 | End: 2020-11-11

## 2020-11-07 RX ORDER — OXYCODONE HYDROCHLORIDE AND ACETAMINOPHEN 5; 325 MG/1; MG/1
1 TABLET ORAL EVERY 8 HOURS PRN
Qty: 9 TABLET | Refills: 0 | Status: SHIPPED | OUTPATIENT
Start: 2020-11-07 | End: 2020-11-10

## 2020-11-07 RX ADMIN — PROMETHAZINE HYDROCHLORIDE 12.5 MG: 25 INJECTION INTRAMUSCULAR; INTRAVENOUS at 08:35

## 2020-11-07 RX ADMIN — ACETAMINOPHEN 650 MG: 325 TABLET ORAL at 08:36

## 2020-11-07 RX ADMIN — PANTOPRAZOLE SODIUM 40 MG: 40 TABLET, DELAYED RELEASE ORAL at 16:30

## 2020-11-07 RX ADMIN — ACETAMINOPHEN 650 MG: 325 TABLET ORAL at 14:37

## 2020-11-07 RX ADMIN — CEFTAZIDIME, AVIBACTAM 2.5 G: 2; .5 POWDER, FOR SOLUTION INTRAVENOUS at 14:33

## 2020-11-07 RX ADMIN — ERAVACYCLINE: 50 INJECTION, POWDER, LYOPHILIZED, FOR SOLUTION INTRAVENOUS at 13:09

## 2020-11-07 RX ADMIN — Medication 400 MG: at 08:36

## 2020-11-07 RX ADMIN — METRONIDAZOLE 500 MG: 500 INJECTION, SOLUTION INTRAVENOUS at 14:33

## 2020-11-07 RX ADMIN — OXYCODONE AND ACETAMINOPHEN 1 TABLET: 5; 325 TABLET ORAL at 08:36

## 2020-11-07 RX ADMIN — ERAVACYCLINE: 50 INJECTION, POWDER, LYOPHILIZED, FOR SOLUTION INTRAVENOUS at 00:43

## 2020-11-07 RX ADMIN — GUAIFENESIN 600 MG: 600 TABLET, EXTENDED RELEASE ORAL at 08:35

## 2020-11-07 RX ADMIN — SODIUM CHLORIDE 100 MG: 9 INJECTION, SOLUTION INTRAVENOUS at 10:10

## 2020-11-07 RX ADMIN — LISINOPRIL 40 MG: 40 TABLET ORAL at 08:35

## 2020-11-07 RX ADMIN — PANCRELIPASE 30000 UNITS: 30000; 6000; 19000 CAPSULE, DELAYED RELEASE PELLETS ORAL at 13:08

## 2020-11-07 RX ADMIN — OXYCODONE AND ACETAMINOPHEN 1 TABLET: 5; 325 TABLET ORAL at 14:37

## 2020-11-07 RX ADMIN — SODIUM CHLORIDE, PRESERVATIVE FREE 10 ML: 5 INJECTION INTRAVENOUS at 14:34

## 2020-11-07 RX ADMIN — METOPROLOL TARTRATE 125 MG: 25 TABLET, FILM COATED ORAL at 08:36

## 2020-11-07 RX ADMIN — PANTOPRAZOLE SODIUM 40 MG: 40 TABLET, DELAYED RELEASE ORAL at 05:46

## 2020-11-07 RX ADMIN — CEFTAZIDIME, AVIBACTAM 2.5 G: 2; .5 POWDER, FOR SOLUTION INTRAVENOUS at 06:45

## 2020-11-07 RX ADMIN — PROMETHAZINE HYDROCHLORIDE 12.5 MG: 25 INJECTION INTRAMUSCULAR; INTRAVENOUS at 16:29

## 2020-11-07 RX ADMIN — PANCRELIPASE 30000 UNITS: 30000; 6000; 19000 CAPSULE, DELAYED RELEASE PELLETS ORAL at 08:34

## 2020-11-07 RX ADMIN — METRONIDAZOLE 500 MG: 500 INJECTION, SOLUTION INTRAVENOUS at 05:47

## 2020-11-07 RX ADMIN — AMLODIPINE BESYLATE 10 MG: 10 TABLET ORAL at 08:36

## 2020-11-07 RX ADMIN — OXYCODONE AND ACETAMINOPHEN 1 TABLET: 5; 325 TABLET ORAL at 04:12

## 2020-11-07 RX ADMIN — CLOTRIMAZOLE AND BETAMETHASONE DIPROPIONATE: 10; .5 CREAM TOPICAL at 08:56

## 2020-11-07 ASSESSMENT — PAIN SCALES - GENERAL
PAINLEVEL_OUTOF10: 9
PAINLEVEL_OUTOF10: 6
PAINLEVEL_OUTOF10: 9
PAINLEVEL_OUTOF10: 9

## 2020-11-07 NOTE — DISCHARGE SUMMARY
[place] 1975 [id number]  PCP:  Nanda Conteh RN, BSN, MSN, FNP-C    Admit date: 11/3/2020  Admitting Physician: Victoriano Rodriguez MD    Discharge date: 11/7/2020 Discharge Physician: Victoriano Rodriguez MD      Reason for admission: No chief complaint on file. Present on Admission:   Abdominal infection [facility])       Discharge Diagnoses Include:  1. Peritoneal abscess sp exlap needs IV abx  - completed IV abx     2. htn  - bp well controlled     3. HIV, without AIDS  - monitor for fever     4. Chronic pancreatitis  - no dm2     5. Protein calorie malnutrition  - encourage oral itnake     6. Hypomagnesemia  -replace    Hospital Course[de-identified]   Patient presented to [facility] for IV antibiotic therapy,. Feels well and will be discharged home with follow up with specialists. Pt was personally examined by me on the day of discharge with the following findings: no new issues  Gen: ao nad  Heent: ncat  Lungs: ctabl  Car: nl s1s2  Abd; soft ntnd  Ext: rom wnl  Skin: warm+dry  Neuro: cn 2-12 grossly intact        Patient Instructions:   Chet Barbosa   Home Medication Instructions [facility]:[id number]    Printed on:11/07/20 0816   Medication Information                      Abacavir-Dolutegravir-Lamivud 600- MG TABS  Take 1 tablet by mouth              cloNIDine (CATAPRES) 0.1 MG/24HR PTWK  Place 1 patch onto the skin once a week             clotrimazole-betamethasone (LOTRISONE) 1-0.05 % cream  Apply topically daily Apply topically once daily to midline incision with dressing change. lisinopril (PRINIVIL;ZESTRIL) 40 MG tablet  Take 1 tablet by mouth daily             metoprolol (LOPRESSOR) 25 MG tablet  Take 2 tablets by mouth 2 times daily             oxyCODONE-acetaminophen (PERCOCET) 5-325 MG per tablet  Take 1 tablet by mouth every 8 hours as needed for Pain for up to 3 days.                   Code Status: Full Code     Consults:   IP CONSULT TO HOME CARE NEEDS    Diet: regular diet    Activity: activity as tolerated   Work:    Discharged Condition: fair    Prognosis: Fair    Disposition: home      Follow-up with   1. PCP within   5-7    Days         Discharge Physician Signed: Carlos Joiner M.D. The patient was seen and examined on day of discharge and this discharge summary is in conjunction with any daily progress note from day of discharge.   Time spent on discharge in the examination, evaluation, counseling and review of medications and discharge plan: 22 minutes

## 2020-11-07 NOTE — PLAN OF CARE
Problem: Discharge Planning:  Goal: Discharged to appropriate level of care  Description: Discharged to appropriate level of care  11/7/2020 0554 by Vimal Munson LPN  Outcome: Ongoing  11/6/2020 1746 by Birdia Fleischer, RN  Outcome: Ongoing     Problem: Pain:  Goal: Pain level will decrease  Description: Pain level will decrease  11/7/2020 0554 by Vimal Munson LPN  Outcome: Ongoing  11/6/2020 1746 by Birdia Fleischer, RN  Outcome: Ongoing  Goal: Control of acute pain  Description: Control of acute pain  11/7/2020 0554 by Vimal Munson LPN  Outcome: Ongoing  11/6/2020 1746 by Birdia Fleischer, RN  Outcome: Ongoing  Goal: Control of chronic pain  Description: Control of chronic pain  11/7/2020 0554 by Vimal Munson LPN  Outcome: Ongoing  11/6/2020 1746 by Birdia Fleischer, RN  Outcome: Ongoing     Problem: Infection - Surgical Site:  Goal: Will show no infection signs and symptoms  Description: Will show no infection signs and symptoms  11/7/2020 0554 by Vimal Munson LPN  Outcome: Ongoing  11/6/2020 1746 by Birdia Fleischer, RN  Outcome: Ongoing     Problem: Nutrition  Goal: Optimal nutrition therapy  11/7/2020 0554 by Vimal Munson LPN  Outcome: Ongoing  11/6/2020 1746 by Birdia Fleischer, RN  Outcome: Ongoing  Goal: Understanding of nutritional guidelines  11/7/2020 0554 by Vimal Munson LPN  Outcome: Ongoing  11/6/2020 1746 by Birdia Fleischer, RN  Outcome: Ongoing

## 2020-11-07 NOTE — PLAN OF CARE
Problem: Discharge Planning:  Goal: Discharged to appropriate level of care  Description: Discharged to appropriate level of care  11/7/2020 1204 by Anthony Garcia  Outcome: Ongoing     Problem: Pain:  Goal: Pain level will decrease  Description: Pain level will decrease  11/7/2020 1204 by Anthony Garcia  Outcome: Ongoing     Problem: Pain:  Goal: Control of acute pain  Description: Control of acute pain  11/7/2020 1204 by Anthony Garcia  Outcome: Ongoing     Problem: Pain:  Goal: Control of chronic pain  Description: Control of chronic pain  11/7/2020 1204 by Anthony Garcia  Outcome: Ongoing     Problem: Infection - Surgical Site:  Goal: Will show no infection signs and symptoms  Description: Will show no infection signs and symptoms  11/7/2020 1204 by Anthony Garcia  Outcome: Ongoing     Problem: Nutrition  Goal: Optimal nutrition therapy  11/7/2020 1204 by Anthony Garcia  Outcome: Ongoing     Problem: Nutrition  Goal: Understanding of nutritional guidelines  11/7/2020 1204 by Anthony Garcia  Outcome: Ongoing

## 2020-11-11 PROBLEM — Z09 STATUS POST COLOSTOMY, FOLLOW-UP EXAM: Status: ACTIVE | Noted: 2020-11-11

## 2020-11-11 PROBLEM — G89.18 POST-OP PAIN: Status: ACTIVE | Noted: 2020-11-11

## 2020-11-11 PROBLEM — Z93.3: Status: ACTIVE | Noted: 2020-11-11

## 2020-11-14 ENCOUNTER — TELEPHONE (OUTPATIENT)
Dept: SURGERY | Age: 45
End: 2020-11-14

## 2020-11-14 RX ORDER — ONDANSETRON 4 MG/1
4 TABLET, ORALLY DISINTEGRATING ORAL EVERY 6 HOURS PRN
Qty: 20 TABLET | Refills: 0 | Status: SHIPPED | OUTPATIENT
Start: 2020-11-14 | End: 2020-12-09 | Stop reason: SDUPTHER

## 2020-11-17 LAB
Lab: NORMAL
TEST NAME: NORMAL

## 2020-11-19 ENCOUNTER — OFFICE VISIT (OUTPATIENT)
Dept: INFECTIOUS DISEASES | Age: 45
End: 2020-11-19
Payer: COMMERCIAL

## 2020-11-19 VITALS — BODY MASS INDEX: 21.03 KG/M2 | TEMPERATURE: 96.8 F | WEIGHT: 146.6 LBS

## 2020-11-19 PROCEDURE — 99214 OFFICE O/P EST MOD 30 MIN: CPT | Performed by: INTERNAL MEDICINE

## 2020-11-19 NOTE — PROGRESS NOTES
11/22/2020     Diagnosis Orders   1. Asymptomatic HIV infection ([facility]     2. Follow up  Basic Metabolic Panel    CBC Auto Differential    C-Reactive Protein    Hepatic Function Panel    Sedimentation Rate    Procalcitonin   3. Diverticulitis  CBC Auto Differential    C-Reactive Protein    Hepatic Function Panel    Sedimentation Rate    Procalcitonin   4. Colonic diverticular abscess         DISCUSSION   HIV: Pt is stable/tolerating ART- well; currently on Triumeq   No components found for: CD4H No components found for: HIVRN    Chronic medical problems as above/stable   Lipid recommendation:    Labs to be done every 3-6 months: CBC, CD4, VL, BMP, LFTs   Labs to be done once every 12months (last done at [facility] HCV, Lipids, T-SPOT, RPR, UA   Vaccines: PCV13/PSSV23, Tdap, HAV, HBV, yearly flu recommended    Colorectal cancer screen: due at age 48; repeat every 10 yrs (if normal)   Prostate cancer screen: due at age 48; repeat every 2 yrs (PSA < 2.5 ng/mL)   Dexa scan: due at age 48; repeat every 3 yrs (if normal)    Future Appointments   Date Time Provider [facility]   12/1/2020  2:30 PM Kendal Henley MD AFLADVNPHHTN AFL [facility]   12/2/2020  1:45 PM Brian Roque MD AFLSurSprfld AFL [facility]   12/3/2020  9:45 AM Dana Malcolm MD INFECT DIS  [facility]       Roberta Giles is a 39 y.o.  male     Chief Complaint:  Here for follow up    HISTORY OF PRESENT ILLNESS:  He has the followingconcerns:  71-year-old  man living with HIV since 2003 on Triumeq, chronic pancreatitis, hypertension, diverticulitis, alcohol abuse who was admitted on 10/16/2020 for evaluation and management of 2-week history of left lower quadrant abdominal pain associated with difficulty urinating. Patient received a diagnosis of diverticulitis with diverticular abscess. CT-guided drain was placed however this did not control his infection.   Eventually had to undergo an exploratory laparotomy, colostomy creation, drainage of peritoneal abscess, removal of accessory spleen on 10/21/2020. Antimicrobials needed to be expanded as the patient continued to have leukocytosis and fever. Was eventually discharged to a swing bed to complete a 4-week course of linezolid, Avycaz, metronidazole and Portillo Durham on 11/21/2020. It appears that the patient was discharged on 11/7. Feels well. Denies n/v/d/f. Would like to transfer care to [facility], do he could keep his care local.         ART Adherance/Tolerability is excellent    Review of Systems   All other systems reviewed and are negative. Patient Active Problem List    Diagnosis Date Noted    Status post colostomy, follow-up exam 11/11/2020    Post-op pain 11/11/2020    Abdominal infection ([facility] 11/04/2020    Incisional hernia, without obstruction or gangrene 10/22/2020    HIV (human immunodeficiency virus infection) ([facility] 10/20/2020    Proteinuria     Abdominal wall abscess 10/16/2020    Abscess of sigmoid colon due to diverticulitis 10/16/2020    Alcohol withdrawal ([facility] 11/04/2012    HIV positive (HCC)     Alcoholic (HCC)     Hypertension        Current Outpatient Medications   Medication Sig Dispense Refill    ondansetron (ZOFRAN-ODT) 4 MG disintegrating tablet Take 1 tablet by mouth every 6 hours as needed for Nausea or Vomiting 20 tablet 0    metoprolol (LOPRESSOR) 25 MG tablet Take 2 tablets by mouth 2 times daily 60 tablet 3    clotrimazole-betamethasone (LOTRISONE) 1-0.05 % cream Apply topically daily Apply topically once daily to midline incision with dressing change.  Abacavir-Dolutegravir-Lamivud 867- MG TABS Take 1 tablet by mouth        No current facility-administered medications for this visit.         Patient Active Problem List   Diagnosis    HIV positive ([facility]    Alcoholic ([facility]    Hypertension    Alcohol withdrawal ([facility]    Abdominal wall abscess    Abscess of sigmoid colon due to diverticulitis    Proteinuria    HIV (human immunodeficiency virus infection) ([facility]    Incisional hernia, without obstruction or gangrene    Abdominal infection ([facility]    Status post colostomy, follow-up exam    Post-op pain       Past Medical History:   Diagnosis Date    Abscess of sigmoid colon due to diverticulitis 12/22/0371    Alcoholic ([facility]     Diverticulitis     [facility]    Hypertension     Incisional hernia, without obstruction or gangrene 10/22/2020    Pancreatitis        Past Surgical History:   Procedure Laterality Date    COLOSTOMY Left     CT DRAINAGE ABDOM ABSCESS OPEN  10/17/2020    CT DRAINAGE ABDOM ABSCESS OPEN 10/17/2020 [facility] CT SCAN    HERNIA REPAIR      LAPAROTOMY N/A 10/21/2020    LAPAROTOMY EXPLORATORY COLOSTOMY CREATION performed by Antonio Jaffe MD at [address] [place] [address]      Dr Nu Pollard       Social History     Socioeconomic History    Marital status: Single     Spouse name: Not on file    Number of children: 0    Years of education: Not on file    Highest education level: Not on file   Occupational History    Not on file   Social Needs    Financial resource strain: Not on file    Food insecurity     Worry: Not on file     Inability: Not on file   [employer] needs     Medical: Not on file     Non-medical: Not on file   Tobacco Use    Smoking status: Current Every Day Smoker     Packs/day: 2.00     Years: 18.00     Pack years: 36.00     Types: Cigarettes    Smokeless tobacco: Never Used   Substance and Sexual Activity    Alcohol use: Not Currently    Drug use: Yes     Types: Marijuana    Sexual activity: Not Currently   Lifestyle    Physical activity     Days per week: Not on file     Minutes per session: Not on file    Stress: Not on file   Relationships    Social connections     Talks on phone: Not on file     Gets together: Not on file     Attends Islam service: Not on file     Active member of club or organization: Not on file     Attends meetings of clubs or organizations: Not on file     Relationship status: Not on file    Intimate partner violence     Fear of current or ex partner: Not on file     Emotionally abused: Not on file     Physically abused: Not on file     Forced sexual activity: Not on file   Other Topics Concern    Not on file   Social History Narrative    Not on file       No family history on file. Most recent labs reviewed with patient    CD4 / T Cells:   No components found for: CD4H  HIV Viral Load:  No components found for: HIVRN   WBC:  Lab Results   Component Value Date    WBC 13.7 11/03/2020    WBC 16.1 11/02/2020    WBC 16.7 11/01/2020      Creatinine:  Lab Results   Component Value Date    CREATININE 0.7 11/01/2020    CREATININE 0.6 10/30/2020    CREATININE 0.6 10/28/2020     ALT:  Lab Results   Component Value Date    ALT 6 11/01/2020    ALT 6 10/30/2020    ALT 6 10/28/2020     LDL CHOL:  No components found for: LDL    Current problems andcomplaints as above. Otherwise unremarkable including HEENT, Constitutional, Cardiovascular, Respiratory, GI, Musculoskeletal, Skin, Endocrine, Hemo/Lympatic, Neurologic    Vital Signs:  [unfilled]  Wt Readings from Last 3 Encounters:   11/19/20 146 lb 9.6 oz (66.5 kg)   11/11/20 146 lb (66.2 kg)   11/03/20 161 lb 4 oz (73.1 kg)      Estimated body mass index is 21.03 kg/m² as calculated from the following:    Height as of 11/4/20: 5' 10\" (1.778 m). Weight as of this encounter: 146 lb 9.6 oz (66.5 kg). Physical Exam:   Gen: alert, NAD  HEENT: AT/NC, no icterus, no oral lesions, no dental problems. Neck: supple, no JVD, no cervical lymphadenopathy, trachea midline, no thyromegaly or thyroid nodules. Chest: no distress, CTA, no wheezes, crackles, or rhonchi. Heart: regular rate and rhythm, no murmurs, gallop, or rub.   ABD: LLQ colostomy, soft, non-distended, no HSM/masses, non-tender, normoactive bowel sounds  EXT: no cyanosis, no clubbing, no splinter hemorrhages, no edema  NEURO: CN 2-12 intact, no focal deficits, normal gait  Derm: no rashes. Psych: normal affect. Imaging Studies:   Reviewed - recentstudies reviewed      The electronic medical record was reviewed and updated as indicated via the [facility] as Reviewed\"time-stamps.     Electronically signed by Pauly Nunez MD on 11/19/20 at 5:53 AM EST

## 2020-11-25 DIAGNOSIS — K57.92 DIVERTICULITIS: ICD-10-CM

## 2020-11-25 DIAGNOSIS — Z09 FOLLOW UP: ICD-10-CM

## 2020-11-25 LAB
ALBUMIN SERPL-MCNC: 3.8 G/DL (ref 3.4–5)
ALP BLD-CCNC: 166 U/L (ref 40–129)
ALT SERPL-CCNC: 11 U/L (ref 10–40)
ANION GAP SERPL CALCULATED.3IONS-SCNC: 16 MMOL/L (ref 3–16)
AST SERPL-CCNC: 16 U/L (ref 15–37)
BASOPHILS ABSOLUTE: 0.1 K/UL (ref 0–0.2)
BASOPHILS RELATIVE PERCENT: 0.6 %
BILIRUB SERPL-MCNC: <0.2 MG/DL (ref 0–1)
BILIRUBIN DIRECT: <0.2 MG/DL (ref 0–0.3)
BILIRUBIN, INDIRECT: ABNORMAL MG/DL (ref 0–1)
BUN BLDV-MCNC: 18 MG/DL (ref 7–20)
C-REACTIVE PROTEIN: 33.5 MG/L (ref 0–5.1)
CALCIUM SERPL-MCNC: 9.5 MG/DL (ref 8.3–10.6)
CHLORIDE BLD-SCNC: 100 MMOL/L (ref 99–110)
CO2: 19 MMOL/L (ref 21–32)
CREAT SERPL-MCNC: 1.5 MG/DL (ref 0.9–1.3)
EOSINOPHILS ABSOLUTE: 0.5 K/UL (ref 0–0.6)
EOSINOPHILS RELATIVE PERCENT: 5 %
GFR AFRICAN AMERICAN: >60
GFR NON-AFRICAN AMERICAN: 51
GLUCOSE BLD-MCNC: 80 MG/DL (ref 70–99)
HCT VFR BLD CALC: 31.2 % (ref 40.5–52.5)
HEMOGLOBIN: 10.4 G/DL (ref 13.5–17.5)
LYMPHOCYTES ABSOLUTE: 3.9 K/UL (ref 1–5.1)
LYMPHOCYTES RELATIVE PERCENT: 37.7 %
MCH RBC QN AUTO: 30.2 PG (ref 26–34)
MCHC RBC AUTO-ENTMCNC: 33.2 G/DL (ref 31–36)
MCV RBC AUTO: 91 FL (ref 80–100)
MONOCYTES ABSOLUTE: 1.2 K/UL (ref 0–1.3)
MONOCYTES RELATIVE PERCENT: 11.8 %
NEUTROPHILS ABSOLUTE: 4.7 K/UL (ref 1.7–7.7)
NEUTROPHILS RELATIVE PERCENT: 44.9 %
PDW BLD-RTO: 15.7 % (ref 12.4–15.4)
PLATELET # BLD: 665 K/UL (ref 135–450)
PMV BLD AUTO: 7.4 FL (ref 5–10.5)
POTASSIUM SERPL-SCNC: 5 MMOL/L (ref 3.5–5.1)
PROCALCITONIN: 0.15 NG/ML (ref 0–0.15)
RBC # BLD: 3.43 M/UL (ref 4.2–5.9)
SEDIMENTATION RATE, ERYTHROCYTE: >120 MM/HR (ref 0–15)
SODIUM BLD-SCNC: 135 MMOL/L (ref 136–145)
TOTAL PROTEIN: 8.6 G/DL (ref 6.4–8.2)
WBC # BLD: 10.4 K/UL (ref 4–11)

## 2020-12-03 ENCOUNTER — HOSPITAL ENCOUNTER (OUTPATIENT)
Age: 45
Discharge: HOME | End: 2020-12-03
Payer: COMMERCIAL

## 2020-12-03 ENCOUNTER — HOSPITAL ENCOUNTER (OUTPATIENT)
Dept: GENERAL RADIOLOGY | Age: 45
Discharge: HOME | End: 2020-12-03
Payer: COMMERCIAL

## 2020-12-03 ENCOUNTER — VIRTUAL VISIT (OUTPATIENT)
Dept: INFECTIOUS DISEASES | Age: 45
End: 2020-12-03
Payer: COMMERCIAL

## 2020-12-03 PROBLEM — R06.02 SHORTNESS OF BREATH: Status: ACTIVE | Noted: 2020-12-03

## 2020-12-03 PROCEDURE — 99423 OL DIG E/M SVC 21+ MIN: CPT | Performed by: INTERNAL MEDICINE

## 2020-12-03 PROCEDURE — 71046 X-RAY EXAM CHEST 2 VIEWS: CPT

## 2020-12-03 NOTE — PROGRESS NOTES
Suly Gonzales is a 39 y.o. male evaluated via telephone on 12/3/2020. Consent:  He and/or health care decision maker is aware that that he may receive a bill for this telephone service, depending on his insurance coverage, and has provided verbal consent to proceed: Yes      Documentation:  I communicated with the patient and/or health care decision maker about divertcular abscess, peritonitis, s/p . Details of this discussion including any medical advice provided: as below. Has belly pain at the pouch site. Seen Dr. Shelvy Shone who said it was a consequence of trying to heal from the surgery. He has SOB with exertion, and reports that at Dr. Lisa Sanchez office his oxygen saturation dropped to the 50s with ambulation. Scheduled for a chest x-ray. Denies cough, fever. Reports feeling cold all the time, attributes that to weight loss. Denies night sweats. Problem List Items Addressed This Visit     Abscess of sigmoid colon due to diverticulitis     Elevated inflammatory markers. ? HIV or residual inflammation. Check CT abdomen with po and IV contrast if it fails to improve. HIV (human immunodeficiency virus infection) ([facility] - Primary     Triumeq. Check labs in 6 months. Get records from [facility] Relevant Medications    Abacavir-Dolutegravir-Lamivud 600- MG TABS    Shortness of breath     Pneumonia or pleural effusion  Follow up CXR that was ordered by Dr. Victor Baumgarten. I affirm this is a Patient Initiated Episode with a Patient who has not had a related appointment within my department in the past 7 days or scheduled within the next 24 hours.     Patient identification was verified at the start of the visit: Yes    Total Time: minutes: 21-30 minutes    Note: not billable if this call serves to triage the patient into an appointment for the relevant concern      Reena Valverde

## 2020-12-03 NOTE — ASSESSMENT & PLAN NOTE
Elevated inflammatory markers. ? HIV or residual inflammation. Check CT abdomen with po and IV contrast if it fails to improve.

## 2020-12-11 PROBLEM — Z09 STATUS POST COLOSTOMY, FOLLOW-UP EXAM: Status: RESOLVED | Noted: 2020-11-11 | Resolved: 2020-12-11

## 2020-12-11 PROBLEM — Z93.3: Status: RESOLVED | Noted: 2020-11-11 | Resolved: 2020-12-11

## 2021-03-05 PROBLEM — Z09 STATUS POST COLOSTOMY, FOLLOW-UP EXAM: Status: RESOLVED | Noted: 2020-11-11 | Resolved: 2021-03-05

## 2021-03-05 PROBLEM — Z93.3: Status: RESOLVED | Noted: 2020-11-11 | Resolved: 2021-03-05

## 2021-05-27 PROBLEM — K57.20 DIVERTICULITIS OF LARGE INTESTINE WITH PERFORATION AND ABSCESS WITHOUT BLEEDING: Status: ACTIVE | Noted: 2021-05-27

## 2021-05-27 PROBLEM — Z09 STATUS POST COLOSTOMY, FOLLOW-UP EXAM: Status: ACTIVE | Noted: 2021-05-27

## 2021-05-27 PROBLEM — Z93.3: Status: ACTIVE | Noted: 2021-05-27

## 2021-06-17 ENCOUNTER — HOSPITAL ENCOUNTER (OUTPATIENT)
Age: 46
Discharge: HOME | End: 2021-06-17
Payer: COMMERCIAL

## 2021-06-17 ENCOUNTER — HOSPITAL ENCOUNTER (OUTPATIENT)
Dept: PREADMISSION TESTING | Age: 46
Discharge: HOME | End: 2021-06-21
Payer: COMMERCIAL

## 2021-06-17 ENCOUNTER — HOSPITAL ENCOUNTER (OUTPATIENT)
Dept: GENERAL RADIOLOGY | Age: 46
Discharge: HOME | End: 2021-06-17
Payer: COMMERCIAL

## 2021-06-17 VITALS
SYSTOLIC BLOOD PRESSURE: 149 MMHG | OXYGEN SATURATION: 100 % | BODY MASS INDEX: 20.58 KG/M2 | HEIGHT: 71 IN | HEART RATE: 92 BPM | RESPIRATION RATE: 18 BRPM | DIASTOLIC BLOOD PRESSURE: 101 MMHG | WEIGHT: 147 LBS | TEMPERATURE: 96.6 F

## 2021-06-17 DIAGNOSIS — Z01.818 PREOPERATIVE TESTING: ICD-10-CM

## 2021-06-17 DIAGNOSIS — Z01.818 PREOPERATIVE TESTING: Primary | ICD-10-CM

## 2021-06-17 LAB
ABO/RH: NORMAL
ANION GAP SERPL CALCULATED.3IONS-SCNC: 8 MMOL/L (ref 4–16)
ANTIBODY SCREEN: NEGATIVE
APTT: 30.6 SECONDS (ref 25.1–37.1)
BACTERIA: NEGATIVE /HPF
BILIRUBIN URINE: NEGATIVE MG/DL
BLOOD, URINE: NEGATIVE
BUN BLDV-MCNC: 12 MG/DL (ref 6–23)
CALCIUM SERPL-MCNC: 8.8 MG/DL (ref 8.3–10.6)
CHLORIDE BLD-SCNC: 106 MMOL/L (ref 99–110)
CLARITY: CLEAR
CO2: 28 MMOL/L (ref 21–32)
COLOR: YELLOW
COMMENT: NORMAL
CREAT SERPL-MCNC: 0.9 MG/DL (ref 0.9–1.3)
GFR AFRICAN AMERICAN: >60 ML/MIN/1.73M2
GFR NON-AFRICAN AMERICAN: >60 ML/MIN/1.73M2
GLUCOSE BLD-MCNC: 78 MG/DL (ref 70–99)
GLUCOSE, URINE: NEGATIVE MG/DL
HCT VFR BLD CALC: 39.6 % (ref 42–52)
HEMOGLOBIN: 13 GM/DL (ref 13.5–18)
INR BLD: 0.99 INDEX
KETONES, URINE: NEGATIVE MG/DL
LEUKOCYTE ESTERASE, URINE: NEGATIVE
MCH RBC QN AUTO: 34.4 PG (ref 27–31)
MCHC RBC AUTO-ENTMCNC: 32.8 % (ref 32–36)
MCV RBC AUTO: 104.8 FL (ref 78–100)
MUCUS: ABNORMAL HPF
NITRITE URINE, QUANTITATIVE: NEGATIVE
PDW BLD-RTO: 12.7 % (ref 11.7–14.9)
PH, URINE: 6 (ref 5–8)
PLATELET # BLD: 247 K/CU MM (ref 140–440)
PMV BLD AUTO: 9.6 FL (ref 7.5–11.1)
POTASSIUM SERPL-SCNC: 4.2 MMOL/L (ref 3.5–5.1)
PROTEIN UA: NEGATIVE MG/DL
PROTHROMBIN TIME: 12 SECONDS (ref 11.7–14.5)
RBC # BLD: 3.78 M/CU MM (ref 4.6–6.2)
RBC URINE: 1 /HPF (ref 0–3)
SODIUM BLD-SCNC: 142 MMOL/L (ref 135–145)
SPECIFIC GRAVITY UA: 1.02 (ref 1–1.03)
TRICHOMONAS: ABNORMAL /HPF
UROBILINOGEN, URINE: NEGATIVE MG/DL (ref 0.2–1)
WBC # BLD: 5.8 K/CU MM (ref 4–10.5)
WBC UA: <1 /HPF (ref 0–2)

## 2021-06-17 PROCEDURE — U0003 INFECTIOUS AGENT DETECTION BY NUCLEIC ACID (DNA OR RNA); SEVERE ACUTE RESPIRATORY SYNDROME CORONAVIRUS 2 (SARS-COV-2) (CORONAVIRUS DISEASE [COVID-19]), AMPLIFIED PROBE TECHNIQUE, MAKING USE OF HIGH THROUGHPUT TECHNOLOGIES AS DESCRIBED BY CMS-2020-01-R: HCPCS

## 2021-06-17 PROCEDURE — U0005 INFEC AGEN DETEC AMPLI PROBE: HCPCS

## 2021-06-17 PROCEDURE — 36415 COLL VENOUS BLD VENIPUNCTURE: CPT

## 2021-06-17 PROCEDURE — 81001 URINALYSIS AUTO W/SCOPE: CPT

## 2021-06-17 PROCEDURE — 86900 BLOOD TYPING SEROLOGIC ABO: CPT

## 2021-06-17 PROCEDURE — 71046 X-RAY EXAM CHEST 2 VIEWS: CPT

## 2021-06-17 PROCEDURE — 85027 COMPLETE CBC AUTOMATED: CPT

## 2021-06-17 PROCEDURE — 86901 BLOOD TYPING SEROLOGIC RH(D): CPT

## 2021-06-17 PROCEDURE — 85730 THROMBOPLASTIN TIME PARTIAL: CPT

## 2021-06-17 PROCEDURE — 85610 PROTHROMBIN TIME: CPT

## 2021-06-17 PROCEDURE — 80048 BASIC METABOLIC PNL TOTAL CA: CPT

## 2021-06-17 PROCEDURE — 86850 RBC ANTIBODY SCREEN: CPT

## 2021-06-17 RX ORDER — LOPERAMIDE HYDROCHLORIDE 2 MG/1
2 CAPSULE ORAL 4 TIMES DAILY PRN
Status: ON HOLD | COMMUNITY
End: 2021-07-02 | Stop reason: HOSPADM

## 2021-06-17 NOTE — PROGRESS NOTES
Called Dr Brent Gonzalez office patient b/p of 149/101 and rechecked 20 min later now 140/92- states Dr Sharolyn Homans stopped his Metoprolol due to low b/p in May 2021- patient also states was not given any instructions on bowel prep for upcoming surgery per office- office nurse Renata James to notify Dr Anrdes Story

## 2021-06-18 LAB
SARS-COV-2: NOT DETECTED
SOURCE: NORMAL

## 2021-06-23 ENCOUNTER — ANESTHESIA EVENT (OUTPATIENT)
Dept: OPERATING ROOM | Age: 46
DRG: 330 | End: 2021-06-23
Payer: COMMERCIAL

## 2021-06-23 ASSESSMENT — LIFESTYLE VARIABLES: SMOKING_STATUS: 1

## 2021-06-23 NOTE — PROGRESS NOTES
.6/23/21 - Notified surgery 6/24/21 @ 1130, arrival 0930. One person can come in with you. Understanding verbalized.

## 2021-06-23 NOTE — ANESTHESIA PRE PROCEDURE
([facility] K65.9    Post-op pain G89.18    Shortness of breath R06.02    Diverticulitis of large intestine with perforation and abscess without bleeding K57.20    Status post colostomy, follow-up exam Z09       Past Medical History:        Diagnosis Date    Abscess of sigmoid colon due to diverticulitis 39/48/0284    Alcoholic ([facility] [address] [address]     Bipolar 1 disorder (HCC)     \"was on medication yrs ago for this'    Diverticulitis     HIV positive ([facility] 2003    follow with Dr Eddie Rodriguez Hypertension     \"started on medication for b/p approx 20 yrs ago \"\"dr Tika Dyer had me stop the blood pressure medication in May ( 2021) got to low\"    Incisional hernia, without obstruction or gangrene 10/22/2020    Pancreatitis     \" in 2013\"    Wears glasses        Past Surgical History:        Procedure Laterality Date    ABDOMEN SURGERY      \"age 33- removed part of large and small intesine\"    COLONOSCOPY  2017    COLOSTOMY Left 2020    with Dr Leyla Nova  10/17/2020    CT DRAINAGE ABDOM ABSCESS OPEN 10/17/2020 [facility] CT SCAN    HERNIA REPAIR  [id number]    mila ing hernia     LAPAROTOMY N/A 10/21/2020    LAPAROTOMY EXPLORATORY COLOSTOMY CREATION performed by Corina Donaldson MD at [address] [place] [address] History:    Social History     Tobacco Use    Smoking status: Current Every Day Smoker     Packs/day: 1.00     Years: 27.00     Pack years: 27.00     Types: Cigarettes    Smokeless tobacco: Never Used   Substance Use Topics    Alcohol use: Not Currently     Comment: per pt on 6/17/2021\"quit drinking 2016- use to drink alot, every day, a fifth per day\"                                Ready to quit: Not Answered  Counseling given: Not Answered      Vital Signs (Current): There were no vitals filed for this visit.                                            BP Readings from Last 3 Encounters:   06/17/21 (!) 149/101   02/24/21 120/80   12/01/20 90/60       NPO Status: BMI:   Wt Readings from Last 3 Encounters:   06/17/21 147 lb (66.7 kg)   05/26/21 143 lb (64.9 kg)   03/17/21 147 lb (66.7 kg)     There is no height or weight on file to calculate BMI.    CBC:   Lab Results   Component Value Date    WBC 5.8 06/17/2021    RBC 3.78 06/17/2021    HGB 13.0 06/17/2021    HCT 39.6 06/17/2021    .8 06/17/2021    RDW 12.7 06/17/2021     06/17/2021       CMP:   Lab Results   Component Value Date     06/17/2021    K 4.2 06/17/2021     06/17/2021    CO2 28 06/17/2021    BUN 12 06/17/2021    CREATININE 0.9 06/17/2021    GFRAA >60 06/17/2021    AGRATIO 0.9 01/19/2021    LABGLOM >60 06/17/2021    GLUCOSE 78 06/17/2021    PROT 8.3 01/19/2021    PROT 6.1 11/04/2012    CALCIUM 8.8 06/17/2021    BILITOT <0.2 01/19/2021    ALKPHOS 170 01/19/2021    AST 19 01/19/2021    ALT 12 01/19/2021       POC Tests: No results for input(s): POCGLU, POCNA, POCK, POCCL, POCBUN, POCHEMO, POCHCT in the last 72 hours.     Coags:   Lab Results   Component Value Date    PROTIME 12.0 06/17/2021    INR 0.99 06/17/2021    APTT 30.6 06/17/2021       HCG (If Applicable): No results found for: PREGTESTUR, PREGSERUM, HCG, HCGQUANT     ABGs: No results found for: PHART, PO2ART, FXD5JOR, FWB2XIM, BEART, Z8WTYEEG     Type & Screen (If Applicable):  No results found for: LABABO, LABRH    Drug/Infectious Status (If Applicable):  Lab Results   Component Value Date    HEPCAB 0.03 04/30/2015       COVID-19 Screening (If Applicable):   Lab Results   Component Value Date    COVID19 NOT DETECTED 06/17/2021         Anesthesia Evaluation  Patient summary reviewed no history of anesthetic complications:   Airway: Mallampati: III  TM distance: >3 FB   Neck ROM: full  Mouth opening: > = 3 FB Dental:          Pulmonary: breath sounds clear to auscultation  (+) current smoker                          ROS comment: Home O2    thc hx etoh use    Cardiovascular:  Exercise tolerance: good (>4 METS),   (+) hypertension: mild,       ECG reviewed  Rhythm: regular  Rate: normal           Beta Blocker:  Dose within 24 Hrs      ROS comment: Sinus tachycardia   Otherwise normal ECG   No previous ECGs available   Confirmed by Ramírez Howard, HUGHED ([id number]) on 10/16/2020 3:41:49 PM      Neuro/Psych:   (+) psychiatric history (bipolar):            GI/Hepatic/Renal:            ROS comment: Hx etoh and pancreatitis   Hx diverticular dx . Endo/Other:    (+) blood dyscrasia (HIV): anemia:., .                  ROS comment: HIV+ Abdominal:           Vascular: negative vascular ROS. Anesthesia Plan      general     ASA 3       Induction: intravenous. MIPS: Postoperative opioids intended and Prophylactic antiemetics administered. Anesthetic plan and risks discussed with patient. Plan discussed with CRNA.

## 2021-06-24 ENCOUNTER — HOSPITAL ENCOUNTER (INPATIENT)
Age: 46
LOS: 8 days | Discharge: HOME | DRG: 330 | End: 2021-07-02
Attending: SURGERY | Admitting: SURGERY
Payer: COMMERCIAL

## 2021-06-24 ENCOUNTER — ANESTHESIA (OUTPATIENT)
Dept: OPERATING ROOM | Age: 46
DRG: 330 | End: 2021-06-24
Payer: COMMERCIAL

## 2021-06-24 VITALS
TEMPERATURE: 95.1 F | RESPIRATION RATE: 1 BRPM | DIASTOLIC BLOOD PRESSURE: 96 MMHG | OXYGEN SATURATION: 100 % | SYSTOLIC BLOOD PRESSURE: 133 MMHG

## 2021-06-24 DIAGNOSIS — G89.18 POST-OPERATIVE PAIN: Primary | ICD-10-CM

## 2021-06-24 PROBLEM — K57.20 PERFORATION OF SIGMOID COLON DUE TO DIVERTICULITIS: Status: ACTIVE | Noted: 2021-06-24

## 2021-06-24 PROBLEM — Z93.3 COLOSTOMY IN PLACE (HCC): Status: ACTIVE | Noted: 2021-06-24

## 2021-06-24 PROCEDURE — 6360000002 HC RX W HCPCS: Performed by: SURGERY

## 2021-06-24 PROCEDURE — 3700000001 HC ADD 15 MINUTES (ANESTHESIA): Performed by: SURGERY

## 2021-06-24 PROCEDURE — 2720000010 HC SURG SUPPLY STERILE: Performed by: SURGERY

## 2021-06-24 PROCEDURE — 2580000003 HC RX 258: Performed by: SURGERY

## 2021-06-24 PROCEDURE — 6360000002 HC RX W HCPCS: Performed by: NURSE ANESTHETIST, CERTIFIED REGISTERED

## 2021-06-24 PROCEDURE — 0DBM0ZZ EXCISION OF DESCENDING COLON, OPEN APPROACH: ICD-10-PCS | Performed by: SURGERY

## 2021-06-24 PROCEDURE — 3600000004 HC SURGERY LEVEL 4 BASE: Performed by: SURGERY

## 2021-06-24 PROCEDURE — 6360000002 HC RX W HCPCS: Performed by: ANESTHESIOLOGY

## 2021-06-24 PROCEDURE — 2580000003 HC RX 258: Performed by: ANESTHESIOLOGY

## 2021-06-24 PROCEDURE — 88304 TISSUE EXAM BY PATHOLOGIST: CPT

## 2021-06-24 PROCEDURE — 2500000003 HC RX 250 WO HCPCS: Performed by: NURSE ANESTHETIST, CERTIFIED REGISTERED

## 2021-06-24 PROCEDURE — 2709999900 HC NON-CHARGEABLE SUPPLY: Performed by: SURGERY

## 2021-06-24 PROCEDURE — 3700000000 HC ANESTHESIA ATTENDED CARE: Performed by: SURGERY

## 2021-06-24 PROCEDURE — C9113 INJ PANTOPRAZOLE SODIUM, VIA: HCPCS | Performed by: SURGERY

## 2021-06-24 PROCEDURE — 7100000000 HC PACU RECOVERY - FIRST 15 MIN: Performed by: SURGERY

## 2021-06-24 PROCEDURE — 7100000001 HC PACU RECOVERY - ADDTL 15 MIN: Performed by: SURGERY

## 2021-06-24 PROCEDURE — 1200000000 HC SEMI PRIVATE

## 2021-06-24 PROCEDURE — 2500000003 HC RX 250 WO HCPCS: Performed by: SURGERY

## 2021-06-24 PROCEDURE — 2500000003 HC RX 250 WO HCPCS: Performed by: ANESTHESIOLOGY

## 2021-06-24 PROCEDURE — 3600000014 HC SURGERY LEVEL 4 ADDTL 15MIN: Performed by: SURGERY

## 2021-06-24 PROCEDURE — 0DN80ZZ RELEASE SMALL INTESTINE, OPEN APPROACH: ICD-10-PCS | Performed by: SURGERY

## 2021-06-24 RX ORDER — ONDANSETRON 2 MG/ML
4 INJECTION INTRAMUSCULAR; INTRAVENOUS EVERY 6 HOURS PRN
Status: DISCONTINUED | OUTPATIENT
Start: 2021-06-24 | End: 2021-07-02 | Stop reason: HOSPADM

## 2021-06-24 RX ORDER — FENTANYL CITRATE 50 UG/ML
50 INJECTION, SOLUTION INTRAMUSCULAR; INTRAVENOUS EVERY 5 MIN PRN
Status: COMPLETED | OUTPATIENT
Start: 2021-06-24 | End: 2021-06-24

## 2021-06-24 RX ORDER — CIPROFLOXACIN 2 MG/ML
400 INJECTION, SOLUTION INTRAVENOUS EVERY 12 HOURS
Status: COMPLETED | OUTPATIENT
Start: 2021-06-25 | End: 2021-06-25

## 2021-06-24 RX ORDER — PHENYLEPHRINE HCL IN 0.9% NACL 1 MG/10 ML
SYRINGE (ML) INTRAVENOUS PRN
Status: DISCONTINUED | OUTPATIENT
Start: 2021-06-24 | End: 2021-06-24 | Stop reason: SDUPTHER

## 2021-06-24 RX ORDER — HYDROCODONE BITARTRATE AND ACETAMINOPHEN 5; 325 MG/1; MG/1
2 TABLET ORAL PRN
Status: DISCONTINUED | OUTPATIENT
Start: 2021-06-24 | End: 2021-06-24 | Stop reason: HOSPADM

## 2021-06-24 RX ORDER — DEXAMETHASONE SODIUM PHOSPHATE 4 MG/ML
INJECTION, SOLUTION INTRA-ARTICULAR; INTRALESIONAL; INTRAMUSCULAR; INTRAVENOUS; SOFT TISSUE PRN
Status: DISCONTINUED | OUTPATIENT
Start: 2021-06-24 | End: 2021-06-24 | Stop reason: SDUPTHER

## 2021-06-24 RX ORDER — SODIUM CHLORIDE 0.9 % (FLUSH) 0.9 %
5-40 SYRINGE (ML) INJECTION EVERY 12 HOURS SCHEDULED
Status: DISCONTINUED | OUTPATIENT
Start: 2021-06-24 | End: 2021-07-02 | Stop reason: HOSPADM

## 2021-06-24 RX ORDER — METOPROLOL TARTRATE 50 MG/1
50 TABLET, FILM COATED ORAL 2 TIMES DAILY
Status: DISCONTINUED | OUTPATIENT
Start: 2021-06-25 | End: 2021-07-02 | Stop reason: HOSPADM

## 2021-06-24 RX ORDER — PROPOFOL 10 MG/ML
INJECTION, EMULSION INTRAVENOUS PRN
Status: DISCONTINUED | OUTPATIENT
Start: 2021-06-24 | End: 2021-06-24 | Stop reason: SDUPTHER

## 2021-06-24 RX ORDER — SODIUM CHLORIDE 9 MG/ML
10 INJECTION INTRAVENOUS DAILY
Status: DISCONTINUED | OUTPATIENT
Start: 2021-06-24 | End: 2021-06-25

## 2021-06-24 RX ORDER — HYDROCODONE BITARTRATE AND ACETAMINOPHEN 5; 325 MG/1; MG/1
1 TABLET ORAL PRN
Status: DISCONTINUED | OUTPATIENT
Start: 2021-06-24 | End: 2021-06-24 | Stop reason: HOSPADM

## 2021-06-24 RX ORDER — ROCURONIUM BROMIDE 10 MG/ML
INJECTION, SOLUTION INTRAVENOUS PRN
Status: DISCONTINUED | OUTPATIENT
Start: 2021-06-24 | End: 2021-06-24 | Stop reason: SDUPTHER

## 2021-06-24 RX ORDER — HYDROMORPHONE HCL 110MG/55ML
0.5 PATIENT CONTROLLED ANALGESIA SYRINGE INTRAVENOUS EVERY 5 MIN PRN
Status: DISCONTINUED | OUTPATIENT
Start: 2021-06-24 | End: 2021-06-24 | Stop reason: HOSPADM

## 2021-06-24 RX ORDER — DIPHENHYDRAMINE HYDROCHLORIDE 50 MG/ML
25 INJECTION INTRAMUSCULAR; INTRAVENOUS EVERY 6 HOURS PRN
Status: DISCONTINUED | OUTPATIENT
Start: 2021-06-24 | End: 2021-06-27

## 2021-06-24 RX ORDER — PROMETHAZINE HYDROCHLORIDE 25 MG/ML
6.25 INJECTION, SOLUTION INTRAMUSCULAR; INTRAVENOUS
Status: COMPLETED | OUTPATIENT
Start: 2021-06-24 | End: 2021-06-24

## 2021-06-24 RX ORDER — FENTANYL CITRATE 50 UG/ML
INJECTION, SOLUTION INTRAMUSCULAR; INTRAVENOUS PRN
Status: DISCONTINUED | OUTPATIENT
Start: 2021-06-24 | End: 2021-06-24 | Stop reason: SDUPTHER

## 2021-06-24 RX ORDER — CIPROFLOXACIN 2 MG/ML
400 INJECTION, SOLUTION INTRAVENOUS EVERY 12 HOURS
Status: DISCONTINUED | OUTPATIENT
Start: 2021-06-24 | End: 2021-06-24 | Stop reason: SDUPTHER

## 2021-06-24 RX ORDER — MEPERIDINE HYDROCHLORIDE 25 MG/ML
12.5 INJECTION INTRAMUSCULAR; INTRAVENOUS; SUBCUTANEOUS EVERY 5 MIN PRN
Status: DISCONTINUED | OUTPATIENT
Start: 2021-06-24 | End: 2021-06-24 | Stop reason: HOSPADM

## 2021-06-24 RX ORDER — SODIUM CHLORIDE 9 MG/ML
INJECTION, SOLUTION INTRAVENOUS CONTINUOUS
Status: DISCONTINUED | OUTPATIENT
Start: 2021-06-24 | End: 2021-07-01

## 2021-06-24 RX ORDER — LIDOCAINE HYDROCHLORIDE 20 MG/ML
INJECTION, SOLUTION INTRAVENOUS PRN
Status: DISCONTINUED | OUTPATIENT
Start: 2021-06-24 | End: 2021-06-24 | Stop reason: SDUPTHER

## 2021-06-24 RX ORDER — HYDROMORPHONE HCL 110MG/55ML
PATIENT CONTROLLED ANALGESIA SYRINGE INTRAVENOUS PRN
Status: DISCONTINUED | OUTPATIENT
Start: 2021-06-24 | End: 2021-06-24 | Stop reason: SDUPTHER

## 2021-06-24 RX ORDER — ONDANSETRON 2 MG/ML
INJECTION INTRAMUSCULAR; INTRAVENOUS PRN
Status: DISCONTINUED | OUTPATIENT
Start: 2021-06-24 | End: 2021-06-24 | Stop reason: SDUPTHER

## 2021-06-24 RX ORDER — DIPHENHYDRAMINE HYDROCHLORIDE 50 MG/ML
12.5 INJECTION INTRAMUSCULAR; INTRAVENOUS
Status: DISCONTINUED | OUTPATIENT
Start: 2021-06-24 | End: 2021-06-24 | Stop reason: HOSPADM

## 2021-06-24 RX ORDER — METRONIDAZOLE 500 MG/1
500 TABLET ORAL 3 TIMES DAILY
Status: ON HOLD | COMMUNITY
End: 2021-07-02 | Stop reason: HOSPADM

## 2021-06-24 RX ORDER — HYDRALAZINE HYDROCHLORIDE 20 MG/ML
5 INJECTION INTRAMUSCULAR; INTRAVENOUS EVERY 10 MIN PRN
Status: DISCONTINUED | OUTPATIENT
Start: 2021-06-24 | End: 2021-06-24 | Stop reason: HOSPADM

## 2021-06-24 RX ORDER — NALOXONE HYDROCHLORIDE 0.4 MG/ML
0.4 INJECTION, SOLUTION INTRAMUSCULAR; INTRAVENOUS; SUBCUTANEOUS PRN
Status: DISCONTINUED | OUTPATIENT
Start: 2021-06-24 | End: 2021-06-27

## 2021-06-24 RX ORDER — SODIUM CHLORIDE, SODIUM LACTATE, POTASSIUM CHLORIDE, CALCIUM CHLORIDE 600; 310; 30; 20 MG/100ML; MG/100ML; MG/100ML; MG/100ML
INJECTION, SOLUTION INTRAVENOUS CONTINUOUS
Status: DISCONTINUED | OUTPATIENT
Start: 2021-06-24 | End: 2021-06-24

## 2021-06-24 RX ORDER — PANTOPRAZOLE SODIUM 40 MG/10ML
40 INJECTION, POWDER, LYOPHILIZED, FOR SOLUTION INTRAVENOUS DAILY
Status: DISCONTINUED | OUTPATIENT
Start: 2021-06-24 | End: 2021-06-25

## 2021-06-24 RX ORDER — LABETALOL HYDROCHLORIDE 5 MG/ML
5 INJECTION, SOLUTION INTRAVENOUS EVERY 10 MIN PRN
Status: DISCONTINUED | OUTPATIENT
Start: 2021-06-24 | End: 2021-06-24 | Stop reason: HOSPADM

## 2021-06-24 RX ORDER — HYDROMORPHONE HCL IN WATER/PF 6 MG/30 ML
PATIENT CONTROLLED ANALGESIA SYRINGE INTRAVENOUS CONTINUOUS
Status: DISCONTINUED | OUTPATIENT
Start: 2021-06-24 | End: 2021-06-27

## 2021-06-24 RX ORDER — SODIUM CHLORIDE 0.9 % (FLUSH) 0.9 %
5-40 SYRINGE (ML) INJECTION PRN
Status: DISCONTINUED | OUTPATIENT
Start: 2021-06-24 | End: 2021-07-02 | Stop reason: HOSPADM

## 2021-06-24 RX ORDER — SODIUM CHLORIDE 9 MG/ML
25 INJECTION, SOLUTION INTRAVENOUS PRN
Status: DISCONTINUED | OUTPATIENT
Start: 2021-06-24 | End: 2021-07-02 | Stop reason: HOSPADM

## 2021-06-24 RX ORDER — METOCLOPRAMIDE HYDROCHLORIDE 5 MG/ML
10 INJECTION INTRAMUSCULAR; INTRAVENOUS
Status: DISCONTINUED | OUTPATIENT
Start: 2021-06-24 | End: 2021-06-24 | Stop reason: HOSPADM

## 2021-06-24 RX ORDER — ONDANSETRON 4 MG/1
4 TABLET, ORALLY DISINTEGRATING ORAL EVERY 8 HOURS PRN
Status: DISCONTINUED | OUTPATIENT
Start: 2021-06-24 | End: 2021-07-02 | Stop reason: HOSPADM

## 2021-06-24 RX ADMIN — LIDOCAINE HYDROCHLORIDE 100 MG: 20 INJECTION, SOLUTION INTRAVENOUS at 12:24

## 2021-06-24 RX ADMIN — METRONIDAZOLE 500 MG: 500 INJECTION, SOLUTION INTRAVENOUS at 12:50

## 2021-06-24 RX ADMIN — FENTANYL CITRATE 50 MCG: 50 INJECTION, SOLUTION INTRAMUSCULAR; INTRAVENOUS at 16:37

## 2021-06-24 RX ADMIN — PROMETHAZINE HYDROCHLORIDE 6.25 MG: 25 INJECTION INTRAMUSCULAR; INTRAVENOUS at 16:10

## 2021-06-24 RX ADMIN — PROPOFOL 170 MG: 10 INJECTION, EMULSION INTRAVENOUS at 12:24

## 2021-06-24 RX ADMIN — FENTANYL CITRATE 100 MCG: 50 INJECTION, SOLUTION INTRAMUSCULAR; INTRAVENOUS at 12:24

## 2021-06-24 RX ADMIN — ROCURONIUM BROMIDE 10 MG: 10 INJECTION INTRAVENOUS at 14:40

## 2021-06-24 RX ADMIN — FENTANYL CITRATE 50 MCG: 50 INJECTION, SOLUTION INTRAMUSCULAR; INTRAVENOUS at 16:18

## 2021-06-24 RX ADMIN — LABETALOL HYDROCHLORIDE 5 MG: 5 INJECTION, SOLUTION INTRAVENOUS at 16:52

## 2021-06-24 RX ADMIN — SODIUM CHLORIDE, POTASSIUM CHLORIDE, SODIUM LACTATE AND CALCIUM CHLORIDE: 600; 310; 30; 20 INJECTION, SOLUTION INTRAVENOUS at 13:05

## 2021-06-24 RX ADMIN — SODIUM CHLORIDE, PRESERVATIVE FREE 10 ML: 5 INJECTION INTRAVENOUS at 16:59

## 2021-06-24 RX ADMIN — PANTOPRAZOLE SODIUM 40 MG: 40 INJECTION, POWDER, FOR SOLUTION INTRAVENOUS at 16:59

## 2021-06-24 RX ADMIN — HYDROMORPHONE HYDROCHLORIDE 0.5 MG: 2 INJECTION INTRAMUSCULAR; INTRAVENOUS; SUBCUTANEOUS at 13:44

## 2021-06-24 RX ADMIN — Medication 0.2 MG: at 19:22

## 2021-06-24 RX ADMIN — HYDROMORPHONE HYDROCHLORIDE 0.25 MG: 2 INJECTION INTRAMUSCULAR; INTRAVENOUS; SUBCUTANEOUS at 14:50

## 2021-06-24 RX ADMIN — PROPOFOL 30 MG: 10 INJECTION, EMULSION INTRAVENOUS at 13:00

## 2021-06-24 RX ADMIN — HYDROMORPHONE HYDROCHLORIDE 0.5 MG: 2 INJECTION INTRAMUSCULAR; INTRAVENOUS; SUBCUTANEOUS at 15:03

## 2021-06-24 RX ADMIN — SODIUM CHLORIDE: 9 INJECTION, SOLUTION INTRAVENOUS at 16:55

## 2021-06-24 RX ADMIN — SODIUM CHLORIDE, PRESERVATIVE FREE 10 ML: 5 INJECTION INTRAVENOUS at 21:19

## 2021-06-24 RX ADMIN — FENTANYL CITRATE 50 MCG: 50 INJECTION, SOLUTION INTRAMUSCULAR; INTRAVENOUS at 13:00

## 2021-06-24 RX ADMIN — FENTANYL CITRATE 50 MCG: 50 INJECTION, SOLUTION INTRAMUSCULAR; INTRAVENOUS at 16:07

## 2021-06-24 RX ADMIN — FENTANYL CITRATE 50 MCG: 50 INJECTION, SOLUTION INTRAMUSCULAR; INTRAVENOUS at 15:59

## 2021-06-24 RX ADMIN — HYDROMORPHONE HYDROCHLORIDE 1 MG: 1 INJECTION, SOLUTION INTRAMUSCULAR; INTRAVENOUS; SUBCUTANEOUS at 21:08

## 2021-06-24 RX ADMIN — HYDROMORPHONE HYDROCHLORIDE 0.5 MG: 2 INJECTION INTRAMUSCULAR; INTRAVENOUS; SUBCUTANEOUS at 13:18

## 2021-06-24 RX ADMIN — SUGAMMADEX 200 MG: 100 INJECTION, SOLUTION INTRAVENOUS at 15:37

## 2021-06-24 RX ADMIN — ROCURONIUM BROMIDE 50 MG: 10 INJECTION INTRAVENOUS at 12:25

## 2021-06-24 RX ADMIN — ROCURONIUM BROMIDE 50 MG: 10 INJECTION INTRAVENOUS at 12:54

## 2021-06-24 RX ADMIN — LABETALOL HYDROCHLORIDE 5 MG: 5 INJECTION, SOLUTION INTRAVENOUS at 16:13

## 2021-06-24 RX ADMIN — LABETALOL HYDROCHLORIDE 5 MG: 5 INJECTION, SOLUTION INTRAVENOUS at 16:37

## 2021-06-24 RX ADMIN — Medication 200 MCG: at 12:34

## 2021-06-24 RX ADMIN — HYDROMORPHONE HYDROCHLORIDE 0.25 MG: 2 INJECTION INTRAMUSCULAR; INTRAVENOUS; SUBCUTANEOUS at 15:48

## 2021-06-24 RX ADMIN — SODIUM CHLORIDE, POTASSIUM CHLORIDE, SODIUM LACTATE AND CALCIUM CHLORIDE: 600; 310; 30; 20 INJECTION, SOLUTION INTRAVENOUS at 15:30

## 2021-06-24 RX ADMIN — Medication 100 MCG: at 13:08

## 2021-06-24 RX ADMIN — ONDANSETRON 4 MG: 2 INJECTION INTRAMUSCULAR; INTRAVENOUS at 14:50

## 2021-06-24 RX ADMIN — DEXAMETHASONE SODIUM PHOSPHATE 8 MG: 4 INJECTION, SOLUTION INTRAMUSCULAR; INTRAVENOUS at 12:34

## 2021-06-24 RX ADMIN — FENTANYL CITRATE 50 MCG: 50 INJECTION, SOLUTION INTRAMUSCULAR; INTRAVENOUS at 12:55

## 2021-06-24 RX ADMIN — METRONIDAZOLE 500 MG: 500 INJECTION, SOLUTION INTRAVENOUS at 21:18

## 2021-06-24 RX ADMIN — SODIUM CHLORIDE, POTASSIUM CHLORIDE, SODIUM LACTATE AND CALCIUM CHLORIDE: 600; 310; 30; 20 INJECTION, SOLUTION INTRAVENOUS at 09:43

## 2021-06-24 RX ADMIN — CIPROFLOXACIN 400 MG: 2 INJECTION, SOLUTION INTRAVENOUS at 12:32

## 2021-06-24 ASSESSMENT — PULMONARY FUNCTION TESTS
PIF_VALUE: 19
PIF_VALUE: 19
PIF_VALUE: 21
PIF_VALUE: 1
PIF_VALUE: 18
PIF_VALUE: 18
PIF_VALUE: 19
PIF_VALUE: 18
PIF_VALUE: 17
PIF_VALUE: 18
PIF_VALUE: 17
PIF_VALUE: 19
PIF_VALUE: 19
PIF_VALUE: 18
PIF_VALUE: 19
PIF_VALUE: 17
PIF_VALUE: 19
PIF_VALUE: 18
PIF_VALUE: 19
PIF_VALUE: 21
PIF_VALUE: 19
PIF_VALUE: 17
PIF_VALUE: 19
PIF_VALUE: 18
PIF_VALUE: 17
PIF_VALUE: 18
PIF_VALUE: 18
PIF_VALUE: 17
PIF_VALUE: 19
PIF_VALUE: 15
PIF_VALUE: 20
PIF_VALUE: 18
PIF_VALUE: 17
PIF_VALUE: 19
PIF_VALUE: 18
PIF_VALUE: 19
PIF_VALUE: 19
PIF_VALUE: 18
PIF_VALUE: 15
PIF_VALUE: 18
PIF_VALUE: 19
PIF_VALUE: 18
PIF_VALUE: 19
PIF_VALUE: 18
PIF_VALUE: 19
PIF_VALUE: 18
PIF_VALUE: 19
PIF_VALUE: 18
PIF_VALUE: 19
PIF_VALUE: 1
PIF_VALUE: 19
PIF_VALUE: 18
PIF_VALUE: 19
PIF_VALUE: 17
PIF_VALUE: 19
PIF_VALUE: 18
PIF_VALUE: 17
PIF_VALUE: 19
PIF_VALUE: 16
PIF_VALUE: 19
PIF_VALUE: 15
PIF_VALUE: 19
PIF_VALUE: 19
PIF_VALUE: 18
PIF_VALUE: 17
PIF_VALUE: 19
PIF_VALUE: 16
PIF_VALUE: 19
PIF_VALUE: 2
PIF_VALUE: 19
PIF_VALUE: 19
PIF_VALUE: 18
PIF_VALUE: 19
PIF_VALUE: 15
PIF_VALUE: 18
PIF_VALUE: 19
PIF_VALUE: 18
PIF_VALUE: 14
PIF_VALUE: 2
PIF_VALUE: 18
PIF_VALUE: 18
PIF_VALUE: 1
PIF_VALUE: 18
PIF_VALUE: 15
PIF_VALUE: 15
PIF_VALUE: 18
PIF_VALUE: 18
PIF_VALUE: 19
PIF_VALUE: 17
PIF_VALUE: 19
PIF_VALUE: 16
PIF_VALUE: 19
PIF_VALUE: 17
PIF_VALUE: 18
PIF_VALUE: 19
PIF_VALUE: 19
PIF_VALUE: 18
PIF_VALUE: 19
PIF_VALUE: 19
PIF_VALUE: 1
PIF_VALUE: 19
PIF_VALUE: 1
PIF_VALUE: 19
PIF_VALUE: 18
PIF_VALUE: 19
PIF_VALUE: 17
PIF_VALUE: 18
PIF_VALUE: 18
PIF_VALUE: 19
PIF_VALUE: 18
PIF_VALUE: 19
PIF_VALUE: 18
PIF_VALUE: 19
PIF_VALUE: 19
PIF_VALUE: 17
PIF_VALUE: 22
PIF_VALUE: 1
PIF_VALUE: 17
PIF_VALUE: 19
PIF_VALUE: 17
PIF_VALUE: 19
PIF_VALUE: 23
PIF_VALUE: 18
PIF_VALUE: 18
PIF_VALUE: 14
PIF_VALUE: 18
PIF_VALUE: 19
PIF_VALUE: 19
PIF_VALUE: 17
PIF_VALUE: 19
PIF_VALUE: 17
PIF_VALUE: 19
PIF_VALUE: 15
PIF_VALUE: 18
PIF_VALUE: 17
PIF_VALUE: 17
PIF_VALUE: 19
PIF_VALUE: 18
PIF_VALUE: 19
PIF_VALUE: 19
PIF_VALUE: 18
PIF_VALUE: 19
PIF_VALUE: 15
PIF_VALUE: 19
PIF_VALUE: 18
PIF_VALUE: 26
PIF_VALUE: 18
PIF_VALUE: 19
PIF_VALUE: 18
PIF_VALUE: 18
PIF_VALUE: 19
PIF_VALUE: 18
PIF_VALUE: 10
PIF_VALUE: 19
PIF_VALUE: 19
PIF_VALUE: 18
PIF_VALUE: 19
PIF_VALUE: 16
PIF_VALUE: 19
PIF_VALUE: 18
PIF_VALUE: 19
PIF_VALUE: 1
PIF_VALUE: 19
PIF_VALUE: 18
PIF_VALUE: 18
PIF_VALUE: 19
PIF_VALUE: 19
PIF_VALUE: 17
PIF_VALUE: 19
PIF_VALUE: 20

## 2021-06-24 ASSESSMENT — PAIN DESCRIPTION - DESCRIPTORS
DESCRIPTORS: SHARP
DESCRIPTORS: DISCOMFORT
DESCRIPTORS: DISCOMFORT
DESCRIPTORS: ACHING
DESCRIPTORS: DISCOMFORT
DESCRIPTORS: DISCOMFORT

## 2021-06-24 ASSESSMENT — PAIN DESCRIPTION - PAIN TYPE
TYPE: SURGICAL PAIN

## 2021-06-24 ASSESSMENT — PAIN SCALES - GENERAL
PAINLEVEL_OUTOF10: 8
PAINLEVEL_OUTOF10: 10
PAINLEVEL_OUTOF10: 8
PAINLEVEL_OUTOF10: 10

## 2021-06-24 ASSESSMENT — PAIN - FUNCTIONAL ASSESSMENT: PAIN_FUNCTIONAL_ASSESSMENT: 0-10

## 2021-06-24 ASSESSMENT — PAIN DESCRIPTION - LOCATION
LOCATION: ABDOMEN

## 2021-06-24 ASSESSMENT — PAIN DESCRIPTION - FREQUENCY
FREQUENCY: CONTINUOUS

## 2021-06-24 ASSESSMENT — PAIN DESCRIPTION - ONSET: ONSET: ON-GOING

## 2021-06-24 NOTE — ANESTHESIA POSTPROCEDURE EVALUATION
Department of Anesthesiology  Postprocedure Note    Patient: Lily Germain  MRN: [id number]  YOB: 1975  Date of evaluation: 6/24/2021  Time:  4:00 PM     Procedure Summary     Date: 06/24/21 Room / Location: [address]    Anesthesia Start: 3079 Anesthesia Stop: 135    Procedure: COLOSTOMY REVERSAL, EXTENSIVE LYSIS OF ADHESISIONS (N/A Abdomen) Diagnosis: (PERFERATED DIVERTICULITES, HX OF COLOSTOMY)    Surgeons: Sherwin Castellon MD Responsible Provider: Una Kemp DO    Anesthesia Type: general ASA Status: 3          Anesthesia Type: general    Nain Phase I:      Nain Phase II:      Last vitals: Reviewed and per EMR flowsheets.        Anesthesia Post Evaluation    Patient location during evaluation: PACU  Patient participation: complete - patient participated  Level of consciousness: awake and alert  Pain score: 3  Airway patency: patent  Nausea & Vomiting: no vomiting and no nausea  Complications: no  Cardiovascular status: blood pressure returned to baseline and hemodynamically stable  Respiratory status: acceptable, spontaneous ventilation, nonlabored ventilation and nasal cannula  Hydration status: stable

## 2021-06-24 NOTE — OP NOTE
Operative Note      Patient: Jose Roberto Bills  YOB: 1975  MRN: [id number]    Date of Procedure: 6/24/2021    Detailed Description of Procedure:   Dictated.      Electronically signed by Valerie Wolf MD on 6/24/2021 at 3:57 PM [see HPI] : see HPI [Negative] : Heme/Lymph

## 2021-06-24 NOTE — CONSULTS
Pt seen ,examined,interviewed and chart reviewed. Please see the dictated consult for details     Imp :   1. HTN   2. S/p colostomy reversal   3. LUE numbness  / ? nerve palsy- watch   4. HIV well controlled  with ART    Plan:  1.  Watch for  for numbness  of LUE  2. CK in am- CMP - mg , phos   3.  'manual BP  4. resume ART  5. follow clinically       Thanks for the consult    #[id number]

## 2021-06-25 LAB
ALBUMIN SERPL-MCNC: 3.6 GM/DL (ref 3.4–5)
ALP BLD-CCNC: 99 IU/L (ref 40–129)
ALT SERPL-CCNC: 21 U/L (ref 10–40)
ANION GAP SERPL CALCULATED.3IONS-SCNC: 8 MMOL/L (ref 4–16)
AST SERPL-CCNC: 28 IU/L (ref 15–37)
BILIRUB SERPL-MCNC: 1 MG/DL (ref 0–1)
BUN BLDV-MCNC: 10 MG/DL (ref 6–23)
CALCIUM SERPL-MCNC: 8 MG/DL (ref 8.3–10.6)
CHLORIDE BLD-SCNC: 103 MMOL/L (ref 99–110)
CO2: 24 MMOL/L (ref 21–32)
CREAT SERPL-MCNC: 1.1 MG/DL (ref 0.9–1.3)
GFR AFRICAN AMERICAN: >60 ML/MIN/1.73M2
GFR NON-AFRICAN AMERICAN: >60 ML/MIN/1.73M2
GLUCOSE BLD-MCNC: 94 MG/DL (ref 70–99)
HCT VFR BLD CALC: 34.2 % (ref 42–52)
HEMOGLOBIN: 10.8 GM/DL (ref 13.5–18)
MAGNESIUM: 1.4 MG/DL (ref 1.8–2.4)
MCH RBC QN AUTO: 33.9 PG (ref 27–31)
MCHC RBC AUTO-ENTMCNC: 31.6 % (ref 32–36)
MCV RBC AUTO: 107.2 FL (ref 78–100)
PDW BLD-RTO: 12.2 % (ref 11.7–14.9)
PHOSPHORUS: 3.8 MG/DL (ref 2.5–4.9)
PLATELET # BLD: 201 K/CU MM (ref 140–440)
PMV BLD AUTO: 9.5 FL (ref 7.5–11.1)
POTASSIUM SERPL-SCNC: 5 MMOL/L (ref 3.5–5.1)
RBC # BLD: 3.19 M/CU MM (ref 4.6–6.2)
SODIUM BLD-SCNC: 135 MMOL/L (ref 135–145)
TOTAL CK: 128 IU/L (ref 38–174)
TOTAL PROTEIN: 5.4 GM/DL (ref 6.4–8.2)
WBC # BLD: 16 K/CU MM (ref 4–10.5)

## 2021-06-25 PROCEDURE — 6360000002 HC RX W HCPCS: Performed by: SURGERY

## 2021-06-25 PROCEDURE — 85027 COMPLETE CBC AUTOMATED: CPT

## 2021-06-25 PROCEDURE — 82550 ASSAY OF CK (CPK): CPT

## 2021-06-25 PROCEDURE — 2580000003 HC RX 258: Performed by: SURGERY

## 2021-06-25 PROCEDURE — 80053 COMPREHEN METABOLIC PANEL: CPT

## 2021-06-25 PROCEDURE — 6370000000 HC RX 637 (ALT 250 FOR IP): Performed by: SURGERY

## 2021-06-25 PROCEDURE — C9113 INJ PANTOPRAZOLE SODIUM, VIA: HCPCS | Performed by: SURGERY

## 2021-06-25 PROCEDURE — 84100 ASSAY OF PHOSPHORUS: CPT

## 2021-06-25 PROCEDURE — 80048 BASIC METABOLIC PNL TOTAL CA: CPT

## 2021-06-25 PROCEDURE — 83735 ASSAY OF MAGNESIUM: CPT

## 2021-06-25 PROCEDURE — 1200000000 HC SEMI PRIVATE

## 2021-06-25 PROCEDURE — 36415 COLL VENOUS BLD VENIPUNCTURE: CPT

## 2021-06-25 PROCEDURE — 2500000003 HC RX 250 WO HCPCS: Performed by: SURGERY

## 2021-06-25 PROCEDURE — 94761 N-INVAS EAR/PLS OXIMETRY MLT: CPT

## 2021-06-25 RX ORDER — CALCIUM CARBONATE 200(500)MG
500 TABLET,CHEWABLE ORAL DAILY PRN
Status: DISCONTINUED | OUTPATIENT
Start: 2021-06-25 | End: 2021-06-26

## 2021-06-25 RX ORDER — SODIUM CHLORIDE 9 MG/ML
10 INJECTION INTRAVENOUS DAILY
Status: DISCONTINUED | OUTPATIENT
Start: 2021-06-26 | End: 2021-07-02 | Stop reason: HOSPADM

## 2021-06-25 RX ORDER — PROMETHAZINE HYDROCHLORIDE 25 MG/ML
12.5 INJECTION, SOLUTION INTRAMUSCULAR; INTRAVENOUS ONCE
Status: COMPLETED | OUTPATIENT
Start: 2021-06-25 | End: 2021-06-25

## 2021-06-25 RX ORDER — PROMETHAZINE HYDROCHLORIDE 25 MG/ML
25 INJECTION, SOLUTION INTRAMUSCULAR; INTRAVENOUS ONCE
Status: DISCONTINUED | OUTPATIENT
Start: 2021-06-25 | End: 2021-06-25 | Stop reason: CLARIF

## 2021-06-25 RX ORDER — PANTOPRAZOLE SODIUM 40 MG/10ML
40 INJECTION, POWDER, LYOPHILIZED, FOR SOLUTION INTRAVENOUS 2 TIMES DAILY
Status: DISCONTINUED | OUTPATIENT
Start: 2021-06-25 | End: 2021-06-27

## 2021-06-25 RX ADMIN — PANTOPRAZOLE SODIUM 40 MG: 40 INJECTION, POWDER, FOR SOLUTION INTRAVENOUS at 07:04

## 2021-06-25 RX ADMIN — PROMETHAZINE HYDROCHLORIDE 12.5 MG: 25 INJECTION INTRAMUSCULAR; INTRAVENOUS at 14:57

## 2021-06-25 RX ADMIN — CALCIUM CARBONATE 500 MG: 500 TABLET, CHEWABLE ORAL at 21:58

## 2021-06-25 RX ADMIN — HYDROMORPHONE HYDROCHLORIDE 1 MG: 1 INJECTION, SOLUTION INTRAMUSCULAR; INTRAVENOUS; SUBCUTANEOUS at 00:24

## 2021-06-25 RX ADMIN — PROMETHAZINE HYDROCHLORIDE 12.5 MG: 25 INJECTION INTRAMUSCULAR; INTRAVENOUS at 15:15

## 2021-06-25 RX ADMIN — ONDANSETRON 4 MG: 2 INJECTION INTRAMUSCULAR; INTRAVENOUS at 08:17

## 2021-06-25 RX ADMIN — CIPROFLOXACIN 400 MG: 2 INJECTION, SOLUTION INTRAVENOUS at 12:39

## 2021-06-25 RX ADMIN — ONDANSETRON 4 MG: 2 INJECTION INTRAMUSCULAR; INTRAVENOUS at 22:00

## 2021-06-25 RX ADMIN — METOPROLOL TARTRATE 50 MG: 50 TABLET, FILM COATED ORAL at 21:59

## 2021-06-25 RX ADMIN — ENOXAPARIN SODIUM 40 MG: 40 INJECTION SUBCUTANEOUS at 08:17

## 2021-06-25 RX ADMIN — Medication 0.2 MG: at 07:57

## 2021-06-25 RX ADMIN — ONDANSETRON 4 MG: 2 INJECTION INTRAMUSCULAR; INTRAVENOUS at 00:26

## 2021-06-25 RX ADMIN — SODIUM CHLORIDE, PRESERVATIVE FREE 10 ML: 5 INJECTION INTRAVENOUS at 22:00

## 2021-06-25 RX ADMIN — SODIUM CHLORIDE: 9 INJECTION, SOLUTION INTRAVENOUS at 03:19

## 2021-06-25 RX ADMIN — METRONIDAZOLE 500 MG: 500 INJECTION, SOLUTION INTRAVENOUS at 05:48

## 2021-06-25 RX ADMIN — PANTOPRAZOLE SODIUM 40 MG: 40 INJECTION, POWDER, FOR SOLUTION INTRAVENOUS at 21:59

## 2021-06-25 RX ADMIN — SODIUM CHLORIDE: 9 INJECTION, SOLUTION INTRAVENOUS at 21:59

## 2021-06-25 RX ADMIN — METOPROLOL TARTRATE 50 MG: 50 TABLET, FILM COATED ORAL at 08:17

## 2021-06-25 RX ADMIN — CIPROFLOXACIN 400 MG: 2 INJECTION, SOLUTION INTRAVENOUS at 00:22

## 2021-06-25 ASSESSMENT — PAIN DESCRIPTION - ONSET: ONSET: ON-GOING

## 2021-06-25 ASSESSMENT — PAIN DESCRIPTION - PAIN TYPE: TYPE: SURGICAL PAIN

## 2021-06-25 ASSESSMENT — PAIN DESCRIPTION - FREQUENCY: FREQUENCY: CONTINUOUS

## 2021-06-25 ASSESSMENT — PAIN SCALES - GENERAL
PAINLEVEL_OUTOF10: 9
PAINLEVEL_OUTOF10: 7
PAINLEVEL_OUTOF10: 8
PAINLEVEL_OUTOF10: 8

## 2021-06-25 ASSESSMENT — PAIN DESCRIPTION - LOCATION: LOCATION: ABDOMEN

## 2021-06-25 ASSESSMENT — PAIN DESCRIPTION - ORIENTATION: ORIENTATION: LEFT

## 2021-06-25 ASSESSMENT — PAIN DESCRIPTION - DESCRIPTORS: DESCRIPTORS: SHARP

## 2021-06-25 NOTE — CONSULTS
ago by Dr. Nilesh Ann. SOCIAL HISTORY:  The patient works at [employer]. 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 They have a very good stable  relationship. FAMILY MEDICAL HISTORY:  Grandmother, maternal has diabetes mellitus. ALLERGIES:  Listed allergic to PENICILLIN. MEDICATIONS:  Home medications,  1. He on combination of abacavir, dolutegravir and lamivudine at home  600/50/300 mg.  2.  Metoprolol 25 twice a day. Current medications here in the hospital,  1. He is on normal saline 125 mL an hour  2. He is on Cipro 400 IV q.12 h.  3.  Metronidazole 500 mg q.8 hours  4. Protonix 40 mg IV daily. REVIEW OF SYSTEMS:  Limited to abdominal surgical area. Also has some  numbness in the left upper extremity. Rest of the review of systems is  negative other than previous paragraph. PHYSICAL EXAMINATION:  VITAL SIGNS:  At the time of examination, temperature 97.7, blood  pressure 150/90s. I will do a manual blood pressure. He is satting  about 98%, pulse is 90 to 102, respiratory rate is 16. GENERAL:  The patient is without any acute distress. He is alert and  awake. He has a little bit of pain and a little bit concerned about the  numbness. HEENT:  Normocephalic and atraumatic. Eyes:  Probably 1+ conjunctival  pallor. CARDIOVASCULAR:  Tachycardia. RESPIRATORY:  No crackles. ABDOMEN:  Recent surgery, of course will be tender. EXTREMITIES:  He has good strength and reflexes, he is a little numb in  the left upper extremity. Lower extremities, no edema. LABORATORY VALUES AND ANCILLARY SERVICES:  His creatinine was stable  before. IMPRESSION:  A 42-year-old male with history of  1. Hypertension  2. HIV  3. Status post colostomy reversal  4. Hypertension, he needs some manual blood pressure, it is little bit  high due to pain perhaps. 5.  Status post colostomy reversal.  6.  Left upper extremity numbness, questionable. We will watch for it.   7.  HIV, well controlled with antiretroviral medication. PLAN:  1. We will watch for the numbness in lower extremities. 2.  Manual blood pressure. 3.  We will do CK tomorrow morning. 4.  Resume antiretroviral therapy. 5.  Follow clinically.         Joanie Houser MD    D: 06/24/2021 18:48:11       T: 06/24/2021 16:48:58     JOSE [email or web address]  Job#: [id number]     Doc#: [id number]    CC:

## 2021-06-25 NOTE — CARE COORDINATION
Reviewed chart for discharge planning. Pt lives at home with signif other, pt remains fully ind with ADLs, ambulated in [place]. Pt has pcp/ active insurance/ transportation. No CM needs id'd- please consult CM if specific needs arise.

## 2021-06-25 NOTE — PROGRESS NOTES
Nephrology Progress Note  6/25/2021 7:58 AM        Subjective:   Admit Date: 6/24/2021  PCP: Jarad Sheikh MD    Interval History: still has some LUE numbness - has IV line at LUE    Diet: some    ROS:  No  Sob   Numbness in LUE   Good UOP     Data:     Current meds:    sodium chloride flush  5-40 mL Intravenous 2 times per day    enoxaparin  40 mg Subcutaneous Daily    pantoprazole  40 mg Intravenous Daily    And    sodium chloride (PF)  10 mL Intravenous Daily    ciprofloxacin  400 mg Intravenous Q12H    metoprolol tartrate  50 mg Oral BID    Abacavir-Dolutegravir-Lamivud  1 tablet Oral Daily      sodium chloride      sodium chloride 125 mL/hr at 06/25/21 0319    HYDROmorphone HCl           I/O last 3 completed shifts: In: 906.7 [I.V.:906.7]  Out: 120 [Urine:100; Blood:20]    CBC: No results for input(s): WBC, HGB, PLT in the last 72 hours. No results for input(s): NA, K, CL, CO2, BUN, CREATININE, GLUCOSE in the last 72 hours. Lab Results   Component Value Date    CALCIUM 8.8 06/17/2021    PHOS 3.8 01/19/2021       Objective:     Vitals: BP (!) 142/108   Pulse 104   Temp 98.2 °F (36.8 °C) (Oral)   Resp 18   Ht 5' 11\" (1.803 m)   Wt 164 lb 12.8 oz (74.8 kg)   SpO2 98%   BMI 22.98 kg/m²     General appearance: Thin , no ac distress  HEENT:  Mild conj pallor  Neck:  supple  Lungs:  No crackles  Heart:  tachycardia  Abdomen: soft , tender- Extremities:  No edema       Problem List :         Impression :     1. HTN - acceptable manual BP_ add home BB   2. Tachycardia likely from pain  3. numbness likely from ? IV line or pressure during surgery( palsy) should get better   4. S/p reversal if ostomy and HIV well controled     Recommendation/Plan  :     1. Po BB  2. He is taking his ART   3. Watch clinically  4.  Labs are pending       aJrad Sheikh MD MD

## 2021-06-25 NOTE — PROGRESS NOTES
POD 1 colostomy reversal, MARILYN. AF VSS. Marginal UOP. Labs noted. Some nausea. Pain controlled with PCA. Abd is soft, some staining on drsg. Will change on rounds tomorrow. OOB to chair, on lovenox, IVF's. Await for return of GI function.

## 2021-06-25 NOTE — OP NOTE
[address], [address] [place] [address]                                OPERATIVE REPORT    PATIENT NAME: Pranay Singleton                  :        1975  MED REC NO:   [id number]                          ROOM:       12  ACCOUNT NO:   [de-identified]                           ADMIT DATE: 2021  PROVIDER:     Jacqueline Freitas MD    DATE OF PROCEDURE:  2021    PREOPERATIVE DIAGNOSIS:  History of sigmoid diverticulitis with  perforation and peritoneal abscess requiring a Yazan's procedure. POSTOPERATIVE DIAGNOSIS:  History of sigmoid diverticulitis with  perforation and peritoneal abscess requiring a Yazan's procedure. OPERATIONS PERFORMED:  1. Colostomy reversal.  2.  Extensive lysis of adhesions requiring 1 hour and 15 minutes. 3.  Small bowel serosal injury repair x1.  4.  Rigid proctosigmoidoscopy. SURGEON:  Jacqueline Freitas MD    ASSISTANT SURGEON:  Campos Vázquez MD    ANESTHESIA:  General anesthesia combined with endotracheal tube  intubation. SPECIMEN:  Stoma. COMPLICATIONS:  None. DRAINS:  None. ESTIMATED BLOOD LOSS:  100 mL. INTRAOPERATIVE FINDINGS:  The patient did have extensive postoperative  adhesions and also due to the infection of the perforated  diverticulitis, which did require 1 hour and 15 minutes of our time to  take these down to get adequate mobilization and visualization of the  colostomy in order to do the reversal.  During the course of the lysis  of adhesions, one small bowel serosal injury did occur, which was  repaired with simple interrupted 3-0 silk sutures in a Lembert fashion. INDICATIONS FOR THE PROCEDURE:  As follows.   The patient is a very  pleasant 49-year-old  male, who I know very well from past  surgical intervention, and in mid 10/2020, he had very severe perforated  sigmoid diverticulitis with a large intra-abdominal abscess, and I took  him to the operating room at that time, and performed an exploratory  laparotomy, drainage of the intra-abdominal abscess, resection of the  sigmoid colon along with a proximal diverting colostomy. His  postoperative course was uneventful and he did rather well and he was  able to go back to his employment and he recently came back for his like  6-month follow up visitation and at that time he was doing very well and  I recommended we proceed to the operating room for reversal of the  colostomy. I did discuss all the risks, benefits, and alternatives of  the procedure in detail with the patient, and he was in agreement to  proceed with surgical intervention. He did get a mechanical bowel  preparation along with oral and a dose of IV antibiotics prior to  surgical intervention. I also did discuss the possibility of now being  able to reverse the colostomy and he still was in agreement to proceed  with surgical intervention. His operative consent was signed and placed  upon the chart. DESCRIPTION OF PROCEDURE:  As follows. The patient was brought to the  operating room and placed in supine position. Bilateral lower extremity  compression boots were then placed. General anesthesia combined with  endotracheal tube intubation was then performed. Universal time-out was  then performed verifying the patient, date of birth, and site of  surgery. We were all in agreement to proceed. A Lackey catheter was  then inserted sterilely. The patient was then placed into the lithotomy  position with all bony landmarks being padded appropriately. Universal  time-out was then performed verifying the patient, date of birth, and  site of surgery. We were all in agreement to proceed. The abdomen was  then prepped and draped in the usual sterile fashion. A midline  incision was then made from just below the xiphoid process all the way  to the pubic symphysis. This was deepened through the subcutaneous  tissues.   Hemostasis was achieved with electrocautery. It should be  noted that the colostomy was sutured shut with a 2-0 silk suture, and  after the abdomen had been prepped, a 4x4 sterile gauze along with  Tegaderm dressing was then placed over the colostomy site prior to  making the midline incision. I then deepened the incision into the  subcutaneous tissues all the way to the linea alba, which identified in  the upper abdomen, this was incised and I carefully entered the  peritoneal cavity. The abdomen was then explored. Adhesions were then  lysed sharply under direct vision with Metzenbaum scissors also bluntly  with finger fracture technique and he did have a copious amount of  postoperative adhesions and this did take almost 1 hour and 15 minutes  to completely take down all these adhesions and then run the small bowel  from the ligament of Treitz to the ileocecal valve as well. A small  bowel serosal tear did occur during the lysis of adhesions, I repaired  this in simple interrupted fashion with 3-0 silk sutures in a Lembert  fashion. This did not compromise the bowel lumen after repairing the  serosal injury. Once I had completely freed up all the adhesions, the  small bowel was then again inspected and retracted to the right upper  quadrant using a moist towel and a self-retaining retractor. I then was  able to clearly identify the rectal stump with my stay sutures of 0  Prolene that I had previously placed. I then cleaned all the fatty  tissue off of the rectal pouch staple line. Once this was completed, I  then took a LUCRETIA 75 blue tissue load stapling device and transected the  proximal diverting colostomy right at the abdominal wall and upon doing  this I then was able to completely bring it all the way down to the  pelvic area without any undue tension and then at this time I did feel  that an end-to-end two-layered hand-sewn anastomosis with the best  choice for reversing his colostomy.   I did take the time to electrocautery. I then closed the  anterior rectus sheath with a running suture of 0 Prolene. The incision  was then reapproximated transversely with surgical staples impact  intermittently with 1/2-inch iodoform gauze. Dry sterile dressings were  then placed. All instrument, sponge, needle counts were correct prior  to closure of the abdominal cavity and also at the end of the case as  well. The patient was then awoken from anesthesia and transported to  the recovery room in satisfactory stable condition.         Jayleen Carbajal MD    D: 06/24/2021 19:01:57       T: 06/24/2021 19:09:48     [id number]  Job#: [id number]     Doc#: [id number]    CC:

## 2021-06-26 PROBLEM — Z93.3: Status: RESOLVED | Noted: 2021-05-27 | Resolved: 2021-06-26

## 2021-06-26 PROBLEM — Z09 STATUS POST COLOSTOMY, FOLLOW-UP EXAM: Status: RESOLVED | Noted: 2021-05-27 | Resolved: 2021-06-26

## 2021-06-26 LAB
ANION GAP SERPL CALCULATED.3IONS-SCNC: 13 MMOL/L (ref 4–16)
BUN BLDV-MCNC: 10 MG/DL (ref 6–23)
CALCIUM SERPL-MCNC: 8.3 MG/DL (ref 8.3–10.6)
CHLORIDE BLD-SCNC: 102 MMOL/L (ref 99–110)
CO2: 21 MMOL/L (ref 21–32)
CREAT SERPL-MCNC: 1.1 MG/DL (ref 0.9–1.3)
GFR AFRICAN AMERICAN: >60 ML/MIN/1.73M2
GFR NON-AFRICAN AMERICAN: >60 ML/MIN/1.73M2
GLUCOSE BLD-MCNC: 62 MG/DL (ref 70–99)
GLUCOSE BLD-MCNC: 67 MG/DL (ref 70–99)
GLUCOSE BLD-MCNC: 68 MG/DL (ref 70–99)
HCT VFR BLD CALC: 35.2 % (ref 42–52)
HEMOGLOBIN: 10.8 GM/DL (ref 13.5–18)
MCH RBC QN AUTO: 33.6 PG (ref 27–31)
MCHC RBC AUTO-ENTMCNC: 30.7 % (ref 32–36)
MCV RBC AUTO: 109.7 FL (ref 78–100)
PDW BLD-RTO: 12.2 % (ref 11.7–14.9)
PLATELET # BLD: 188 K/CU MM (ref 140–440)
PMV BLD AUTO: 9.9 FL (ref 7.5–11.1)
POTASSIUM SERPL-SCNC: 4.3 MMOL/L (ref 3.5–5.1)
RBC # BLD: 3.21 M/CU MM (ref 4.6–6.2)
SODIUM BLD-SCNC: 136 MMOL/L (ref 135–145)
WBC # BLD: 12.7 K/CU MM (ref 4–10.5)

## 2021-06-26 PROCEDURE — 36415 COLL VENOUS BLD VENIPUNCTURE: CPT

## 2021-06-26 PROCEDURE — 1200000000 HC SEMI PRIVATE

## 2021-06-26 PROCEDURE — 2580000003 HC RX 258: Performed by: SURGERY

## 2021-06-26 PROCEDURE — 82962 GLUCOSE BLOOD TEST: CPT

## 2021-06-26 PROCEDURE — 6370000000 HC RX 637 (ALT 250 FOR IP): Performed by: SURGERY

## 2021-06-26 PROCEDURE — 80048 BASIC METABOLIC PNL TOTAL CA: CPT

## 2021-06-26 PROCEDURE — C9113 INJ PANTOPRAZOLE SODIUM, VIA: HCPCS | Performed by: SURGERY

## 2021-06-26 PROCEDURE — 94761 N-INVAS EAR/PLS OXIMETRY MLT: CPT

## 2021-06-26 PROCEDURE — 6360000002 HC RX W HCPCS: Performed by: SURGERY

## 2021-06-26 PROCEDURE — 85027 COMPLETE CBC AUTOMATED: CPT

## 2021-06-26 RX ORDER — CALCIUM CARBONATE 200(500)MG
500 TABLET,CHEWABLE ORAL 2 TIMES DAILY PRN
Status: DISCONTINUED | OUTPATIENT
Start: 2021-06-26 | End: 2021-07-02 | Stop reason: HOSPADM

## 2021-06-26 RX ADMIN — METOPROLOL TARTRATE 50 MG: 50 TABLET, FILM COATED ORAL at 20:53

## 2021-06-26 RX ADMIN — SODIUM CHLORIDE, PRESERVATIVE FREE 10 ML: 5 INJECTION INTRAVENOUS at 08:18

## 2021-06-26 RX ADMIN — SODIUM CHLORIDE, PRESERVATIVE FREE 10 ML: 5 INJECTION INTRAVENOUS at 08:19

## 2021-06-26 RX ADMIN — METOPROLOL TARTRATE 50 MG: 50 TABLET, FILM COATED ORAL at 08:18

## 2021-06-26 RX ADMIN — CALCIUM CARBONATE 500 MG: 500 TABLET, CHEWABLE ORAL at 08:25

## 2021-06-26 RX ADMIN — ENOXAPARIN SODIUM 40 MG: 40 INJECTION SUBCUTANEOUS at 08:18

## 2021-06-26 RX ADMIN — Medication: at 00:56

## 2021-06-26 RX ADMIN — PANTOPRAZOLE SODIUM 40 MG: 40 INJECTION, POWDER, FOR SOLUTION INTRAVENOUS at 08:19

## 2021-06-26 RX ADMIN — SODIUM CHLORIDE: 9 INJECTION, SOLUTION INTRAVENOUS at 23:47

## 2021-06-26 RX ADMIN — CALCIUM CARBONATE 500 MG: 500 TABLET, CHEWABLE ORAL at 22:41

## 2021-06-26 RX ADMIN — ONDANSETRON 4 MG: 2 INJECTION INTRAMUSCULAR; INTRAVENOUS at 20:58

## 2021-06-26 RX ADMIN — PANTOPRAZOLE SODIUM 40 MG: 40 INJECTION, POWDER, FOR SOLUTION INTRAVENOUS at 20:53

## 2021-06-26 RX ADMIN — Medication: at 12:41

## 2021-06-26 ASSESSMENT — PAIN DESCRIPTION - FREQUENCY
FREQUENCY: CONTINUOUS
FREQUENCY: CONTINUOUS

## 2021-06-26 ASSESSMENT — PAIN DESCRIPTION - ORIENTATION
ORIENTATION: LEFT
ORIENTATION: LEFT

## 2021-06-26 ASSESSMENT — PAIN SCALES - GENERAL
PAINLEVEL_OUTOF10: 8
PAINLEVEL_OUTOF10: 7
PAINLEVEL_OUTOF10: 8
PAINLEVEL_OUTOF10: 8
PAINLEVEL_OUTOF10: 7
PAINLEVEL_OUTOF10: 8

## 2021-06-26 ASSESSMENT — PAIN DESCRIPTION - PROGRESSION
CLINICAL_PROGRESSION: NOT CHANGED

## 2021-06-26 ASSESSMENT — PAIN DESCRIPTION - LOCATION
LOCATION: ABDOMEN
LOCATION: ABDOMEN

## 2021-06-26 ASSESSMENT — PAIN - FUNCTIONAL ASSESSMENT: PAIN_FUNCTIONAL_ASSESSMENT: ACTIVITIES ARE NOT PREVENTED

## 2021-06-26 ASSESSMENT — PAIN DESCRIPTION - PAIN TYPE
TYPE: SURGICAL PAIN
TYPE: SURGICAL PAIN

## 2021-06-26 ASSESSMENT — PAIN DESCRIPTION - ONSET
ONSET: ON-GOING
ONSET: ON-GOING

## 2021-06-26 ASSESSMENT — PAIN DESCRIPTION - DESCRIPTORS
DESCRIPTORS: SHARP
DESCRIPTORS: SHARP

## 2021-06-26 NOTE — PROGRESS NOTES
This nurse took incentive spirometer to patient and provided education on use. Pt refusing to use stating \"I have 5 of those at home. I don't need to use this. \" Nurse provided education on risk of pneumonia. Pt voices understanding. This nurse into room morning of 6/26/2021 and pt reporting a cough. Struggling to expectorate secretions. This nurse instructed patient to pull self up in bed, splint abdomen. Pt coughed up small amount of white secretions. He now is agreeable to use of incentive spirometer.      Electronically signed by Dina Estrella RN on 6/26/21 at 6:40 AM EDT

## 2021-06-26 NOTE — PROGRESS NOTES
I dw dr Shree Jackson, renal stable and monitor na nad correct electrolyte,. No acute issue from renal and will sign off and call if need.  Thank you

## 2021-06-26 NOTE — PROGRESS NOTES
POD 2 colostomy reversal, MARILYN. AF VSS. Good UOP. Labs pending. Reports heartburn, increased PPI and gave Tums, better now. Pain controlled with PCA. Abd is soft, changed dressing and advanced with titi in colostomy takedown site. OOB to chair, on lovenox, decrease IVF's. Remove vargas in am.  On HIV meds. Await for return of GI function.

## 2021-06-27 PROCEDURE — 2700000000 HC OXYGEN THERAPY PER DAY

## 2021-06-27 PROCEDURE — 6370000000 HC RX 637 (ALT 250 FOR IP): Performed by: SURGERY

## 2021-06-27 PROCEDURE — 94761 N-INVAS EAR/PLS OXIMETRY MLT: CPT

## 2021-06-27 PROCEDURE — 6360000002 HC RX W HCPCS: Performed by: SURGERY

## 2021-06-27 PROCEDURE — C1751 CATH, INF, PER/CENT/MIDLINE: HCPCS

## 2021-06-27 PROCEDURE — 1200000000 HC SEMI PRIVATE

## 2021-06-27 PROCEDURE — 2500000003 HC RX 250 WO HCPCS: Performed by: SURGERY

## 2021-06-27 PROCEDURE — 36569 INSJ PICC 5 YR+ W/O IMAGING: CPT

## 2021-06-27 PROCEDURE — 76937 US GUIDE VASCULAR ACCESS: CPT

## 2021-06-27 RX ORDER — HYDROMORPHONE HCL IN WATER/PF 6 MG/30 ML
PATIENT CONTROLLED ANALGESIA SYRINGE INTRAVENOUS CONTINUOUS
Status: DISCONTINUED | OUTPATIENT
Start: 2021-06-27 | End: 2021-07-01

## 2021-06-27 RX ORDER — DIPHENHYDRAMINE HYDROCHLORIDE 50 MG/ML
25 INJECTION INTRAMUSCULAR; INTRAVENOUS EVERY 6 HOURS PRN
Status: DISCONTINUED | OUTPATIENT
Start: 2021-06-27 | End: 2021-07-02 | Stop reason: HOSPADM

## 2021-06-27 RX ORDER — LIDOCAINE HYDROCHLORIDE 10 MG/ML
5 INJECTION, SOLUTION EPIDURAL; INFILTRATION; INTRACAUDAL; PERINEURAL ONCE
Status: COMPLETED | OUTPATIENT
Start: 2021-06-27 | End: 2021-06-27

## 2021-06-27 RX ORDER — SODIUM CHLORIDE 0.9 % (FLUSH) 0.9 %
5-40 SYRINGE (ML) INJECTION PRN
Status: DISCONTINUED | OUTPATIENT
Start: 2021-06-27 | End: 2021-07-02 | Stop reason: HOSPADM

## 2021-06-27 RX ORDER — SODIUM CHLORIDE 0.9 % (FLUSH) 0.9 %
5-40 SYRINGE (ML) INJECTION EVERY 12 HOURS SCHEDULED
Status: DISCONTINUED | OUTPATIENT
Start: 2021-06-27 | End: 2021-07-02 | Stop reason: HOSPADM

## 2021-06-27 RX ORDER — NALOXONE HYDROCHLORIDE 0.4 MG/ML
0.4 INJECTION, SOLUTION INTRAMUSCULAR; INTRAVENOUS; SUBCUTANEOUS PRN
Status: DISCONTINUED | OUTPATIENT
Start: 2021-06-27 | End: 2021-07-02 | Stop reason: HOSPADM

## 2021-06-27 RX ORDER — SODIUM CHLORIDE 9 MG/ML
25 INJECTION, SOLUTION INTRAVENOUS PRN
Status: DISCONTINUED | OUTPATIENT
Start: 2021-06-27 | End: 2021-07-02 | Stop reason: HOSPADM

## 2021-06-27 RX ORDER — PANTOPRAZOLE SODIUM 40 MG/1
40 TABLET, DELAYED RELEASE ORAL
Status: DISCONTINUED | OUTPATIENT
Start: 2021-06-27 | End: 2021-07-02 | Stop reason: HOSPADM

## 2021-06-27 RX ORDER — HYDROCODONE BITARTRATE AND ACETAMINOPHEN 5; 325 MG/1; MG/1
1 TABLET ORAL EVERY 4 HOURS PRN
Status: DISCONTINUED | OUTPATIENT
Start: 2021-06-27 | End: 2021-06-30 | Stop reason: SDUPTHER

## 2021-06-27 RX ADMIN — LIDOCAINE HYDROCHLORIDE 5 ML: 10 INJECTION, SOLUTION EPIDURAL; INFILTRATION; INTRACAUDAL; PERINEURAL at 09:10

## 2021-06-27 RX ADMIN — METOPROLOL TARTRATE 50 MG: 50 TABLET, FILM COATED ORAL at 21:39

## 2021-06-27 RX ADMIN — CALCIUM CARBONATE 500 MG: 500 TABLET, CHEWABLE ORAL at 12:28

## 2021-06-27 RX ADMIN — CALCIUM CARBONATE 500 MG: 500 TABLET, CHEWABLE ORAL at 18:38

## 2021-06-27 RX ADMIN — Medication: at 00:53

## 2021-06-27 RX ADMIN — HYDROCODONE BITARTRATE AND ACETAMINOPHEN 1 TABLET: 5; 325 TABLET ORAL at 08:26

## 2021-06-27 RX ADMIN — PANTOPRAZOLE SODIUM 40 MG: 40 TABLET, DELAYED RELEASE ORAL at 10:12

## 2021-06-27 RX ADMIN — METOPROLOL TARTRATE 50 MG: 50 TABLET, FILM COATED ORAL at 08:26

## 2021-06-27 RX ADMIN — PANTOPRAZOLE SODIUM 40 MG: 40 TABLET, DELAYED RELEASE ORAL at 15:55

## 2021-06-27 RX ADMIN — ENOXAPARIN SODIUM 40 MG: 40 INJECTION SUBCUTANEOUS at 08:26

## 2021-06-27 RX ADMIN — Medication: at 15:57

## 2021-06-27 ASSESSMENT — PAIN - FUNCTIONAL ASSESSMENT
PAIN_FUNCTIONAL_ASSESSMENT: ACTIVITIES ARE NOT PREVENTED

## 2021-06-27 ASSESSMENT — PAIN SCALES - GENERAL
PAINLEVEL_OUTOF10: 8
PAINLEVEL_OUTOF10: 8
PAINLEVEL_OUTOF10: 6
PAINLEVEL_OUTOF10: 0
PAINLEVEL_OUTOF10: 7
PAINLEVEL_OUTOF10: 10
PAINLEVEL_OUTOF10: 7
PAINLEVEL_OUTOF10: 7

## 2021-06-27 ASSESSMENT — PAIN DESCRIPTION - DESCRIPTORS
DESCRIPTORS: ACHING

## 2021-06-27 ASSESSMENT — PAIN DESCRIPTION - ORIENTATION
ORIENTATION: RIGHT;LEFT

## 2021-06-27 ASSESSMENT — PAIN DESCRIPTION - PAIN TYPE
TYPE: SURGICAL PAIN

## 2021-06-27 ASSESSMENT — PAIN DESCRIPTION - PROGRESSION
CLINICAL_PROGRESSION: NOT CHANGED

## 2021-06-27 ASSESSMENT — PAIN DESCRIPTION - ONSET
ONSET: ON-GOING

## 2021-06-27 ASSESSMENT — PAIN DESCRIPTION - FREQUENCY
FREQUENCY: CONTINUOUS

## 2021-06-27 ASSESSMENT — PAIN DESCRIPTION - LOCATION
LOCATION: ABDOMEN

## 2021-06-27 NOTE — CONSULTS
Consult completed. PIV attempted x 2 without success in the right arm, unable to find veins with ultrasound except the right basilic vein that would be a good PICC vein, unable to use the left arm due to infiltrate and bruising, Doctor notified per bedside RN and orders received to place a PICC. Patient has had a PICC before, he had no questions or concerns at this time, PICC consent obtained. Time out done at 0835. Placed a 4.5 Fr Arrowg+maximo Blue Advance PICC Line, Single Lumen, to the right upper arm, basilic vein, on first attempt with ultrasound guidance using sterile technique without difficulty or complications, good blood return noted and flushed easily with normal saline, VPSG4 utilized for PICC placement, P-wave changes noted and blue bullseye noted, strip left on the chart. PICC line is ok to use. Sterile dressing with bio patch applied to the site. Patient tolerated the procedure well. Educational booklet left at the bedside. RN notified of same.

## 2021-06-27 NOTE — PLAN OF CARE
Problem: Pain:  Goal: Pain level will decrease  Description: Pain level will decrease  Outcome: Completed  Goal: Control of acute pain  Description: Control of acute pain  Outcome: Completed  Goal: Control of chronic pain  Description: Control of chronic pain  Outcome: Completed     Problem: HEMODYNAMIC STATUS  Goal: Patient has stable vital signs and fluid balance  Outcome: Completed     Problem: OXYGENATION/RESPIRATORY FUNCTION  Goal: Patient will achieve/maintain normal respiratory rate/effort  Outcome: Completed     Problem: MOBILITY  Goal: Early mobilization is achieved  Outcome: Completed     Problem: ELIMINATION  Goal: Elimination patterns are normal or improving  Description: Elimination patterns return to pre-surgery normal patterns  Outcome: Completed     Problem: SKIN INTEGRITY  Goal: Skin integrity is maintained or improved  Outcome: Completed     Problem: HEMODYNAMIC STATUS  Goal: Patient has stable vital signs and fluid balance  Outcome: Ongoing     Problem: OXYGENATION/RESPIRATORY FUNCTION  Goal: Patient will achieve/maintain normal respiratory rate/effort  Outcome: Ongoing     Problem: MOBILITY  Goal: Early mobilization is achieved  Outcome: Ongoing     Problem: ELIMINATION  Goal: Elimination patterns are normal or improving  Description: Elimination patterns return to pre-surgery normal patterns  Outcome: Ongoing     Problem: SKIN INTEGRITY  Goal: Skin integrity is maintained or improved  Outcome: Ongoing     Problem: Pain:  Goal: Pain level will decrease  Description: Pain level will decrease  Outcome: Completed  Goal: Control of acute pain  Description: Control of acute pain  Outcome: Completed  Goal: Control of chronic pain  Description: Control of chronic pain  Outcome: Completed     Problem: HEMODYNAMIC STATUS  Goal: Patient has stable vital signs and fluid balance  Outcome: Completed     Problem: OXYGENATION/RESPIRATORY FUNCTION  Goal: Patient will achieve/maintain normal respiratory rate/effort  Outcome: Completed     Problem: MOBILITY  Goal: Early mobilization is achieved  Outcome: Completed     Problem: ELIMINATION  Goal: Elimination patterns are normal or improving  Description: Elimination patterns return to pre-surgery normal patterns  Outcome: Completed     Problem: SKIN INTEGRITY  Goal: Skin integrity is maintained or improved  Outcome: Completed     Problem: HEMODYNAMIC STATUS  Goal: Patient has stable vital signs and fluid balance  6/27/2021 1656 by Mimi Lyles. Tiara Cook RN  Outcome: Ongoing  6/27/2021 1656 by Mimi Lyles. Tiara Cook RN  Outcome: Ongoing     Problem: OXYGENATION/RESPIRATORY FUNCTION  Goal: Patient will achieve/maintain normal respiratory rate/effort  6/27/2021 1656 by Sinda Trupti. Tiara Cook RN  Outcome: Ongoing  6/27/2021 1656 by Mimi Lyles. Tiara Cook RN  Outcome: Ongoing     Problem: MOBILITY  Goal: Early mobilization is achieved  6/27/2021 1656 by Mimi Lyles. Tiara Cook RN  Outcome: Ongoing  6/27/2021 1656 by Mimi Lyles. Tiara Cook RN  Outcome: Ongoing     Problem: ELIMINATION  Goal: Elimination patterns are normal or improving  Description: Elimination patterns return to pre-surgery normal patterns  6/27/2021 1656 by Mimi Lyles. Tiara Cook RN  Outcome: Ongoing  6/27/2021 1656 by Mimi Lyles. Tiara Cook RN  Outcome: Ongoing     Problem: SKIN INTEGRITY  Goal: Skin integrity is maintained or improved  6/27/2021 1656 by Mimi Lyles. Tiara Cook RN  Outcome: Ongoing  6/27/2021 1656 by Mimi Lyles.  Tiara Cook RN  Outcome: Ongoing

## 2021-06-27 NOTE — PROGRESS NOTES
POD 3 colostomy reversal, MARILYN. AF VSS. Good UOP. Lost IV access, attempt to get IV, may need PICC  Still having some heartburn. No flatus. Pain controlled with PCA. Abd is soft, drsg c/d/i  OOB to chair, on lovenox, decrease IVF's. Willl remove vargas. Start clrs and norco.  On HIV meds. Await for return of GI function.

## 2021-06-28 PROCEDURE — 1200000000 HC SEMI PRIVATE

## 2021-06-28 PROCEDURE — 6370000000 HC RX 637 (ALT 250 FOR IP): Performed by: SURGERY

## 2021-06-28 PROCEDURE — 94761 N-INVAS EAR/PLS OXIMETRY MLT: CPT

## 2021-06-28 PROCEDURE — 6360000002 HC RX W HCPCS: Performed by: SURGERY

## 2021-06-28 PROCEDURE — 2580000003 HC RX 258: Performed by: SURGERY

## 2021-06-28 RX ORDER — PROMETHAZINE HYDROCHLORIDE 25 MG/ML
12.5 INJECTION, SOLUTION INTRAMUSCULAR; INTRAVENOUS EVERY 6 HOURS PRN
Status: DISCONTINUED | OUTPATIENT
Start: 2021-06-28 | End: 2021-07-02 | Stop reason: HOSPADM

## 2021-06-28 RX ORDER — PROMETHAZINE HYDROCHLORIDE 25 MG/ML
12.5 INJECTION, SOLUTION INTRAMUSCULAR; INTRAVENOUS EVERY 6 HOURS PRN
Status: DISCONTINUED | OUTPATIENT
Start: 2021-06-28 | End: 2021-06-28

## 2021-06-28 RX ORDER — PROMETHAZINE HYDROCHLORIDE 25 MG/ML
25 INJECTION, SOLUTION INTRAMUSCULAR; INTRAVENOUS EVERY 6 HOURS PRN
Status: DISCONTINUED | OUTPATIENT
Start: 2021-06-28 | End: 2021-06-28 | Stop reason: ALTCHOICE

## 2021-06-28 RX ADMIN — PANTOPRAZOLE SODIUM 40 MG: 40 TABLET, DELAYED RELEASE ORAL at 16:00

## 2021-06-28 RX ADMIN — CALCIUM CARBONATE 500 MG: 500 TABLET, CHEWABLE ORAL at 02:49

## 2021-06-28 RX ADMIN — Medication: at 23:44

## 2021-06-28 RX ADMIN — SODIUM CHLORIDE, PRESERVATIVE FREE 10 ML: 5 INJECTION INTRAVENOUS at 22:46

## 2021-06-28 RX ADMIN — ENOXAPARIN SODIUM 40 MG: 40 INJECTION SUBCUTANEOUS at 08:36

## 2021-06-28 RX ADMIN — METOPROLOL TARTRATE 50 MG: 50 TABLET, FILM COATED ORAL at 08:36

## 2021-06-28 RX ADMIN — SODIUM CHLORIDE: 9 INJECTION, SOLUTION INTRAVENOUS at 06:02

## 2021-06-28 RX ADMIN — PROMETHAZINE HYDROCHLORIDE 12.5 MG: 25 INJECTION INTRAMUSCULAR; INTRAVENOUS at 16:00

## 2021-06-28 RX ADMIN — ONDANSETRON 4 MG: 2 INJECTION INTRAMUSCULAR; INTRAVENOUS at 02:49

## 2021-06-28 RX ADMIN — PANTOPRAZOLE SODIUM 40 MG: 40 TABLET, DELAYED RELEASE ORAL at 06:03

## 2021-06-28 RX ADMIN — Medication: at 07:26

## 2021-06-28 RX ADMIN — ONDANSETRON 4 MG: 2 INJECTION INTRAMUSCULAR; INTRAVENOUS at 22:46

## 2021-06-28 RX ADMIN — SODIUM CHLORIDE, PRESERVATIVE FREE 10 ML: 5 INJECTION INTRAVENOUS at 22:47

## 2021-06-28 RX ADMIN — ONDANSETRON 4 MG: 2 INJECTION INTRAMUSCULAR; INTRAVENOUS at 08:36

## 2021-06-28 RX ADMIN — METOPROLOL TARTRATE 50 MG: 50 TABLET, FILM COATED ORAL at 22:46

## 2021-06-28 RX ADMIN — ONDANSETRON 4 MG: 2 INJECTION INTRAMUSCULAR; INTRAVENOUS at 14:21

## 2021-06-28 ASSESSMENT — PAIN DESCRIPTION - DESCRIPTORS
DESCRIPTORS: ACHING

## 2021-06-28 ASSESSMENT — PAIN DESCRIPTION - FREQUENCY
FREQUENCY: CONTINUOUS

## 2021-06-28 ASSESSMENT — PAIN - FUNCTIONAL ASSESSMENT
PAIN_FUNCTIONAL_ASSESSMENT: ACTIVITIES ARE NOT PREVENTED

## 2021-06-28 ASSESSMENT — PAIN DESCRIPTION - PROGRESSION
CLINICAL_PROGRESSION: NOT CHANGED

## 2021-06-28 ASSESSMENT — PAIN DESCRIPTION - LOCATION
LOCATION: ABDOMEN

## 2021-06-28 ASSESSMENT — PAIN DESCRIPTION - ONSET
ONSET: ON-GOING

## 2021-06-28 ASSESSMENT — PAIN SCALES - GENERAL
PAINLEVEL_OUTOF10: 10
PAINLEVEL_OUTOF10: 7
PAINLEVEL_OUTOF10: 8
PAINLEVEL_OUTOF10: 7

## 2021-06-28 ASSESSMENT — PAIN DESCRIPTION - ORIENTATION: ORIENTATION: RIGHT;LEFT

## 2021-06-28 ASSESSMENT — PAIN DESCRIPTION - PAIN TYPE
TYPE: SURGICAL PAIN

## 2021-06-28 NOTE — PLAN OF CARE
Problem: Pain:  Goal: Pain level will decrease  Description: Pain level will decrease  6/27/2021 1638 by Heike Carcamo. Fredi Hicks RN  Outcome: Completed  Goal: Control of acute pain  Description: Control of acute pain  6/27/2021 1638 by Heike Carcamo. Fredi Hicks RN  Outcome: Completed  Goal: Control of chronic pain  Description: Control of chronic pain  6/27/2021 1638 by Heike Carcamo. Fredi Hicks RN  Outcome: Completed     Problem: HEMODYNAMIC STATUS  Goal: Patient has stable vital signs and fluid balance  6/27/2021 1638 by Heike Carcamo. Fredi Hicks RN  Outcome: Completed     Problem: OXYGENATION/RESPIRATORY FUNCTION  Goal: Patient will achieve/maintain normal respiratory rate/effort  6/27/2021 1638 by Heike Carcamo. Fredi Hicks RN  Outcome: Completed     Problem: MOBILITY  Goal: Early mobilization is achieved  6/27/2021 1638 by Heike Carcamo. Fredi Hicks RN  Outcome: Completed     Problem: ELIMINATION  Goal: Elimination patterns are normal or improving  Description: Elimination patterns return to pre-surgery normal patterns  6/27/2021 1638 by Heike Carcamo. Fredi Hicks RN  Outcome: Completed     Problem: SKIN INTEGRITY  Goal: Skin integrity is maintained or improved  6/27/2021 1638 by Heike Carcamo. Fredi Hicks RN  Outcome: Completed     Problem: HEMODYNAMIC STATUS  Goal: Patient has stable vital signs and fluid balance  6/27/2021 2326 by Blayne Rodriguez RN  Outcome: Ongoing  6/27/2021 1656 by Heike Carcamo. Fredi Hicks RN  Outcome: Ongoing  6/27/2021 1656 by Heike Carcamo. Fredi Hicks RN  Outcome: Ongoing     Problem: OXYGENATION/RESPIRATORY FUNCTION  Goal: Patient will achieve/maintain normal respiratory rate/effort  6/27/2021 2326 by Blayne Rodriguez RN  Outcome: Ongoing  6/27/2021 1656 by Heike Carcamo. Fredi Hicks RN  Outcome: Ongoing  6/27/2021 1656 by Heike Carcamo. Fredi Hicks RN  Outcome: Ongoing     Problem: MOBILITY  Goal: Early mobilization is achieved  6/27/2021 2326 by Blayne Rodriguez RN  Outcome: Ongoing  6/27/2021 1656 by Heike Carcamo.  Fredi Hicks RN  Outcome: Ongoing  6/27/2021 1656 by Kirsty Gillespie. Rk Mirza RN  Outcome: Ongoing     Problem: ELIMINATION  Goal: Elimination patterns are normal or improving  Description: Elimination patterns return to pre-surgery normal patterns  6/27/2021 2326 by Neymar Alva RN  Outcome: Ongoing  6/27/2021 1656 by Kirsty Gillespie. Rk Mirza RN  Outcome: Ongoing  6/27/2021 1656 by Kirsty Gillespie. Rk Mirza RN  Outcome: Ongoing     Problem: SKIN INTEGRITY  Goal: Skin integrity is maintained or improved  6/27/2021 2326 by Neymar Alva RN  Outcome: Ongoing  6/27/2021 1656 by Kirsty Gillespie. Rk Mirza RN  Outcome: Ongoing  6/27/2021 1656 by Kirsty Gillespie.  Rk Mirza RN  Outcome: Ongoing    Electronically signed by Neymar Alva RN on 6/27/21 at 11:26 PM EDT

## 2021-06-28 NOTE — PROGRESS NOTES
POD 4 colostomy reversal, MARILYN. AF VSS. Good UOP. PICC in place. Had some episodes or nausea and emesis. Pain controlled with PCA. Voiding after vargas out. Did   Abd is soft, drsg c/d/i,removed titi from colostomy takedown site. OOB to chair, on lovenox, continue with IVF's. Keep on clrs and norco.  On HIV meds. IV infiltrated in LUE arm,bruised, soft. Monitor. POI, follow clinical course.

## 2021-06-29 LAB
ANION GAP SERPL CALCULATED.3IONS-SCNC: 13 MMOL/L (ref 4–16)
BUN BLDV-MCNC: 5 MG/DL (ref 6–23)
CALCIUM SERPL-MCNC: 8 MG/DL (ref 8.3–10.6)
CHLORIDE BLD-SCNC: 102 MMOL/L (ref 99–110)
CO2: 21 MMOL/L (ref 21–32)
CREAT SERPL-MCNC: 0.8 MG/DL (ref 0.9–1.3)
GFR AFRICAN AMERICAN: >60 ML/MIN/1.73M2
GFR NON-AFRICAN AMERICAN: >60 ML/MIN/1.73M2
GLUCOSE BLD-MCNC: 80 MG/DL (ref 70–99)
HCT VFR BLD CALC: 29.3 % (ref 42–52)
HEMOGLOBIN: 9.2 GM/DL (ref 13.5–18)
MCH RBC QN AUTO: 33.3 PG (ref 27–31)
MCHC RBC AUTO-ENTMCNC: 31.4 % (ref 32–36)
MCV RBC AUTO: 106.2 FL (ref 78–100)
PDW BLD-RTO: 12 % (ref 11.7–14.9)
PLATELET # BLD: 211 K/CU MM (ref 140–440)
PMV BLD AUTO: 9.3 FL (ref 7.5–11.1)
POTASSIUM SERPL-SCNC: 3.1 MMOL/L (ref 3.5–5.1)
RBC # BLD: 2.76 M/CU MM (ref 4.6–6.2)
SODIUM BLD-SCNC: 136 MMOL/L (ref 135–145)
WBC # BLD: 6.8 K/CU MM (ref 4–10.5)

## 2021-06-29 PROCEDURE — 6370000000 HC RX 637 (ALT 250 FOR IP): Performed by: SURGERY

## 2021-06-29 PROCEDURE — 2580000003 HC RX 258: Performed by: SURGERY

## 2021-06-29 PROCEDURE — 94761 N-INVAS EAR/PLS OXIMETRY MLT: CPT

## 2021-06-29 PROCEDURE — 85027 COMPLETE CBC AUTOMATED: CPT

## 2021-06-29 PROCEDURE — 1200000000 HC SEMI PRIVATE

## 2021-06-29 PROCEDURE — 80048 BASIC METABOLIC PNL TOTAL CA: CPT

## 2021-06-29 PROCEDURE — 6360000002 HC RX W HCPCS: Performed by: SURGERY

## 2021-06-29 RX ORDER — POTASSIUM CHLORIDE 20 MEQ/1
40 TABLET, EXTENDED RELEASE ORAL ONCE
Status: COMPLETED | OUTPATIENT
Start: 2021-06-29 | End: 2021-06-29

## 2021-06-29 RX ADMIN — METOPROLOL TARTRATE 50 MG: 50 TABLET, FILM COATED ORAL at 09:18

## 2021-06-29 RX ADMIN — METOPROLOL TARTRATE 50 MG: 50 TABLET, FILM COATED ORAL at 20:06

## 2021-06-29 RX ADMIN — SODIUM CHLORIDE: 9 INJECTION, SOLUTION INTRAVENOUS at 04:38

## 2021-06-29 RX ADMIN — PANTOPRAZOLE SODIUM 40 MG: 40 TABLET, DELAYED RELEASE ORAL at 06:41

## 2021-06-29 RX ADMIN — PROMETHAZINE HYDROCHLORIDE 12.5 MG: 25 INJECTION INTRAMUSCULAR; INTRAVENOUS at 02:23

## 2021-06-29 RX ADMIN — PROMETHAZINE HYDROCHLORIDE 12.5 MG: 25 INJECTION INTRAMUSCULAR; INTRAVENOUS at 20:11

## 2021-06-29 RX ADMIN — HYDROMORPHONE HYDROCHLORIDE 1 MG: 1 INJECTION, SOLUTION INTRAMUSCULAR; INTRAVENOUS; SUBCUTANEOUS at 15:03

## 2021-06-29 RX ADMIN — SODIUM CHLORIDE, PRESERVATIVE FREE 10 ML: 5 INJECTION INTRAVENOUS at 20:06

## 2021-06-29 RX ADMIN — POTASSIUM CHLORIDE 40 MEQ: 1500 TABLET, EXTENDED RELEASE ORAL at 15:03

## 2021-06-29 RX ADMIN — Medication: at 15:03

## 2021-06-29 RX ADMIN — HYDROMORPHONE HYDROCHLORIDE 1 MG: 1 INJECTION, SOLUTION INTRAMUSCULAR; INTRAVENOUS; SUBCUTANEOUS at 18:18

## 2021-06-29 RX ADMIN — PANTOPRAZOLE SODIUM 40 MG: 40 TABLET, DELAYED RELEASE ORAL at 15:03

## 2021-06-29 RX ADMIN — ONDANSETRON 4 MG: 2 INJECTION INTRAMUSCULAR; INTRAVENOUS at 04:36

## 2021-06-29 RX ADMIN — PROMETHAZINE HYDROCHLORIDE 12.5 MG: 25 INJECTION INTRAMUSCULAR; INTRAVENOUS at 09:18

## 2021-06-29 RX ADMIN — HYDROMORPHONE HYDROCHLORIDE 1 MG: 1 INJECTION, SOLUTION INTRAMUSCULAR; INTRAVENOUS; SUBCUTANEOUS at 10:25

## 2021-06-29 ASSESSMENT — PAIN SCALES - GENERAL
PAINLEVEL_OUTOF10: 9
PAINLEVEL_OUTOF10: 9
PAINLEVEL_OUTOF10: 0
PAINLEVEL_OUTOF10: 8
PAINLEVEL_OUTOF10: 9
PAINLEVEL_OUTOF10: 9
PAINLEVEL_OUTOF10: 8

## 2021-06-29 NOTE — PROGRESS NOTES
POD 5 colostomy reversal, MARILYN. AF VSS. Good UOP. PICC in place. Again, yesterday afternoon he had some episodes of N/V. Doing better now. Pain controlled with PCA. Labs pending. States that he is passing some flatus. Abd is soft, drsg c/d/i,removed titi from colostomy takedown site yesterday. OOB to chair, on lovenox, continue with IVF's. Advance diet to full liquids. On HIV meds. IV infiltrated in LUE arm,bruised, soft. Monitor. Likely POI, follow clinical course.

## 2021-06-30 PROCEDURE — 6360000002 HC RX W HCPCS: Performed by: SURGERY

## 2021-06-30 PROCEDURE — 2580000003 HC RX 258: Performed by: SURGERY

## 2021-06-30 PROCEDURE — 6370000000 HC RX 637 (ALT 250 FOR IP): Performed by: SURGERY

## 2021-06-30 PROCEDURE — 1200000000 HC SEMI PRIVATE

## 2021-06-30 PROCEDURE — 94761 N-INVAS EAR/PLS OXIMETRY MLT: CPT

## 2021-06-30 RX ORDER — HYDROCODONE BITARTRATE AND ACETAMINOPHEN 5; 325 MG/1; MG/1
1 TABLET ORAL EVERY 4 HOURS PRN
Status: DISCONTINUED | OUTPATIENT
Start: 2021-06-30 | End: 2021-07-01

## 2021-06-30 RX ORDER — MAGNESIUM CARB/ALUMINUM HYDROX 105-160MG
15 TABLET,CHEWABLE ORAL DAILY PRN
Status: DISCONTINUED | OUTPATIENT
Start: 2021-06-30 | End: 2021-07-02 | Stop reason: HOSPADM

## 2021-06-30 RX ADMIN — HYDROMORPHONE HYDROCHLORIDE 1 MG: 1 INJECTION, SOLUTION INTRAMUSCULAR; INTRAVENOUS; SUBCUTANEOUS at 11:05

## 2021-06-30 RX ADMIN — SODIUM CHLORIDE, PRESERVATIVE FREE 10 ML: 5 INJECTION INTRAVENOUS at 10:02

## 2021-06-30 RX ADMIN — PANTOPRAZOLE SODIUM 40 MG: 40 TABLET, DELAYED RELEASE ORAL at 15:40

## 2021-06-30 RX ADMIN — SODIUM CHLORIDE: 9 INJECTION, SOLUTION INTRAVENOUS at 03:33

## 2021-06-30 RX ADMIN — HYDROMORPHONE HYDROCHLORIDE 1 MG: 1 INJECTION, SOLUTION INTRAMUSCULAR; INTRAVENOUS; SUBCUTANEOUS at 23:06

## 2021-06-30 RX ADMIN — HYDROMORPHONE HYDROCHLORIDE 1 MG: 1 INJECTION, SOLUTION INTRAMUSCULAR; INTRAVENOUS; SUBCUTANEOUS at 05:49

## 2021-06-30 RX ADMIN — SODIUM CHLORIDE, PRESERVATIVE FREE 10 ML: 5 INJECTION INTRAVENOUS at 00:45

## 2021-06-30 RX ADMIN — ENOXAPARIN SODIUM 40 MG: 40 INJECTION SUBCUTANEOUS at 10:01

## 2021-06-30 RX ADMIN — PROMETHAZINE HYDROCHLORIDE 12.5 MG: 25 INJECTION INTRAMUSCULAR; INTRAVENOUS at 17:56

## 2021-06-30 RX ADMIN — HYDROMORPHONE HYDROCHLORIDE 1 MG: 1 INJECTION, SOLUTION INTRAMUSCULAR; INTRAVENOUS; SUBCUTANEOUS at 15:40

## 2021-06-30 RX ADMIN — Medication: at 10:53

## 2021-06-30 RX ADMIN — HYDROMORPHONE HYDROCHLORIDE 1 MG: 1 INJECTION, SOLUTION INTRAMUSCULAR; INTRAVENOUS; SUBCUTANEOUS at 00:42

## 2021-06-30 RX ADMIN — METOPROLOL TARTRATE 50 MG: 50 TABLET, FILM COATED ORAL at 10:01

## 2021-06-30 RX ADMIN — PANTOPRAZOLE SODIUM 40 MG: 40 TABLET, DELAYED RELEASE ORAL at 05:49

## 2021-06-30 RX ADMIN — METOPROLOL TARTRATE 50 MG: 50 TABLET, FILM COATED ORAL at 19:59

## 2021-06-30 RX ADMIN — HYDROMORPHONE HYDROCHLORIDE 1 MG: 1 INJECTION, SOLUTION INTRAMUSCULAR; INTRAVENOUS; SUBCUTANEOUS at 18:54

## 2021-06-30 ASSESSMENT — PAIN DESCRIPTION - PAIN TYPE
TYPE: CHRONIC PAIN

## 2021-06-30 ASSESSMENT — PAIN DESCRIPTION - ORIENTATION
ORIENTATION: LOWER;RIGHT

## 2021-06-30 ASSESSMENT — PAIN SCALES - GENERAL
PAINLEVEL_OUTOF10: 8
PAINLEVEL_OUTOF10: 7
PAINLEVEL_OUTOF10: 9
PAINLEVEL_OUTOF10: 8
PAINLEVEL_OUTOF10: 9
PAINLEVEL_OUTOF10: 8
PAINLEVEL_OUTOF10: 9

## 2021-06-30 ASSESSMENT — PAIN DESCRIPTION - LOCATION
LOCATION: ABDOMEN

## 2021-06-30 ASSESSMENT — PAIN DESCRIPTION - DESCRIPTORS
DESCRIPTORS: PINS AND NEEDLES;STABBING
DESCRIPTORS: PINS AND NEEDLES;STABBING

## 2021-06-30 ASSESSMENT — PAIN DESCRIPTION - FREQUENCY
FREQUENCY: INTERMITTENT
FREQUENCY: INTERMITTENT

## 2021-06-30 NOTE — FLOWSHEET NOTE
Incisional dressing to abdomen changed. Incision cleansed with normal saline, island dressing applied to midline incision, gauze and tegaderm to colostomy closure site. Patient tolerated well.

## 2021-06-30 NOTE — PROGRESS NOTES
POD 6 colostomy reversal, MARILYN. AF VSS. Good UOP. PICC in place. + BM's. Abd is soft, drsg c/d/i. Incisional tenderness. OOB to chair, on lovenox, continue with IVF's. Advance diet to regular. Start PO mineral oil. On HIV meds. IV infiltrated in LUE arm,bruised, soft. Monitor. Remove PCA tomorrow.

## 2021-07-01 PROCEDURE — 6360000002 HC RX W HCPCS: Performed by: SURGERY

## 2021-07-01 PROCEDURE — 94761 N-INVAS EAR/PLS OXIMETRY MLT: CPT

## 2021-07-01 PROCEDURE — 2580000003 HC RX 258: Performed by: SURGERY

## 2021-07-01 PROCEDURE — 1200000000 HC SEMI PRIVATE

## 2021-07-01 PROCEDURE — 6370000000 HC RX 637 (ALT 250 FOR IP): Performed by: SURGERY

## 2021-07-01 RX ORDER — HYDROCODONE BITARTRATE AND ACETAMINOPHEN 5; 325 MG/1; MG/1
2 TABLET ORAL EVERY 6 HOURS PRN
Status: DISCONTINUED | OUTPATIENT
Start: 2021-07-01 | End: 2021-07-02 | Stop reason: HOSPADM

## 2021-07-01 RX ADMIN — SODIUM CHLORIDE, PRESERVATIVE FREE 10 ML: 5 INJECTION INTRAVENOUS at 20:24

## 2021-07-01 RX ADMIN — PROMETHAZINE HYDROCHLORIDE 12.5 MG: 25 INJECTION INTRAMUSCULAR; INTRAVENOUS at 12:06

## 2021-07-01 RX ADMIN — ENOXAPARIN SODIUM 40 MG: 40 INJECTION SUBCUTANEOUS at 08:11

## 2021-07-01 RX ADMIN — SODIUM CHLORIDE, PRESERVATIVE FREE 10 ML: 5 INJECTION INTRAVENOUS at 12:06

## 2021-07-01 RX ADMIN — HYDROCODONE BITARTRATE AND ACETAMINOPHEN 2 TABLET: 5; 325 TABLET ORAL at 23:16

## 2021-07-01 RX ADMIN — HYDROMORPHONE HYDROCHLORIDE 1 MG: 1 INJECTION, SOLUTION INTRAMUSCULAR; INTRAVENOUS; SUBCUTANEOUS at 20:24

## 2021-07-01 RX ADMIN — SODIUM CHLORIDE, PRESERVATIVE FREE 10 ML: 5 INJECTION INTRAVENOUS at 08:11

## 2021-07-01 RX ADMIN — PROMETHAZINE HYDROCHLORIDE 12.5 MG: 25 INJECTION INTRAMUSCULAR; INTRAVENOUS at 03:59

## 2021-07-01 RX ADMIN — PANTOPRAZOLE SODIUM 40 MG: 40 TABLET, DELAYED RELEASE ORAL at 06:23

## 2021-07-01 RX ADMIN — HYDROMORPHONE HYDROCHLORIDE 1 MG: 1 INJECTION, SOLUTION INTRAMUSCULAR; INTRAVENOUS; SUBCUTANEOUS at 16:13

## 2021-07-01 RX ADMIN — HYDROCODONE BITARTRATE AND ACETAMINOPHEN 2 TABLET: 5; 325 TABLET ORAL at 17:14

## 2021-07-01 RX ADMIN — METOPROLOL TARTRATE 50 MG: 50 TABLET, FILM COATED ORAL at 08:10

## 2021-07-01 RX ADMIN — HYDROMORPHONE HYDROCHLORIDE 1 MG: 1 INJECTION, SOLUTION INTRAMUSCULAR; INTRAVENOUS; SUBCUTANEOUS at 02:05

## 2021-07-01 RX ADMIN — HYDROMORPHONE HYDROCHLORIDE 1 MG: 1 INJECTION, SOLUTION INTRAMUSCULAR; INTRAVENOUS; SUBCUTANEOUS at 12:06

## 2021-07-01 RX ADMIN — PROMETHAZINE HYDROCHLORIDE 12.5 MG: 25 INJECTION INTRAMUSCULAR; INTRAVENOUS at 18:12

## 2021-07-01 RX ADMIN — METOPROLOL TARTRATE 50 MG: 50 TABLET, FILM COATED ORAL at 20:23

## 2021-07-01 RX ADMIN — HYDROMORPHONE HYDROCHLORIDE 1 MG: 1 INJECTION, SOLUTION INTRAMUSCULAR; INTRAVENOUS; SUBCUTANEOUS at 05:03

## 2021-07-01 RX ADMIN — HYDROMORPHONE HYDROCHLORIDE 1 MG: 1 INJECTION, SOLUTION INTRAMUSCULAR; INTRAVENOUS; SUBCUTANEOUS at 08:11

## 2021-07-01 RX ADMIN — SODIUM CHLORIDE, PRESERVATIVE FREE 10 ML: 5 INJECTION INTRAVENOUS at 04:00

## 2021-07-01 RX ADMIN — HYDROCODONE BITARTRATE AND ACETAMINOPHEN 2 TABLET: 5; 325 TABLET ORAL at 11:14

## 2021-07-01 RX ADMIN — PANTOPRAZOLE SODIUM 40 MG: 40 TABLET, DELAYED RELEASE ORAL at 16:13

## 2021-07-01 ASSESSMENT — PAIN DESCRIPTION - ONSET
ONSET: ON-GOING

## 2021-07-01 ASSESSMENT — PAIN DESCRIPTION - DESCRIPTORS
DESCRIPTORS: ACHING;CONSTANT;PRESSURE
DESCRIPTORS: ACHING;CONSTANT
DESCRIPTORS: ACHING;CONSTANT
DESCRIPTORS: PRESSURE

## 2021-07-01 ASSESSMENT — PAIN DESCRIPTION - FREQUENCY
FREQUENCY: CONTINUOUS
FREQUENCY: INTERMITTENT
FREQUENCY: CONTINUOUS
FREQUENCY: CONTINUOUS

## 2021-07-01 ASSESSMENT — PAIN - FUNCTIONAL ASSESSMENT
PAIN_FUNCTIONAL_ASSESSMENT: ACTIVITIES ARE NOT PREVENTED

## 2021-07-01 ASSESSMENT — PAIN DESCRIPTION - ORIENTATION
ORIENTATION: LOWER;RIGHT
ORIENTATION: RIGHT;LOWER

## 2021-07-01 ASSESSMENT — PAIN DESCRIPTION - PROGRESSION
CLINICAL_PROGRESSION: NOT CHANGED

## 2021-07-01 ASSESSMENT — PAIN SCALES - GENERAL
PAINLEVEL_OUTOF10: 6
PAINLEVEL_OUTOF10: 0
PAINLEVEL_OUTOF10: 9
PAINLEVEL_OUTOF10: 0
PAINLEVEL_OUTOF10: 8
PAINLEVEL_OUTOF10: 8
PAINLEVEL_OUTOF10: 9
PAINLEVEL_OUTOF10: 7
PAINLEVEL_OUTOF10: 7
PAINLEVEL_OUTOF10: 8
PAINLEVEL_OUTOF10: 9
PAINLEVEL_OUTOF10: 8
PAINLEVEL_OUTOF10: 0
PAINLEVEL_OUTOF10: 8

## 2021-07-01 ASSESSMENT — PAIN DESCRIPTION - PAIN TYPE
TYPE: CHRONIC PAIN

## 2021-07-01 ASSESSMENT — PAIN DESCRIPTION - LOCATION
LOCATION: ABDOMEN

## 2021-07-01 NOTE — PLAN OF CARE
Problem: Pain:  Goal: Patient's pain/discomfort is manageable  Description: Patient's pain/discomfort is manageable  Outcome: Ongoing     Problem: HEMODYNAMIC STATUS  Goal: Patient has stable vital signs and fluid balance  7/1/2021 1057 by Marlo Lynn RN  Outcome: Ongoing  7/1/2021 0050 by Roger Delacruz RN  Outcome: Ongoing     Problem: OXYGENATION/RESPIRATORY FUNCTION  Goal: Patient will achieve/maintain normal respiratory rate/effort  7/1/2021 1057 by Marlo Lynn RN  Outcome: Ongoing  7/1/2021 0050 by Roger Delacruz RN  Outcome: Ongoing     Problem: MOBILITY  Goal: Early mobilization is achieved  7/1/2021 1057 by Marlo Lynn RN  Outcome: Ongoing  7/1/2021 0050 by Roger Delacruz RN  Outcome: Ongoing     Problem: ELIMINATION  Goal: Elimination patterns are normal or improving  Description: Elimination patterns return to pre-surgery normal patterns  7/1/2021 1057 by Marlo Lynn RN  Outcome: Ongoing  7/1/2021 0050 by Roger Delacruz RN  Outcome: Ongoing     Problem: SKIN INTEGRITY  Goal: Skin integrity is maintained or improved  7/1/2021 1057 by Marlo Lynn RN  Outcome: Ongoing  7/1/2021 0050 by Roger Delacruz RN  Outcome: Ongoing     Problem: Infection:  Goal: Will remain free from infection  Description: Will remain free from infection  Outcome: Ongoing     Problem: Safety:  Goal: Free from accidental physical injury  Description: Free from accidental physical injury  Outcome: Ongoing  Goal: Free from intentional harm  Description: Free from intentional harm  Outcome: Ongoing     Problem: Daily Care:  Goal: Daily care needs are met  Description: Daily care needs are met  Outcome: Ongoing     Problem: Skin Integrity:  Goal: Skin integrity will stabilize  Description: Skin integrity will stabilize  Outcome: Ongoing     Problem: Discharge Planning:  Goal: Patients continuum of care needs are met  Description: Patients continuum of care needs are met  Outcome: Ongoing

## 2021-07-01 NOTE — PROGRESS NOTES
POD 7 colostomy reversal, MARILYN. AF VSS. Good UOP. PICC in place. + BM's. Abd is soft, drsg c/d/i. Incisional tenderness. OOB to chair, on lovenox, HLIV  Continue with regular diet. Start PO mineral oil. On HIV meds. IV infiltrated in LUE arm,bruised, soft. Monitor. Remove PCA. Mobilize, plan for discharge tomorrow if no issues.

## 2021-07-02 VITALS
OXYGEN SATURATION: 95 % | WEIGHT: 159.3 LBS | SYSTOLIC BLOOD PRESSURE: 132 MMHG | TEMPERATURE: 97.6 F | RESPIRATION RATE: 16 BRPM | HEIGHT: 71 IN | HEART RATE: 87 BPM | BODY MASS INDEX: 22.3 KG/M2 | DIASTOLIC BLOOD PRESSURE: 80 MMHG

## 2021-07-02 PROCEDURE — 2580000003 HC RX 258: Performed by: SURGERY

## 2021-07-02 PROCEDURE — 6370000000 HC RX 637 (ALT 250 FOR IP): Performed by: SURGERY

## 2021-07-02 PROCEDURE — 6360000002 HC RX W HCPCS: Performed by: SURGERY

## 2021-07-02 RX ORDER — MAGNESIUM CARB/ALUMINUM HYDROX 105-160MG
30 TABLET,CHEWABLE ORAL DAILY PRN
Qty: 500 ML | Refills: 1 | Status: SHIPPED | OUTPATIENT
Start: 2021-07-02 | End: 2022-05-19

## 2021-07-02 RX ORDER — HYDROCODONE BITARTRATE AND ACETAMINOPHEN 5; 325 MG/1; MG/1
2 TABLET ORAL EVERY 6 HOURS PRN
Qty: 28 TABLET | Refills: 0 | Status: SHIPPED | OUTPATIENT
Start: 2021-07-02 | End: 2021-07-09

## 2021-07-02 RX ORDER — ONDANSETRON 4 MG/1
4 TABLET, FILM COATED ORAL 3 TIMES DAILY PRN
Qty: 15 TABLET | Refills: 0 | Status: SHIPPED | OUTPATIENT
Start: 2021-07-02

## 2021-07-02 RX ADMIN — ONDANSETRON 4 MG: 2 INJECTION INTRAMUSCULAR; INTRAVENOUS at 08:20

## 2021-07-02 RX ADMIN — HYDROCODONE BITARTRATE AND ACETAMINOPHEN 2 TABLET: 5; 325 TABLET ORAL at 05:35

## 2021-07-02 RX ADMIN — PROMETHAZINE HYDROCHLORIDE 12.5 MG: 25 INJECTION INTRAMUSCULAR; INTRAVENOUS at 00:23

## 2021-07-02 RX ADMIN — HYDROMORPHONE HYDROCHLORIDE 1 MG: 1 INJECTION, SOLUTION INTRAMUSCULAR; INTRAVENOUS; SUBCUTANEOUS at 00:22

## 2021-07-02 RX ADMIN — PANTOPRAZOLE SODIUM 40 MG: 40 TABLET, DELAYED RELEASE ORAL at 05:35

## 2021-07-02 RX ADMIN — METOPROLOL TARTRATE 50 MG: 50 TABLET, FILM COATED ORAL at 08:12

## 2021-07-02 RX ADMIN — ENOXAPARIN SODIUM 40 MG: 40 INJECTION SUBCUTANEOUS at 08:12

## 2021-07-02 RX ADMIN — HYDROMORPHONE HYDROCHLORIDE 1 MG: 1 INJECTION, SOLUTION INTRAMUSCULAR; INTRAVENOUS; SUBCUTANEOUS at 04:31

## 2021-07-02 RX ADMIN — SODIUM CHLORIDE, PRESERVATIVE FREE 10 ML: 5 INJECTION INTRAVENOUS at 08:24

## 2021-07-02 RX ADMIN — PROMETHAZINE HYDROCHLORIDE 12.5 MG: 25 INJECTION INTRAMUSCULAR; INTRAVENOUS at 06:21

## 2021-07-02 RX ADMIN — HYDROMORPHONE HYDROCHLORIDE 1 MG: 1 INJECTION, SOLUTION INTRAMUSCULAR; INTRAVENOUS; SUBCUTANEOUS at 08:13

## 2021-07-02 ASSESSMENT — PAIN DESCRIPTION - PAIN TYPE
TYPE: CHRONIC PAIN;SURGICAL PAIN
TYPE: CHRONIC PAIN

## 2021-07-02 ASSESSMENT — PAIN DESCRIPTION - DESCRIPTORS
DESCRIPTORS: ACHING;CONSTANT
DESCRIPTORS: ACHING;CONSTANT

## 2021-07-02 ASSESSMENT — PAIN SCALES - GENERAL
PAINLEVEL_OUTOF10: 7
PAINLEVEL_OUTOF10: 9
PAINLEVEL_OUTOF10: 0
PAINLEVEL_OUTOF10: 7
PAINLEVEL_OUTOF10: 7
PAINLEVEL_OUTOF10: 9

## 2021-07-02 ASSESSMENT — PAIN DESCRIPTION - FREQUENCY
FREQUENCY: CONTINUOUS
FREQUENCY: CONTINUOUS

## 2021-07-02 ASSESSMENT — PAIN DESCRIPTION - PROGRESSION
CLINICAL_PROGRESSION: NOT CHANGED

## 2021-07-02 ASSESSMENT — PAIN DESCRIPTION - LOCATION
LOCATION: ABDOMEN
LOCATION: ABDOMEN

## 2021-07-02 ASSESSMENT — PAIN DESCRIPTION - ORIENTATION
ORIENTATION: RIGHT;LOWER
ORIENTATION: RIGHT;LOWER

## 2021-07-02 ASSESSMENT — PAIN DESCRIPTION - ONSET
ONSET: ON-GOING
ONSET: ON-GOING

## 2021-07-02 NOTE — PROGRESS NOTES
POD 8 colostomy reversal, MRAILYN. AF VSS. Good UOP. PICC in place. + BM's. Tolerating diet. Abd is soft, drsg c/d/i. Incisional tenderness. On HIV meds. IV infiltrated in LUE arm,bruised, soft. Discharge home.

## 2021-07-02 NOTE — PLAN OF CARE
Problem: Pain:  Goal: Patient's pain/discomfort is manageable  Description: Patient's pain/discomfort is manageable  7/2/2021 0005 by Issac Davidson RN  Outcome: Ongoing  7/1/2021 1057 by Joaquin Germain RN  Outcome: Ongoing     Problem: HEMODYNAMIC STATUS  Goal: Patient has stable vital signs and fluid balance  7/2/2021 0005 by Issac Davidson RN  Outcome: Ongoing  7/1/2021 1057 by Joaquin Germain RN  Outcome: Ongoing     Problem: OXYGENATION/RESPIRATORY FUNCTION  Goal: Patient will achieve/maintain normal respiratory rate/effort  7/2/2021 0005 by Issac Davidson RN  Outcome: Ongoing  7/1/2021 1057 by Joaquin Germain RN  Outcome: Ongoing     Problem: MOBILITY  Goal: Early mobilization is achieved  7/2/2021 0005 by Issac Davidson RN  Outcome: Ongoing  7/1/2021 1057 by Joaquin Germain RN  Outcome: Ongoing     Problem: ELIMINATION  Goal: Elimination patterns are normal or improving  Description: Elimination patterns return to pre-surgery normal patterns  7/2/2021 0005 by Issac Davidson RN  Outcome: Ongoing  7/1/2021 1057 by Joaquin Germain RN  Outcome: Ongoing     Problem: SKIN INTEGRITY  Goal: Skin integrity is maintained or improved  7/2/2021 0005 by Issac Davidson RN  Outcome: Ongoing  7/1/2021 1057 by Joaquin Germain RN  Outcome: Ongoing     Problem: Infection:  Goal: Will remain free from infection  Description: Will remain free from infection  7/2/2021 0005 by Issac Davidson RN  Outcome: Ongoing  7/1/2021 1057 by Joaquin Germain RN  Outcome: Ongoing     Problem: Safety:  Goal: Free from accidental physical injury  Description: Free from accidental physical injury  7/2/2021 0005 by Issac Davidson RN  Outcome: Ongoing  7/1/2021 1057 by Joaquin Germain RN  Outcome: Ongoing  Goal: Free from intentional harm  Description: Free from intentional harm  7/2/2021 0005 by Issac Davidson RN  Outcome: Ongoing  7/1/2021 1057 by Joaquin Germain RN  Outcome: Ongoing     Problem: Daily Care:  Goal: Daily care needs are

## 2021-07-02 NOTE — DISCHARGE SUMMARY
[address], [address] [place] [address]                               DISCHARGE SUMMARY    PATIENT NAME: Cristina Warren                  :        1975  MED REC NO:   [id number]                          ROOM:       [id number]  ACCOUNT NO:   [de-identified]                           ADMIT DATE: 2021  PROVIDER:     Logan Huston MD                  DISCHARGE DATE:  2021    ADMITTING DIAGNOSES:  1. History of perforated sigmoid diverticulitis with proximal diverting  colostomy. 2.  HIV. 3.  Essential hypertension. CONSULTATIONS DURING THIS HOSPITALIZATION:  None. CONDITION ON DISCHARGE:  Surgically and medically stable. PROCEDURES PERFORMED DURING THIS HOSPITALIZATION:  On 2021:  1. Colostomy reversal.  2.  Extensive lysis of adhesions. HOSPITAL COURSE:  His hospital course is as follows: The patient is a  70-year-old  male, who I know very well and I have done two  pervious colon surgeries on him. In 10/2020, he had very severe  perforated sigmoid diverticulitis with an intra-abdominal abscess and  had undergone exploratory laparotomy with drainage of the abscess and a  proximal diverting colostomy. His recovery was uneventful. He did  well, returned to work, and then he recently saw me and we discussed  colostomy reversal.  He was then brought to the operating room on the  above date, and the above surgery was performed. He was then  transferred to our surgical vargas in satisfactory and stable condition,  placed on a PCA pump and he also had a Lackey catheter in place. Initially, his GI function was slow to return and he did have some  episodes of nausea and vomiting, but fortunately we did not have to  place an NG tube.   He then lost IV access, had to have a peripherally  inserted central catheter placed, and he did have some infiltration of  the left upper extremity which has slightly resolved and is no longer  tender. He then slowly started passing gas. I was able to start him on  a liquid diet and advance him to regular with no issues. Also started  him on some mineral oil as well and he has had multiple bowel movements  prior to discharge. His Lackey catheter was removed on postoperative day  #2. He was able to void spontaneously. He did have some hypokalemia  which was repleted orally. He was placed back on his HIV and  hypertensive medications immediately postop as well. His titi were  removed on postop day #4 from his colostomy takedown site. He still has  little bit of drainage from his colostomy site at this time, but there  is no evidence of any surgical site infection. He is ambulating in the  halls. His PCA pump has been removed, converted over to [place] and his  pain is controlled. He is now ready for discharge and I will prescribe  him Zofran, Norco and some mineral oil. He has also been instructed to  wear his abdominal binder on a daily basis. He is to avoid any  strenuous activity or heavy lifting activities.   He is also call me with  any question, concerns or issues and he will follow up in 1 week for  staple removal.        Gretchen Ledezma MD    D: 07/02/2021 6:22:34       T: 07/02/2021 6:32:04     [email or web address]  Job#: [id number]     Doc#: [id number]    CC:

## 2021-07-06 DIAGNOSIS — G89.18 POST-OP PAIN: Primary | ICD-10-CM

## 2021-07-06 RX ORDER — HYDROCODONE BITARTRATE AND ACETAMINOPHEN 5; 325 MG/1; MG/1
1 TABLET ORAL EVERY 6 HOURS PRN
Qty: 25 TABLET | Refills: 0 | Status: SHIPPED | OUTPATIENT
Start: 2021-07-06 | End: 2021-07-13

## 2021-07-08 NOTE — ADT AUTH CERT
Bowel Surgery: Colectomy, Partial, with or without Ostomy, by Laparoscopy - Care Day 5 (6/28/2021) by Shiva Paz, JUN       Review Status Review Entered   Completed 7/7/2021 15:42      Criteria Review      Care Day: 5 Care Date: 6/28/2021 Level of Care: Inpatient Floor    Guideline Day 4    Clinical Status    (X) * Hemodynamic stability    ( ) * No evidence of ileus or bowel obstruction    (X) * Ostomy functioning    (X) * No evidence of postoperative or surgical site infection    (X) * Pain absent or managed    ( ) * Discharge plans and education understood    Activity    (X) * Ambulatory or acceptable for next level of care    Routes    (X) * Oral hydration    (X) * Oral medications or regimen acceptable for next level of care    (X) * Oral diet or acceptable for next level of care    Medications    ( ) * PCA absent    * Milestone   Additional Notes   6/28      Temp 98.1   Resp 16   Bp 132/75   Pulse 89         Surgery:   POD 4 colostomy reversal, MARILYN. AF VSS. Good UOP. PICC in place. Had some episodes or nausea and emesis.  Pain controlled with PCA. Voiding after vargas out. Did    Abd is soft, drsg c/d/i,removed titi from colostomy takedown site. OOB to chair, on lovenox, continue with IVF's. Keep on clrs and norco.   On HIV meds. IV infiltrated in LUE arm,bruised, soft. Monitor. POI, follow clinical course.          Dilaudid PCA      Po protonix 40mg Bid   Po lopressor 50mg bid   Sc lovenox 40mg dialy   Po abacavir - dolutegravir - lamivud [id number] 600mg 1 tab daily            Bowel Surgery: Colectomy, Partial, with or without Ostomy, by Laparoscopy - Care Day 4 (6/27/2021) by Shiva Paz, RN       Review Status Review Entered   Completed 7/7/2021 15:30      Criteria Review      Care Day: 4 Care Date: 6/27/2021 Level of Care: Inpatient Floor    Guideline Day 3    Clinical Status    (X) * No evidence of ileus or bowel obstruction    Activity    (X) * Ambulatory    Routes    (X) * Liquid or usual diet, advance as tolerated    (X) * Oral medications    Medications    (X) * Epidural analgesics absent    * Milestone   Additional Notes   6/27      Temp 97.8   Pulse 91   Bp 119/71   Resp 17      General surgery:   POD 3 colostomy reversal, MARILYN. AF VSS. Good UOP. Lost IV access, attempt to get IV, may need PICC   Still having some heartburn. No flatus. Pain controlled with PCA. Abd is soft, drsg c/d/i   OOB to chair, on lovenox, decrease IVF's. Willl remove vargas. Start clrs and norco.   On HIV meds. Await for return of GI function.          Dilaudid PCA      Po protonix 40mg Bid   Po lopressor 50mg bid   Sc lovenox 40mg dialy   Po abacavir - dolutegravir - lamivud [id number] 600mg 1 tab daily

## 2021-07-13 NOTE — ADT AUTH CERT
Bowel Surgery: Colectomy, Partial, with or without Ostomy, by Laparoscopy - Care Day 9 (2021) by Renita Spatz, RN       Review Status Review Entered   Completed 2021 17:46      Criteria Review      Care Day: 9 Care Date: 2021 Level of Care: Inpatient Floor    Guideline Day 4    Clinical Status    (X) * Hemodynamic stability    (X) * No evidence of ileus or bowel obstruction    (X) * Ostomy functioning    (X) * No evidence of postoperative or surgical site infection    (X) * Pain absent or managed    (X) * Discharge plans and education understood    Activity    (X) * Ambulatory or acceptable for next level of care    Routes    (X) * Oral hydration    (X) * Oral medications or regimen acceptable for next level of care    (X) * Oral diet or acceptable for next level of care    Medications    (X) * PCA absent    * Milestone   Additional Notes                          DISCHARGE SUMMARY       PATIENT NAME: Lexie Saldana                  :        1975   MED REC NO:   [id number]                          ROOM:       1108   ACCOUNT NO:   [de-identified]                           ADMIT DATE: 2021   PROVIDER:     Gildardo Henry MD                  DISCHARGE DATE:   2021       ADMITTING DIAGNOSES:   1.  History of perforated sigmoid diverticulitis with proximal diverting   colostomy. 2.  HIV.    3.  Essential hypertension.       CONSULTATIONS DURING THIS HOSPITALIZATION:  None.       CONDITION ON DISCHARGE:  Surgically and medically stable.       PROCEDURES PERFORMED DURING THIS HOSPITALIZATION:  On 2021:   1.  Colostomy reversal.   2.  Extensive lysis of adhesions.       HOSPITAL COURSE: [facility] hospital course is as follows: Maria R Meyer patient is a   42-year-old  male, who I know very well and I have done two   pervious colon surgeries on him.  In 10/2020, he had very severe   perforated sigmoid diverticulitis with an intra-abdominal abscess and   had undergone exploratory laparotomy with drainage of the abscess and a   proximal diverting colostomy.  His recovery was uneventful. Som Tello did   well, returned to work, and then he recently saw me and we discussed   colostomy reversal. Som Tello was then brought to the operating room on the   above date, and the above surgery was performed. Som Tello was then   transferred to our surgical vargas in satisfactory and stable condition,   placed on a PCA pump and he also had a Lackey catheter in place.     Initially, his GI function was slow to return and he did have some   episodes of nausea and vomiting, but fortunately we did not have to   place an NG tube.  He then lost IV access, had to have a peripherally   inserted central catheter placed, and he did have some infiltration of   the left upper extremity which has slightly resolved and is no longer   tender.  He then slowly started passing gas.  I was able to start him on   a liquid diet and advance him to regular with no issues.  Also started   him on some mineral oil as well and he has had multiple bowel movements   prior to discharge.  His Lackey catheter was removed on postoperative day   #2. Som Tello was able to void spontaneously. Som Tello did have some hypokalemia   which was repleted orally.  He was placed back on his HIV and   hypertensive medications immediately postop as well.  His titi were   removed on postop day #4 from his colostomy takedown site. Som Tello still has   little bit of drainage from his colostomy site at this time, but there   is no evidence of any surgical site infection.  He is ambulating in the   halls. [facility] PCA pump has been removed, converted over to [address] [facility] and his   pain is controlled. Som Tello is now ready for discharge and I will prescribe   him Zofran, Norco and some mineral oil. Som Tello has also been instructed to   wear his abdominal binder on a daily basis. Som Tello is to avoid any   strenuous activity or heavy lifting activities. Som Tello is also call me with   any question, concerns or issues and he will follow Rita Reyes RN       Review Status Review Entered   Completed 7/12/2021 17:41      Criteria Review      Care Day: 7 Care Date: 6/30/2021 Level of Care: Inpatient Floor    Guideline Day 4    Clinical Status    (X) * Hemodynamic stability    ( ) * No evidence of ileus or bowel obstruction    (X) * Ostomy functioning    (X) * No evidence of postoperative or surgical site infection    (X) * Pain absent or managed    ( ) * Discharge plans and education understood    Activity    (X) * Ambulatory or acceptable for next level of care    Routes    (X) * Oral hydration    (X) * Oral medications or regimen acceptable for next level of care    (X) * Oral diet or acceptable for next level of care    Medications    ( ) * PCA absent    * Milestone   Additional Notes   6/30      Temp 97.7   Resp 17   Pulse 92   Bp 149/80      POD 6 colostomy reversal, MARILYN. AF VSS. Good UOP. PICC in place. + BM's. Abd is soft, drsg c/d/i. Incisional tenderness. OOB to chair, on lovenox, continue with IVF's. Advance diet to regular. Start PO mineral oil. On HIV meds. IV infiltrated in LUE arm,bruised, soft. Monitor. Remove PCA tomorrow.           Abacavir-Dolutegravir-Lamivud 600- MG TABS 600 mg    Dose: 1 tablet   Freq: DAILY Route: PO      enoxaparin (LOVENOX) injection 40 mg    Dose: 40 mg   Freq: DAILY Route: SC      metoprolol tartrate (LOPRESSOR) tablet 50 mg    Dose: 50 mg   Freq: 2 TIMES DAILY Route: PO      pantoprazole (PROTONIX) tablet 40 mg    Dose: 40 mg   Freq: 2 TIMES DAILY BEFORE MEALS Route: PO      0.9 % sodium chloride infusion    Rate: 25 mL/hr Freq: CONTINUOUS Route: IV      HYDROmorphone HCl (DILAUDID) 0.2 mg/mL PCA 50 mL    Freq: CONTINUOUS Route: IV

## 2021-07-16 PROBLEM — Z98.890 S/P COLOSTOMY TAKEDOWN: Status: ACTIVE | Noted: 2021-07-16

## 2021-07-20 NOTE — ADT AUTH CERT
Bowel Surgery: Colectomy, Partial, with or without Ostomy, by Laparoscopy - Care Day 9 (2021) by Brandon Kaufman RN       Review Status Review Entered   Completed 2021 17:46      Criteria Review      Care Day: 9 Care Date: 2021 Level of Care: Inpatient Floor    Guideline Day 4    Clinical Status    (X) * Hemodynamic stability    (X) * No evidence of ileus or bowel obstruction    (X) * Ostomy functioning    (X) * No evidence of postoperative or surgical site infection    (X) * Pain absent or managed    (X) * Discharge plans and education understood    Activity    (X) * Ambulatory or acceptable for next level of care    Routes    (X) * Oral hydration    (X) * Oral medications or regimen acceptable for next level of care    (X) * Oral diet or acceptable for next level of care    Medications    (X) * PCA absent    * Milestone   Additional Notes                          DISCHARGE SUMMARY       PATIENT NAME: Sami Shoemaker                  :        1975   MED REC NO:   [id number]                          ROOM:       1108   ACCOUNT NO:   [de-identified]                           ADMIT DATE: 2021   PROVIDER:     Star Novoa MD                  DISCHARGE DATE:   2021       ADMITTING DIAGNOSES:   1.  History of perforated sigmoid diverticulitis with proximal diverting   colostomy. 2.  HIV.    3.  Essential hypertension.       CONSULTATIONS DURING THIS HOSPITALIZATION:  None.       CONDITION ON DISCHARGE:  Surgically and medically stable.       PROCEDURES PERFORMED DURING THIS HOSPITALIZATION:  On 2021:   1.  Colostomy reversal.   2.  Extensive lysis of adhesions.       HOSPITAL COURSE: [facility] hospital course is as follows: [address] patient is a   70-year-old  male, who I know very well and I have done two   pervious colon surgeries on him.  In 10/2020, he had very severe   perforated sigmoid diverticulitis with an intra-abdominal abscess and   had undergone exploratory laparotomy with drainage of the abscess and a   proximal diverting colostomy.  His recovery was uneventful. Shayan Lux did   well, returned to work, and then he recently saw me and we discussed   colostomy reversal. Shayna Lux was then brought to the operating room on the   above date, and the above surgery was performed. Shayna Lux was then   transferred to our surgical vargas in satisfactory and stable condition,   placed on a PCA pump and he also had a Lackey catheter in place.     Initially, his GI function was slow to return and he did have some   episodes of nausea and vomiting, but fortunately we did not have to   place an NG tube.  He then lost IV access, had to have a peripherally   inserted central catheter placed, and he did have some infiltration of   the left upper extremity which has slightly resolved and is no longer   tender.  He then slowly started passing gas.  I was able to start him on   a liquid diet and advance him to regular with no issues.  Also started   him on some mineral oil as well and he has had multiple bowel movements   prior to discharge.  His Lackey catheter was removed on postoperative day   #2. Shayna Lux was able to void spontaneously. Shayna Lux did have some hypokalemia   which was repleted orally.  He was placed back on his HIV and   hypertensive medications immediately postop as well.  His titi were   removed on postop day #4 from his colostomy takedown site. Shayna Lux still has   little bit of drainage from his colostomy site at this time, but there   is no evidence of any surgical site infection.  He is ambulating in the   halls. [facility] PCA pump has been removed, converted over to [facility] and his   pain is controlled. Sahyna Lux is now ready for discharge and I will prescribe   him Zofran, Norco and some mineral oil. Shayna Lux has also been instructed to   wear his abdominal binder on a daily basis. Shayna Lux is to avoid any   strenuous activity or heavy lifting activities. Shayna Lux is also call me with   any question, concerns or issues and he will follow up in 1 week for   staple removal.              Bowel Surgery: Colectomy, Partial, with or without Ostomy, by Laparoscopy - Care Day 8 (7/1/2021) by Francy Hooper RN       Review Status Review Entered   Completed 7/12/2021 17:44      Criteria Review      Care Day: 8 Care Date: 7/1/2021 Level of Care: Inpatient Floor    Guideline Day 4    Clinical Status    (X) * Hemodynamic stability    (X) * No evidence of ileus or bowel obstruction    (X) * Ostomy functioning    (X) * No evidence of postoperative or surgical site infection    (X) * Pain absent or managed    ( ) * Discharge plans and education understood    Activity    (X) * Ambulatory or acceptable for next level of care    Routes    (X) * Oral hydration    (X) * Oral medications or regimen acceptable for next level of care    (X) * Oral diet or acceptable for next level of care    Medications    (X) * PCA absent    * Milestone   Additional Notes   7/1 - 3 doses 2 tab Norco 5/325   6 doses iv dilaudid 1mg   3 doses iv phenergan 12.5mg         POD 7 colostomy reversal, MARILYN. AF VSS. Good UOP. PICC in place. + BM's. Abd is soft, drsg c/d/i. Incisional tenderness. OOB to chair, on lovenox, HLIV   Continue with regular diet. Start PO mineral oil. On HIV meds. IV infiltrated in LUE arm,bruised, soft. Monitor. Remove PCA.  Mobilize, plan for discharge tomorrow if no issues.              Abacavir-Dolutegravir-Lamivud 600- MG TABS 600 mg    Dose: 1 tablet   Freq: DAILY Route: PO      enoxaparin (LOVENOX) injection 40 mg    Dose: 40 mg   Freq: DAILY Route: SC      metoprolol tartrate (LOPRESSOR) tablet 50 mg    Dose: 50 mg   Freq: 2 TIMES DAILY Route: PO      pantoprazole (PROTONIX) tablet 40 mg    Dose: 40 mg   Freq: 2 TIMES DAILY BEFORE MEALS Route: PO      0.9 % sodium chloride infusion    Rate: 25 mL/hr Freq: CONTINUOUS Route: IV             Bowel Surgery: Colectomy, Partial, with or without Ostomy, by Laparoscopy - Care Day 7 (6/30/2021) by Jeb Goodman Henrietta Rose RN       Review Status Review Entered   Completed 7/12/2021 17:41      Criteria Review      Care Day: 7 Care Date: 6/30/2021 Level of Care: Inpatient Floor    Guideline Day 4    Clinical Status    (X) * Hemodynamic stability    ( ) * No evidence of ileus or bowel obstruction    (X) * Ostomy functioning    (X) * No evidence of postoperative or surgical site infection    (X) * Pain absent or managed    ( ) * Discharge plans and education understood    Activity    (X) * Ambulatory or acceptable for next level of care    Routes    (X) * Oral hydration    (X) * Oral medications or regimen acceptable for next level of care    (X) * Oral diet or acceptable for next level of care    Medications    ( ) * PCA absent    * Milestone   Additional Notes   6/30      Temp 97.7   Resp 17   Pulse 92   Bp 149/80      POD 6 colostomy reversal, MARILYN. AF VSS. Good UOP. PICC in place. + BM's. Abd is soft, drsg c/d/i. Incisional tenderness. OOB to chair, on lovenox, continue with IVF's. Advance diet to regular. Start PO mineral oil. On HIV meds. IV infiltrated in LUE arm,bruised, soft. Monitor. Remove PCA tomorrow.           Abacavir-Dolutegravir-Lamivud 600- MG TABS 600 mg    Dose: 1 tablet   Freq: DAILY Route: PO      enoxaparin (LOVENOX) injection 40 mg    Dose: 40 mg   Freq: DAILY Route: SC      metoprolol tartrate (LOPRESSOR) tablet 50 mg    Dose: 50 mg   Freq: 2 TIMES DAILY Route: PO      pantoprazole (PROTONIX) tablet 40 mg    Dose: 40 mg   Freq: 2 TIMES DAILY BEFORE MEALS Route: PO      0.9 % sodium chloride infusion    Rate: 25 mL/hr Freq: CONTINUOUS Route: IV      HYDROmorphone HCl (DILAUDID) 0.2 mg/mL PCA 50 mL    Freq: CONTINUOUS Route: IV

## 2021-07-20 NOTE — ADT AUTH CERT
Bowel Surgery: Colectomy, Partial, with or without Ostomy, by Laparoscopy - Care Day 9 (2021) by Stefan Ansari RN       Review Status Review Entered   Completed 2021 17:46      Criteria Review      Care Day: 9 Care Date: 2021 Level of Care: Inpatient Floor    Guideline Day 4    Clinical Status    (X) * Hemodynamic stability    (X) * No evidence of ileus or bowel obstruction    (X) * Ostomy functioning    (X) * No evidence of postoperative or surgical site infection    (X) * Pain absent or managed    (X) * Discharge plans and education understood    Activity    (X) * Ambulatory or acceptable for next level of care    Routes    (X) * Oral hydration    (X) * Oral medications or regimen acceptable for next level of care    (X) * Oral diet or acceptable for next level of care    Medications    (X) * PCA absent    * Milestone   Additional Notes                          DISCHARGE SUMMARY       PATIENT NAME: Claritza Wild                  :        1975   MED REC NO:   [id number]                          ROOM:       1108   ACCOUNT NO:   [de-identified]                           ADMIT DATE: 2021   PROVIDER:     Bibiana Morel MD                  DISCHARGE DATE:   2021       ADMITTING DIAGNOSES:   1.  History of perforated sigmoid diverticulitis with proximal diverting   colostomy. 2.  HIV.    3.  Essential hypertension.       CONSULTATIONS DURING THIS HOSPITALIZATION:  None.       CONDITION ON DISCHARGE:  Surgically and medically stable.       PROCEDURES PERFORMED DURING THIS HOSPITALIZATION:  On 2021:   1.  Colostomy reversal.   2.  Extensive lysis of adhesions.       HOSPITAL COURSE: [facility] hospital course is as follows: [address] patient is a   51-year-old  male, who I know very well and I have done two   pervious colon surgeries on him.  In 10/2020, he had very severe   perforated sigmoid diverticulitis with an intra-abdominal abscess and   had undergone exploratory laparotomy with drainage of the abscess and a   proximal diverting colostomy.  His recovery was uneventful. Lenny Frausto did   well, returned to work, and then he recently saw me and we discussed   colostomy reversal. Lenny Frausto was then brought to the operating room on the   above date, and the above surgery was performed. Lenny Frausto was then   transferred to our surgical vargas in satisfactory and stable condition,   placed on a PCA pump and he also had a Lackey catheter in place.     Initially, his GI function was slow to return and he did have some   episodes of nausea and vomiting, but fortunately we did not have to   place an NG tube.  He then lost IV access, had to have a peripherally   inserted central catheter placed, and he did have some infiltration of   the left upper extremity which has slightly resolved and is no longer   tender.  He then slowly started passing gas.  I was able to start him on   a liquid diet and advance him to regular with no issues.  Also started   him on some mineral oil as well and he has had multiple bowel movements   prior to discharge.  His Lackey catheter was removed on postoperative day   #2. Lenny Frausto was able to void spontaneously. Lenny Frausto did have some hypokalemia   which was repleted orally.  He was placed back on his HIV and   hypertensive medications immediately postop as well.  His titi were   removed on postop day #4 from his colostomy takedown site. Lenny Frausto still has   little bit of drainage from his colostomy site at this time, but there   is no evidence of any surgical site infection.  He is ambulating in the   halls. [facility] PCA pump has been removed, converted over to [address] [facility] and his   pain is controlled. Lenny Frausto is now ready for discharge and I will prescribe   him Zofran, Norco and some mineral oil. Lenny Frausto has also been instructed to   wear his abdominal binder on a daily basis. Lenny Frausto is to avoid any   strenuous activity or heavy lifting activities. Lenny Frausto is also call me with   any question, concerns or issues and he will follow up in 1 week for   staple removal.              Bowel Surgery: Colectomy, Partial, with or without Ostomy, by Laparoscopy - Care Day 8 (7/1/2021) by Dakota Sykes RN       Review Status Review Entered   Completed 7/12/2021 17:44      Criteria Review      Care Day: 8 Care Date: 7/1/2021 Level of Care: Inpatient Floor    Guideline Day 4    Clinical Status    (X) * Hemodynamic stability    (X) * No evidence of ileus or bowel obstruction    (X) * Ostomy functioning    (X) * No evidence of postoperative or surgical site infection    (X) * Pain absent or managed    ( ) * Discharge plans and education understood    Activity    (X) * Ambulatory or acceptable for next level of care    Routes    (X) * Oral hydration    (X) * Oral medications or regimen acceptable for next level of care    (X) * Oral diet or acceptable for next level of care    Medications    (X) * PCA absent    * Milestone   Additional Notes   7/1 - 3 doses 2 tab Norco 5/325   6 doses iv dilaudid 1mg   3 doses iv phenergan 12.5mg         POD 7 colostomy reversal, MARILYN. AF VSS. Good UOP. PICC in place. + BM's. Abd is soft, drsg c/d/i. Incisional tenderness. OOB to chair, on lovenox, HLIV   Continue with regular diet. Start PO mineral oil. On HIV meds. IV infiltrated in LUE arm,bruised, soft. Monitor. Remove PCA.  Mobilize, plan for discharge tomorrow if no issues.              Abacavir-Dolutegravir-Lamivud 600- MG TABS 600 mg    Dose: 1 tablet   Freq: DAILY Route: PO      enoxaparin (LOVENOX) injection 40 mg    Dose: 40 mg   Freq: DAILY Route: SC      metoprolol tartrate (LOPRESSOR) tablet 50 mg    Dose: 50 mg   Freq: 2 TIMES DAILY Route: PO      pantoprazole (PROTONIX) tablet 40 mg    Dose: 40 mg   Freq: 2 TIMES DAILY BEFORE MEALS Route: PO      0.9 % sodium chloride infusion    Rate: 25 mL/hr Freq: CONTINUOUS Route: IV             Bowel Surgery: Colectomy, Partial, with or without Ostomy, by Laparoscopy - Care Day 7 (6/30/2021) by Pennie Buerger Jaye Sheriff RN       Review Status Review Entered   Completed 7/12/2021 17:41      Criteria Review      Care Day: 7 Care Date: 6/30/2021 Level of Care: Inpatient Floor    Guideline Day 4    Clinical Status    (X) * Hemodynamic stability    ( ) * No evidence of ileus or bowel obstruction    (X) * Ostomy functioning    (X) * No evidence of postoperative or surgical site infection    (X) * Pain absent or managed    ( ) * Discharge plans and education understood    Activity    (X) * Ambulatory or acceptable for next level of care    Routes    (X) * Oral hydration    (X) * Oral medications or regimen acceptable for next level of care    (X) * Oral diet or acceptable for next level of care    Medications    ( ) * PCA absent    * Milestone   Additional Notes   6/30      Temp 97.7   Resp 17   Pulse 92   Bp 149/80      POD 6 colostomy reversal, MARILYN. AF VSS. Good UOP. PICC in place. + BM's. Abd is soft, drsg c/d/i. Incisional tenderness. OOB to chair, on lovenox, continue with IVF's. Advance diet to regular. Start PO mineral oil. On HIV meds. IV infiltrated in LUE arm,bruised, soft. Monitor. Remove PCA tomorrow.           Abacavir-Dolutegravir-Lamivud 600- MG TABS 600 mg    Dose: 1 tablet   Freq: DAILY Route: PO      enoxaparin (LOVENOX) injection 40 mg    Dose: 40 mg   Freq: DAILY Route: SC      metoprolol tartrate (LOPRESSOR) tablet 50 mg    Dose: 50 mg   Freq: 2 TIMES DAILY Route: PO      pantoprazole (PROTONIX) tablet 40 mg    Dose: 40 mg   Freq: 2 TIMES DAILY BEFORE MEALS Route: PO      0.9 % sodium chloride infusion    Rate: 25 mL/hr Freq: CONTINUOUS Route: IV      HYDROmorphone HCl (DILAUDID) 0.2 mg/mL PCA 50 mL    Freq: CONTINUOUS Route: IV

## 2021-07-21 DIAGNOSIS — G89.18 POST-OP PAIN: Primary | ICD-10-CM

## 2021-07-21 RX ORDER — HYDROCODONE BITARTRATE AND ACETAMINOPHEN 5; 325 MG/1; MG/1
1 TABLET ORAL EVERY 6 HOURS PRN
Qty: 25 TABLET | Refills: 0 | Status: SHIPPED | OUTPATIENT
Start: 2021-07-21 | End: 2021-07-28

## 2021-08-04 DIAGNOSIS — G89.18 POST-OP PAIN: Primary | ICD-10-CM

## 2021-08-04 RX ORDER — HYDROCODONE BITARTRATE AND ACETAMINOPHEN 5; 325 MG/1; MG/1
1 TABLET ORAL EVERY 6 HOURS PRN
Qty: 20 TABLET | Refills: 0 | Status: SHIPPED | OUTPATIENT
Start: 2021-08-04 | End: 2021-08-11

## 2021-12-06 ENCOUNTER — HOSPITAL ENCOUNTER (EMERGENCY)
Age: 46
Discharge: HOME | End: 2021-12-07
Payer: COMMERCIAL

## 2021-12-06 DIAGNOSIS — F10.929 ACUTE ALCOHOLIC INTOXICATION WITH COMPLICATION (HCC): Primary | ICD-10-CM

## 2021-12-06 LAB
ALCOHOL SCREEN SERUM: 0.12 %WT/VOL
AMPHETAMINES: NEGATIVE
ANION GAP SERPL CALCULATED.3IONS-SCNC: 14 MMOL/L (ref 4–16)
BACTERIA: NEGATIVE /HPF
BARBITURATE SCREEN URINE: NEGATIVE
BASOPHILS ABSOLUTE: 0.1 K/CU MM
BASOPHILS RELATIVE PERCENT: 1.1 % (ref 0–1)
BENZODIAZEPINE SCREEN, URINE: NEGATIVE
BILIRUBIN URINE: NEGATIVE MG/DL
BLOOD, URINE: NEGATIVE
BUN BLDV-MCNC: 14 MG/DL (ref 6–23)
CALCIUM SERPL-MCNC: 8.5 MG/DL (ref 8.3–10.6)
CANNABINOID SCREEN URINE: ABNORMAL
CHLORIDE BLD-SCNC: 103 MMOL/L (ref 99–110)
CLARITY: CLEAR
CO2: 21 MMOL/L (ref 21–32)
COCAINE METABOLITE: NEGATIVE
COLOR: YELLOW
CREAT SERPL-MCNC: 0.8 MG/DL (ref 0.9–1.3)
DIFFERENTIAL TYPE: ABNORMAL
EOSINOPHILS ABSOLUTE: 0.1 K/CU MM
EOSINOPHILS RELATIVE PERCENT: 1.3 % (ref 0–3)
GFR AFRICAN AMERICAN: >60 ML/MIN/1.73M2
GFR NON-AFRICAN AMERICAN: >60 ML/MIN/1.73M2
GLUCOSE BLD-MCNC: 172 MG/DL (ref 70–99)
GLUCOSE, URINE: 150 MG/DL
HCT VFR BLD CALC: 40.6 % (ref 42–52)
HEMOGLOBIN: 13.3 GM/DL (ref 13.5–18)
IMMATURE NEUTROPHIL %: 0.1 % (ref 0–0.43)
KETONES, URINE: ABNORMAL MG/DL
LEUKOCYTE ESTERASE, URINE: NEGATIVE
LYMPHOCYTES ABSOLUTE: 2.8 K/CU MM
LYMPHOCYTES RELATIVE PERCENT: 39.9 % (ref 24–44)
MCH RBC QN AUTO: 32.2 PG (ref 27–31)
MCHC RBC AUTO-ENTMCNC: 32.8 % (ref 32–36)
MCV RBC AUTO: 98.3 FL (ref 78–100)
MONOCYTES ABSOLUTE: 0.6 K/CU MM
MONOCYTES RELATIVE PERCENT: 7.9 % (ref 0–4)
NITRITE URINE, QUANTITATIVE: NEGATIVE
NUCLEATED RBC %: 0 %
OPIATES, URINE: NEGATIVE
OXYCODONE: NEGATIVE
PDW BLD-RTO: 14.5 % (ref 11.7–14.9)
PH, URINE: 6 (ref 5–8)
PHENCYCLIDINE, URINE: NEGATIVE
PLATELET # BLD: 171 K/CU MM (ref 140–440)
PMV BLD AUTO: 9.9 FL (ref 7.5–11.1)
POTASSIUM SERPL-SCNC: 3.7 MMOL/L (ref 3.5–5.1)
PROTEIN UA: 100 MG/DL
RBC # BLD: 4.13 M/CU MM (ref 4.6–6.2)
RBC URINE: 1 /HPF (ref 0–3)
SEGMENTED NEUTROPHILS ABSOLUTE COUNT: 3.5 K/CU MM
SEGMENTED NEUTROPHILS RELATIVE PERCENT: 49.7 % (ref 36–66)
SODIUM BLD-SCNC: 138 MMOL/L (ref 135–145)
SPECIFIC GRAVITY UA: 1.02 (ref 1–1.03)
SQUAMOUS EPITHELIAL: <1 /HPF
TOTAL IMMATURE NEUTOROPHIL: 0.01 K/CU MM
TOTAL NUCLEATED RBC: 0 K/CU MM
TRICHOMONAS: ABNORMAL /HPF
UROBILINOGEN, URINE: NEGATIVE MG/DL (ref 0.2–1)
WBC # BLD: 7.1 K/CU MM (ref 4–10.5)
WBC UA: 1 /HPF (ref 0–2)

## 2021-12-06 PROCEDURE — 96375 TX/PRO/DX INJ NEW DRUG ADDON: CPT

## 2021-12-06 PROCEDURE — 81001 URINALYSIS AUTO W/SCOPE: CPT

## 2021-12-06 PROCEDURE — 85025 COMPLETE CBC W/AUTO DIFF WBC: CPT

## 2021-12-06 PROCEDURE — 96365 THER/PROPH/DIAG IV INF INIT: CPT

## 2021-12-06 PROCEDURE — 80307 DRUG TEST PRSMV CHEM ANLYZR: CPT

## 2021-12-06 PROCEDURE — 2580000003 HC RX 258: Performed by: PHYSICIAN ASSISTANT

## 2021-12-06 PROCEDURE — G0480 DRUG TEST DEF 1-7 CLASSES: HCPCS

## 2021-12-06 PROCEDURE — 6360000002 HC RX W HCPCS: Performed by: PHYSICIAN ASSISTANT

## 2021-12-06 PROCEDURE — 96366 THER/PROPH/DIAG IV INF ADDON: CPT

## 2021-12-06 PROCEDURE — 2500000003 HC RX 250 WO HCPCS: Performed by: PHYSICIAN ASSISTANT

## 2021-12-06 PROCEDURE — 99283 EMERGENCY DEPT VISIT LOW MDM: CPT

## 2021-12-06 PROCEDURE — 80048 BASIC METABOLIC PNL TOTAL CA: CPT

## 2021-12-06 RX ORDER — LORAZEPAM 2 MG/ML
0.5 INJECTION INTRAMUSCULAR ONCE
Status: COMPLETED | OUTPATIENT
Start: 2021-12-06 | End: 2021-12-06

## 2021-12-06 RX ADMIN — THIAMINE HYDROCHLORIDE: 100 INJECTION, SOLUTION INTRAMUSCULAR; INTRAVENOUS at 22:37

## 2021-12-06 RX ADMIN — LORAZEPAM 0.5 MG: 2 INJECTION, SOLUTION INTRAMUSCULAR; INTRAVENOUS at 22:37

## 2021-12-06 ASSESSMENT — PAIN DESCRIPTION - PAIN TYPE: TYPE: ACUTE PAIN

## 2021-12-06 ASSESSMENT — PAIN DESCRIPTION - LOCATION: LOCATION: BACK

## 2021-12-06 ASSESSMENT — PAIN SCALES - GENERAL: PAINLEVEL_OUTOF10: 9

## 2021-12-07 VITALS
TEMPERATURE: 98.1 F | WEIGHT: 147 LBS | BODY MASS INDEX: 20.5 KG/M2 | HEART RATE: 97 BPM | RESPIRATION RATE: 18 BRPM | DIASTOLIC BLOOD PRESSURE: 101 MMHG | SYSTOLIC BLOOD PRESSURE: 153 MMHG | OXYGEN SATURATION: 97 %

## 2021-12-07 PROCEDURE — 96366 THER/PROPH/DIAG IV INF ADDON: CPT

## 2021-12-07 PROCEDURE — G0480 DRUG TEST DEF 1-7 CLASSES: HCPCS

## 2021-12-07 PROCEDURE — 6370000000 HC RX 637 (ALT 250 FOR IP): Performed by: PHYSICIAN ASSISTANT

## 2021-12-07 RX ORDER — ONDANSETRON 4 MG/1
4 TABLET, FILM COATED ORAL EVERY 8 HOURS PRN
Qty: 10 TABLET | Refills: 0 | Status: SHIPPED | OUTPATIENT
Start: 2021-12-07 | End: 2022-05-19

## 2021-12-07 RX ORDER — CHLORDIAZEPOXIDE HYDROCHLORIDE 25 MG/1
25 CAPSULE, GELATIN COATED ORAL 4 TIMES DAILY PRN
Qty: 20 CAPSULE | Refills: 0 | Status: SHIPPED | OUTPATIENT
Start: 2021-12-07 | End: 2021-12-12

## 2021-12-07 RX ORDER — CHLORDIAZEPOXIDE HYDROCHLORIDE 25 MG/1
25 CAPSULE, GELATIN COATED ORAL 4 TIMES DAILY PRN
Qty: 20 CAPSULE | Refills: 0 | Status: SHIPPED | OUTPATIENT
Start: 2021-12-07 | End: 2021-12-07 | Stop reason: SDUPTHER

## 2021-12-07 RX ORDER — CHLORDIAZEPOXIDE HYDROCHLORIDE 25 MG/1
25 CAPSULE, GELATIN COATED ORAL ONCE
Status: COMPLETED | OUTPATIENT
Start: 2021-12-07 | End: 2021-12-07

## 2021-12-07 RX ADMIN — CHLORDIAZEPOXIDE HYDROCHLORIDE 25 MG: 25 CAPSULE ORAL at 05:19

## 2021-12-07 NOTE — ED PROVIDER NOTES
Triage Chief Complaint:   Withdrawal (needs rehab, etoh normally drinks 1.5-2  fifthes daily, last drink 1230 today )    [place]:  Today in the ED I had the pleasure of caring for Rylee Perales who is a 55 y.o. male that presents to emergency department. For alcohol withdrawal.  Overdose because he wants to go through withdrawal.  He wants to stop drinking alcohol. Been alcoholic since he was 15years old. He states that he is been very stressed over the last 3 to 4 days has been drinking multiple fifths of liquor per day. He states his last time drinking was at noon. He states he feels a little anxious. Denies any auditory visual hallucinations no abdominal pain chest pain hip pain headache. No nausea vomiting or back pain. No urinary symptoms. No suicidal homicidal ideations.   Is accompanied by his father is at bedside    ROS:  REVIEW OF SYSTEMS    At least 10 systems reviewed      All other review of systems are negative  See HPI and nursing notes for additional information       Past Medical History:   Diagnosis Date    Abscess of sigmoid colon due to diverticulitis 16/11/3317    [facility]coholic [facility]     Bipolar 1 disorder ([facility]     \"was on medication yrs ago for this'    Colostomy in place [facility]) 6/24/2021    Diverticulitis     HIV positive ([facility] 2003    follow with Dr Radha Storey Hypertension     \"started on medication for b/p approx 20 yrs ago \"\"dr La Nena Brewer had me stop the blood pressure medication in May ( 2021) got to low\"    Incisional hernia, without obstruction or gangrene 10/22/2020    Pancreatitis     \" in 2013\"    Wears glasses      Past Surgical History:   Procedure Laterality Date    ABDOMEN SURGERY      \"age 33- removed part of large and small intesine\"    COLONOSCOPY  2017    COLOSTOMY Left 2020    with Dr Consuelo Ludwig 6/24/2021    COLOSTOMY REVERSAL, EXTENSIVE LYSIS OF ADHESISIONS performed by [facility]ute [facility] at [facility] 10/17/2020    CT DRAINAGE ABDOM ABSCESS OPEN 10/17/2020 [facility] CT SCAN    HERNIA REPAIR  [id number]    mila ing hernia     LAPAROTOMY N/A 10/21/2020    LAPAROTOMY EXPLORATORY COLOSTOMY CREATION performed by Vj Spears MD at [facility] OR     Family History   Problem Relation Age of Onset    Kidney Disease Father     Diabetes Maternal Grandmother     Diabetes Paternal Grandfather     Stroke Mother      Social History     Socioeconomic History    Marital status: Single     Spouse name: Not on file    Number of children: 0    Years of education: Not on file    Highest education level: Not on file   Occupational History    Not on file   Tobacco Use    Smoking status: Current Every Day Smoker     Packs/day: 1.00     Years: 27.00     Pack years: 27.00     Types: Cigarettes    Smokeless tobacco: Never Used   Vaping Use    Vaping Use: Never used   Substance and Sexual Activity    Alcohol use: Yes     Comment: per pt on 6/17/2021\"quit drinking 2016- use to drink alot, every day, a fifth per day\"    Drug use: Not Currently     Types: Marijuana [place]     Comment: \"last did cocaine age 28,LSD last used age 21, marijuana use for nausea - average 1-2 times per week \", MJ last used 6/22/21    Sexual activity: Not Currently   Other Topics Concern    Not on file   Social History Narrative    Not on file     Social Determinants of Health     Financial Resource Strain:     Difficulty of Paying Living Expenses: Not on file   Food Insecurity:     Worried About Running Out of Food in the Last Year: Not on file    Chandu of Food in the Last Year: Not on file   Transportation Needs:     Lack of Transportation (Medical): Not on file    Lack of Transportation (Non-Medical):  Not on file   Physical Activity:     Days of Exercise per Week: Not on file    Minutes of Exercise per Session: Not on file   Stress:     Feeling of Stress : Not on file   Social Connections:     Frequency of Communication with Friends and Family: Not on file    Frequency of Social Gatherings with Friends and Family: Not on file    Attends Buddhist Services: Not on file    Active Member of Clubs or Organizations: Not on file    Attends Club or Organization Meetings: Not on file    Marital Status: Not on file   Intimate Partner Violence:     Fear of Current or Ex-Partner: Not on file    Emotionally Abused: Not on file    Physically Abused: Not on file    Sexually Abused: Not on file   Housing Stability:     Unable to Pay for Housing in the Last Year: Not on file    Number of [place] in the Last Year: Not on file    Unstable Housing in the Last Year: Not on file     No current facility-administered medications for this encounter. Current Outpatient Medications   Medication Sig Dispense Refill    ondansetron (ZOFRAN) 4 MG tablet Take 1 tablet by mouth every 8 hours as needed for Nausea 10 tablet 0    chlordiazePOXIDE (LIBRIUM) 25 MG capsule Take 1 capsule by mouth 4 times daily as needed for Anxiety for up to 5 days. 20 capsule 0    ondansetron (ZOFRAN) 4 MG tablet Take 1 tablet by mouth 3 times daily as needed for Nausea or Vomiting 15 tablet 0    mineral oil liquid Take 30 mLs by mouth daily as needed for Constipation 500 mL 1    Abacavir-Dolutegravir-Lamivud (TRIUMEQ) 600- MG TABS nightly       metoprolol (LOPRESSOR) 25 MG tablet Take 2 tablets by mouth 2 times daily 60 tablet 3     Allergies   Allergen Reactions    Pcn [Penicillins] Anaphylaxis       Nursing Notes Reviewed    Physical Exam:  ED Triage Vitals [12/06/21 1406]   Enc Vitals Group      BP (!) 161/98      Pulse 91      Resp 16      Temp 98.1 °F (36.7 °C)      Temp Source Oral      SpO2 99 %      Weight 147 lb (66.7 kg)      Height       Head Circumference       Peak Flow       Pain Score       Pain Loc       Pain Edu? Excl. in [address]?       General :Patient is awake alert oriented person place and time no acute distress nontoxic appearing  HEENT: Pupils are equally round and reactive to light extraocular motors are intact conjunctivae clear sclerae white there is no injection no icterus. Nose without any rhinorrhea or epistaxis. Oral mucosa is moist no exudate buccal mucosa shows no ulcerations. Uvula is midline    Neck: Neck is supple full range of motion trachea midline thyroid nonpalpable  Cardiac: Heart regular rate rhythm no murmurs rubs clicks or gallops  Lungs: Lungs are clear to auscultation there is no wheezing rhonchi or rales. There is no use of accessory muscles no nasal flaring identified. Chest wall: There is no tenderness to palpation over the chest wall or over ribs  Abdomen: Abdomen is soft nontender nondistended. There is no firm or pulsatile masses no rebound rigidity or guarding negative Queen's negative McBurney, no peritoneal signs  Suprapubic:  there is no tenderness to palpation over the external bladder   Musculoskeletal: 5 out of 5 strength in all 4 extremities full flexion extension abduction and adduction supination pronation of all extremities and all digits. No obvious muscle atrophy is noted. No focal muscle deficits are appreciated  Dermatology: Skin is warm and dry there is no obvious abscesses lacerations or lesions noted  Psych: Mentation is grossly normal cognition is grossly normal. Affect is appropriate  Neuro: Motor intact sensory intact cranial nerves II through XII are intact level of consciousness is normal cerebellar function is normal reflexes are grossly normal. No evidence of incontinence or loss of bowel or bladder no saddle anesthesia noted Lymphatic: There is no submandibular or cervical adenopathy appreciated.         I have reviewed and interpreted all of the currently available lab results from this visit (if applicable):  Results for orders placed or performed during the hospital encounter of 12/06/21   CBC auto diff   Result Value Ref Range    WBC 7.1 4.0 - 10.5 K/CU MM    RBC 4.13 (L) 4.6 - 6.2 M/CU MM Hemoglobin 13.3 (L) 13.5 - 18.0 GM/DL    Hematocrit 40.6 (L) 42 - 52 %    MCV 98.3 78 - 100 FL    MCH 32.2 (H) 27 - 31 PG    MCHC 32.8 32.0 - 36.0 %    RDW 14.5 11.7 - 14.9 %    Platelets 486 170 - 780 K/CU MM    MPV 9.9 7.5 - 11.1 FL    Differential Type AUTOMATED DIFFERENTIAL     Segs Relative 49.7 36 - 66 %    Lymphocytes % 39.9 24 - 44 %    Monocytes % 7.9 (H) 0 - 4 %    Eosinophils % 1.3 0 - 3 %    Basophils % 1.1 (H) 0 - 1 %    Segs Absolute 3.5 K/CU MM    Lymphocytes Absolute 2.8 K/CU MM    Monocytes Absolute 0.6 K/CU MM    Eosinophils Absolute 0.1 K/CU MM    Basophils Absolute 0.1 K/CU MM    Nucleated RBC % 0.0 %    Total Nucleated RBC 0.0 K/CU MM    Total Immature Neutrophil 0.01 K/CU MM    Immature Neutrophil % 0.1 0 - 0.43 %   BMP   Result Value Ref Range    Sodium 138 135 - 145 MMOL/L    Potassium 3.7 3.5 - 5.1 MMOL/L    Chloride 103 99 - 110 mMol/L    CO2 21 21 - 32 MMOL/L    Anion Gap 14 4 - 16    BUN 14 6 - 23 MG/DL    CREATININE 0.8 (L) 0.9 - 1.3 MG/DL    Glucose 172 (H) 70 - 99 MG/DL    Calcium 8.5 8.3 - 10.6 MG/DL    GFR Non-African American >60 >60 mL/min/1.73m2    GFR African American >60 >60 mL/min/1.73m2   Urinalysis   Result Value Ref Range    Color, UA YELLOW YELLOW    Clarity, UA CLEAR CLEAR    Glucose, Urine 150 (A) NEGATIVE MG/DL    Bilirubin Urine NEGATIVE NEGATIVE MG/DL    Ketones, Urine SMALL (A) NEGATIVE MG/DL    Specific Gravity, UA 1.016 1.001 - 1.035    Blood, Urine NEGATIVE NEGATIVE    pH, Urine 6.0 5.0 - 8.0    Protein,  (A) NEGATIVE MG/DL    Urobilinogen, Urine NEGATIVE 0.2 - 1.0 MG/DL    Nitrite Urine, Quantitative NEGATIVE NEGATIVE    Leukocyte Esterase, Urine NEGATIVE NEGATIVE    RBC, UA 1 0 - 3 /HPF    WBC, UA 1 0 - 2 /HPF    Bacteria, UA NEGATIVE NEGATIVE /HPF    Squam Epithel, UA <1 /HPF    Trichomonas, UA NONE SEEN NONE SEEN /HPF   Drug screen multi urine   Result Value Ref Range    Cannabinoid Scrn, Ur UNCONFIRMED POSITIVE (A) NEGATIVE    Amphetamines NEGATIVE NEGATIVE    Cocaine Metabolite NEGATIVE NEGATIVE    Benzodiazepine Screen, Urine NEGATIVE NEGATIVE    Barbiturate Screen, Ur NEGATIVE NEGATIVE    Opiates, Urine NEGATIVE NEGATIVE    Phencyclidine, Urine NEGATIVE NEGATIVE    Oxycodone NEGATIVE NEGATIVE   ETOH Blood   Result Value Ref Range    Alcohol Scrn 0.12 (H) <0.01 %WT/VOL      Radiographs (if obtained):  [] The following radiograph was interpreted by myself in the absence of a radiologist:   [] Radiologist's Report Reviewed:  No orders to display       EKG (if obtained):   Please See Note of attending physician for EKG interpretation. Chart review shows recent radiograph(s):  No results found. MDM:     Interventions given this visit:   Orders Placed This Encounter   Medications    sodium chloride 0.9 % 9,137 mL with folic acid 1 mg, adult multi-vitamin with vitamin k 10 mL, thiamine 100 mg    LORazepam (ATIVAN) injection 0.5 mg    chlordiazePOXIDE (LIBRIUM) capsule 25 mg    DISCONTD: chlordiazePOXIDE (LIBRIUM) 25 MG capsule     Sig: Take 1 capsule by mouth 4 times daily as needed for Anxiety for up to 5 days. Dispense:  20 capsule     Refill:  0    ondansetron (ZOFRAN) 4 MG tablet     Sig: Take 1 tablet by mouth every 8 hours as needed for Nausea     Dispense:  10 tablet     Refill:  0    chlordiazePOXIDE (LIBRIUM) 25 MG capsule     Sig: Take 1 capsule by mouth 4 times daily as needed for Anxiety for up to 5 days. Dispense:  20 capsule     Refill:  0       Appearing gentleman presents today to the ED with acute alcohol intoxication. Blood alcohol level is elevated. CIWA never got higher than 7. Only 0.5 of Ativan was provided. He was observed here times many hours. Is now clinically sober. As well as sober on laboratory values. There is no acute abnormality requiring emergent intervention. Patient was provided with Librium provided with a single dose as well as a 5-day prescription. Educated on return precautions.   No delirium louise  I have discussed the findings of today's workup with the patient and present family members and have addressed their questions and concerns. Important warning signs as well as new or worsening symptoms which would necessitate immediate return to the ED were discussed. The plan is to discharge from the ED at this time, and the patient is in stable condition. The patient acknowledged understanding is agreeable with this plan. The patient and/or family and I have discussed the diagnosis and risks, and we agree with discharging home to follow-up with their primary care, specialist or referral doctor. Questions addressed. Disposition and follow-up agreed upon. Specific discharge instructions explained. We have discussed the symptoms which are most concerning that necessitate immediate return. We also discussed returning to the Emergency Department immediately if new or worsening symptoms occur. I independently managed patient today in the ED    BP (!) 147/100   Pulse 91   Temp 98.1 °F (36.7 °C) (Oral)   Resp 16   Wt 147 lb (66.7 kg)   SpO2 99%   BMI 20.50 kg/m²       Clinical Impression:  1. Acute alcoholic intoxication with complication ([facility]        Disposition referral (if applicable): Sonia Johns MD  [address]  [phone]    In 2 days      Disposition medications (if applicable):  New Prescriptions    CHLORDIAZEPOXIDE (LIBRIUM) 25 MG CAPSULE    Take 1 capsule by mouth 4 times daily as needed for Anxiety for up to 5 days. ONDANSETRON (ZOFRAN) 4 MG TABLET    Take 1 tablet by mouth every 8 hours as needed for Nausea         Comment: Please note this report has been produced using speech recognition software and may contain errors related to that system including errors in grammar, punctuation, and spelling, as well as words and phrases that may be inappropriate.  If there are any questions or concerns please feel free to contact the dictating provider for clarification.       Shirin Mike, [address], [place]  12/07/21 9527

## 2021-12-07 NOTE — PROGRESS NOTES
Patient seen, examined and chart reviewed.   Also discussed with ED provider  His relapse of alcohol abuse  He was intoxicated but getting better no apparent withdrawal yet  Electrolytes are acceptable  If he goes home, I would follow him up closely within a week or so

## 2021-12-19 RX ORDER — ABACAVIR SULFATE, DOLUTEGRAVIR SODIUM, LAMIVUDINE 600; 50; 300 MG/1; MG/1; MG/1
TABLET, FILM COATED ORAL
Qty: 30 TABLET | Refills: 3 | Status: SHIPPED | OUTPATIENT
Start: 2021-12-19

## 2022-05-20 ENCOUNTER — HOSPITAL ENCOUNTER (OUTPATIENT)
Age: 47
Discharge: HOME | End: 2022-05-20
Payer: COMMERCIAL

## 2022-05-20 DIAGNOSIS — E87.5 HYPERKALEMIA: ICD-10-CM

## 2022-05-20 LAB
ALBUMIN SERPL-MCNC: 4.2 GM/DL (ref 3.4–5)
ALP BLD-CCNC: 115 IU/L (ref 40–128)
ALT SERPL-CCNC: 54 U/L (ref 10–40)
ANION GAP SERPL CALCULATED.3IONS-SCNC: 12 MMOL/L (ref 4–16)
AST SERPL-CCNC: 28 IU/L (ref 15–37)
BACTERIA: NEGATIVE /HPF
BASOPHILS ABSOLUTE: 0.1 K/CU MM
BASOPHILS RELATIVE PERCENT: 0.8 % (ref 0–1)
BILIRUB SERPL-MCNC: 0.5 MG/DL (ref 0–1)
BILIRUBIN URINE: NEGATIVE MG/DL
BLOOD, URINE: NEGATIVE
BUN BLDV-MCNC: 23 MG/DL (ref 6–23)
CALCIUM SERPL-MCNC: 9.2 MG/DL (ref 8.3–10.6)
CHLORIDE BLD-SCNC: 103 MMOL/L (ref 99–110)
CLARITY: CLEAR
CO2: 22 MMOL/L (ref 21–32)
COLOR: YELLOW
CREAT SERPL-MCNC: 0.9 MG/DL (ref 0.9–1.3)
CREATININE URINE: 104.3 MG/DL (ref 39–259)
DIFFERENTIAL TYPE: ABNORMAL
EOSINOPHILS ABSOLUTE: 0.2 K/CU MM
EOSINOPHILS RELATIVE PERCENT: 2.4 % (ref 0–3)
GFR AFRICAN AMERICAN: >60 ML/MIN/1.73M2
GFR NON-AFRICAN AMERICAN: >60 ML/MIN/1.73M2
GLUCOSE BLD-MCNC: 88 MG/DL (ref 70–99)
GLUCOSE, URINE: NEGATIVE MG/DL
HCT VFR BLD CALC: 43.2 % (ref 42–52)
HEMOGLOBIN: 14.1 GM/DL (ref 13.5–18)
IMMATURE NEUTROPHIL %: 0.3 % (ref 0–0.43)
KETONES, URINE: NEGATIVE MG/DL
LEUKOCYTE ESTERASE, URINE: NEGATIVE
LYMPHOCYTES ABSOLUTE: 1.1 K/CU MM
LYMPHOCYTES RELATIVE PERCENT: 14.6 % (ref 24–44)
MCH RBC QN AUTO: 33.5 PG (ref 27–31)
MCHC RBC AUTO-ENTMCNC: 32.6 % (ref 32–36)
MCV RBC AUTO: 102.6 FL (ref 78–100)
MONOCYTES ABSOLUTE: 0.9 K/CU MM
MONOCYTES RELATIVE PERCENT: 12.4 % (ref 0–4)
NITRITE URINE, QUANTITATIVE: NEGATIVE
NUCLEATED RBC %: 0 %
PDW BLD-RTO: 11.9 % (ref 11.7–14.9)
PH, URINE: 6 (ref 5–8)
PLATELET # BLD: 198 K/CU MM (ref 140–440)
PMV BLD AUTO: 9.9 FL (ref 7.5–11.1)
POTASSIUM SERPL-SCNC: 4.9 MMOL/L (ref 3.5–5.1)
PROT/CREAT RATIO, UR: 0.1
PROTEIN UA: NEGATIVE MG/DL
RBC # BLD: 4.21 M/CU MM (ref 4.6–6.2)
RBC URINE: <1 /HPF (ref 0–3)
SEGMENTED NEUTROPHILS ABSOLUTE COUNT: 5.2 K/CU MM
SEGMENTED NEUTROPHILS RELATIVE PERCENT: 69.5 % (ref 36–66)
SODIUM BLD-SCNC: 137 MMOL/L (ref 135–145)
SPECIFIC GRAVITY UA: >1.03 (ref 1–1.03)
SQUAMOUS EPITHELIAL: <1 /HPF
TOTAL IMMATURE NEUTOROPHIL: 0.02 K/CU MM
TOTAL NUCLEATED RBC: 0 K/CU MM
TOTAL PROTEIN: 6.9 GM/DL (ref 6.4–8.2)
TRICHOMONAS: NORMAL /HPF
URINE TOTAL PROTEIN: 14.5 MG/DL
UROBILINOGEN, URINE: 0.2 MG/DL (ref 0.2–1)
WBC # BLD: 7.5 K/CU MM (ref 4–10.5)
WBC UA: 1 /HPF (ref 0–2)

## 2022-05-20 PROCEDURE — 86780 TREPONEMA PALLIDUM: CPT

## 2022-05-20 PROCEDURE — 86593 SYPHILIS TEST NON-TREP QUANT: CPT

## 2022-05-20 PROCEDURE — 82570 ASSAY OF URINE CREATININE: CPT

## 2022-05-20 PROCEDURE — 36415 COLL VENOUS BLD VENIPUNCTURE: CPT

## 2022-05-20 PROCEDURE — 84156 ASSAY OF PROTEIN URINE: CPT

## 2022-05-20 PROCEDURE — 88184 FLOWCYTOMETRY/ TC 1 MARKER: CPT

## 2022-05-20 PROCEDURE — 85025 COMPLETE CBC W/AUTO DIFF WBC: CPT

## 2022-05-20 PROCEDURE — 80053 COMPREHEN METABOLIC PANEL: CPT

## 2022-05-20 PROCEDURE — 86592 SYPHILIS TEST NON-TREP QUAL: CPT

## 2022-05-20 PROCEDURE — 81001 URINALYSIS AUTO W/SCOPE: CPT

## 2022-05-20 PROCEDURE — 88185 FLOWCYTOMETRY/TC ADD-ON: CPT

## 2022-05-22 LAB
CD3 % TOTAL T CELLS: 80 % (ref 62–87)
CD3 ABSOLUTE: 774 CELLS/UL (ref 570–2400)
CD4 % HELPER T CELL: 51 % (ref 32–64)
CD4 T CELL ABSOLUTE: 488 CELLS/UL (ref 430–1800)
CD4/CD8 RATIO: 1.76 RATIO (ref 0.8–3.9)
CD8 % SUPPRESSOR T CELL: 29 % (ref 15–46)
CD8 T CELL ABSOLUTE: 284 CELLS/UL (ref 210–1200)

## 2022-05-25 LAB
RPR TITER: NORMAL
RPR: REACTIVE

## 2022-05-28 LAB — TREPONEMA PALLIDUM ANTIBODIES: REACTIVE

## 2022-07-28 ENCOUNTER — HOSPITAL ENCOUNTER (EMERGENCY)
Age: 47
Discharge: HOME | End: 2022-07-28
Attending: EMERGENCY MEDICINE
Payer: COMMERCIAL

## 2022-07-28 VITALS
WEIGHT: 160.94 LBS | OXYGEN SATURATION: 98 % | HEART RATE: 100 BPM | BODY MASS INDEX: 22.45 KG/M2 | RESPIRATION RATE: 22 BRPM | TEMPERATURE: 99.1 F | SYSTOLIC BLOOD PRESSURE: 156 MMHG | DIASTOLIC BLOOD PRESSURE: 112 MMHG

## 2022-07-28 DIAGNOSIS — R42 LIGHTHEADEDNESS: Primary | ICD-10-CM

## 2022-07-28 PROCEDURE — 80053 COMPREHEN METABOLIC PANEL: CPT

## 2022-07-28 PROCEDURE — 99282 EMERGENCY DEPT VISIT SF MDM: CPT

## 2022-07-28 ASSESSMENT — PAIN - FUNCTIONAL ASSESSMENT
PAIN_FUNCTIONAL_ASSESSMENT: NONE - DENIES PAIN
PAIN_FUNCTIONAL_ASSESSMENT: NONE - DENIES PAIN
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
## 2022-11-07 RX ORDER — ABACAVIR SULFATE, DOLUTEGRAVIR SODIUM, LAMIVUDINE 600; 50; 300 MG/1; MG/1; MG/1
TABLET, FILM COATED ORAL
Qty: 30 TABLET | Refills: 3 | OUTPATIENT
Start: 2022-11-07

## 2022-11-29 DIAGNOSIS — Z21 ASYMPTOMATIC HIV INFECTION (HCC): Primary | ICD-10-CM

## 2022-12-02 RX ORDER — ABACAVIR SULFATE, DOLUTEGRAVIR SODIUM, LAMIVUDINE 600; 50; 300 MG/1; MG/1; MG/1
1 TABLET, FILM COATED ORAL DAILY
Qty: 30 TABLET | Refills: 2 | Status: SHIPPED | OUTPATIENT
Start: 2022-12-02

## 2022-12-10 ENCOUNTER — HOSPITAL ENCOUNTER (EMERGENCY)
Age: 47
Discharge: HOME | End: 2022-12-10
Attending: STUDENT IN AN ORGANIZED HEALTH CARE EDUCATION/TRAINING PROGRAM
Payer: COMMERCIAL

## 2022-12-10 ENCOUNTER — APPOINTMENT (OUTPATIENT)
Dept: CT IMAGING | Age: 47
End: 2022-12-10
Payer: COMMERCIAL

## 2022-12-10 ENCOUNTER — APPOINTMENT (OUTPATIENT)
Dept: GENERAL RADIOLOGY | Age: 47
End: 2022-12-10
Payer: COMMERCIAL

## 2022-12-10 VITALS
RESPIRATION RATE: 16 BRPM | TEMPERATURE: 98 F | BODY MASS INDEX: 24.5 KG/M2 | WEIGHT: 175 LBS | HEIGHT: 71 IN | DIASTOLIC BLOOD PRESSURE: 107 MMHG | SYSTOLIC BLOOD PRESSURE: 166 MMHG | HEART RATE: 112 BPM | OXYGEN SATURATION: 98 %

## 2022-12-10 DIAGNOSIS — R07.89 CHEST WALL PAIN: Primary | ICD-10-CM

## 2022-12-10 LAB
ALBUMIN SERPL-MCNC: 4.6 GM/DL (ref 3.4–5)
ALP BLD-CCNC: 133 IU/L (ref 40–129)
ALT SERPL-CCNC: 17 U/L (ref 10–40)
ANION GAP SERPL CALCULATED.3IONS-SCNC: 13 MMOL/L (ref 4–16)
AST SERPL-CCNC: 25 IU/L (ref 15–37)
BASOPHILS ABSOLUTE: 0.1 K/CU MM
BASOPHILS RELATIVE PERCENT: 0.9 % (ref 0–1)
BILIRUB SERPL-MCNC: 0.8 MG/DL (ref 0–1)
BUN BLDV-MCNC: 12 MG/DL (ref 6–23)
CALCIUM SERPL-MCNC: 8.7 MG/DL (ref 8.3–10.6)
CHLORIDE BLD-SCNC: 100 MMOL/L (ref 99–110)
CO2: 25 MMOL/L (ref 21–32)
CREAT SERPL-MCNC: 0.9 MG/DL (ref 0.9–1.3)
DIFFERENTIAL TYPE: ABNORMAL
EOSINOPHILS ABSOLUTE: 0.1 K/CU MM
EOSINOPHILS RELATIVE PERCENT: 0.6 % (ref 0–3)
GFR SERPL CREATININE-BSD FRML MDRD: >60 ML/MIN/1.73M2
GLUCOSE BLD-MCNC: 91 MG/DL (ref 70–99)
HCT VFR BLD CALC: 51.6 % (ref 42–52)
HEMOGLOBIN: 17.4 GM/DL (ref 13.5–18)
IMMATURE NEUTROPHIL %: 0.4 % (ref 0–0.43)
LYMPHOCYTES ABSOLUTE: 3.1 K/CU MM
LYMPHOCYTES RELATIVE PERCENT: 37.7 % (ref 24–44)
MAGNESIUM: 2 MG/DL (ref 1.8–2.4)
MCH RBC QN AUTO: 31.2 PG (ref 27–31)
MCHC RBC AUTO-ENTMCNC: 33.7 % (ref 32–36)
MCV RBC AUTO: 92.6 FL (ref 78–100)
MONOCYTES ABSOLUTE: 0.6 K/CU MM
MONOCYTES RELATIVE PERCENT: 7.7 % (ref 0–4)
NUCLEATED RBC %: 0 %
PDW BLD-RTO: 13.1 % (ref 11.7–14.9)
PLATELET # BLD: 255 K/CU MM (ref 140–440)
PMV BLD AUTO: 8.9 FL (ref 7.5–11.1)
POTASSIUM SERPL-SCNC: 4.2 MMOL/L (ref 3.5–5.1)
PRO-BNP: 30.55 PG/ML
RBC # BLD: 5.57 M/CU MM (ref 4.6–6.2)
SEGMENTED NEUTROPHILS ABSOLUTE COUNT: 4.3 K/CU MM
SEGMENTED NEUTROPHILS RELATIVE PERCENT: 52.7 % (ref 36–66)
SODIUM BLD-SCNC: 138 MMOL/L (ref 135–145)
TOTAL IMMATURE NEUTOROPHIL: 0.03 K/CU MM
TOTAL NUCLEATED RBC: 0 K/CU MM
TOTAL PROTEIN: 8.5 GM/DL (ref 6.4–8.2)
TROPONIN T: <0.01 NG/ML
TROPONIN T: <0.01 NG/ML
WBC # BLD: 8.1 K/CU MM (ref 4–10.5)

## 2022-12-10 PROCEDURE — 96375 TX/PRO/DX INJ NEW DRUG ADDON: CPT

## 2022-12-10 PROCEDURE — 71045 X-RAY EXAM CHEST 1 VIEW: CPT

## 2022-12-10 PROCEDURE — 6360000002 HC RX W HCPCS: Performed by: STUDENT IN AN ORGANIZED HEALTH CARE EDUCATION/TRAINING PROGRAM

## 2022-12-10 PROCEDURE — 85025 COMPLETE CBC W/AUTO DIFF WBC: CPT

## 2022-12-10 PROCEDURE — 99285 EMERGENCY DEPT VISIT HI MDM: CPT

## 2022-12-10 PROCEDURE — 80053 COMPREHEN METABOLIC PANEL: CPT

## 2022-12-10 PROCEDURE — 83735 ASSAY OF MAGNESIUM: CPT

## 2022-12-10 PROCEDURE — 83880 ASSAY OF NATRIURETIC PEPTIDE: CPT

## 2022-12-10 PROCEDURE — 6360000004 HC RX CONTRAST MEDICATION: Performed by: STUDENT IN AN ORGANIZED HEALTH CARE EDUCATION/TRAINING PROGRAM

## 2022-12-10 PROCEDURE — 6370000000 HC RX 637 (ALT 250 FOR IP): Performed by: STUDENT IN AN ORGANIZED HEALTH CARE EDUCATION/TRAINING PROGRAM

## 2022-12-10 PROCEDURE — 84484 ASSAY OF TROPONIN QUANT: CPT

## 2022-12-10 PROCEDURE — 93005 ELECTROCARDIOGRAM TRACING: CPT | Performed by: PHYSICIAN ASSISTANT

## 2022-12-10 PROCEDURE — 71275 CT ANGIOGRAPHY CHEST: CPT

## 2022-12-10 PROCEDURE — 96374 THER/PROPH/DIAG INJ IV PUSH: CPT

## 2022-12-10 PROCEDURE — 2580000003 HC RX 258: Performed by: STUDENT IN AN ORGANIZED HEALTH CARE EDUCATION/TRAINING PROGRAM

## 2022-12-10 RX ORDER — KETOROLAC TROMETHAMINE 30 MG/ML
30 INJECTION, SOLUTION INTRAMUSCULAR; INTRAVENOUS ONCE
Status: DISCONTINUED | OUTPATIENT
Start: 2022-12-10 | End: 2022-12-10

## 2022-12-10 RX ORDER — ASPIRIN 325 MG
325 TABLET ORAL ONCE
Status: COMPLETED | OUTPATIENT
Start: 2022-12-10 | End: 2022-12-10

## 2022-12-10 RX ORDER — 0.9 % SODIUM CHLORIDE 0.9 %
1000 INTRAVENOUS SOLUTION INTRAVENOUS ONCE
Status: COMPLETED | OUTPATIENT
Start: 2022-12-10 | End: 2022-12-10

## 2022-12-10 RX ORDER — MORPHINE SULFATE 4 MG/ML
4 INJECTION, SOLUTION INTRAMUSCULAR; INTRAVENOUS ONCE
Status: COMPLETED | OUTPATIENT
Start: 2022-12-10 | End: 2022-12-10

## 2022-12-10 RX ORDER — LORAZEPAM 2 MG/ML
2 INJECTION INTRAMUSCULAR ONCE
Status: COMPLETED | OUTPATIENT
Start: 2022-12-10 | End: 2022-12-10

## 2022-12-10 RX ORDER — HYDROCODONE BITARTRATE AND ACETAMINOPHEN 5; 325 MG/1; MG/1
1 TABLET ORAL ONCE
Status: COMPLETED | OUTPATIENT
Start: 2022-12-10 | End: 2022-12-10

## 2022-12-10 RX ADMIN — IOPAMIDOL 75 ML: 755 INJECTION, SOLUTION INTRAVENOUS at 20:33

## 2022-12-10 RX ADMIN — MORPHINE SULFATE 4 MG: 4 INJECTION, SOLUTION INTRAMUSCULAR; INTRAVENOUS at 21:42

## 2022-12-10 RX ADMIN — SODIUM CHLORIDE 1000 ML: 9 INJECTION, SOLUTION INTRAVENOUS at 21:39

## 2022-12-10 RX ADMIN — HYDROCODONE BITARTRATE AND ACETAMINOPHEN 1 TABLET: 5; 325 TABLET ORAL at 23:12

## 2022-12-10 RX ADMIN — ASPIRIN 325 MG: 325 TABLET ORAL at 20:11

## 2022-12-10 RX ADMIN — LORAZEPAM 2 MG: 2 INJECTION INTRAMUSCULAR at 20:11

## 2022-12-10 ASSESSMENT — PAIN DESCRIPTION - DESCRIPTORS: DESCRIPTORS: POUNDING

## 2022-12-10 ASSESSMENT — PAIN - FUNCTIONAL ASSESSMENT
PAIN_FUNCTIONAL_ASSESSMENT: PREVENTS OR INTERFERES WITH MANY ACTIVE NOT PASSIVE ACTIVITIES
PAIN_FUNCTIONAL_ASSESSMENT: PREVENTS OR INTERFERES WITH MANY ACTIVE NOT PASSIVE ACTIVITIES
PAIN_FUNCTIONAL_ASSESSMENT: 0-10

## 2022-12-10 ASSESSMENT — PAIN DESCRIPTION - ORIENTATION
ORIENTATION: LEFT
ORIENTATION: ANTERIOR;MID
ORIENTATION: LEFT

## 2022-12-10 ASSESSMENT — PAIN SCALES - GENERAL
PAINLEVEL_OUTOF10: 8
PAINLEVEL_OUTOF10: 9
PAINLEVEL_OUTOF10: 9

## 2022-12-10 ASSESSMENT — PAIN DESCRIPTION - LOCATION
LOCATION: CHEST

## 2022-12-10 NOTE — ACP (ADVANCE CARE PLANNING)
Patient does not have any ACP documents/Medical Power of . LSW notes hospital will follow [place]'s Next of Kin hierarchy in the following descending order for priority:    Guardian  Spouse  [de-identified] of adult Children  Parents  [de-identified] of adult Siblings  Nearest Relative not described above    Per [place] Next of Kin hierarchy: Patients' parent will be [address].

## 2022-12-10 NOTE — ED TRIAGE NOTES
Patient brought in by squad with complaint of chest pain and \"feeling like he's having a heart attack. \" Patient stated he has been drinking today. Per EMS patient was hypertensive for them. EMS states patient has not been taking his medications.

## 2022-12-11 LAB
EKG ATRIAL RATE: 109 BPM
EKG DIAGNOSIS: NORMAL
EKG P AXIS: 29 DEGREES
EKG P-R INTERVAL: 124 MS
EKG Q-T INTERVAL: 330 MS
EKG QRS DURATION: 88 MS
EKG QTC CALCULATION (BAZETT): 444 MS
EKG R AXIS: 39 DEGREES
EKG T AXIS: 21 DEGREES
EKG VENTRICULAR RATE: 109 BPM

## 2022-12-11 NOTE — ED NOTES
Report given to [facility] and care transferred at this time     Edvin Lazo RN  12/10/22 1940

## 2022-12-11 NOTE — DISCHARGE INSTRUCTIONS
Follow-up with primary care in the next 2 days  Take OTC medication as needed for pain  Return to the ED symptoms persist or worsen

## 2022-12-11 NOTE — ED NOTES
Report given to [facility] and care transferred at this time     Richard Dan RN  12/10/22 7845

## 2022-12-11 NOTE — ED PROVIDER NOTES
Emergency Department Encounter    Patient: Vanessa Cool  MRN: [id number]  : 1975  Date of Evaluation: 2022  ED Provider:  Analilia Gastelum DO    Triage Chief Complaint:   Chest Pain (Chest pain since yesterday. Pt states it feels like his heart is \"pounding\" out of his chest. Pt states he thinks it is d/t him using meth Thursday. Pt reports he doesn't usually use meth, his drug of choice is cocaine which he last used this past ) and Hypertension    [place]:  Vanessa Cool is a 52 y.o. male with a past medical history of that presents the presents to the ED with a history of left-sided chest pain which started yesterday. Patient rates the pain with a severity of about 9/10. Pain is sharp and nonradiating. Pain is associated with left upper extremity numbness. Patient states the numbness has resolved but the chest pain is still there. Patient admits to the use of cocaine and occasionally amphetamines. No history of shortness of breath, fever, cough, lightheadedness, nausea, diaphoresis,cancer, recent prolonged immobility, trauma or surgery.      ROS - see HPI, below listed is current ROS at time of my eval:  General:  No fevers, no chills, no weakness  Eyes:  No recent vison changes, no discharge  ENT:  No sore throat, no nasal congestion, no hearing changes  Cardiovascular:  + chest pain, + palpitations  Respiratory:  No shortness of breath, no cough, no wheezing  Gastrointestinal:  No pain, no nausea, no vomiting, no diarrhea  Musculoskeletal:  No muscle pain, no joint pain  Skin:  No rash, no pruritis, no easy bruising  Neurologic:  No speech problems, no headache, no extremity numbness, no extremity tingling, no extremity weakness  Psychiatric:  No anxiety  Genitourinary:  No dysuria, no hematuria  Endocrine:  No unexpected weight gain, no unexpected weight loss  Extremities:  no edema, no pain    Past Medical History:   Diagnosis Date    Abscess of sigmoid colon due to diverticulitis 52/18/3369    Alcoholic ([facility]     [facility]     \"was on medication yrs ago for this'    Colostomy in place [facility]) 6/24/2021    Diverticulitis     HIV positive ([facility] 2003    follow with Dr Suzie Ballard     Hypertension     \"started on medication for b/p approx 20 yrs ago \"\"dr Suzie Ballard had me stop the blood pressure medication in May ( 2021) got to low\"    Incisional hernia, without obstruction or gangrene 10/22/2020    Pancreatitis     \" in 2013\"    Wears glasses      Past Surgical History:   Procedure Laterality Date    ABDOMEN SURGERY      \"age 33- removed part of large and small intesine\"    COLONOSCOPY  2017    COLOSTOMY Left 2020    with Dr Martine Abrams N/A 6/24/2021    COLOSTOMY REVERSAL, EXTENSIVE LYSIS OF ADHESISIONS performed by Jeevan Blake MD at [address] ABDOM ABSCESS OPEN  10/17/2020    CT DRAINAGE ABDOM ABSCESS OPEN 10/17/2020 [facility] CT SCAN    HERNIA REPAIR  [id number]    mila ing hernia     LAPAROTOMY N/A 10/21/2020    LAPAROTOMY EXPLORATORY COLOSTOMY CREATION performed by Jeevan Blake MD at [facility] OR     Family History   Problem Relation Age of Onset    Kidney Disease Father     Diabetes Maternal Grandmother     Diabetes Paternal Grandfather     Stroke Mother      Social History     Socioeconomic History    Marital status: Single     Spouse name: Not on file    Number of children: 0    Years of education: Not on file    Highest education level: Not on file   Occupational History    Not on file   Tobacco Use    Smoking status: Every Day     Packs/day: 0.50     Years: 27.00     Pack years: 13.50     Types: Cigarettes    Smokeless tobacco: Never   Vaping Use    Vaping Use: Every day    Substances: Nicotine, Flavoring    Devices: Disposable   Substance and Sexual Activity    Alcohol use: Yes     Comment: pt reports he has been binge drinking the past 2 weeks (12/10/22)    Drug use: Yes     Types: Marijuana Jurline David), Cocaine, Methamphetamines (Crystal Meth)     Comment: last used cocaine 12/4/22 and used meth 12/8/22    Sexual activity: Yes     Partners: Male   Other Topics Concern    Not on file   Social History Narrative    Not on file     Social Determinants of Health     Financial Resource Strain: Not on file   Food Insecurity: Not on file   Transportation Needs: Not on file   Physical Activity: Not on file   Stress: Not on file   Social Connections: Not on file   Intimate Partner Violence: Not on file   Housing Stability: Not on file     No current facility-administered medications for this encounter.      Current Outpatient Medications   Medication Sig Dispense Refill    TRIUMEQ 600- MG TABS Take 1 tablet by mouth daily 30 tablet 2    QUEtiapine (SEROQUEL) 100 MG tablet Take 1 tablet by mouth nightly as needed (NIGHTLY AS NEEDED) 30 tablet 5    OLANZapine (ZYPREXA) 5 MG tablet Take 1 tablet by mouth daily 30 tablet 5    naltrexone (DEPADE) 50 MG tablet Take 50 mg by mouth daily      hydrOXYzine pamoate (VISTARIL) 50 MG capsule Take 1 capsule by mouth as needed for Itching 30 capsule 3    cariprazine hcl (VRAYLAR) 3 MG CAPS capsule Take 1 capsule by mouth at bedtime 30 capsule 3    mirtazapine (REMERON) 30 MG tablet Take 1 tablet by mouth nightly 1-2 tablets po qhs 30 tablet 3    methocarbamol (ROBAXIN) 750 MG tablet Take 1 tablet by mouth 4 times daily as needed (.) Take 750 mg by mouth 4 times daily as needed 20 tablet 0    atorvastatin (LIPITOR) 20 MG tablet Take 1 tablet by mouth daily 30 tablet 3    Multiple Vitamin (MULTIVITAMIN ADULT PO) Take 1 tablet by mouth daily      cloNIDine (CATAPRES) 0.1 MG tablet Take 0.1 mg by mouth 2 times daily as needed for High Blood Pressure      ibuprofen (ADVIL;MOTRIN) 200 MG tablet Take 600 mg by mouth every 6 hours as needed for Pain      acetaminophen (TYLENOL) 500 MG tablet Take 1,000 mg by mouth every 6 hours as needed for Pain      ondansetron (ZOFRAN) 4 MG tablet Take 1 tablet by mouth 3 times daily as needed for Nausea or Vomiting 15 tablet 0     Allergies   Allergen Reactions    Pcn [Penicillins] Anaphylaxis       Nursing Notes Reviewed    Physical Exam:  Triage VS:    ED Triage Vitals [12/10/22 9364]   Enc Vitals Group      BP (!) 174/110      Heart Rate (!) 102      Resp 20      Temp 98 °F (36.7 °C)      Temp Source Oral      SpO2 97 %      Weight 175 lb (79.4 kg)      Height 5' 11\" (1.803 m)      Head Circumference       Peak Flow       Pain Score       Pain Loc       Pain Edu? Excl. in [address]? My pulse ox interpretation is - normal    General appearance:  No acute distress. Skin:  Warm. Dry. Eye:  Extraocular movements intact. Ears, nose, mouth and throat:  Oral mucosa moist   Neck:  Trachea midline. Extremity:  No obvious deformity, erythema, or warmth. No swelling. Normal ROM. Distal pulses intact. Heart:  Regular rate and rhythm, normal S1 & S2, no extra heart sounds. Perfusion:  intact  Respiratory:  Lungs clear to auscultation bilaterally. Respirations nonlabored. Abdominal:  Normal bowel sounds. Soft. Nontender. Non distended. Back:  No CVA tenderness to palpation     Neurological:  Alert and oriented times 3. No focal neuro deficits.              Psychiatric:  Appropriate    I have reviewed and interpreted all of the currently available lab results from this visit (if applicable):  Results for orders placed or performed during the hospital encounter of 12/10/22   CBC with Auto Differential   Result Value Ref Range    WBC 8.1 4.0 - 10.5 K/CU MM    RBC 5.57 4.6 - 6.2 M/CU MM    Hemoglobin 17.4 13.5 - 18.0 GM/DL    Hematocrit 51.6 42 - 52 %    MCV 92.6 78 - 100 FL    MCH 31.2 (H) 27 - 31 PG    MCHC 33.7 32.0 - 36.0 %    RDW 13.1 11.7 - 14.9 %    Platelets 064 250 - 400 K/CU MM    MPV 8.9 7.5 - 11.1 FL    Differential Type AUTOMATED DIFFERENTIAL     Segs Relative 52.7 36 - 66 %    Lymphocytes % 37.7 24 - 44 %    Monocytes % 7.7 (H) 0 - 4 %    Eosinophils % 0.6 0 - 3 % Basophils % 0.9 0 - 1 %    Segs Absolute 4.3 K/CU MM    Lymphocytes Absolute 3.1 K/CU MM    Monocytes Absolute 0.6 K/CU MM    Eosinophils Absolute 0.1 K/CU MM    Basophils Absolute 0.1 K/CU MM    Nucleated RBC % 0.0 %    Total Nucleated RBC 0.0 K/CU MM    Total Immature Neutrophil 0.03 K/CU MM    Immature Neutrophil % 0.4 0 - 0.43 %   Comprehensive Metabolic Panel   Result Value Ref Range    Sodium 138 135 - 145 MMOL/L    Potassium 4.2 3.5 - 5.1 MMOL/L    Chloride 100 99 - 110 mMol/L    CO2 25 21 - 32 MMOL/L    BUN 12 6 - 23 MG/DL    Creatinine 0.9 0.9 - 1.3 MG/DL    Est, Glom Filt Rate >60 >60 mL/min/1.73m2    Glucose 91 70 - 99 MG/DL    Calcium 8.7 8.3 - 10.6 MG/DL    Albumin 4.6 3.4 - 5.0 GM/DL    Total Protein 8.5 (H) 6.4 - 8.2 GM/DL    Total Bilirubin 0.8 0.0 - 1.0 MG/DL    ALT 17 10 - 40 U/L    AST 25 15 - 37 IU/L    Alkaline Phosphatase 133 (H) 40 - 129 IU/L    Anion Gap 13 4 - 16   Troponin   Result Value Ref Range    Troponin T <0.010 <0.01 NG/ML   Brain Natriuretic Peptide   Result Value Ref Range    Pro-BNP 30.55 <300 PG/ML   Magnesium   Result Value Ref Range    Magnesium 2.0 1.8 - 2.4 mg/dl   Troponin   Result Value Ref Range    Troponin T <0.010 <0.01 NG/ML   EKG 12 Lead   Result Value Ref Range    Ventricular Rate 109 BPM    Atrial Rate 109 BPM    P-R Interval 124 ms    QRS Duration 88 ms    Q-T Interval 330 ms    QTc Calculation (Bazett) 444 ms    P Axis 29 degrees    R Axis 39 degrees    T Axis 21 degrees    Diagnosis       Sinus tachycardia  Otherwise normal ECG  When compared with ECG of 16-OCT-2020 11:25,  No significant change was found        Radiographs (if obtained):  Radiologist's Report Reviewed:  XR CHEST PORTABLE    Result Date: 12/10/2022  EXAMINATION: ONE XRAY VIEW OF THE CHEST 12/10/2022 7:08 pm COMPARISON: None. HISTORY: Reason for Exam: chest pain FINDINGS: Heart size is normal. No focal consolidation in the lungs. No pleural effusion or pneumothorax. No acute abnormality. CTA PULMONARY W CONTRAST   Final Result   No evidence of pulmonary embolism. Mild bronchial wall thickening suggestive of bronchitis. RECOMMENDATIONS:   Unavailable         XR CHEST PORTABLE   Final Result   No acute abnormality. EKG (if obtained): (All EKG's are interpreted by myself in the absence of a cardiologist)  EKG: Sinus tachycardia, otherwise normal EKG  Ventricular rate 109  Parent of 125  QRS ration 88  QT/QTc 330/444  PRT axis 29 39 21  No STEMI  CRITICAL CARE NOTE:  There was a high probability of clinically significant life-threatening deterioration of the patient's condition requiring my urgent intervention due to chest pain. Total critical care time is AT LEAST 25 minutes. This includes vital sign monitoring, pulse oximetry monitoring, telemetry monitoring, clinical response to the IV medications, reviewing the nursing notes, consultation time, dictation/documentation time, and interpretation of the lab work. This time excludes time spent performing procedures and separately billable procedures and family discussion time. MDM:  52years old male  who presented to the ED with a history of left sided chest pain for 1 day. Patient physical examination is significant for tachycardia. Laboratory evaluation is significant for unremarkable troponin x 2, CBC, electrolytes, magnesium, BNP,    Radiology is  significant for unremarkable CXR. EKG is unremarkable for any ischemic concerns but shows tachycardia, and hence patient is scheduled for CT-chest to rule out PE, and also to rule out dissection due to numbness of the L upper extremity. CT-pulmonary came up negative. Patient is given aspirin, ativan and pain medications and endorsed and improvement of his symptoms, and hence is scheduled for discharge. Patient is also advised about the dangers of street drugs like cocaine and amphetamine and the potential fatality from use of such drugs.       Management

## 2022-12-11 NOTE — ED NOTES
Avs reviewed with pt and family member. Pt states understanding to follow up with Dr. Ifeanyi Gates office. Pt A&Ox4, pt ambulated to exit independently with family member. Family member will be driving him home.       Puma Benjamin RN  12/10/22 [id number]

## 2022-12-13 PROCEDURE — 93010 ELECTROCARDIOGRAM REPORT: CPT | Performed by: INTERNAL MEDICINE

## 2022-12-27 RX ORDER — ABACAVIR SULFATE, DOLUTEGRAVIR SODIUM, LAMIVUDINE 600; 50; 300 MG/1; MG/1; MG/1
TABLET, FILM COATED ORAL
Qty: 30 TABLET | Refills: 3 | OUTPATIENT
Start: 2022-12-27

## 2023-01-12 ENCOUNTER — HOSPITAL ENCOUNTER (OUTPATIENT)
Age: 48
Discharge: HOME | End: 2023-01-12
Payer: COMMERCIAL

## 2023-01-12 DIAGNOSIS — I10 HYPERTENSION, UNSPECIFIED TYPE: ICD-10-CM

## 2023-01-12 DIAGNOSIS — B20 HIV INFECTION, UNSPECIFIED SYMPTOM STATUS (HCC): ICD-10-CM

## 2023-01-12 LAB
ALBUMIN SERPL-MCNC: 3.9 GM/DL (ref 3.4–5)
ALP BLD-CCNC: 168 IU/L (ref 40–128)
ALT SERPL-CCNC: 26 U/L (ref 10–40)
ANION GAP SERPL CALCULATED.3IONS-SCNC: 15 MMOL/L (ref 4–16)
AST SERPL-CCNC: 42 IU/L (ref 15–37)
BACTERIA: NEGATIVE /HPF
BILIRUB SERPL-MCNC: 0.6 MG/DL (ref 0–1)
BILIRUBIN URINE: NEGATIVE MG/DL
BLOOD, URINE: ABNORMAL
BUN BLDV-MCNC: 5 MG/DL (ref 6–23)
CALCIUM SERPL-MCNC: 8.2 MG/DL (ref 8.3–10.6)
CHLORIDE BLD-SCNC: 97 MMOL/L (ref 99–110)
CLARITY: CLEAR
CO2: 27 MMOL/L (ref 21–32)
COLOR: YELLOW
CREAT SERPL-MCNC: 0.8 MG/DL (ref 0.9–1.3)
CREATININE URINE: 28.1 MG/DL (ref 39–259)
GFR SERPL CREATININE-BSD FRML MDRD: >60 ML/MIN/1.73M2
GLUCOSE BLD-MCNC: 73 MG/DL (ref 70–99)
GLUCOSE, URINE: NEGATIVE MG/DL
KETONES, URINE: NEGATIVE MG/DL
LEUKOCYTE ESTERASE, URINE: NEGATIVE
MAGNESIUM: 1.5 MG/DL (ref 1.8–2.4)
NITRITE URINE, QUANTITATIVE: NEGATIVE
PH, URINE: 5.5 (ref 5–8)
PHOSPHORUS: 2.1 MG/DL (ref 2.5–4.9)
POTASSIUM SERPL-SCNC: 3.8 MMOL/L (ref 3.5–5.1)
PROT/CREAT RATIO, UR: 2.1
PROTEIN UA: 100 MG/DL
RBC URINE: NORMAL /HPF (ref 0–3)
SODIUM BLD-SCNC: 139 MMOL/L (ref 135–145)
SPECIFIC GRAVITY UA: <1.005 (ref 1–1.03)
TOTAL PROTEIN: 7.1 GM/DL (ref 6.4–8.2)
TRICHOMONAS: NORMAL /HPF
URINE TOTAL PROTEIN: 58.3 MG/DL
UROBILINOGEN, URINE: 0.2 MG/DL (ref 0.2–1)
WBC UA: NORMAL /HPF (ref 0–2)

## 2023-01-12 PROCEDURE — 83735 ASSAY OF MAGNESIUM: CPT

## 2023-01-12 PROCEDURE — 84156 ASSAY OF PROTEIN URINE: CPT

## 2023-01-12 PROCEDURE — 86593 SYPHILIS TEST NON-TREP QUANT: CPT

## 2023-01-12 PROCEDURE — 88184 FLOWCYTOMETRY/ TC 1 MARKER: CPT

## 2023-01-12 PROCEDURE — 80053 COMPREHEN METABOLIC PANEL: CPT

## 2023-01-12 PROCEDURE — 81001 URINALYSIS AUTO W/SCOPE: CPT

## 2023-01-12 PROCEDURE — 82570 ASSAY OF URINE CREATININE: CPT

## 2023-01-12 PROCEDURE — 86592 SYPHILIS TEST NON-TREP QUAL: CPT

## 2023-01-12 PROCEDURE — 88185 FLOWCYTOMETRY/TC ADD-ON: CPT

## 2023-01-12 PROCEDURE — 36415 COLL VENOUS BLD VENIPUNCTURE: CPT

## 2023-01-12 PROCEDURE — 84100 ASSAY OF PHOSPHORUS: CPT

## 2023-01-12 PROCEDURE — 86780 TREPONEMA PALLIDUM: CPT

## 2023-01-13 LAB
RPR TITER: NORMAL
RPR: REACTIVE

## 2023-01-14 LAB
CD3 % TOTAL T CELLS: 76 % (ref 62–87)
CD3 ABSOLUTE: 2307 CELLS/UL (ref 570–2400)
CD4 % HELPER T CELL: 39 % (ref 32–64)
CD4 T CELL ABSOLUTE: 1179 CELLS/UL (ref 430–1800)
CD4/CD8 RATIO: 1.08 RATIO (ref 0.8–3.9)
CD8 % SUPPRESSOR T CELL: 36 % (ref 15–46)
CD8 T CELL ABSOLUTE: 1086 CELLS/UL (ref 210–1200)

## 2023-02-26 ENCOUNTER — HOSPITAL ENCOUNTER (OUTPATIENT)
Age: 48
Setting detail: SPECIMEN
Discharge: HOME | End: 2023-02-26
Payer: MEDICAID

## 2023-02-26 PROCEDURE — 81050 URINALYSIS VOLUME MEASURE: CPT

## 2023-02-26 PROCEDURE — 84156 ASSAY OF PROTEIN URINE: CPT

## 2023-02-27 LAB
Lab: 24 HRS
PROTEIN 24 HOUR URINE: 126 MG/24 HR
VOLUME, (UVOL): 1300 MLS

## 2023-07-18 ENCOUNTER — HOSPITAL ENCOUNTER (OUTPATIENT)
Dept: PSYCHIATRY | Age: 48
Setting detail: THERAPIES SERIES
Discharge: HOME | End: 2023-07-18
Payer: COMMERCIAL

## 2023-07-18 PROCEDURE — 90791 PSYCH DIAGNOSTIC EVALUATION: CPT

## 2023-07-18 PROCEDURE — 80305 DRUG TEST PRSMV DIR OPT OBS: CPT

## 2023-07-18 ASSESSMENT — ANXIETY QUESTIONNAIRES
4. TROUBLE RELAXING: 1
7. FEELING AFRAID AS IF SOMETHING AWFUL MIGHT HAPPEN: 0
1. FEELING NERVOUS, ANXIOUS, OR ON EDGE: 1
5. BEING SO RESTLESS THAT IT IS HARD TO SIT STILL: 1
2. NOT BEING ABLE TO STOP OR CONTROL WORRYING: 1
IF YOU CHECKED OFF ANY PROBLEMS ON THIS QUESTIONNAIRE, HOW DIFFICULT HAVE THESE PROBLEMS MADE IT FOR YOU TO DO YOUR WORK, TAKE CARE OF THINGS AT HOME, OR GET ALONG WITH OTHER PEOPLE: SOMEWHAT DIFFICULT
3. WORRYING TOO MUCH ABOUT DIFFERENT THINGS: 1
6. BECOMING EASILY ANNOYED OR IRRITABLE: 0
GAD7 TOTAL SCORE: 5

## 2023-07-18 NOTE — PROGRESS NOTES
[facility]                Progress Note    [x] [place]  [] Stephan Paulino                    Patient Name: Leann Maldonado   : 1975     Case # :   [id number]  Therapist: JOSEPHINE Oquendo        Objective/Service/Time:  ASSESSMENT PART I    1-HOUR    S:  Client is a readmit. He is a 51 YO [address] who lives with his father. Client reports going into [address] [place] [address] for detox and AOD treatment in May of 2023. He reports having a long history of alcohol dependency and loosing his life partner of 11 years, after he experienced a stroke and never recovered on 23. He reports relapsing on alcohol after his partners death. He stated, \"I got really bad! \" \"I became delirious, had hallucinations from what my sister told me, and then I was admitted into the hospital.    O:  Client was pleasant and fully oriented with an intact thought process. Speech was normal and affect congruent. Reports no hallucinations, delusions, or SI/HI. A:  Completed Intake Paperwork, began Psychosocial Assessment, and Initial UDS. P:  Client was recommended to begin IOP group (Modified) 2x Week Mon/Wed beginning 23 and Tuesday's at 11:00 am individual sessions due to his work schedule.                       Shirley Gar MA, [facility], , 11:67 PM

## 2023-07-18 NOTE — PROGRESS NOTES
[facility]     PHYSICIAN ORDER  &  LABORATORY TESTING  &       CLINICAL DIAGNOSTIC SUMMARY             Location: [x] [place] [] [facility]                   Patient Name: Breann De Jesus   : 1975     Case # :  [id number]  Therapist: JOSEPHINE Mccullough    Diagnostic Summary:    IDENTIFYING INFORMATION:  Breann De Jesus / 1975           Client is a readmit. He is a 51 YO [address] who lives with his father. Client reports going into [address] [place] [address] for detox and AOD treatment in May of 2023. He reports having a long history of alcohol dependency and loosing his life partner of 11 years, after he experienced a stroke and never recovered on 23. He reports relapsing on alcohol after his partners death. He stated, \"I got really bad! \" \"I became delirious, had hallucinations from what my sister told me, and then I was admitted into the hospital. Client reports having good support from his Father (Sober for 11 years) and his sister.     2.  IMPAIRED CONTROL : (Criteria: (1) using larger amounts, or for longer than meant to, (2) try to cut down/stop repeated attempts to control/quit, (3) time getting, using or recovering from substances, (4) cravings/urges to use)         Client reports first use of Alcohol age 6; Client reports through his teens off/on fifth; Client reports drinking 20's-30's 2 Fifths and/or more in 1 setting; 40's 2-3 Fifths daily; Client reports last use May 28, 2023    Client reports first use of Cocaine/Powder age 18-40's 1-2 grams week; Client reports last use of Cocaine 2023; Client reports first use of Crack began in his Mid 20's smoking 1x month when it was around; Client reports last day of use of Crack over a year ago    Client reports first use of Opiates orally age 28 due to surgeries and began abusing sheeba's and perc's shortly thereafter; Client reports tolerance built to taking 28 pills weekly; Client reports last use taking 1 pill in 2023    Client

## 2023-07-19 ENCOUNTER — HOSPITAL ENCOUNTER (OUTPATIENT)
Dept: PSYCHIATRY | Age: 48
Setting detail: THERAPIES SERIES
Discharge: HOME | End: 2023-07-19
Payer: COMMERCIAL

## 2023-07-19 PROCEDURE — H2020 THER BEHAV SVC, PER DIEM: HCPCS

## 2023-07-19 NOTE — GROUP NOTE
500 [facility] Group Therapy Note      2023    Location:  [facility] Presents: [phone]    Clients Absent: 1257    Length of session: 3.0 hours    Group Note: [facility]    Group Type: Co-Ed    New members were welcomed and introduced. Norms and expectations of group were discussed. Content: GROUP CHECKED-IN  TOPIC:  \"Preventing Relapse Cookie Saldana" - \"Stages of Recovery\"  Clients complete a \"Preventing Relapse Quiz\" to test their knowledge about relapse. Clients reviewed the 4 stages of recovery to gain a working knowledge of the stages: 1-Abstinence (0-6 months), 2-Lifestyle Change (6-18 months), 3-Self-Knowledge (greater than 18 months), 4-Spirituality (Lifetime) and identified which stage they were in.  [address]   94:37 PM    Co-Therapist: N/A      [facility] Individual Group Progress Note    Luther Jansen  1975    Notes on Client Progress in Group    Jenny is new to the IOP group. He participated in the group discussion. He was attentive and gave appropriate insight and feedback. He stated, \"I want to get back to stage 2 (Lifestyle Change) before my partner ! \"     [address]   3:88 PM    Co-Therapist: N/A

## 2023-07-21 ENCOUNTER — APPOINTMENT (OUTPATIENT)
Dept: PSYCHIATRY | Age: 48
End: 2023-07-21
Payer: COMMERCIAL

## 2023-07-24 ENCOUNTER — HOSPITAL ENCOUNTER (OUTPATIENT)
Dept: PSYCHIATRY | Age: 48
Setting detail: THERAPIES SERIES
Discharge: HOME | End: 2023-07-24
Payer: COMMERCIAL

## 2023-07-24 PROCEDURE — H2020 THER BEHAV SVC, PER DIEM: HCPCS

## 2023-07-24 NOTE — GROUP NOTE
500 [facility] Group Therapy Note      7/24/2023    Location:  [place]      Clients Presents: [phone], [address], 3572, [phone] [id number]    Clients Absent:     Length of session: 3.0 hours    Group Note: IOP    Group Type: Co-Ed    New members were welcomed and introduced. Norms and expectations of group were discussed. Content: Counselor facilitated solution focused group on relapse prevention. Client identified coping skills to avoid relapse. Nikki Woodard, [address]  7/24/2023 10:36 AM    Co-Therapist: N/A      [facility] Individual Group Progress Note    Naz Malhotra  1975 7/24/2023    Notes on Client Progress in Group    Client shared he feels like he is making progress on his goals of staying sober. He shared he lost his partner of 15 years and has lost everything. He is living with his family and his home is now in probate. Client spent several weeks inpatient with detox and rehab. He still uses medical marijuana and reports he still has cravings for alcohol. Client shared he is never alone. His family is very supportive and he is not back to work full time yet so he doesn't feel too much pressure. He has not started meetings but he will soon.      Nikki Woodard, Maria Esther [address]  7/24/2023 12:04 PM    Co-Therapist: N/A

## 2023-07-24 NOTE — PROGRESS NOTES
500 [facility]        Individual  Progress Note    Location: [x] [place] [] [facility]                   Patient Name: Anne Bates   : 1975     Case # :  [id number]  Therapist: OSCAR Sanchez        Objective/Service/Time:   CASE MANAGEMENT  Problem 2 Objective 3    S:  I met with client about SNAP application and interview. Client stated he completed application and has interview today. I explained that I am not an authorized representative for him, but I can assist if he needs me to. He will complete interview between 1-3 pm and let me know if he needs additional documentation. He currently receives [facility] Medicaid. O:  Client was oriented and open to discussion. A:  Client will complete SNAP food assistance interview today and keep me updated about status. P:  Client will update me on status of SNAP application. I will assist if needed.       ANITHA Sanchez, [facility], [facility]       Electronically signed by Trina Clinton on 2023 at 4:58 PM

## 2023-07-25 ENCOUNTER — HOSPITAL ENCOUNTER (OUTPATIENT)
Dept: PSYCHIATRY | Age: 48
Setting detail: THERAPIES SERIES
Discharge: HOME | End: 2023-07-25
Payer: COMMERCIAL

## 2023-07-25 PROCEDURE — 90834 PSYTX W PT 45 MINUTES: CPT

## 2023-07-25 NOTE — PROGRESS NOTES
1296 [facility]                Progress Note    [x] [place]  [] Brionna Ferrari                    Patient Name: Breann De Jesus   : 1975     Case # :  [id number]  Therapist: Jenn Lindsay [facility]        Objective/Service/Time:    1-HOUR      S:  Client is attended individual session. He reports no use of any substances. He stated, \"We went to the [facility] because my niece is showing her goats off there. \" \"I'm taking it one day at a time! \"     O:  Client was pleasant and fully oriented. A:  Client has a good support system and reports utilizing his coping strategies. He will continue completing his Individualized Treatment Plan Goals and Objectives. P:  Client will continue in IOP group (Modified) 2x Week Mon/Wed  and  at 11:00 am individual sessions due to his work schedule.                 Cristina Paul MA, [facility], , 7:94 PM

## 2023-07-26 ENCOUNTER — HOSPITAL ENCOUNTER (OUTPATIENT)
Dept: PSYCHIATRY | Age: 48
Setting detail: THERAPIES SERIES
Discharge: HOME | End: 2023-07-26
Payer: COMMERCIAL

## 2023-07-26 PROCEDURE — H2020 THER BEHAV SVC, PER DIEM: HCPCS

## 2023-07-26 NOTE — GROUP NOTE
500 [facility] Group Therapy Note      7/26/2023    Location:  [facility] Presents: [phone]    Clients Absent: 0    Length of session: 3.0 hours    Group Note: IOP    Group Type: Co-Ed    New members were welcomed and introduced. Norms and expectations of group were discussed. Content: GROUP CHECKED-IN  TOPIC:  \"Think Your Way Out Of Using\"  Clients discussed treatment worksheet where they identified strategies to \"Think Their Way Out of Using\": Think about the benefits of not using, List and recall unpleasant drinking/drugging situations, Focus on the progress you are making, Think about the potential negative consequences of using, Distractions, and Challenging your Irrational Beliefs. [address]  6/73/4054 4:80 PM    Co-Therapist: N/A      [facility] Individual Group Progress Note    Hai Parekh  1975 7/26/2023    Notes on Client Progress in Group    Jenny participated in the group discussion. He stated, \"My goal is to be alcohol free! \" He reported not sleeping well and stated, \"My night medications may have to be adjusted-I'm still a bit groggy this morning from my medications. \"    [address]  6/44/1045 1:88 PM    Co-Therapist: N/A

## 2023-07-31 ENCOUNTER — APPOINTMENT (OUTPATIENT)
Dept: PSYCHIATRY | Age: 48
End: 2023-07-31
Payer: COMMERCIAL

## 2023-07-31 PROCEDURE — H2020 THER BEHAV SVC, PER DIEM: HCPCS

## 2023-07-31 NOTE — GROUP NOTE
500 [facility] Group Therapy Note      7/31/2023    Location:  [facility] Presents: [phone]    Clients Absent: 1467    Length of session: 3.0 hours    Group Note: IOP    Group Type: Co-Ed    New members were welcomed and introduced. Norms and expectations of group were discussed. Content: GROUP CHECKED-IN  TOPIC:  \"Anxiety Triggers and Warning Signs\"  Clients learned about anxiety, the importance of normalizing healthy levels of anxiety and to treat it. Clients first identified how they experience anxiety physically, behaviorally, cognitively, and emotionally. Secondly, clients learned how to develop a hierarchy of least to most anxiety-producing experiences. Finally, clients learned how to develop a plan for coping with anxiety. [address]  2/78/2231 7:04 PM    Co-Therapist: N/A      [facility] Individual Group Progress Note    Kasey Gómez  1975 7/31/2023    Notes on Client Progress in Group    Jenny participated in the group discussion. He was attentive and completed treatment worksheet where he identified his own anxiety triggers.     [address]  9/24/8425 1:26 PM    Co-Therapist: N/A

## 2023-08-01 ENCOUNTER — HOSPITAL ENCOUNTER (OUTPATIENT)
Dept: PSYCHIATRY | Age: 48
Setting detail: THERAPIES SERIES
Discharge: HOME | End: 2023-08-01
Payer: COMMERCIAL

## 2023-08-01 PROCEDURE — 90834 PSYTX W PT 45 MINUTES: CPT

## 2023-08-01 NOTE — PROGRESS NOTES
500 [facility] TREATMENT PLAN      Location: [x] [place] [] Kiran Smith    Treatment plan:   Initial  0814    Strengths:  Willingness, Motivated, Headstrong    Weakness/Limitations:  Temptation, Procrastination, Smoking    Service/Frequency/Duration: Individual 1x Week in 90 Days, IOP/Modified Mon/Wed from 9-12 in 90 Days, Urinalysis Random monthly in 90 Days, AA/NA 2-4 Weekly in 90 Days, Sponsor Contact Weekly in 90 Days and Case Management as Needed in 90 Days    Diagnosis:   F10.20 Other and unspecified alcohol dependence/unspecified drinking behavior, F11.10 Nondependent opioid abuse-unspecified use    Level of Care: 2.1 Intensive Outpatient Services    Problem: On Probation/BAYRON/Menacing Charge/History of Substance  Goal: Abstain from using drugs/alcohol in 90 Days   Objectives:   1) Identify 3 Indicators of Addiction in 90 Days. Evaluation Date: 10/25/23  Code: C Continue TBD   2)  Identify 3 Negative Effects of Substance Use in 90 Days. Evaluation Date:  10/25/23  Code: C Continue TBD     iii  3)  Identify 3 Positives for maintaining sobriety in 90 day Evaluation Date:  10/25/23  Code: C continue TBD    2. Problem: Lacks Coping Skills to Maintain Long-term Sobriety   Goal: Acquire necessary skills to maintain sobriety in 90 Days   Objectives:   1)  Identify 3 Changes that you will make that support your recovery in 90 Days Evaluation Date:  10/25/23  Code: C Continue TBD   2) Identify 3 External/Internal Triggers and Sober Responses to them in 90 Days Evaluation Date: 10/25/23  Code: C Continue TBD   3) 1x Month contact with REACH  for support in 90 Days Evaluation Date: 10/25/23  Code: C Continue TBD     3.     Problem: Lacks Understanding and Knowledge of Addiction   Goal: Develop increased awareness of the Disease of Addiction and Relapse triggers and coping strategies needed to effectively deal with them that support long-term sobriety in 90 Days  Objectives:   1)  Identify 3 Ways to

## 2023-08-02 ENCOUNTER — HOSPITAL ENCOUNTER (OUTPATIENT)
Dept: PSYCHIATRY | Age: 48
Setting detail: THERAPIES SERIES
Discharge: HOME | End: 2023-08-02
Payer: COMMERCIAL

## 2023-08-02 PROCEDURE — H2020 THER BEHAV SVC, PER DIEM: HCPCS

## 2023-08-02 NOTE — PROGRESS NOTES
500 [facility]        Individual  Progress Note    Location: [x] [place] [] [facility]                   Patient Name: Naveen Groves   : 1975     Case # :  [id number]  Therapist: OSCAR Silva        Objective/Service/Time:   CASE MANAGEMENT  Problem 2 Objective 3    S:  I met with client about letter from DJ. He previously applied for SNAP, but needs to submit additional documents. We completed authorized rep for and LAURYN. I sent letter from father verifying living arrangements and proof of address. Client also expressed an interest in Naltrexone pill form. We completed a referral to [facility]. O:  Client was oriented and open to discussion. A:  Client provided needed documentation for DJFS and completed referral for [facility]. Client states that he currently has a Primary Care Physician that he sees regularly for MH medications. P:  [facility]. Client will continue to speak with Therapist or  about requests for assistance if needed.       ANITHA Silva, [facility], CTTS       Electronically signed by Chanelle Rosa on 2023 at 11:09 AM

## 2023-08-02 NOTE — GROUP NOTE
500 [facility] Group Therapy Note      8/2/2023    Location:  [facility] Presents: [phone]    Clients Absent: 7177    Length of session: 3.0 hours    Group Note: IOP    Group Type: Co-Ed    New members were welcomed and introduced. Norms and expectations of group were discussed. Content: GROUP CHECKED-IN  TOPIC:  \"Coping Skills-Depression\"  Clients learned that depression could be a huge trigger to relapse if not recognized and managed well. Clients learned various strategies to combating depression: (Think of positive experiences, Practice Mindfulness, Exercise, Socialize/Sober Support, Personal Care, Watch a funny movie/Utube/Actitok-[place]rum. Clients were encouraged to start small, make a plan, and take a friend for support. Quinten Benson  5/2/0074 3:03 PM    Co-Therapist: N/A      [facility] Individual Group Progress Note    Hai Parekh  1975 8/2/2023    Notes on Client Progress in Group    Jenny participated in the group discussion. He shared that he was able to obtain his 's license since a friend paid him back some money from a loan he'd given him. He stated, \"I think I'm still going through the loss of my partner and I'm angry about the position that I am in (now living with my father) and stuck with a lot of debt! \"    Quinten Benson  1/1/3463 2:40 PM    Co-Therapist: N/A

## 2023-08-07 ENCOUNTER — HOSPITAL ENCOUNTER (OUTPATIENT)
Dept: PSYCHIATRY | Age: 48
Setting detail: THERAPIES SERIES
Discharge: HOME | End: 2023-08-07
Payer: COMMERCIAL

## 2023-08-07 PROCEDURE — 80305 DRUG TEST PRSMV DIR OPT OBS: CPT

## 2023-08-07 PROCEDURE — H2020 THER BEHAV SVC, PER DIEM: HCPCS

## 2023-08-07 NOTE — GROUP NOTE
500 [facility] Group Therapy Note      8/7/2023    Location:  [facility] Presents: [phone]    Clients Absent: 7177    Length of session: 3.0 hours    Group Note: IOP    Group Type: Co-Ed    New members were welcomed and introduced. Norms and expectations of group were discussed. Content: GROUP CHECKED-IN  TOPIC: \"Coping with Cravings\" - \"Self Assessment\"  Clients identified the following coping strategies for cravings (Walking/Exercise, Hobbies, Support Groups, Developing a daily plan and an overall routine, Calling a friend, Doing Yoga/Meditation, Listening to Music). Clients completed a \"Self Assessment\" where they identified common triggers, strategies to handle them that promote abstinence and shared a situation, how they handled it, and what they could have done differently. [address]  1/3/2985 54:21 PM    Co-Therapist: N/A      [facility] Individual Group Progress Note    Naveen Groves  1975 8/7/2023    Notes on Client Progress in Group    Jenny participated in the group discussion. He was attentive, completed treatment worksheet and stated, Wili Buchanan is a huge trigger for me! \" He gave appropriate feedback.     [address]  2/3/5948 3:59 PM    Co-Therapist: N/A

## 2023-08-08 ENCOUNTER — HOSPITAL ENCOUNTER (OUTPATIENT)
Dept: PSYCHIATRY | Age: 48
Setting detail: THERAPIES SERIES
Discharge: HOME | End: 2023-08-08
Payer: COMMERCIAL

## 2023-08-08 NOTE — PROGRESS NOTES
[facility]                Progress Note    [x] Lynnet[place]  [] Chey Patrick                    Patient Name: Naz Malhotra   : 1975     Case # :  [id number]  Therapist: JOSEPHINE Be        Objective/Service/Time:    CX-Client reported being sick                      Jermain Shields, [facility], , 38:87 AM

## 2023-08-09 ENCOUNTER — HOSPITAL ENCOUNTER (OUTPATIENT)
Dept: PSYCHIATRY | Age: 48
Setting detail: THERAPIES SERIES
Discharge: HOME | End: 2023-08-09
Payer: COMMERCIAL

## 2023-08-09 NOTE — GROUP NOTE
500 [facility] Group Therapy Note      8/9/2023    Location:  [place]      Clients Presents: [phone]    Clients Absent: [phone]    Length of session: 3.0 hours    Group Note: IOP    Group Type: Co-Ed    New members were welcomed and introduced. Norms and expectations of group were discussed. Content: GROUP CHECKED-IN  TOPIC:   Cognitive-Behavioral Therapy - \"Safe Coping Skills\"  Clients read and discussed hand-out where they identified 80 \"Safe Seeking Coping Skills. \" Clients learned about various coping strategies that can assist them in maintaining long-sobriety. [address] [facility]  8/5/6374 53:78 PM    Co-Therapist: N/A      [facility] Individual Group Progress Note    Karen García  1975 8/9/2023    Notes on Client Progress in Group    Reason for Absence: CX-Client called in sick yesterday for his individual session and reports continued illness.      [address] [facility]  3/2/4416 51:82 PM    Co-Therapist: N/A

## 2023-08-14 ENCOUNTER — HOSPITAL ENCOUNTER (OUTPATIENT)
Dept: PSYCHIATRY | Age: 48
Setting detail: THERAPIES SERIES
Discharge: HOME | End: 2023-08-14
Payer: COMMERCIAL

## 2023-08-14 PROCEDURE — H2020 THER BEHAV SVC, PER DIEM: HCPCS

## 2023-08-14 NOTE — GROUP NOTE
500 [facility] Group Therapy Note      8/14/2023    Location:  [place]      Clients Presents: [phone]    Clients Absent: 1499    Length of session: 3.0 hours    Group Note: IOP    Group Type: Co-Ed    New members were welcomed and introduced. Norms and expectations of group were discussed. Content: GROUP CHECKED-IN  TOPIC:  \"Good Grief:  5 Strategies to [place] with Loss in Addiction Recovery\"  Clients learned about the 5 Stages of Grief (Denial, Anger, Bargaining, Depression, Acceptance) and strategies to cope with loss in addiction recovery that will not lead to relapse. [address] [facility]  3/74/4926 83:41 PM    Co-Therapist: N/A      [facility] Individual Group Progress Note    Andrey Guillen  1975 8/14/2023    Notes on Client Progress in Group    Jenny participated in the group discussion. He shared openly about the losses in his life and of the most recent one in the loss of his life partner. He stated, \"After his death I just went on the deep end and drank more than I ever had! \"    [address] [facility]  0/02/1809 8:62 PM    Co-Therapist: N/A

## 2023-08-15 ENCOUNTER — HOSPITAL ENCOUNTER (OUTPATIENT)
Dept: PSYCHIATRY | Age: 48
Setting detail: THERAPIES SERIES
Discharge: HOME | End: 2023-08-15
Payer: COMMERCIAL

## 2023-08-15 PROCEDURE — 90834 PSYTX W PT 45 MINUTES: CPT

## 2023-08-15 NOTE — PROGRESS NOTES
500 [facility] TREATMENT PLAN      Location: [x] [place] [] [facility]    Treatment plan:   Initial  8450  (8/15/23)    Strengths:  Willingness, Motivated, Headstrong    Weakness/Limitations:  Temptation, Procrastination, Smoking    Service/Frequency/Duration: Individual 1x Week in 90 Days, IOP/Modified Mon/Wed from 9-12 in 90 Days, Urinalysis Random monthly in 90 Days, AA/NA 2-4 Weekly in 90 Days, Sponsor Contact Weekly in 90 Days and Case Management as Needed in 90 Days    Diagnosis:   F10.20 Other and unspecified alcohol dependence/unspecified drinking behavior, F11.10 Nondependent opioid abuse-unspecified use    Level of Care: 2.1 Intensive Outpatient Services    Problem: On Probation/BAYRON/Menacing Charge/History of Substance  Goal: Abstain from using drugs/alcohol in 90 Days   Objectives:   1) Identify 3 Indicators of Addiction in 90 Days. Evaluation Date: 10/25/23  Code: A Achieved 7/19/23 Loss of Control, Need and Compulsion, Continued Use Despite Adverse Consequences     2)  Verbalize 3 Negative Effects of Substance Use in 90 Days. Evaluation Date:  10/25/23  Code: A Achieved  Poor health, Financial Ruin, Loneliness      iii  3)  Identify 3 Positives for maintaining sobriety in 90 day Evaluation Date:  10/25/23  Code: C continue TBD    2. Problem: Lacks Coping Skills to Maintain Long-term Sobriety   Goal: Acquire necessary skills to maintain sobriety in 90 Days   Objectives:   1)  Identify 5 Stages of Grief in 90 Days Evaluation Date:  10/25/23  Code: A Achieved  8/14/23  Denial, Anger, Bargaining, Depression, Acceptance   2) Identify 3 External/Internal Triggers and Sober Responses to them in 90 Days Evaluation Date: 10/25/23  Code: C Continue TBD   3) 1x Month contact with REACH  for support in 90 Days Evaluation Date: 10/25/23  Code: A Achieved 7/24/23 He completed application and has interview today.  He will complete interview between 1-3 pm and let me know if he needs additional

## 2023-08-15 NOTE — PROGRESS NOTES
[facility]                Progress Note    [x] Lynnette  [] Jovan                    Patient Name: Kasey Gómez   : 1975     Case # :  [id number]  Therapist: Quinten Zaidi [place]        Objective/Service/Time:    1-HOUR        S:  Client attended individual session. He reports no use of any substances. He stated, \"I been feeling blah lately. \" Client shared more about how he's been feeling and stated that he lives with his dad, a sister who comes and goes, along with his niece and his adult brother who just got out of alf. He decided toward the end of the session that he needs to attend some 12-Step meetings at least one during the week and hopes to  some extra hours at work. O:  Client was pleasant and fully oriented. A:  Client has a good support system and reports utilizing his coping strategies. He will continue completing his Individualized Treatment Plan Goals and Objectives. P:  Client is committed to attending 2-IOP groups (Mon/Wed) and individual therapy ( at 11:00 am) sessions weekly.                       Inga Gillespie MA, [facility], 44, 6:32 PM

## 2023-08-16 ENCOUNTER — APPOINTMENT (OUTPATIENT)
Dept: PSYCHIATRY | Age: 48
End: 2023-08-16
Payer: COMMERCIAL

## 2023-08-16 ENCOUNTER — HOSPITAL ENCOUNTER (OUTPATIENT)
Dept: PSYCHIATRY | Age: 48
Setting detail: THERAPIES SERIES
Discharge: HOME | End: 2023-08-16
Payer: COMMERCIAL

## 2023-08-16 NOTE — GROUP NOTE
500 [facility] Group Therapy Note      8/16/2023    Location:  [place]        Clients Presents: [phone]    Clients Absent: 1228, 1491    Length of session: 1.5 hours    Group Note: OP    Group Type: Co-Ed    New members were welcomed and introduced. Norms and expectations of group were discussed.       Content: Understanding Post Acute Withdrawal: Relapse and Substance Use           KRISTEN Taylor  4/45/8522 72:47 PM        Co-Therapist: N/A      [facility] Individual Group Progress Note    Hai Parekh  1975 8/16/2023    Notes on Client Progress in Group        Did not show          KRISTEN Taylor  4/03/7150 51:12 PM        Co-Therapist: N/A

## 2023-08-21 ENCOUNTER — HOSPITAL ENCOUNTER (OUTPATIENT)
Dept: PSYCHIATRY | Age: 48
Setting detail: THERAPIES SERIES
Discharge: HOME | End: 2023-08-21
Payer: COMMERCIAL

## 2023-08-21 PROCEDURE — H2020 THER BEHAV SVC, PER DIEM: HCPCS

## 2023-08-21 NOTE — GROUP NOTE
500 [facility] Group Therapy Note      8/21/2023    Location:  [facility] Presents: [phone]    Clients Absent: 0    Length of session: 3.0 hours    Group Note: IOP    Group Type: Co-Ed    New members were welcomed and introduced. Norms and expectations of group were discussed. Content: GROUP CHECKED-IN  TOPIC:  Waleska Ibrahim Area Between Using and Sobriety\" - \"The Differences Between Abstinence and Recovery\"  Clients discussed the gray area between using and sobriety. Clients learned that you can't rush a successful sobriety, you must nurture it, and help figure out your own path to maintaining abstinence. Clients discussed the differences between abstinence and recovery. Clients learned that abstinence is the removal of something negative from your life. While recovery is the removal of something negative and the consequential replacing of that negativity with a positive lifestyle change instead. Quinten Simons  0/57/9055 5:40 PM    Co-Therapist: N/A      [facility] Individual Group Progress Note    Althea Will  1975 8/21/2023    Notes on Client Progress in Group    Jenny participated in the group discussion. He was attentive but shared minimally and only when prompted. He stated, \"I wasn't in on last Wednesday due to being called into work and I need the extra money. \"    Quinten Simons  6/66/4673 3:02 PM    Co-Therapist: N/A

## 2023-08-22 ENCOUNTER — HOSPITAL ENCOUNTER (OUTPATIENT)
Dept: PSYCHIATRY | Age: 48
Setting detail: THERAPIES SERIES
Discharge: HOME | End: 2023-08-22
Payer: COMMERCIAL

## 2023-08-22 PROCEDURE — 90834 PSYTX W PT 45 MINUTES: CPT

## 2023-08-22 NOTE — PROGRESS NOTES
[facility]                Progress Note    [x] Lynnette  [] Jovan                    Patient Name: Shweta Villalba   : 1975     Case # :  [id number]  Therapist: Quinten Swann        Objective/Service/Time:    1-HOUR        S:  Client attended individual session. He reports using marijuana but does not have a medical marijuana card. He stated, \"My dad gives me medical marijuana from a local depensary. \" Client shared about the need to getting items from his partners house they'd shared together and that he may have put some things into storage. O:  Client was pleasant and fully oriented. A:  Client has a good support system and reports utilizing his coping strategies. He will continue completing his Individualized Treatment Plan Goals and Objectives. P:  Client is committed to attending 2-IOP groups (Mon/Wed) and individual therapy ( at 11:00 am) sessions weekly.                       Sharon Posey MA, [facility], 8213/68, 09:79 AM 23

## 2023-08-23 ENCOUNTER — HOSPITAL ENCOUNTER (OUTPATIENT)
Dept: PSYCHIATRY | Age: 48
Setting detail: THERAPIES SERIES
Discharge: HOME | End: 2023-08-23
Payer: COMMERCIAL

## 2023-08-23 ENCOUNTER — APPOINTMENT (OUTPATIENT)
Dept: PSYCHIATRY | Age: 48
End: 2023-08-23
Payer: COMMERCIAL

## 2023-08-23 NOTE — GROUP NOTE
500 [facility] Group Therapy Note      8/23/2023    Location:  [place]      Clients Presents: [phone]    Clients Absent: [phone]    Length of session: 3.0 hours    Group Note: IOP    Group Type: Co-Ed    New members were welcomed and introduced. Norms and expectations of group were discussed. Content: GROUP CHECKED-IN  TOPIC:  \"Benefits of Quitting Alcohol\" - \"Triggers/Thought-Stopping Techniques (including Visualization, Snapping, Relaxation, and Calling a safe person)  Clients discussed the benefits of quitting alcohol. Clients learned of the sequence of relapse during addiction is an \"automatic\" process (Trigger-Thought-Craving-Use). Clients learned about strategies to disrupt a relapse (Thought Stopping, Visualization, Rubber/Band-Snapping, Relaxation, and calling a safe person).     Quinten Miller  2/86/3082 14:31 AM    Co-Therapist: N/A      [facility] Individual Group Progress Note    Naveen Groves  1975 8/23/2023    Notes on Client Progress in Group    Reason for Absence: Quinten Cerda  9/96/2315 5:77 PM    Co-Therapist: N/A
English

## 2023-08-28 ENCOUNTER — APPOINTMENT (OUTPATIENT)
Dept: PSYCHIATRY | Age: 48
End: 2023-08-28
Payer: COMMERCIAL

## 2023-08-28 ENCOUNTER — HOSPITAL ENCOUNTER (OUTPATIENT)
Dept: PSYCHIATRY | Age: 48
Setting detail: THERAPIES SERIES
Discharge: HOME | End: 2023-08-28
Payer: COMMERCIAL

## 2023-08-28 PROCEDURE — H2020 THER BEHAV SVC, PER DIEM: HCPCS

## 2023-08-28 NOTE — GROUP NOTE
[facility] Group Therapy Note      8/28/2023    Location:  [facility] Presents: [phone]    Clients Absent: 0    Length of session: 3.0 hours    Group Note: [facility]    Group Type: Co-Ed    New members were welcomed and introduced. Norms and expectations of group were discussed. Content: GROUP CHECKED-IN  TOPIC:  \"Consequences of Continuing Addictive Lifestyles\"  Clients learned more clearly what their limits are when it comes to suffering negative consequences of substance abuse or other addictive behaviors. [address]  7/57/6113 5:14 PM    Co-Therapist: N/A      [facility] Individual Group Progress Note    Yoanna Murray  1975 8/28/2023    Notes on Client Progress in Group    Jenny participated in the group discussion. He was attentive, completed treatment worksheet, and has a keen understanding of the negative consequences of his substance use and how it had affected him as well as others in his family.      [address]  3/81/7407 9:59 PM    Co-Therapist: N/A

## 2023-08-29 ENCOUNTER — HOSPITAL ENCOUNTER (OUTPATIENT)
Dept: PSYCHIATRY | Age: 48
Setting detail: THERAPIES SERIES
Discharge: HOME | End: 2023-08-29
Payer: COMMERCIAL

## 2023-08-29 PROCEDURE — 90834 PSYTX W PT 45 MINUTES: CPT

## 2023-08-29 NOTE — PROGRESS NOTES
[facility]                Progress Note    [x] Lynnette  [] Charito Bailey                    Patient Name: Jeanna Portillo   : 1975     Case # :  [id number]  Therapist: Quinten Ruiz        Objective/Service/Time:    1-HOUR        S:  Client attended individual session. He reports using marijuana but does not have a medical marijuana card. He stated, Jossie Higginbotham will be having a \"State Sell\" in mid September of the house of me and my partner. \" \"I don't know how I'm going to get through this? \" Client shared more about he was feeling about the loss of his partner and now his home and antics that they purchased together. O:  Client was pleasant and fully oriented. A:  Client has a good support system and reports utilizing his coping strategies. He will continue completing his Individualized Treatment Plan Goals and Objectives. P:  Client is committed to attending treatment weekly.                       Jason Durham MA, [facility], , 70:95 AM

## 2023-08-30 ENCOUNTER — APPOINTMENT (OUTPATIENT)
Dept: PSYCHIATRY | Age: 48
End: 2023-08-30
Payer: COMMERCIAL

## 2023-08-30 ENCOUNTER — HOSPITAL ENCOUNTER (OUTPATIENT)
Dept: PSYCHIATRY | Age: 48
Setting detail: THERAPIES SERIES
Discharge: HOME | End: 2023-08-30
Payer: COMMERCIAL

## 2023-08-30 PROCEDURE — H2020 THER BEHAV SVC, PER DIEM: HCPCS

## 2023-09-04 ENCOUNTER — APPOINTMENT (OUTPATIENT)
Dept: PSYCHIATRY | Age: 48
End: 2023-09-04
Payer: COMMERCIAL

## 2023-09-05 ENCOUNTER — APPOINTMENT (OUTPATIENT)
Dept: PSYCHIATRY | Age: 48
End: 2023-09-05
Payer: COMMERCIAL

## 2023-09-06 ENCOUNTER — HOSPITAL ENCOUNTER (OUTPATIENT)
Dept: PSYCHIATRY | Age: 48
Setting detail: THERAPIES SERIES
Discharge: HOME | End: 2023-09-06

## 2023-09-06 ENCOUNTER — APPOINTMENT (OUTPATIENT)
Dept: PSYCHIATRY | Age: 48
End: 2023-09-06
Payer: COMMERCIAL

## 2023-09-06 NOTE — GROUP NOTE
[facility] Group Therapy Note      9/6/2023    Location:  [place]      Clients Presents: [phone]    Clients Absent: [phone]    Length of session: 1.5 hours    Group Note: IOP    Group Type: Co-Ed    New members were welcomed and introduced. Norms and expectations of group were discussed. Content: Only one group member attended. An Individual session was completed. [address] [place]  4/2/8272 50:00 AM    Co-Therapist: N/A      [facility] Individual Group Progress Note    Angie Catalan  1975 9/6/2023    Notes on Client Progress in Group    Reason for Absence: DNS - Client will sign a \"Participation Agreement\" at his next session.     [address] [facility]  4/0/1594 55:59 AM    Co-Therapist: N/A

## 2023-09-11 ENCOUNTER — HOSPITAL ENCOUNTER (OUTPATIENT)
Dept: PSYCHIATRY | Age: 48
Setting detail: THERAPIES SERIES
Discharge: HOME | End: 2023-09-11
Payer: COMMERCIAL

## 2023-09-11 ENCOUNTER — APPOINTMENT (OUTPATIENT)
Dept: PSYCHIATRY | Age: 48
End: 2023-09-11
Payer: COMMERCIAL

## 2023-09-11 PROCEDURE — H2020 THER BEHAV SVC, PER DIEM: HCPCS

## 2023-09-11 PROCEDURE — 80305 DRUG TEST PRSMV DIR OPT OBS: CPT

## 2023-09-11 NOTE — GROUP NOTE
[facility] Group Therapy Note      9/11/2023    Location:  [place]      Clients Presents: [phone]    Clients Absent: [phone]    Length of session: 3.0 hours    Group Note: [facility]    Group Type: Co-Ed    New members were welcomed and introduced. Norms and expectations of group were discussed. Content: Group Checked-In  TOPIC:  \"Four Key Steps to Finding Recovery after Addiction Relapse\" -- \"Safe Coping Skills\"  Clients learned four strategies to finding recovery after a relapse to use of substances. Clients were given a list of 80 practical coping skills to practice in addiction recovery. [address] [facility]  6/09/4300 1:83 PM    Co-Therapist: N/A      [facility] Individual Group Progress Note    Che Ibanez  1975 9/11/2023    Notes on Client Progress in Group    Jenny participated in the group discussion. Jenny admitted to relapsing on alcohol and cocaine. He reports having a keen sense of his triggers and stated, \"It was my partner's birthday last week and mine, along with the selling of the house. Shady Fothergill Shady Fothergill Shady Fothergill \" \"It was too much and I wanted to escape! \" Client received good feedback from other group members and offered appropriate feedback.     [address] [facility]  4/60/2204 5:99 PM    Co-Therapist: N/A

## 2023-09-12 ENCOUNTER — APPOINTMENT (OUTPATIENT)
Dept: PSYCHIATRY | Age: 48
End: 2023-09-12
Payer: COMMERCIAL

## 2023-09-12 ENCOUNTER — HOSPITAL ENCOUNTER (OUTPATIENT)
Dept: PSYCHIATRY | Age: 48
Setting detail: THERAPIES SERIES
Discharge: HOME | End: 2023-09-12
Payer: COMMERCIAL

## 2023-09-12 PROCEDURE — 90834 PSYTX W PT 45 MINUTES: CPT

## 2023-09-12 NOTE — PROGRESS NOTES
[facility] TREATMENT PLAN      Location: [x] [place] [] [facility]    Treatment plan:   Initial  3607  (9/12/23)    Strengths:  Willingness, Motivated, Headstrong    Weakness/Limitations:  Temptation, Procrastination, Smoking    Service/Frequency/Duration: Individual 1x Week in 90 Days, IOP/Modified Mon/Wed from 9-12 in 90 Days, Urinalysis Random monthly in 90 Days, AA/NA 2-4 Weekly in 90 Days, Sponsor Contact Weekly in 90 Days and Case Management as Needed in 90 Days    Diagnosis:   F10.20 Other and unspecified alcohol dependence/unspecified drinking behavior, F11.10 Nondependent opioid abuse-unspecified use    Level of Care: 2.1 Intensive Outpatient Services    Problem: Client just to out of detox/rehab/Has History of Substance use  Goal: Abstain from using drugs/alcohol in 90 Days   Objectives:   1) Identify 3 Indicators of Addiction in 90 Days. Evaluation Date: 10/25/23  Code: A Achieved 7/19/23 Loss of Control, Need and Compulsion, Continued Use Despite Adverse Consequences     2)  Verbalize 3 Negative Effects of Substance Use in 90 Days. Evaluation Date:  10/25/23  Code: A Achieved  Poor health, Financial Ruin, Loneliness      iii  3)  Identify 3 Positives for maintaining sobriety in 90 day Evaluation Date:  10/25/23  Code: C continue TBD    2. Problem: Lacks Coping Skills to Maintain Long-term Sobriety   Goal: Acquire necessary skills to maintain sobriety in 90 Days   Objectives:   1)  Identify 5 Stages of Grief in 90 Days Evaluation Date:  10/25/23  Code: A Achieved  8/14/23  Denial, Anger, Bargaining, Depression, Acceptance   2) Identify 3 Root Causes of Shame in 90 Days Evaluation Date: 10/25/23  Code: A  Achieved 9/12/23  fears of being judged, criticized, or rejected by others rather  3) 1x Month contact with [facility]  for support in 90 Days Evaluation Date: 10/25/23  Code: A Achieved 7/24/23 He completed application and has interview today.  He will complete interview between 1-3 pm and

## 2023-09-12 NOTE — PROGRESS NOTES
[facility]                Progress Note    [x] Lynnette  [] Jovan                    Patient Name: Dario Shelley   : 1975     Case # :  [id number]  [facility]        Objective/Service/Time:    1-HOUR    GOAL 2a  OBJECTIVE 2        S:  Client attended individual session. He reports using marijuana but does not have a medical marijuana card. He stated, \"I relapsed on last week and went on a 2 day binge. \" \"My partner's birthday and mine was last week, the estate sell was last week, I'm living with my father. Cherene Pummel Cherene Pummel Cherene Pummel \" \"I wanted to escape!' \"It made me even more depressed because I relapsed! \" Client shared that he struggles with shame and pride and isn't sure how to come up out them. O:  Client was pleasant and fully oriented. A:  Client reviewed ITP where he identified root causes of shame: fears of being judged, criticized, or rejected by others rather. He will continue completing his Individualized Treatment Plan Goals and Objectives. P:  Client is committed to attending treatment weekly.                   Nimo Mcfarland MA, [facility], , 04:57 AM

## 2023-09-13 ENCOUNTER — APPOINTMENT (OUTPATIENT)
Dept: PSYCHIATRY | Age: 48
End: 2023-09-13
Payer: COMMERCIAL

## 2023-09-13 ENCOUNTER — HOSPITAL ENCOUNTER (OUTPATIENT)
Dept: PSYCHIATRY | Age: 48
Setting detail: THERAPIES SERIES
Discharge: HOME | End: 2023-09-13
Payer: COMMERCIAL

## 2023-09-13 PROCEDURE — H2020 THER BEHAV SVC, PER DIEM: HCPCS

## 2023-09-13 NOTE — GROUP NOTE
[facility] Group Therapy Note      9/13/2023    Location:  [place]      Clients Presents: [phone]    Clients Absent: 1464    Length of session: 3.0 hours    Group Note: [facility]    Group Type: Co-Ed    New members were welcomed and introduced. Norms and expectations of group were discussed. Content: GROUP CHECKED-IN  TOPIC \"Cognitive Distortions\"  Clients explored how cognitive distortions take place in the mind which reinforces negativity and feeling bad. Clients learned how to challenge cognitive distortions and how to create positive outlooks. [address]  0/08/8544 22:15 PM    Co-Therapist: N/A      [facility] Individual Group Progress Note    Anne Bates  1975 9/13/2023    Notes on Client Progress in Group    Jenny participated in the group discussion. He shared his treatment goal is \"sobriety\" and that his biggest stressor is not having enough money. He gave good insight in identifying his cognitive distortions and understands that he needs to practice and challenge them that results in positive outlook.     [address]  0/71/9293 29:80 PM    Co-Therapist: N/A

## 2023-09-15 ENCOUNTER — HOSPITAL ENCOUNTER (OUTPATIENT)
Dept: PSYCHIATRY | Age: 48
Setting detail: THERAPIES SERIES
Discharge: HOME | End: 2023-09-15
Payer: COMMERCIAL

## 2023-09-15 PROCEDURE — H2020 THER BEHAV SVC, PER DIEM: HCPCS

## 2023-09-15 NOTE — GROUP NOTE
[facility] Group Therapy Note      9/15/2023    Location:  [place]      Clients Presents: [phone]    Clients Absent: [phone]    Length of session: 1.5 hours    Group Note: [facility]    Group Type: Co-Ed    New members were welcomed and introduced. Norms and expectations of group were discussed. Content: GROUP CHECKED-IN  TOPIC:  \"Continuing Recovery Lifestyle-Discussion\"  Clients assessed where they are progressing in their own recovery and areas that need more work in. [address] [facility]  5/99/9336 75:83 AM    Co-Therapist: N/A      [facility] Individual Group Progress Note    Sugey Ferguson  1975  9/15/2023    Notes on Client Progress in Group    Jenny participated in the group discussion. He shared openly and gave appropriate feedback. He stated, \"Self-care is what I need to work on and boundaries with with old people. \"    [address] [facility]  5/12/7596 46:00 AM    Co-Therapist: N/A

## 2023-09-18 ENCOUNTER — APPOINTMENT (OUTPATIENT)
Dept: PSYCHIATRY | Age: 48
End: 2023-09-18
Payer: COMMERCIAL

## 2023-09-18 ENCOUNTER — HOSPITAL ENCOUNTER (OUTPATIENT)
Dept: PSYCHIATRY | Age: 48
Setting detail: THERAPIES SERIES
Discharge: HOME | End: 2023-09-18
Payer: COMMERCIAL

## 2023-09-18 PROCEDURE — H2020 THER BEHAV SVC, PER DIEM: HCPCS

## 2023-09-18 NOTE — GROUP NOTE
[facility] Group Therapy Note      9/18/2023    Location:  [place]      Clients Presents: [phone]     Clients Absent: [phone]    Length of session: 1.5 hours    Group Note: [facility]    Group Type: Co-Ed    New members were welcomed and introduced. Norms and expectations of group were discussed. Content: GROUP CHECKED-IN  TOPIC:  \"Think Your Way Out Of Using\"  Clients reviewed portions of the hand-out where they identified: Thinking about the benefits of not using which include (Better physical and mental health, Improved family life, Greater job stability, and increased self--esteem) to name a few. Thinking of the benefits of not using can assist clients in thinking their way out of using. Quint[address]  0/09/7937 11:75 AM    Co-Therapist: N/A      [facility] Individual Group Progress Note    Berto Chavez  1975 9/18/2023    Notes on Client Progress in Group    Jenny participated in the group discussion. He was attentive, shared how he has benefited in thinking his way out of using (maintaining employment, healing of family relationships, increased income). He gave appropriate feedback.     Quinten Thompson  0/44/7892 34:62 AM    Co-Therapist: N/A

## 2023-09-19 ENCOUNTER — HOSPITAL ENCOUNTER (OUTPATIENT)
Dept: PSYCHIATRY | Age: 48
Setting detail: THERAPIES SERIES
Discharge: HOME | End: 2023-09-19
Payer: COMMERCIAL

## 2023-09-19 ENCOUNTER — APPOINTMENT (OUTPATIENT)
Dept: PSYCHIATRY | Age: 48
End: 2023-09-19
Payer: COMMERCIAL

## 2023-09-19 PROCEDURE — 90834 PSYTX W PT 45 MINUTES: CPT

## 2023-09-19 PROCEDURE — H2020 THER BEHAV SVC, PER DIEM: HCPCS

## 2023-09-19 PROCEDURE — 99211 OFF/OP EST MAY X REQ PHY/QHP: CPT

## 2023-09-19 NOTE — PROGRESS NOTES
[facility]                Progress Note    [x] Lynnette  [] Jovan                    Patient Name: Yudelka Zhou   : 1975     Case # :  [id number]  Therapist: Quinten Sandhu [place]        Objective/Service/Time:      1-HOUR        S:  Client attended individual session. He reports using marijuana but does not have a medical marijuana card. He stated, \"I am debating whether or not to attend the estate sale at the house I used to live at along with Stacy. \" Client processed his feelings and completed a pros/cons list about attending and shared that if he decides to attend, he will not attend alone. O:  Client was pleasant and fully oriented. A:  Client will continue completing his Individualized Treatment Plan Goals and Objectives. P:  Client is committed to attending treatment weekly.                 Tevin Palomo MA, [facility], , 92:10 AM

## 2023-09-20 ENCOUNTER — HOSPITAL ENCOUNTER (OUTPATIENT)
Dept: PSYCHIATRY | Age: 48
Setting detail: THERAPIES SERIES
Discharge: HOME | End: 2023-09-20
Payer: COMMERCIAL

## 2023-09-20 ENCOUNTER — APPOINTMENT (OUTPATIENT)
Dept: PSYCHIATRY | Age: 48
End: 2023-09-20
Payer: COMMERCIAL

## 2023-09-20 NOTE — GROUP NOTE
[facility] Group Therapy Note      9/20/2023    Location:  [facility] Presents: 1976    Clients Absent: [phone]    Length of session: 1.5 hours    Group Note: [facility]    Group Type: Co-Ed    New members were welcomed and introduced. Norms and expectations of group were discussed. Content: NO GROUP-INDIVIDUAL  Client completed individual session due to other group members canceling. [address] [facility]  5/31/0788 55:56 AM    Co-Therapist: N/A      [facility] Progress Note    Brianna Tyson  1975 9/20/2023    Notes on Client Progress in Group    Reason for Absence: CX-Client reported having to be at an estate auction of his he and his partners belongings including house the contents within the house and the cars. Client stated, \"I need closure! \"     [address] [facility]  6/04/6741 44:95 AM    Co-Therapist: N/A

## 2023-09-22 ENCOUNTER — HOSPITAL ENCOUNTER (OUTPATIENT)
Dept: PSYCHIATRY | Age: 48
Setting detail: THERAPIES SERIES
Discharge: HOME | End: 2023-09-22
Payer: COMMERCIAL

## 2023-09-22 PROCEDURE — H2020 THER BEHAV SVC, PER DIEM: HCPCS

## 2023-09-22 NOTE — GROUP NOTE
[facility] Group Therapy Note      9/22/2023    Location:  [facility] Presents: [phone]    Clients Absent: 0    Length of session: 3.0 hours    Group Note: IOP    Group Type: Co-Ed    New members were welcomed and introduced. Norms and expectations of group were discussed. Content: GROUP CHECKED-IN  TOPIC:  \"What Are Personal Boundaries-Packet\"  Clients learned common traits of what Rigid, Porous, and Healthy Boundaries are. Clients completed treatment packet where they explored how they can set healthy boundaries with people (e.g. spouse, family members, coworkers). [address] [facility]  4/44/4594 0:88 PM    Co-Therapist: N/A      [facility] Individual Group Progress Note    Sugey Ferguson  1975 9/22/2023    Notes on Client Progress in Group    Jenny participated in the group discussion. He was attentive, completed treatment packet, and stated, \"I tend to have a mix of different boundary types with individuals and my family members.     [address] [place]  3/91/5930 6:30 PM    Co-Therapist: N/A

## 2023-09-25 ENCOUNTER — HOSPITAL ENCOUNTER (OUTPATIENT)
Dept: PSYCHIATRY | Age: 48
Setting detail: THERAPIES SERIES
Discharge: HOME | End: 2023-09-25
Payer: COMMERCIAL

## 2023-09-25 ENCOUNTER — APPOINTMENT (OUTPATIENT)
Dept: PSYCHIATRY | Age: 48
End: 2023-09-25
Payer: COMMERCIAL

## 2023-09-25 PROCEDURE — H2020 THER BEHAV SVC, PER DIEM: HCPCS

## 2023-09-25 NOTE — GROUP NOTE
[facility] Group Therapy Note      9/25/2023    Location:  [place]      Clients Presents: [phone]    Clients Absent: [phone]    Length of session: 1.5 hours    Group Note: IOP    Group Type: Co-Ed    New members were welcomed and introduced. Norms and expectations of group were discussed. Content: GROUP CHECKED-IN  TOPIC:  \"Getting To Know Yourself After Recovery-Part I  Clients explored what brought them into treatment, what will it take for them to stay in recovery, their strengths/weaknesses, along with the benefits of staying in recovery. [address] [facility]  2/38/9336 26:21 AM    Co-Therapist: N/A      [facility] Individual Group Progress Note    Kane Manning  1975 9/25/2023    Notes on Client Progress in Group    Jenny participated in the group discussion. He shared how addiction progressed in his own life. He stated, \"I have a keen awareness of my triggers and what it would mean for me to take backward steps to drinking and into a full-blown relapse! \" He was attentive and gave appropriate feedback.     [address] [facility]  9/80/3387 64:48 AM    Co-Therapist: N/A

## 2023-09-26 ENCOUNTER — APPOINTMENT (OUTPATIENT)
Dept: PSYCHIATRY | Age: 48
End: 2023-09-26
Payer: COMMERCIAL

## 2023-09-26 ENCOUNTER — HOSPITAL ENCOUNTER (OUTPATIENT)
Dept: PSYCHIATRY | Age: 48
Setting detail: THERAPIES SERIES
Discharge: HOME | End: 2023-09-26
Payer: COMMERCIAL

## 2023-09-26 NOTE — PROGRESS NOTES
[facility]                Progress Note    [x] Lynnette  [] Jovan                    Patient Name: Leann Maldonado   : 1975     Case # :  [id number]  Therapist: JOSEPHINE Oquendo        Objective/Service/Time:    CX-Client called and reported that he was going into residential at [facility]. He was encouraged to contact us once he is discharged from [facility] for continued support.                       Shirley Gar MA, JOSEPHINE, 28/47/15, 99:16 AM

## 2023-09-27 ENCOUNTER — HOSPITAL ENCOUNTER (OUTPATIENT)
Dept: PSYCHIATRY | Age: 48
Setting detail: THERAPIES SERIES
End: 2023-09-27
Payer: COMMERCIAL

## 2023-09-27 ENCOUNTER — APPOINTMENT (OUTPATIENT)
Dept: PSYCHIATRY | Age: 48
End: 2023-09-27
Payer: COMMERCIAL

## 2023-10-02 ENCOUNTER — APPOINTMENT (OUTPATIENT)
Dept: PSYCHIATRY | Age: 48
End: 2023-10-02
Payer: COMMERCIAL

## 2023-10-03 ENCOUNTER — APPOINTMENT (OUTPATIENT)
Dept: PSYCHIATRY | Age: 48
End: 2023-10-03
Payer: COMMERCIAL

## 2023-10-03 ENCOUNTER — HOSPITAL ENCOUNTER (OUTPATIENT)
Dept: PSYCHIATRY | Age: 48
Discharge: HOME | End: 2023-10-03

## 2023-10-04 ENCOUNTER — APPOINTMENT (OUTPATIENT)
Dept: PSYCHIATRY | Age: 48
End: 2023-10-04
Payer: COMMERCIAL

## 2023-10-05 NOTE — PROGRESS NOTES
[facility] Discharge Summary    Leann Maldonado  1975  Case # [id number]    Location: [x] [place] [] [facility]    Admission Date:  7/18/23    Date of last service: 9/25/23    Therapist: Quinten Zamora     Last drug screen: 9/11/23     Results: Positive [email or web address] and [email or web address]@185    Diagnosis: F10.20 Other and unspecified alcohol dependence/unspecified drinking behavior, F14.20 Cocaine dependence-unspecified use, F12.10 Nondependent cannabis abuse-unspecified use    PRESENTING PROBLEM:  Client needed continued AOD support after detox/residential services    Service: IOP 3x Week, Individual 1x Week, Urinalysis Random, AA/NA 1-2x Week, Sponsor As Needed, and Case Management As Needed    Level of care at admission:   2.1 Intensive Outpatient Services    Level of care at discharge:   2.1 Intensive Outpatient Services    Client's Outcomes Treatment: Client was engaged in IOP treatment but could not stay clean on current Level of Care    Service History Appointments: Scheduled-28   Kelly Hernandez   Did not show-1    Discharge Code: A    Reason for discharge: Client needed a Higher Level of Care not provided by [facility]. Recommendations/Referrals: Return to [facility] for additional support after residential treatment is completed    Upon involuntary termination from service, client has received Client Rights as to their right to file an appeal.     Adult And Adolescent Discharge Level of Care Criteria to be completed separately See Attachment. Quinten Zamora   33/8/1050 / 6:64 AM       Medical Director     ANDRA Rizvi MD    Signature:

## 2023-10-09 ENCOUNTER — APPOINTMENT (OUTPATIENT)
Dept: PSYCHIATRY | Age: 48
End: 2023-10-09
Payer: COMMERCIAL

## 2023-10-10 ENCOUNTER — APPOINTMENT (OUTPATIENT)
Dept: PSYCHIATRY | Age: 48
End: 2023-10-10
Payer: COMMERCIAL

## 2023-10-16 ENCOUNTER — APPOINTMENT (OUTPATIENT)
Dept: PSYCHIATRY | Age: 48
End: 2023-10-16
Payer: COMMERCIAL

## 2023-10-31 ENCOUNTER — HOSPITAL ENCOUNTER (OUTPATIENT)
Dept: PSYCHIATRY | Age: 48
Setting detail: THERAPIES SERIES
Discharge: HOME | End: 2023-10-31

## 2023-10-31 PROCEDURE — 90791 PSYCH DIAGNOSTIC EVALUATION: CPT

## 2023-10-31 PROCEDURE — 80305 DRUG TEST PRSMV DIR OPT OBS: CPT

## 2023-10-31 ASSESSMENT — ANXIETY QUESTIONNAIRES
GAD7 TOTAL SCORE: 15
6. BECOMING EASILY ANNOYED OR IRRITABLE: 2
2. NOT BEING ABLE TO STOP OR CONTROL WORRYING: 2
5. BEING SO RESTLESS THAT IT IS HARD TO SIT STILL: 1
7. FEELING AFRAID AS IF SOMETHING AWFUL MIGHT HAPPEN: 2
3. WORRYING TOO MUCH ABOUT DIFFERENT THINGS: 3
4. TROUBLE RELAXING: 2
1. FEELING NERVOUS, ANXIOUS, OR ON EDGE: 3
IF YOU CHECKED OFF ANY PROBLEMS ON THIS QUESTIONNAIRE, HOW DIFFICULT HAVE THESE PROBLEMS MADE IT FOR YOU TO DO YOUR WORK, TAKE CARE OF THINGS AT HOME, OR GET ALONG WITH OTHER PEOPLE: SOMEWHAT DIFFICULT

## 2023-10-31 ASSESSMENT — PATIENT HEALTH QUESTIONNAIRE - PHQ9
3. TROUBLE FALLING OR STAYING ASLEEP: 3
4. FEELING TIRED OR HAVING LITTLE ENERGY: 2
7. TROUBLE CONCENTRATING ON THINGS, SUCH AS READING THE NEWSPAPER OR WATCHING TELEVISION: 1
SUM OF ALL RESPONSES TO PHQ QUESTIONS 1-9: 12
2. FEELING DOWN, DEPRESSED OR HOPELESS: 2
1. LITTLE INTEREST OR PLEASURE IN DOING THINGS: 2
10. IF YOU CHECKED OFF ANY PROBLEMS, HOW DIFFICULT HAVE THESE PROBLEMS MADE IT FOR YOU TO DO YOUR WORK, TAKE CARE OF THINGS AT HOME, OR GET ALONG WITH OTHER PEOPLE: 2
SUM OF ALL RESPONSES TO PHQ QUESTIONS 1-9: 12
6. FEELING BAD ABOUT YOURSELF - OR THAT YOU ARE A FAILURE OR HAVE LET YOURSELF OR YOUR FAMILY DOWN: 1
SUM OF ALL RESPONSES TO PHQ QUESTIONS 1-9: 12
5. POOR APPETITE OR OVEREATING: 1
9. THOUGHTS THAT YOU WOULD BE BETTER OFF DEAD, OR OF HURTING YOURSELF: 0
8. MOVING OR SPEAKING SO SLOWLY THAT OTHER PEOPLE COULD HAVE NOTICED. OR THE OPPOSITE, BEING SO FIGETY OR RESTLESS THAT YOU HAVE BEEN MOVING AROUND A LOT MORE THAN USUAL: 0
SUM OF ALL RESPONSES TO PHQ QUESTIONS 1-9: 12
SUM OF ALL RESPONSES TO PHQ9 QUESTIONS 1 & 2: 4

## 2023-10-31 NOTE — PROGRESS NOTES
[facility]        Individual  Progress Note    Location: [x] [place] [] [facility]                   Patient Name: Maged Franco   : 1975     Case # :  [id number]  Therapist: OSCAR Draper        Objective/Service/Time: kept 1 hour Assessment    S-Client is a 50year old single  male self referred to [facility]. Client reports he left [facility] in September and went into Detox at [place] on 2023 and stayed through 10/25/2023. Client reports he is still employed full time and living with his father. Client shared he is attending AA weekly and has a sponsor. O-Client was cooperative, his thought process was logical, his mood euthymic, his affect full and speech normal and clear. Client denies any hallucinations or delusions. No HI/SI. A-Completed intake paperwork, began assessment and administered initial UDS. Client and counselor created a treatment plan. Client met criteria for level 2.1 IOP. Client will start treatment 2023. P-Client will attend IOP group 3 days a week and have an individual session weekly. He will continue to attend 12 step meetings and engage with his sponsor.          Electronically signed by Kendra Lux on 10/31/2023 at 8:48 AM
[facility] TREATMENT PLAN      Location: [x] [place] [] [facility]    Treatment plan: Initial    Strengths: intelligent, hard worker    Weakness/Limitations: addiction     Service/Frequency/Duration: Individual 1 a week for 90 days, IOP 3 a week for 90 days, Urinalysis Random for 90 days, AA/NA 1-2 weekly for 90 days, and Case Management as needed for 90 days    Diagnosis: F10.20 Other and unspecified alcohol dependence/unspecified drinking behavior, F14.20 Cocaine dependence-unspecified use, F12.10 Nondependent cannabis abuse-unspecified use, and F11.10 Nondependent opioid abuse-unspecified use    Level of Care: 2.1 Intensive Outpatient Services    Problem: Substance use resulting in strained relationships with family. Goal: Abstain from using drugs/alcohol in 90 Days   Objectives:   1) Identify 3 Indicators of Addiction in 90 Days. Evaluation Date: 1/31/2024 Code: C Continue TBD   2)  Identify 3 Negative Effects of Substance Use in 90 Days. Evaluation Date: 1/31/2024 Code: C Continue TBD     iii  3)  Identify 3 Positives for maintaining sobriety in 90 day Evaluation Date:  1/31/2024 Code: C continue TBD    2. Problem: Lacks Coping Skills to Maintain Long-term Sobriety   Goal: Acquire necessary skills to maintain sobriety in 90 Days   Objectives:   1)  Identify 3 Changes that you will make that support your recovery in 90 Days Evaluation Date: 1/31/2024 Code: C Continue TBD   2) Identify 3 External/Internal Triggers and Sober Responses to them in 90 Days Evaluation Date: 1/31/2024 Code: C Continue TBD   3) 1x Month contact with [facility]  for support in 90 Days Evaluation Date: 1/31/2024 Code: C Continue TBD     3.     Problem: Lacks Understanding and Knowledge of Addiction   Goal: Develop increased awareness of the Disease of Addiction and Relapse triggers and coping strategies needed to effectively deal with them that support long-term sobriety in 90 Days  Objectives:   1)  Identify 3 Ways to
medical care. Summary of Medical History  Prior to Admission medications    Medication Sig Start Date End Date Taking?  Authorizing Provider   amLODIPine-benazepril (LOTREL) 10-40 MG per capsule Take 1 capsule by mouth daily 8/14/23   Adiel Yun MD   atorvastatin (LIPITOR) 20 MG tablet Take 1 tablet by mouth daily 8/14/23   Adiel Yun MD   cariprazine hcl (VRAYLAR) 3 MG CAPS capsule Take 1 capsule by mouth at bedtime 8/14/23   Adiel Yun MD   hydrOXYzine pamoate (VISTARIL) 50 MG capsule Take 1 capsule by mouth as needed for Itching 8/14/23   Adiel Yun MD   mirtazapine (REMERON) 30 MG tablet Take 1 tablet by mouth nightly 8/14/23   Adiel Yun MD   methocarbamol (ROBAXIN) 750 MG tablet TAKE 1 TABLET BY MOUTH 4 TIMES DAILY AS NEEDED (.) 3/8/23   Adiel Yun MD   thiamine 100 MG tablet Take 100 mg by mouth daily 2/14/23   Franc Boss MD   folic acid (FOLVITE) 1 MG tablet Take 1 mg by mouth daily 2/14/23   Franc Boss MD   NIACIN PO Take by mouth daily    Franc oBss MD   TRIUMEQ 600- MG TABS Take 1 tablet by mouth daily 2/6/23   Adiel Yun MD   OLANZapine (ZYPREXA) 5 MG tablet Take 1 tablet by mouth daily 2/2/23   Adiel Yun MD   QUEtiapine (SEROQUEL) 100 MG tablet Take 1 tablet by mouth nightly as needed (NIGHTLY AS NEEDED) 2/2/23   Adiel Yun MD   naltrexone (DEPADE) 50 MG tablet Take 50 mg by mouth daily    Franc Boss MD   Multiple Vitamin (MULTIVITAMIN ADULT PO) Take 1 tablet by mouth daily    Franc Boss MD   acetaminophen (TYLENOL) 500 MG tablet Take 1,000 mg by mouth every 6 hours as needed for Pain    Franc Boss MD   ondansetron (ZOFRAN) 4 MG tablet Take 1 tablet by mouth 3 times daily as needed for Nausea or Vomiting 7/2/21   Nora Araujo MD     Past Surgical History:   Procedure Laterality Date    ABDOMEN SURGERY

## 2023-11-01 ENCOUNTER — APPOINTMENT (OUTPATIENT)
Dept: PSYCHIATRY | Age: 48
End: 2023-11-01
Payer: COMMERCIAL

## 2023-11-01 ENCOUNTER — HOSPITAL ENCOUNTER (OUTPATIENT)
Dept: PSYCHIATRY | Age: 48
Setting detail: THERAPIES SERIES
Discharge: HOME | End: 2023-11-01
Payer: COMMERCIAL

## 2023-11-01 PROCEDURE — H2020 THER BEHAV SVC, PER DIEM: HCPCS

## 2023-11-01 NOTE — GROUP NOTE
[facility] Group Therapy Note      11/1/2023    Location:  [place]      Clients Presents: [phone]     Clients Absent: 0    Length of session: 3.0 hours    Group Note: [facility]    Group Type: Co-Ed    New members were welcomed and introduced. Norms and expectations of group were discussed. Content: GROUP CHECKED-IN  TOPIC:  \"5-Minute Autobiography\" - \"What Is Resilience? \"  Clients completed a five minute autobiography about themselves and shared it. Clients discussed the five pillars of resilience (Self-Awareness, Self-Care, Positive Relationships, Mindfulness, and Purpose). [address] [facility]  34/7/2117 5:59 PM    Co-Therapist: N/A      [employer] [facility] Individual Group Progress Note    Chantal Alonso  1975 11/1/2023    Notes on Client Progress in Group    Jenny is back in the IOP group since completing residential at [facility]. He participated in the group discussion. He stated, \"I now know what I wasn't using the last time I came through-support! \" \"I go to [facility] weekly and now I'm using my sponsor and working the Steps!     [address] [facility]  26/2/1522 2:12 PM    Co-Therapist: N/A

## 2023-11-06 ENCOUNTER — HOSPITAL ENCOUNTER (OUTPATIENT)
Dept: PSYCHIATRY | Age: 48
Setting detail: THERAPIES SERIES
Discharge: HOME | End: 2023-11-06
Payer: COMMERCIAL

## 2023-11-06 ENCOUNTER — APPOINTMENT (OUTPATIENT)
Dept: PSYCHIATRY | Age: 48
End: 2023-11-06
Payer: COMMERCIAL

## 2023-11-06 PROCEDURE — H2020 THER BEHAV SVC, PER DIEM: HCPCS

## 2023-11-06 PROCEDURE — 80305 DRUG TEST PRSMV DIR OPT OBS: CPT

## 2023-11-06 NOTE — GROUP NOTE
[facility] Group Therapy Note      11/6/2023    Location:  [facility] Presents: 2010 [phone]    Clients Absent: 0    Length of session: 3.0 hours    Group Note: IOP    Group Type: Co-Ed    New members were welcomed and introduced. Norms and expectations of group were discussed. Content: GROUP CHECKED-IN  TOPIC:  12-Step Program Packet\"  Clients went into an in depth study of each of the Steps of the 12-Step Program.    Quinten Morgan  56/6/1757 8:00 PM    Co-Therapist: N/A      [facility] Individual Group Progress Note    Shar De Leon  1975 11/6/2023    Notes on Client Progress in Group    Jenny participated in the group discussion. He was attentive and shared that he was very familiar with the 12-Steps of the 12 Step Program and plans to be more intentional about working them this time around in his recovery.     Quinten Morgan  83/4/5071 9:96 PM    Co-Therapist: N/A

## 2023-11-07 ENCOUNTER — HOSPITAL ENCOUNTER (OUTPATIENT)
Dept: PSYCHIATRY | Age: 48
Setting detail: THERAPIES SERIES
Discharge: HOME | End: 2023-11-07
Payer: COMMERCIAL

## 2023-11-07 PROCEDURE — 90834 PSYTX W PT 45 MINUTES: CPT

## 2023-11-07 NOTE — PROGRESS NOTES
[facility]                Progress Note    [x] Lynnette  [] Jovan                    Patient Name: Dwight Lynn   : 1975     Case # :  [id number]  Therapist: JOSEPHINE Calix        Objective/Service/Time:      1-HOUR    GOAL 1a  OBJECTIVE 1     S:  Client completed individual session. He reports no use of any substances. He stated, \"I'm still missing Rosea Chavez! \" \"I've been thinking about the good times but now the holidays are coming it just feels different! \" Client processed his feelings and thought of other ways to celebrate his partner's life through the holidays. O:  Client was cooperative and fully oriented       A:  Client reviewed ITP and verbalized 3 personal patterns of relapse: Loss, Depression, Getting in my head. He will continue working toward completing Individualized Treatment Plan goals and objectives. P:  Client is committed to attending group and individual therapy sessions weekly.                     Veronica Sorto MA, [facility], , 8:18 PM

## 2023-11-07 NOTE — PROGRESS NOTES
[facility] TREATMENT PLAN      Location: [x] [place] [] [facility]    Treatment plan: Initial  7494      Strengths: intelligent, hard worker    Weakness/Limitations: addiction     Service/Frequency/Duration: Individual 1 a week for 90 days, IOP 3 a week for 90 days, Urinalysis Random for 90 days, AA/NA 1-2 weekly for 90 days, and Case Management as needed for 90 days    Diagnosis: F10.20 Other and unspecified alcohol dependence/unspecified drinking behavior, F14.20 Cocaine dependence-unspecified use, F12.10 Nondependent cannabis abuse-unspecified use, and F11.10 Nondependent opioid abuse-unspecified use    Level of Care: 2.1 Intensive Outpatient Services    Problem: Substance use resulting in strained relationships with family. Goal: Abstain from using drugs/alcohol in 90 Days   Objectives:   1) Identify 3 Personal Patterns of Relapse in 90 Days. Evaluation Date: 1/31/2024 Code: A  Achieved 11/7/23 Loss, Depression, Getting in my head  2)  Identify 3 Negative Effects of Substance Use in 90 Days. Evaluation Date: 1/31/2024 Code: C Continue TBD     iii  3)  Identify 3 Positives for maintaining sobriety in 90 day Evaluation Date:  1/31/2024 Code: C continue TBD    2. Problem: Lacks Coping Skills to Maintain Long-term Sobriety   Goal: Acquire necessary skills to maintain sobriety in 90 Days   Objectives:   1)  Identify 3 Changes that you will make that support your recovery in 90 Days Evaluation Date: 1/31/2024 Code: C Continue TBD   2) Identify 3 External/Internal Triggers and Sober Responses to them in 90 Days Evaluation Date: 1/31/2024 Code: C Continue TBD   3) 1x Month contact with [facility]  for support in 90 Days Evaluation Date: 1/31/2024 Code: C Continue TBD     3.     Problem: Lacks Understanding and Knowledge of Addiction   Goal: Develop increased awareness of the Disease of Addiction and Relapse triggers and coping strategies needed to effectively deal with them that support long-term

## 2023-11-08 ENCOUNTER — HOSPITAL ENCOUNTER (OUTPATIENT)
Dept: PSYCHIATRY | Age: 48
Setting detail: THERAPIES SERIES
Discharge: HOME | End: 2023-11-08
Payer: COMMERCIAL

## 2023-11-08 PROCEDURE — H2020 THER BEHAV SVC, PER DIEM: HCPCS

## 2023-11-08 NOTE — GROUP NOTE
500 [facility] Group Therapy Note      11/8/2023    Location:  [facility] Presents:  [id number] [phone]     Clients Absent: 0    Length of session: 3.0 hours    Group Note: IOP    Group Type: Co-Ed    New members were welcomed and introduced. Norms and expectations of group were discussed. Content: GROUP CHECKED-IN  TOPIC:  \"Self Inventory-Honesty Equals New Life-Knowledge-What Is Inside You?\"  Clients completed treatment packet where they completed a self-inventory. Clients had discussed the importance of honesty and knowledge about the disease of addiction in recovery. [address]  47/7/5639 98:41 PM    Co-Therapist: N/A      [facility] Individual Group Progress Note    Leann Maldonado  1975 11/8/2023    Notes on Client Progress in Group    Jenny participated in the group discussion. He was attentive, complete treatment packet, offered good insight about his choices in his own recovery, and gave appropriate feedback.     [address]  58/1/8022 3:37 PM    Co-Therapist: N/A

## 2023-11-13 ENCOUNTER — HOSPITAL ENCOUNTER (OUTPATIENT)
Dept: PSYCHIATRY | Age: 48
Setting detail: THERAPIES SERIES
Discharge: HOME | End: 2023-11-13
Payer: COMMERCIAL

## 2023-11-13 ENCOUNTER — APPOINTMENT (OUTPATIENT)
Dept: PSYCHIATRY | Age: 48
End: 2023-11-13
Payer: COMMERCIAL

## 2023-11-13 NOTE — GROUP NOTE
[facility] Group Therapy Note      11/13/2023    Location:  [place]      Clients Presents: [id number] [phone]    Clients Absent: [phone]    Length of session: 3.0 hours    Group Note: IOP    Group Type: Co-Ed    New members were welcomed and introduced. Norms and expectations of group were discussed. Content: GROUP CHECKED-IN  TOPIC:  \"Grief in Recovery: Coping with Sadness and Loss\" and \"The Stages of Grief\"  Clients read and discussed the importance of understanding loss and sadness in addiction recovery. Clients learned that loss in addiction recovery may bring about unpleasant feelings of depression and anxiety that can serve as triggers to relapse. Clients also completed a worksheet where they identified Jessica Mitchell' Stages of Grief and wrote about the stages that they have experienced themselves.     [address], [address]  59/52/2320 45:18 PM    Co-Therapist: N/A      [facility] Individual Group Progress Note    Andrey Guillen  1975 11/13/2023    Notes on Client Progress in Group    Reason for Absence: CX-Client reports getting Bety, [address]  42/80/2625 9:63 PM    Co-Therapist: N/A

## 2023-11-14 ENCOUNTER — HOSPITAL ENCOUNTER (OUTPATIENT)
Dept: PSYCHIATRY | Age: 48
Setting detail: THERAPIES SERIES
Discharge: HOME | End: 2023-11-14
Payer: COMMERCIAL

## 2023-11-14 PROCEDURE — 90834 PSYTX W PT 45 MINUTES: CPT

## 2023-11-14 NOTE — PROGRESS NOTES
[facility]                Progress Note    [x] Lynnette  [] Jovan                    Patient Name: Carmelina Small   : 1975     Case # :  [id number]  [facility]        Objective/Service/Time:    1-HOUR     GOAL 1a  OBJECTIVE 2     S:  Client completed individual session. He reports a lapse on cocaine laced with fentanyl a few days ago. He stated, \"I brought powdered cocaine from my brother and used narcan on me. \" \"I'm so tired of living this way! \" \"I want to live but I don't want to keep relapsing! \"     O:  Client was cooperative and fully oriented       A:  Client reviewed ITP and verbalized 3 negative of effects of substance use: Declined health, Low-self worth, Possible death. He will continue working toward completing Individualized Treatment Plan goals and objectives. P:  Client is committed to attending group and individual therapy sessions weekly.                           Wicho Duff MA, [facility], 63/55/12, 73:10 AM

## 2023-11-14 NOTE — PROGRESS NOTES
[facility] TREATMENT PLAN      Location: [x] [place] [] [facility]    Treatment plan:  [id number]   7560  (11/14/23)    Strengths: intelligent, hard worker    Weakness/Limitations: addiction     Service/Frequency/Duration: Individual 1 a week for 90 days, IOP 3 a week for 90 days, Urinalysis Random for 90 days, AA/NA 1-2 weekly for 90 days, and Case Management as needed for 90 days    Diagnosis: F10.20 Other and unspecified alcohol dependence/unspecified drinking behavior, F14.20 Cocaine dependence-unspecified use, F12.10 Nondependent cannabis abuse-unspecified use, and F11.10 Nondependent opioid abuse-unspecified use    Level of Care: 2.1 Intensive Outpatient Services    Problem: Substance use resulting in strained relationships with family. Goal: Abstain from using drugs/alcohol in 90 Days   Objectives:   1) Identify 3 Personal Patterns of Relapse in 90 Days. Evaluation Date: 1/31/2024 Code: A  Achieved 11/7/23 Loss, Depression, Getting in my head  2)  Verbalize 3 Negative Effects of Substance Use in 90 Days. Evaluation Date: 1/31/2024 Code:  A  Achieved  11/14/23 Declined health, Low-self worth, Possible death     iii  3)  Identify 3 Positives for maintaining sobriety in 90 day Evaluation Date:  1/31/2024 Code: C continue TBD    2. Problem: Lacks Coping Skills to Maintain Long-term Sobriety   Goal: Acquire necessary skills to maintain sobriety in 90 Days   Objectives:   1)  Identify 3 Changes that you will make that support your recovery in 90 Days Evaluation Date: 1/31/2024 Code: C Continue TBD   2) Identify 3 External/Internal Triggers and Sober Responses to them in 90 Days Evaluation Date: 1/31/2024 Code: C Continue TBD   3) 1x Month contact with [facility]ACH  for support in 90 Days Evaluation Date: 1/31/2024 Code: C Continue TBD     3.     Problem: Lacks Understanding and Knowledge of Addiction   Goal: Develop increased awareness of the Disease of Addiction and Relapse triggers and coping strategies

## 2023-11-15 ENCOUNTER — HOSPITAL ENCOUNTER (OUTPATIENT)
Dept: PSYCHIATRY | Age: 48
Setting detail: THERAPIES SERIES
Discharge: HOME | End: 2023-11-15
Payer: COMMERCIAL

## 2023-11-15 NOTE — PROGRESS NOTES
500 [facility]        Individual  Progress Note    Location: [x] [place] [] [facility]                   Patient Name: Sena Carrera   : 1975     Case # :  [id number]  Therapist: Terrence Fisher, GENIEDCIII        Objective/Service/Time:   CASE MANAGEMENT  1.0  Problem 2 Objective 3    S:  I met with client to complete SNAP interim report and new application if needed. O:  Client was oriented and open to discussion. A:  Client and I completed SNAP interim report and new SNAP application if needed. I scanned and emailed report to [facility]. P:  I will follow up with client and [facility]. Client will continue to speak with Therapist or  about requests for assistance if needed.       Terrence Fisher, BS, [facility], CTTS         Electronically signed by Jaja Mansfield on 11/15/2023 at 12:01 PM

## 2023-11-20 ENCOUNTER — APPOINTMENT (OUTPATIENT)
Dept: PSYCHIATRY | Age: 48
End: 2023-11-20
Payer: COMMERCIAL

## 2023-11-21 ENCOUNTER — APPOINTMENT (OUTPATIENT)
Dept: PSYCHIATRY | Age: 48
End: 2023-11-21
Payer: COMMERCIAL

## 2023-11-22 ENCOUNTER — APPOINTMENT (OUTPATIENT)
Dept: PSYCHIATRY | Age: 48
End: 2023-11-22
Payer: COMMERCIAL

## 2023-11-27 ENCOUNTER — APPOINTMENT (OUTPATIENT)
Dept: PSYCHIATRY | Age: 48
End: 2023-11-27
Payer: COMMERCIAL

## 2023-11-28 ENCOUNTER — APPOINTMENT (OUTPATIENT)
Dept: PSYCHIATRY | Age: 48
End: 2023-11-28
Payer: COMMERCIAL

## 2023-11-29 ENCOUNTER — APPOINTMENT (OUTPATIENT)
Dept: PSYCHIATRY | Age: 48
End: 2023-11-29
Payer: COMMERCIAL

## 2024-01-09 ENCOUNTER — HOSPITAL ENCOUNTER (OUTPATIENT)
Dept: PSYCHIATRY | Age: 49
Discharge: HOME | End: 2024-01-09

## 2024-01-09 NOTE — PROGRESS NOTES
[facility] Discharge Summary    Jenny Rucker  1975  Case # [id number]    Location: [x] [place] [] [place]    Admission Date:  9/8/75    Date of last service: 11/14/23     Therapist: JOSEPHINE Benito     Last drug screen: 11/7/23     Results: Positive Cocaine @ 413, Marijuana @ 19    Diagnosis: F10.20 Other and unspecified alcohol dependence/unspecified drinking behavior, F14.20 Cocaine dependence-unspecified use, F12.10 Nondependent cannabis abuse-unspecified use, F11.10 Nondependent opioid abuse-unspecified use    PRESENTING PROBLEM:  Client completed residential treatment and need ongoing support to maintain sobriety    Service: Individual 1x Month, IOP 3x Week, Urinalysis Random, AA/NA 1-2x Week, Sponsor As Needed, and Case Management As Needed    Level of care at admission:   2.1 Intensive Outpatient Services    Level of care at discharge:   2.1 Intensive Outpatient Services    Client's Outcomes Treatment: Client reports a relapse where he overdosed and needs to go back to residential treatment.    Service History Appointments: Scheduled-7   Kept-6   Cancelled-1   Did not show-0    Discharge Code: A    Reason for discharge: Client needed a Higher Level of Care-Residential not provided by [facility].    Recommendations/Referrals: Client reports going back to residential services at [facility].    Upon involuntary termination from service, client has received Client Rights as to their right to file an appeal.     Adult And Adolescent Discharge Level of Care Criteria to be completed separately See Attachment.      JOSEPHINE Benito   1/9/2024 / 11:16 AM       Medical Director     ANDRA Miller MD    Signature:

## 2024-02-05 ENCOUNTER — APPOINTMENT (OUTPATIENT)
Dept: PSYCHIATRY | Age: 49
End: 2024-02-05
Payer: COMMERCIAL

## 2024-02-06 ENCOUNTER — HOSPITAL ENCOUNTER (OUTPATIENT)
Dept: PSYCHIATRY | Age: 49
Setting detail: THERAPIES SERIES
Discharge: HOME | End: 2024-02-06
Payer: COMMERCIAL

## 2024-02-06 ENCOUNTER — APPOINTMENT (OUTPATIENT)
Dept: PSYCHIATRY | Age: 49
End: 2024-02-06
Payer: COMMERCIAL

## 2024-02-06 PROCEDURE — 80305 DRUG TEST PRSMV DIR OPT OBS: CPT

## 2024-02-06 PROCEDURE — 90791 PSYCH DIAGNOSTIC EVALUATION: CPT

## 2024-02-06 ASSESSMENT — PATIENT HEALTH QUESTIONNAIRE - PHQ9
4. FEELING TIRED OR HAVING LITTLE ENERGY: 1
SUM OF ALL RESPONSES TO PHQ QUESTIONS 1-9: 9
6. FEELING BAD ABOUT YOURSELF - OR THAT YOU ARE A FAILURE OR HAVE LET YOURSELF OR YOUR FAMILY DOWN: 1
5. POOR APPETITE OR OVEREATING: 0
2. FEELING DOWN, DEPRESSED OR HOPELESS: 1
1. LITTLE INTEREST OR PLEASURE IN DOING THINGS: 1
SUM OF ALL RESPONSES TO PHQ QUESTIONS 1-9: 9
3. TROUBLE FALLING OR STAYING ASLEEP: 3
SUM OF ALL RESPONSES TO PHQ QUESTIONS 1-9: 9
7. TROUBLE CONCENTRATING ON THINGS, SUCH AS READING THE NEWSPAPER OR WATCHING TELEVISION: 1
8. MOVING OR SPEAKING SO SLOWLY THAT OTHER PEOPLE COULD HAVE NOTICED. OR THE OPPOSITE, BEING SO FIGETY OR RESTLESS THAT YOU HAVE BEEN MOVING AROUND A LOT MORE THAN USUAL: 1
SUM OF ALL RESPONSES TO PHQ QUESTIONS 1-9: 9
SUM OF ALL RESPONSES TO PHQ9 QUESTIONS 1 & 2: 2
9. THOUGHTS THAT YOU WOULD BE BETTER OFF DEAD, OR OF HURTING YOURSELF: 0
10. IF YOU CHECKED OFF ANY PROBLEMS, HOW DIFFICULT HAVE THESE PROBLEMS MADE IT FOR YOU TO DO YOUR WORK, TAKE CARE OF THINGS AT HOME, OR GET ALONG WITH OTHER PEOPLE: 1

## 2024-02-06 ASSESSMENT — ANXIETY QUESTIONNAIRES
IF YOU CHECKED OFF ANY PROBLEMS ON THIS QUESTIONNAIRE, HOW DIFFICULT HAVE THESE PROBLEMS MADE IT FOR YOU TO DO YOUR WORK, TAKE CARE OF THINGS AT HOME, OR GET ALONG WITH OTHER PEOPLE: SOMEWHAT DIFFICULT
1. FEELING NERVOUS, ANXIOUS, OR ON EDGE: 1
6. BECOMING EASILY ANNOYED OR IRRITABLE: 1
7. FEELING AFRAID AS IF SOMETHING AWFUL MIGHT HAPPEN: 1
5. BEING SO RESTLESS THAT IT IS HARD TO SIT STILL: 1
2. NOT BEING ABLE TO STOP OR CONTROL WORRYING: 1
GAD7 TOTAL SCORE: 7
4. TROUBLE RELAXING: 1
3. WORRYING TOO MUCH ABOUT DIFFERENT THINGS: 1

## 2024-02-06 NOTE — PROGRESS NOTES
[facility] TREATMENT PLAN      Location: [x] [place] [] [place]    Treatment plan: Initial    Strengths: employed, intelligent     Weakness/Limitations: alcoholism     Service/Frequency/Duration: Individual 1 a week for 90 days, IOP 3 a week for 90d ays, Urinalysis Random monthly for 90 days, AA/NA 1-2 weekly for 90 days, and Case Management as needed for 90 days    Diagnosis: F10.20 Other and unspecified alcohol dependence/unspecified drinking behavior, F14.20 Cocaine dependence-unspecified use, F12.10 Nondependent cannabis abuse-unspecified use, and F11.10 Nondependent opioid abuse-unspecified use    Level of Care: 2.1 Intensive Outpatient Services    Problem: Substance use leading to overdose.   Goal: Abstain from using drugs/alcohol in 90 Days   Objectives:   1) Identify 3 Indicators of Addiction in 90 Days.   Evaluation Date:  5/6/24 Code: C Continue TBD   2)  Identify 3 Negative Effects of Substance Use in 90 Days.   Evaluation Date: 5/6/24 Code: C Continue TBD     iii  3)  Identify 3 Positives for maintaining sobriety in 90 day Evaluation Date:  5/6/24 Code: C continue TBD    2.    Problem: Lacks Coping Skills to Maintain Long-term Sobriety   Goal: Acquire necessary skills to maintain sobriety in 90 Days   Objectives:   1)  Identify 3 Coping Skills that support recovery in 90 Days Evaluation Date:  5/6/24 Code: C Continue TBD   2) Identify 3 External/Internal Triggers and Sober Responses to them in 90 Days Evaluation Date: 5/6/24 Code: C Continue TBD   3) 1x Month contact with [facility]  for support in 90 Days Evaluation Date: 5/6/24 Code: C Continue TBD     3.    Problem: Lacks Understanding and Knowledge of Addiction   Goal: Develop increased awareness of the Disease of Addiction and Relapse triggers and coping strategies needed to effectively deal with them that support long-term sobriety in 90 Days  Objectives:   1)  Identify 3 Reasons the Importance of Sober Support in Recovery in 90 Days

## 2024-02-06 NOTE — PROGRESS NOTES
[facility]        Individual  Progress Note    Location: [x] [place] [] [place]                   Patient Name: Jenny Rucker   : 1975     Case # :  [id number]  Therapist: OSCAR Woody        Objective/Service/Time: kept 1 hour assessment     S-Client is a 48 year old single  male, self referred to [facility]. Client reports he went to [facility] inpatient treatment from November 15, 2023 through 2024. Client shared he is here to do aftercare.     O-Client was cooperative, his thought process was logical, his mood euthymic, his affect full and speech normal. Client denies any hallucinations or delusions. No HI/SI.     A-Completed intake paperwork, began assessment, administered initial UDS, and created a treatment plan. Client met criteria for level 2.1 IOP and will start group on 2024 at 9:00 AM.     P-Client will return at 9:00 AM 2024 for IOP.         Electronically signed by OSCAR Woody on 2024 at 11:34 AM

## 2024-02-06 NOTE — PROGRESS NOTES
[facility] FIORELLA     PHYSICIAN ORDER  &  LABORATORY TESTING  &       CLINICAL DIAGNOSTIC SUMMARY             Location: [x] [place] [] [place]                   Patient Name: Jenny Rucker   : 1975     Case # :  [id number]  Therapist: ERIN WoodyIII    Diagnostic Summary:    IDENTIFYING INFORMATION:  Jenny Rucker / 1975         Client is a 48 year old single  male, self referred to [facility]. Client was here in November and relapsed so it was recommended he go inpatient. Client reports he went to [facility] inpatient treatment from November 15, 2023 through 2024. Client shared he is here to do aftercare. Client is employed at [employer] 5 evenings a week. He has not engaged in any 12 step meetings since getting out of residential but is willing to attend.     Alcohol-Reports first use age 11 Teens off/on liquor fifth; 20's - 30's 2 fifths or more; 40's 2-3 fifths daily Client reports last use of alcohol on 2023 drinking 1/5 of tequila and blacking out.    Cocaine- began at 18 powder 18-40's 1-2 grams weekly, crack started in mid 20's smoked 1 x a month. Client reports his last use of cocaine on 2023 snorting 2 lines    Opiate- began 32 prescribed due to surgery, abused percocets taking 28 pills orally weekly. Client reports his last use on 2023 smoked Fentanyl overdosed    Cannabis- started 18 smoked occasionally up until 2023 he reports last use 1 joint.     2.  IMPAIRED CONTROL : (Criteria: (1) Alcohol \"I drank a lot more than I ever wanted or intended to\"  (2) Alcohol \"I tried to stop but couldn't\" (3) Alcohol & Cocaine \"I spent lots of time getting, using and recovering\"        3. SOCIAL IMPAIRMENT: (Criteria: (5) Opiates, \"I have lost jobs due to my use\"(6) Opiates \"I have had problems with relationships\"                   4. RISKY USE: (Criteria: (8) Alcohol, Marijuana \"I have 3 BAYRON's\" (9) Cocaine, Alcohol &

## 2024-02-07 ENCOUNTER — APPOINTMENT (OUTPATIENT)
Dept: PSYCHIATRY | Age: 49
End: 2024-02-07
Payer: COMMERCIAL

## 2024-02-07 ENCOUNTER — HOSPITAL ENCOUNTER (OUTPATIENT)
Dept: PSYCHIATRY | Age: 49
Setting detail: THERAPIES SERIES
Discharge: HOME | End: 2024-02-07
Payer: COMMERCIAL

## 2024-02-07 PROCEDURE — H2020 THER BEHAV SVC, PER DIEM: HCPCS

## 2024-02-08 NOTE — GROUP NOTE
[facility] Group Therapy Note      2/8/2024    Location:  [facility]        Clients Presents: [phone], [phone], [phone], [phone]    Clients Absent:     Length of session: 1.5 hours    Group Note: OP    Group Type: Co-Ed    New members were welcomed and introduced.  Norms and expectations of group were discussed.        Content: Understanding Dysfunctional Families and Substance Use       KRISTEN Alonzo  2/8/2024 1:39 PM        Co-Therapist: N/A      [facility] Individual Group Progress Note    Jenny Rucker  1975 2/8/2024    Notes on Client Progress in Group      Jenny attended group, introduced himself and events leading to arrival: presented check in information: identified his drug use has impacted himself and multiple family members.            KRISTEN Alonzo  2/8/2024 1:50 PM      Co-Therapist: N/A

## 2024-02-09 ENCOUNTER — HOSPITAL ENCOUNTER (OUTPATIENT)
Dept: PSYCHIATRY | Age: 49
Setting detail: THERAPIES SERIES
Discharge: HOME | End: 2024-02-09
Payer: COMMERCIAL

## 2024-02-09 PROCEDURE — H2020 THER BEHAV SVC, PER DIEM: HCPCS

## 2024-02-09 NOTE — GROUP NOTE
[facility] Group Therapy Note      2/9/2024    Location:  [place]      Clients Presents: [phone], [phone], [phone]    Clients Absent: [phone]    Length of session: 3.0 hours    Group Note: [facility]    Group Type: Co-Ed    New members were welcomed and introduced.  Norms and expectations of group were discussed.    Content: Counselor presented a topic focused discussion on \"my addiction\". Client named his/her addiction, identified how it made him/her feel and act. Client reported what he/she loved and hated about the addiction.     ERIN WoodyIII  2/9/2024 12:00 PM    Co-Therapist: N/A      [facility] Individual Group Progress Note    Jenny Rucker  1975 2/9/2024    Notes on Client Progress in Group    Client shared he is making progress on his goals in treatment and denies any current obstacles for recovery. He shared money is a stressor.   Client participated in group discussion sharing he knew he had an issue with alcohol by the time he was 16 and has been to Rehab 7 times. He is hoping this time he will do things differently and get it right.     ERIN WoodyIII  2/9/2024 12:00 PM    Co-Therapist: N/A

## 2024-02-12 ENCOUNTER — HOSPITAL ENCOUNTER (OUTPATIENT)
Dept: PSYCHIATRY | Age: 49
Setting detail: THERAPIES SERIES
Discharge: HOME | End: 2024-02-12
Payer: COMMERCIAL

## 2024-02-12 ENCOUNTER — APPOINTMENT (OUTPATIENT)
Dept: PSYCHIATRY | Age: 49
End: 2024-02-12
Payer: COMMERCIAL

## 2024-02-12 PROCEDURE — H2020 THER BEHAV SVC, PER DIEM: HCPCS

## 2024-02-12 NOTE — GROUP NOTE
[facility] Group Therapy Note      2/12/2024    Location:  [facility] Presents: [phone]    Clients Absent: 0    Length of session: 3.0 hours    Group Note: IOP    Group Type: Co-Ed    New members were welcomed and introduced.  Norms and expectations of group were discussed.    Content: GROUP CHECKED-IN  TOPIC:  \"What Is Self-Care in Recovery From Substance Use and Addiction?\" - \"Daily Affirmations\" - \"Self-Esteem Worksheet\"  Clients learned the value and importance of self-care in recovery. Clients identified helpful self-care strategies (i.e. exercising, eating a healthy diet, keeping busy, maintaining boundaries, maintaining spirituality, and dealing with negative emotions). Clients also completed daily affirmation and self-esteem worksheets.    JOSEPHINE Benito  2/12/2024 1:07 PM    Co-Therapist: N/A      [facility] Individual Group Progress Note    Jenny Rucker  1975 2/12/2024    Notes on Client Progress in Group    Jenny participated in the group discussion. He reports completing residential services at [facility] and needs continued support to maintain sobriety. He was attentive, shared minimally, and gave appropriate feedback.    JOSEPHINE Benito  2/12/2024 1:31 PM    Co-Therapist: N/A

## 2024-02-13 ENCOUNTER — APPOINTMENT (OUTPATIENT)
Dept: PSYCHIATRY | Age: 49
End: 2024-02-13
Payer: COMMERCIAL

## 2024-02-14 ENCOUNTER — APPOINTMENT (OUTPATIENT)
Dept: PSYCHIATRY | Age: 49
End: 2024-02-14
Payer: COMMERCIAL

## 2024-02-14 ENCOUNTER — HOSPITAL ENCOUNTER (OUTPATIENT)
Dept: PSYCHIATRY | Age: 49
Setting detail: THERAPIES SERIES
Discharge: HOME | End: 2024-02-14
Payer: COMMERCIAL

## 2024-02-14 PROCEDURE — H2020 THER BEHAV SVC, PER DIEM: HCPCS

## 2024-02-14 NOTE — GROUP NOTE
[facility] Group Therapy Note      2/14/2024    Location:  [place]      Clients Presents: [phone]    Clients Absent: [phone]    Length of session: 3.0 hours    Group Note: IOP    Group Type: Co-Ed    New members were welcomed and introduced.  Norms and expectations of group were discussed.    Content: GROUP CHECKED-IN  TOPIC:  \"What Is Recovery?\" - \"Abstinence vs Sobriety/Recovery\"  Clients explored the definition of \"recovery\" and explained what \"recovery\" meant to them. Clients read and discussed the difference between abstinence and sobriety/recovery.    FELA BenitoMD  2/14/2024 2:14 PM    Co-Therapist: N/A      [facility] Individual Group Progress Note    Jenny Rucker  1975 2/14/2024    Notes on Client Progress in Group    Jenny participated in the group discussion. He offered good insight into understanding the difference between abstaining and sobriety and shared in more details of his own journey of recovery which included going to rehab three times in the past 12 months. Client gave appropriate feedback.    JOSEPHINE Benito  2/14/2024 2:31 PM    Co-Therapist: N/A

## 2024-02-16 ENCOUNTER — HOSPITAL ENCOUNTER (OUTPATIENT)
Dept: PSYCHIATRY | Age: 49
Setting detail: THERAPIES SERIES
Discharge: HOME | End: 2024-02-16
Payer: COMMERCIAL

## 2024-02-16 PROCEDURE — H2020 THER BEHAV SVC, PER DIEM: HCPCS

## 2024-02-16 NOTE — GROUP NOTE
[facility] Group Therapy Note      2/16/2024    Location:  [place]      Clients Presents: [phone]    Clients Absent: [phone]    Length of session: 3.0 hours    Group Note: [facility]    Group Type: Co-Ed    New members were welcomed and introduced.  Norms and expectations of group were discussed.    Content: GROUP CHECKED-IN  TOPIC:  \"Medical Aspects of Substance Use Disorders\" - \"Coping Skills in Addiction Recovery\"  Clients viewed an educational DVD which provided basic information on the neurobiology of addiction. They learned how alcohol and other drugs affect their brains and bodies, and why, because of changes in brain chemistry, it is so difficult to overcome a severe substance use disorder. Clients also identified various coping skills (i.e. Social Support, Diversions Building New Habits, Prevention, Managing Emotions/Relaxation/Deep Breathing, Journaling, and Imagery).    JOSEPHINE Benito  2/16/2024 12:56 PM    Co-Therapist: N/A      [facility] Individual Group Progress Note    Jenny Rucker  1975 2/16/2024    Notes on Client Progress in Group    Jenny participated in the group discussion. He was attentive and offered good insight in understanding how substances affect the brain and the body. He gave appropriate feedback. He stated, \"My goal in treatment is to stay sober!\"    JOSEPHINE Benito  2/16/2024 1:51 PM    Co-Therapist: N/A

## 2024-02-19 ENCOUNTER — APPOINTMENT (OUTPATIENT)
Dept: PSYCHIATRY | Age: 49
End: 2024-02-19
Payer: COMMERCIAL

## 2024-02-19 ENCOUNTER — HOSPITAL ENCOUNTER (OUTPATIENT)
Dept: PSYCHIATRY | Age: 49
Setting detail: THERAPIES SERIES
Discharge: HOME | End: 2024-02-19
Payer: COMMERCIAL

## 2024-02-19 PROCEDURE — H2020 THER BEHAV SVC, PER DIEM: HCPCS

## 2024-02-19 NOTE — GROUP NOTE
[facility] Group Therapy Note      2/19/2024    Location:  [place]      Clients Presents: [phone]    Clients Absent: [phone]    Length of session: 3.0 hours    Group Note: IOP    Group Type: Co-Ed    New members were welcomed and introduced.  Norms and expectations of group were discussed.    Content: GROUP CHECKED-IN  TOPIC:  \"Grief in Recovery-Coping with Sadness & Loss\"/Stages of Grief\" - \"Nine Practical Ways to Have More Gratitude in Recovery\"  Clients learn about the five stages of grief (Denial, Anger, Bargaining, Depression, and Acceptance), coping with sadness and loss in addiction recovery. Clients learned the importance of practicing gratitude which allows them to take on challenges with a positive mindset and cultivates positive self-regard.    JOSEPHINE Benito  2/19/2024 12:49 PM    Co-Therapist: N/A      [facility] Individual Group Progress Note    Jenny Rucker  1975 2/19/2024    Notes on Client Progress in Group    Jenny participated in the group discussion. He was attentive and read portions of the hand-out. He stated, \"I loss my partner last year and I'm still in the grieving process.\" Client shared that it's sometimes hard for him to practice gratitude when he's been treated badly.    JOSEPHINE Benito  2/19/2024 1:17 PM    Co-Therapist: N/A

## 2024-02-21 ENCOUNTER — HOSPITAL ENCOUNTER (OUTPATIENT)
Dept: PSYCHIATRY | Age: 49
Setting detail: THERAPIES SERIES
Discharge: HOME | End: 2024-02-21
Payer: COMMERCIAL

## 2024-02-21 PROCEDURE — H2020 THER BEHAV SVC, PER DIEM: HCPCS

## 2024-02-21 NOTE — GROUP NOTE
[facility] Group Therapy Note      2/21/2024    Location:  [place]      Clients Presents: [phone]    Clients Absent: [phone]    Length of session: 3.0 hours    Group Note: [facility]    Group Type: Co-Ed    New members were welcomed and introduced.  Norms and expectations of group were discussed.    Content: GROUP CHECKED-IN  TOPIC:  \"Hidden Issues\" - 13 Ways of Thinking: \"For Living In Recovery\"  Clients completed a worksheet where they explored hidden issues in their relationship(s) that can fuel arguments and could be cause for a lapse/relapse. Clients discussed practicing 13 New Ways of Thinking in Recovery to combat \"Stinking Thinking\" for a healthier recovery.    JOSEPHINE Benito  2/21/2024 12:38 PM    Co-Therapist: N/A      [facility] Individual Group Progress Note    Alexjohanny Rucker  1975 2/21/2024    Notes on Client Progress in Group    Jenny participated in the group discussion. He shared that he got his car back yesterday and that he didn't have to rely on his father for bringing him to treatment. He was attentive, completed treatment worksheet and gave appropriate feedback.    JOSEPHINE Benito  2/21/2024 1:01 PM    Co-Therapist: N/A

## 2024-02-23 ENCOUNTER — HOSPITAL ENCOUNTER (OUTPATIENT)
Dept: PSYCHIATRY | Age: 49
Setting detail: THERAPIES SERIES
Discharge: HOME | End: 2024-02-23
Payer: COMMERCIAL

## 2024-02-23 PROCEDURE — H2020 THER BEHAV SVC, PER DIEM: HCPCS

## 2024-02-23 NOTE — GROUP NOTE
[facility] Group Therapy Note      2/23/2024    Location:  [place]      Clients Presents: [phone]    Clients Absent: [phone]    Length of session: 3.0 hours    Group Note: IOP    Group Type: Co-Ed    New members were welcomed and introduced.  Norms and expectations of group were discussed.    Content: GROUP CHECKED-IN  TOPIC:  \"12-Step Programs\"  Clients learned how 12-Step and mutual-help programs support recovery. Clients explored the variety of 12-Step and mutual-help programs available.    JOSEPHINE Benito  2/23/2024 12:39 PM    Co-Therapist: N/A      [facility] Individual Group Progress Note    Jenny Rucker  1975 2/23/2024    Notes on Client Progress in Group    Jenny participated in the group discussion. He was attentive and more talkative in group today. He stated that he is very knowledgeable of the 12-Step programs and prefers AA. He said, \"I don't care for NA.\" Client gave appropriate feedback.    JOSEPHINE Benito  2/23/2024 12:51 PM    Co-Therapist: N/A

## 2024-02-26 ENCOUNTER — HOSPITAL ENCOUNTER (OUTPATIENT)
Dept: PSYCHIATRY | Age: 49
Setting detail: THERAPIES SERIES
Discharge: HOME | End: 2024-02-26
Payer: COMMERCIAL

## 2024-02-26 PROCEDURE — H2020 THER BEHAV SVC, PER DIEM: HCPCS

## 2024-02-26 NOTE — GROUP NOTE
[facility] Group Therapy Note      2/26/2024    Location:  [place]      Clients Presents: [id number]    Clients Absent: [phone]    Length of session: 1.5 hours    Group Note: IOP    Group Type: Co-Ed    New members were welcomed and introduced.  Norms and expectations of group were discussed.    Content: Only one Group member showed/Individual session Only    JOSEPHINE Benito  2/26/2024 10:40 AM    Co-Therapist: N/A      [facility] Individual Group Progress Note    Jenny Rucker  1975 2/26/2024    Notes on Client Progress in Group    1-HOUR    S:  Client completed individual session. He reports no use of any substances. He stated, \"Work is crazy but I get to choose my own hours!\" He stated that he was feeling \"OK\" in his recovery today.     O:  Client was cooperative and fully oriented.       A:  Client reports having a solid support network. He will continue working toward completing Individualized Treatment Plan goals and objectives.      P:  Client is committed to attending IOP group sessions weekly.    JOSEPHINE Benito  2/26/2024 10:43 AM    Co-Therapist: N/A

## 2024-02-28 ENCOUNTER — HOSPITAL ENCOUNTER (OUTPATIENT)
Dept: PSYCHIATRY | Age: 49
Setting detail: THERAPIES SERIES
Discharge: HOME | End: 2024-02-28
Payer: COMMERCIAL

## 2024-02-28 PROCEDURE — H2020 THER BEHAV SVC, PER DIEM: HCPCS

## 2024-02-28 NOTE — GROUP NOTE
[facility] Group Therapy Note      2/28/2024    Location:  [place]      Clients Presents: [phone]    Clients Absent: [phone]    Length of session: 3.0 hours    Group Note: IOP    Group Type: Co-Ed    New members were welcomed and introduced.  Norms and expectations of group were discussed.    Content: GROUP CHECKED-IN  TOPIC:  \"Anger in Addiction Recovery\"  Clients completed and anger packet and discussed how anger can be a trigger for relapse. Clients also explored ways to recognize and address a buildup of anger along with finding healthier copings skills to manage anger.    JOSEPHINE Benito  2/28/2024 1:41 PM    Co-Therapist: N/A      [facility] Individual Group Progress Note    Jenny Rucker  1975 2/28/2024    Notes on Client Progress in Group    Jenny participated in the group discussion. He stated, \"My goal is to maintain sobriety!\" He completed treatment packet, shared good insight into how he manages anger, and gave appropriate feedback.    JOSEPHINE Benito  2/28/2024 1:50 PM    Co-Therapist: N/A

## 2024-03-01 ENCOUNTER — HOSPITAL ENCOUNTER (OUTPATIENT)
Dept: PSYCHIATRY | Age: 49
Setting detail: THERAPIES SERIES
Discharge: HOME | End: 2024-03-01

## 2024-03-01 NOTE — GROUP NOTE
[facility] Group Therapy Note      3/1/2024    Location:  [place]      Clients Presents: [phone]    Clients Absent: [phone],    Length of session: 3.0 hours    Group Note: IOP    Group Type: Co-Ed    New members were welcomed and introduced.  Norms and expectations of group were discussed.    Content: Counselor facilitated a group discussion on alcohol and drugs. Client participated in AoD Mtime game.     OSCAR Woody  3/1/2024 12:00 PM    Co-Therapist: N/A      [facility] Individual Group Progress Note    Jenny Rucker  1975  3/1/2024    Notes on Client Progress in Group    Reason for Absence: cancelled    OSCAR Woody  3/1/2024 12:11 PM    Co-Therapist: N/A

## 2024-03-04 ENCOUNTER — HOSPITAL ENCOUNTER (OUTPATIENT)
Dept: PSYCHIATRY | Age: 49
Setting detail: THERAPIES SERIES
Discharge: HOME | End: 2024-03-04
Payer: COMMERCIAL

## 2024-03-04 NOTE — GROUP NOTE
[facility] Group Therapy Note      3/4/2024    Location:  [place]      Clients Presents: [phone]    Clients Absent: [phone]    Length of session: 1.5 hours    Group Note: IOP    Group Type: Co-Ed    New members were welcomed and introduced.  Norms and expectations of group were discussed.    Content: GROUP CHECKED-IN  TOPIC:  \"Triggers and Cravings\"  Clients learned about the chemical basis of pleasure and reward in the brain and how triggers and cravings relate to addiction.    JOSEPHINE Benito  3/4/2024 11:09 AM    Co-Therapist: N/A      [facility] Individual Group Progress Note    Jenny Rucker  1975  3/4/2024    Notes on Client Progress in Group    Reason for Absence: DNS     JOSEPHINE Benito  3/4/2024 11:27 AM    Co-Therapist: N/A

## 2024-03-06 ENCOUNTER — HOSPITAL ENCOUNTER (OUTPATIENT)
Dept: PSYCHIATRY | Age: 49
Setting detail: THERAPIES SERIES
Discharge: HOME | End: 2024-03-06
Payer: COMMERCIAL

## 2024-03-06 PROCEDURE — 80305 DRUG TEST PRSMV DIR OPT OBS: CPT

## 2024-03-06 PROCEDURE — H2020 THER BEHAV SVC, PER DIEM: HCPCS

## 2024-03-06 NOTE — GROUP NOTE
[facility] Group Therapy Note      3/6/2024    Location:  [place]      Clients Presents: [phone]    Clients Absent: [phone]    Length of session: 3.0 hours    Group Note: IOP    Group Type: Co-Ed    New members were welcomed and introduced.  Norms and expectations of group were discussed.    Content: GROUP CHECKED-IN  TOPIC:  \"Assessing Your Lifestyle\"  Clients learned how living a balanced lifestyle can help raise their own satisfaction with their life and reduce their risk of relapse.    JOSEPHINE Benito  3/6/2024 1:58 PM    Co-Therapist: N/A      [facility] Individual Group Progress Note    Alexjohanny Rucker  1975  3/6/2024    Notes on Client Progress in Group    Jenny participated in the group discussion. He shared that he had a lapse on alcohol and cocaine. He stated, \"My living environment is toxic but I can't move yet because I don't have the money.\" Client gave good insight and shared how balancing his life is a process.     JOSEPHINE Benito  3/6/2024 2:41 PM    Co-Therapist: N/A

## 2024-03-08 ENCOUNTER — HOSPITAL ENCOUNTER (OUTPATIENT)
Dept: PSYCHIATRY | Age: 49
Setting detail: THERAPIES SERIES
Discharge: HOME | End: 2024-03-08
Payer: COMMERCIAL

## 2024-03-08 NOTE — GROUP NOTE
[facility] Group Therapy Note      3/8/2024    Location:  [place]      Clients Presents: [phone]     Clients Absent: [phone]    Length of session: 3.0 hours    Group Note: IOP    Group Type: Co-Ed    New members were welcomed and introduced.  Norms and expectations of group were discussed.    Content: GROUP CHECKED-IN  TOPIC:  \"7 Factors that Contribute to Drug Addiction\" - \"Explaining the Cycle of Addiction\"  Clients identified seven factors that contribute to drug addiction (Genetics, Lact of options, Mental Health Disorders, Anxiety/Depression, Peer Pressure, Loneliness, and Prescription Drugs). Clients explored what the cycle of addiction is-which included the definition, the effects of drug and alcohol on the brain and the stages of change.    JOSEPHINE Benito  3/8/2024 1:29 PM    Co-Therapist: N/A      [facility] Individual Group Progress Note    Jenny Rucker  1975  3/8/2024    Notes on Client Progress in Group    Reason for Absence: DNS - Letter of Intent will be sent.    JOSEPHINE Benito  3/8/2024 1:46 PM    Co-Therapist: N/A

## 2024-03-11 ENCOUNTER — APPOINTMENT (OUTPATIENT)
Dept: PSYCHIATRY | Age: 49
End: 2024-03-11
Payer: COMMERCIAL

## 2024-03-11 PROCEDURE — H2020 THER BEHAV SVC, PER DIEM: HCPCS

## 2024-03-11 NOTE — GROUP NOTE
[facility] Therapy Note      3/11/2024    Location:  [place]      Clients Presents: [phone]    Clients Absent: [phone]    Length of session: 3.0 hours    Group Note: IOP    Group Type: Co-Ed    New members were welcomed and introduced.  Norms and expectations of group were discussed.    Content: GROUP CHECKED-IN  TOPIC:  \"Substance Use Behaviors Journal\"  Clients completed an interactive journal where they learned strategies to make changes in their thinking, feelings and behaviors.    JOSEPHINE Benito  3/11/2024 3:51 PM    Co-Therapist: N/A      [facility] Individual Group Progress Note    Jenny Rucker  1975  3/11/2024    Notes on Client Progress in Group    Jonnieleesa participated in the group discussion. He was attentive. He stated, \"I've been struggling with where I'm living and can't afford to live on my own right now.\" Client offered good insight into his own substance use and addictive behaviors. He gave appropriate feedback.    JOSEPHINE Benito  3/11/2024 4:20 PM    Co-Therapist: N/A

## 2024-03-13 ENCOUNTER — APPOINTMENT (OUTPATIENT)
Dept: PSYCHIATRY | Age: 49
End: 2024-03-13
Payer: COMMERCIAL

## 2024-03-13 NOTE — GROUP NOTE
[facility] Group Therapy Note      3/13/2024    Location:  [place]      Clients Presents: [phone]    Clients Absent: [phone]    Length of session: 3.0 hours    Group Note: IOP    Group Type: Co-Ed    New members were welcomed and introduced.  Norms and expectations of group were discussed.    Content: GROUP CHECKED-IN  TOPIC:  \"Self-Destructive Behaviors\" - \"Self-Sabotage/How to Banish Self-Destructive Behaviors\"  Clients learned how self-destructive behaviors can cause harm to their recovery. Clients completed a worksheet where they were able to identify their own self-destructive patterns and strategies to stop their self-destructive patterns.     JOSEPHINE Benito  3/13/2024 1:14 PM    Co-Therapist: N/A      [facility] Individual Group Progress Note    Jenny Rucker  1975  3/13/2024    Notes on Client Progress in Group    Reason for Absence: DNS - Letter of Intent sent.    JOSEPHINE Benito  3/13/2024 1:29 PM    Co-Therapist: N/A

## 2024-03-15 ENCOUNTER — APPOINTMENT (OUTPATIENT)
Dept: PSYCHIATRY | Age: 49
End: 2024-03-15
Payer: COMMERCIAL

## 2024-03-15 NOTE — GROUP NOTE
[facility] Group Therapy Note      3/15/2024    Location:  [place]      Clients Presents: [phone]    Clients Absent: [phone]    Length of session: 3.0 hours    Group Note: IOP    Group Type: Co-Ed    New members were welcomed and introduced.  Norms and expectations of group were discussed.    Content: GROUP CHECKED-IN  TOPIC:  \"Obstacles In Recovery\"  Clients identified common obstacles in recovery and strategies to overcome them.    JOSEPHINE Benito  3/15/2024 12:07 PM    Co-Therapist: N/A      [facility] Individual Group Progress Note    Jenny Rucker  1975  3/15/2024    Notes on Client Progress in Group    Reason for Absence: DNS     JOSEPHINE Benito  3/15/2024 12:32 PM    Co-Therapist: N/A

## 2024-03-18 ENCOUNTER — APPOINTMENT (OUTPATIENT)
Dept: PSYCHIATRY | Age: 49
End: 2024-03-18
Payer: COMMERCIAL

## 2024-03-18 PROCEDURE — H2020 THER BEHAV SVC, PER DIEM: HCPCS

## 2024-03-18 NOTE — GROUP NOTE
[facility] Group Therapy Note      3/18/2024    Location:  [place]      Clients Presents: [phone]    Clients Absent: [phone]    Length of session: 3.0 hours    Group Note: IOP    Group Type: Co-Ed    New members were welcomed and introduced.  Norms and expectations of group were discussed.    Content: GROUP CHECKED-IN  TOPIC:  \"Cross Addiction\"  Clients viewed a DVD on Cross Addiction and completed treatment worksheet. Clients identified 7 facts about the dangers of Cross Addiction.    JOSEPHINE Benito  3/18/2024 12:03 PM    Co-Therapist: N/A      [facility] Individual Group Progress Note    Jenny Rucker  1975  3/18/2024    Notes on Client Progress in Group    Jenny participated in the group discussion. He was attentive, offered good insight in understanding cross addiction,and gave appropriate feedback. Client stated, \"I'm really struggling with my living situation and need to look at other options for housing!\"    JOSEPHINE Benito  3/18/2024 12:26 PM    Co-Therapist: N/A

## 2024-03-20 ENCOUNTER — APPOINTMENT (OUTPATIENT)
Dept: PSYCHIATRY | Age: 49
End: 2024-03-20
Payer: COMMERCIAL

## 2024-03-20 NOTE — GROUP NOTE
[facility] Group Therapy Note      3/20/2024    Location:  [place]      Clients Presents: [phone]    Clients Absent: [phone]    Length of session: 3.0 hours    Group Note: IOP    Group Type: Co-Ed    New members were welcomed and introduced.  Norms and expectations of group were discussed.    Content: GROUP CHECKED-IN  TOPIC:  \"Problem Solving Worksheet & Abstinence and Recovery Contract\"  Clients completed a worksheet where they were able to list problems that got them into treatment. Clients were able to identify the relationship of each of the listed problems to their use of alcohol/drug. Clients determined what will happen with each problem if they keep using alcohol/drugs and made a conscious commitment to stop using by signing an Abstinence and Recovery Contract.    JOSEPHINE Benito  3/20/2024 12:50 PM    Co-Therapist: N/A      [facility] Individual Group Progress Note    Jenny Rucker  1975  3/20/2024    Notes on Client Progress in Group    Reason for Absence: DNS - Client has an individual scheduled with this therapist on Friday, March 22, 2024 to discuss his commitment to treatment and his recovery.    JOSEPHINE Benito  3/20/2024 1:34 PM    Co-Therapist: N/A

## 2024-03-22 ENCOUNTER — HOSPITAL ENCOUNTER (OUTPATIENT)
Dept: PSYCHIATRY | Age: 49
Setting detail: THERAPIES SERIES
Discharge: HOME | End: 2024-03-22
Payer: COMMERCIAL

## 2024-03-22 ENCOUNTER — APPOINTMENT (OUTPATIENT)
Dept: PSYCHIATRY | Age: 49
End: 2024-03-22
Payer: COMMERCIAL

## 2024-03-22 PROCEDURE — 90834 PSYTX W PT 45 MINUTES: CPT

## 2024-03-22 NOTE — PROGRESS NOTES
[facility]                Progress Note    [x] [place]  [] [place]                    Patient Name: Jenny Rucker   : 1975     Case # :  [id number]  Therapist: JOSEPHINE Benito        Objective/Service/Time:      1-HOUR    GOAL 2a  OBJECTIVE 2    S:  Client completed individual session. He admitted to drinking two days ago. He stated, \"I feel stuck!\" \"They cut everyone's hours at work, it's too crowded in my father's house, so I either go with friends who drink or a buy alcohol myself!\" Client shared more about his stressors and concluded that he will be going to [employer] next week and that he has a doctor's appointment to see if he can file for social security due to his chronic diagnosis.     O:  Client was cooperative and fully oriented.       A:  Client reviewed ITP and identified the following triggers and sober responses to them: People/Stay away from them, Thoughts/Thought stopping, Places/Don't go the drive-through. He will continue working toward completing Individualized Treatment Plan goals and objectives.      P:  Client is committed to attending group and individual therapy sessions weekly.                        Adri Lindsay MA, JOSEPHINE, 24, 1:53 PM

## 2024-03-22 NOTE — PROGRESS NOTES
[facility] TREATMENT PLAN      Location: [x] [place] [] [place]    Treatment plan:   UPDATE  8718  (3/22/24)    Strengths: employed, intelligent     Weakness/Limitations: alcoholism     Service/Frequency/Duration: Individual 1 a week for 90 days, IOP 3 a week for 90d ays, Urinalysis Random monthly for 90 days, AA/NA 1-2 weekly for 90 days, and Case Management as needed for 90 days    Diagnosis: F10.20 Other and unspecified alcohol dependence/unspecified drinking behavior, F14.20 Cocaine dependence-unspecified use, F12.10 Nondependent cannabis abuse-unspecified use, and F11.10 Nondependent opioid abuse-unspecified use    Level of Care: 2.1 Intensive Outpatient Services    Problem: Substance use leading to overdose.   Goal: Abstain from using drugs/alcohol in 90 Days   Objectives:   1) Identify 3 Indicators of Addiction in 90 Days.   Evaluation Date:  5/6/24 Code: C Continue TBD   2)  Identify 3 Negative Effects of Substance Use in 90 Days.   Evaluation Date: 5/6/24 Code: A  Achieved 2/7/24 Pancreatitis, Estranged with some family members , Several Hospitalizations, and Residential treatment (See Group Note)     iii  3)  Identify 3 Positives for maintaining sobriety in 90 day Evaluation Date:  5/6/24 Code: C continue TBD    2.    Problem: Lacks Coping Skills to Maintain Long-term Sobriety   Goal: Acquire necessary skills to maintain sobriety in 90 Days   Objectives:   1)  Identify 3 Ways Addiction Progressed in your life in 90 Days Evaluation Date:  5/6/24 Code: A  Achieved 2/9/24  Tolerance, Withdrawal Symptoms, Hospitalizations (See Group Note)   2) Identify 3 Triggers and Sober Responses to them in 90 Days Evaluation Date: 5/6/24 Code: A  Achieved  3/22/24 People/Stay away from them, Thoughts/Thought stopping, Places/Don't go the drive-through   3) 1x Month contact with [facility]ACH  for support in 90 Days Evaluation Date: 5/6/24 Code: C Continue TBD     3.    Problem: Lacks Understanding and Knowledge of

## 2024-03-25 ENCOUNTER — APPOINTMENT (OUTPATIENT)
Dept: CT IMAGING | Age: 49
End: 2024-03-25
Payer: OTHER MISCELLANEOUS

## 2024-03-25 ENCOUNTER — HOSPITAL ENCOUNTER (EMERGENCY)
Age: 49
Discharge: HOME | End: 2024-03-25
Attending: STUDENT IN AN ORGANIZED HEALTH CARE EDUCATION/TRAINING PROGRAM
Payer: OTHER MISCELLANEOUS

## 2024-03-25 ENCOUNTER — APPOINTMENT (OUTPATIENT)
Dept: PSYCHIATRY | Age: 49
End: 2024-03-25
Payer: COMMERCIAL

## 2024-03-25 VITALS
TEMPERATURE: 98.1 F | DIASTOLIC BLOOD PRESSURE: 72 MMHG | BODY MASS INDEX: 25.8 KG/M2 | HEART RATE: 76 BPM | SYSTOLIC BLOOD PRESSURE: 146 MMHG | WEIGHT: 185 LBS | RESPIRATION RATE: 18 BRPM | OXYGEN SATURATION: 98 %
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
## 2024-03-25 DIAGNOSIS — F10.920 ACUTE ALCOHOLIC INTOXICATION WITHOUT COMPLICATION (HCC): ICD-10-CM

## 2024-03-25 DIAGNOSIS — K86.1 CHRONIC PANCREATITIS, UNSPECIFIED PANCREATITIS TYPE (HCC): ICD-10-CM

## 2024-03-25 DIAGNOSIS — N28.1 CYST OF RIGHT KIDNEY: ICD-10-CM

## 2024-03-25 LAB
ALBUMIN SERPL-MCNC: 4.4 GM/DL (ref 3.4–5)
ALCOHOL SCREEN SERUM: 0.29 %WT/VOL
ALP BLD-CCNC: 171 IU/L (ref 40–128)
ALT SERPL-CCNC: 22 U/L (ref 10–40)
AMPHETAMINES: ABNORMAL
ANION GAP SERPL CALCULATED.3IONS-SCNC: 14 MMOL/L (ref 7–16)
AST SERPL-CCNC: 30 IU/L (ref 15–37)
BARBITURATE SCREEN URINE: NEGATIVE
BASOPHILS ABSOLUTE: 0.1 K/CU MM
BASOPHILS RELATIVE PERCENT: 0.9 % (ref 0–1)
BENZODIAZEPINE SCREEN, URINE: NEGATIVE
BILIRUB SERPL-MCNC: 0.3 MG/DL (ref 0–1)
BUN SERPL-MCNC: 16 MG/DL (ref 6–23)
CALCIUM SERPL-MCNC: 8.4 MG/DL (ref 8.3–10.6)
CANNABINOID SCREEN URINE: NEGATIVE
CHLORIDE BLD-SCNC: 100 MMOL/L (ref 99–110)
CO2: 22 MMOL/L (ref 21–32)
COCAINE METABOLITE: ABNORMAL
CREAT SERPL-MCNC: 1.1 MG/DL (ref 0.9–1.3)
DIFFERENTIAL TYPE: ABNORMAL
EOSINOPHILS ABSOLUTE: 0.2 K/CU MM
EOSINOPHILS RELATIVE PERCENT: 2.2 % (ref 0–3)
FENTANYL URINE: NEGATIVE
GFR SERPL CREATININE-BSD FRML MDRD: 83 ML/MIN/1.73M2
GLUCOSE SERPL-MCNC: 99 MG/DL (ref 70–99)
HCT VFR BLD CALC: 39.7 % (ref 42–52)
HEMOGLOBIN: 13.2 GM/DL (ref 13.5–18)
IMMATURE NEUTROPHIL %: 0.1 % (ref 0–0.43)
LIPASE: 50 IU/L (ref 13–60)
LYMPHOCYTES ABSOLUTE: 2.4 K/CU MM
LYMPHOCYTES RELATIVE PERCENT: 30.5 % (ref 24–44)
MCH RBC QN AUTO: 29.3 PG (ref 27–31)
MCHC RBC AUTO-ENTMCNC: 33.2 % (ref 32–36)
MCV RBC AUTO: 88 FL (ref 78–100)
MONOCYTES ABSOLUTE: 0.9 K/CU MM
MONOCYTES RELATIVE PERCENT: 11.3 % (ref 0–4)
NUCLEATED RBC %: 0 %
OPIATES, URINE: NEGATIVE
OXYCODONE: NEGATIVE
PDW BLD-RTO: 12.5 % (ref 11.7–14.9)
PLATELET # BLD: 174 K/CU MM (ref 140–440)
PMV BLD AUTO: 9.8 FL (ref 7.5–11.1)
POTASSIUM SERPL-SCNC: 4.4 MMOL/L (ref 3.5–5.1)
RBC # BLD: 4.51 M/CU MM (ref 4.6–6.2)
SEGMENTED NEUTROPHILS ABSOLUTE COUNT: 4.3 K/CU MM
SEGMENTED NEUTROPHILS RELATIVE PERCENT: 55 % (ref 36–66)
SODIUM BLD-SCNC: 136 MMOL/L (ref 135–145)
TOTAL IMMATURE NEUTOROPHIL: 0.01 K/CU MM
TOTAL NUCLEATED RBC: 0 K/CU MM
TOTAL PROTEIN: 8.3 GM/DL (ref 6.4–8.2)
WBC # BLD: 7.9 K/CU MM (ref 4–10.5)

## 2024-03-25 PROCEDURE — 70450 CT HEAD/BRAIN W/O DYE: CPT

## 2024-03-25 PROCEDURE — 6370000000 HC RX 637 (ALT 250 FOR IP): Performed by: STUDENT IN AN ORGANIZED HEALTH CARE EDUCATION/TRAINING PROGRAM

## 2024-03-25 PROCEDURE — 99285 EMERGENCY DEPT VISIT HI MDM: CPT

## 2024-03-25 PROCEDURE — 76376 3D RENDER W/INTRP POSTPROCES: CPT

## 2024-03-25 PROCEDURE — 6360000004 HC RX CONTRAST MEDICATION: Performed by: STUDENT IN AN ORGANIZED HEALTH CARE EDUCATION/TRAINING PROGRAM

## 2024-03-25 PROCEDURE — 72125 CT NECK SPINE W/O DYE: CPT

## 2024-03-25 PROCEDURE — 71260 CT THORAX DX C+: CPT

## 2024-03-25 PROCEDURE — 80053 COMPREHEN METABOLIC PANEL: CPT

## 2024-03-25 PROCEDURE — 80307 DRUG TEST PRSMV CHEM ANLYZR: CPT

## 2024-03-25 PROCEDURE — 85025 COMPLETE CBC W/AUTO DIFF WBC: CPT

## 2024-03-25 PROCEDURE — G0480 DRUG TEST DEF 1-7 CLASSES: HCPCS

## 2024-03-25 PROCEDURE — 83690 ASSAY OF LIPASE: CPT

## 2024-03-25 RX ORDER — HYDROCODONE BITARTRATE AND ACETAMINOPHEN 5; 325 MG/1; MG/1
1 TABLET ORAL ONCE
Status: COMPLETED | OUTPATIENT
Start: 2024-03-25 | End: 2024-03-25

## 2024-03-25 RX ADMIN — IOPAMIDOL 75 ML: 755 INJECTION, SOLUTION INTRAVENOUS at 12:18

## 2024-03-25 RX ADMIN — HYDROCODONE BITARTRATE AND ACETAMINOPHEN 1 TABLET: 5; 325 TABLET ORAL at 11:26

## 2024-03-25 ASSESSMENT — PAIN DESCRIPTION - LOCATION: LOCATION: BACK;LEG

## 2024-03-25 ASSESSMENT — PAIN SCALES - GENERAL: PAINLEVEL_OUTOF10: 8

## 2024-03-25 NOTE — DISCHARGE INSTRUCTIONS
You can use Tylenol as needed for pain  You can use ibuprofen as needed for pain  Please follow-up with your family doctor regarding your kidney cyst  Call and follow-up with your family doctor in the next 3-5 days  Return to the ED if your symptoms worsen or you feel you need to be reevaluated

## 2024-03-25 NOTE — ED PROVIDER NOTES
acute traumatic abnormality in the cervical spine.      Thoracic spine   1.  No acute traumatic abnormality in the thoracic spine.      Lumbar spine   1.  No acute traumatic abnormality in cervical spine.         CT CERVICAL SPINE WO CONTRAST   Final Result      Head   1.  No evidence of acute intracranial hemorrhage or mass effect.      Cervical spine   1.  No acute traumatic abnormality in the cervical spine.      Thoracic spine   1.  No acute traumatic abnormality in the thoracic spine.      Lumbar spine   1.  No acute traumatic abnormality in cervical spine.         CT THORACIC RECONSTRUCTION WO POST PROCESS   Final Result      Head   1.  No evidence of acute intracranial hemorrhage or mass effect.      Cervical spine   1.  No acute traumatic abnormality in the cervical spine.      Thoracic spine   1.  No acute traumatic abnormality in the thoracic spine.      Lumbar spine   1.  No acute traumatic abnormality in cervical spine.         CT LUMBAR RECONSTRUCTION WO POST PROCESS   Final Result      Head   1.  No evidence of acute intracranial hemorrhage or mass effect.      Cervical spine   1.  No acute traumatic abnormality in the cervical spine.      Thoracic spine   1.  No acute traumatic abnormality in the thoracic spine.      Lumbar spine   1.  No acute traumatic abnormality in cervical spine.         CT CHEST ABDOMEN PELVIS W CONTRAST Additional Contrast? None   Final Result      CHEST:   1.  No acute traumatic intrathoracic abnormality.      ABDOMEN AND PELVIS:   1.  No acute traumatic abnormality in the abdomen and pelvis.   2.  Hepatic steatosis.   3.  2.8 cm exophytic lesion arising from the superior right kidney is favored   reflect a benign cyst, but measures chest above simple fluid attenuation.   Therefore, recommend correlation with nonemergent renal ultrasound to help   distinguish a solid from cystic lesion.   4.  Parenchymal calcifications in the pancreas consistent with chronic   pancreatitis.

## 2024-03-27 ENCOUNTER — APPOINTMENT (OUTPATIENT)
Dept: PSYCHIATRY | Age: 49
End: 2024-03-27
Payer: COMMERCIAL

## 2024-03-29 ENCOUNTER — APPOINTMENT (OUTPATIENT)
Dept: PSYCHIATRY | Age: 49
End: 2024-03-29
Payer: COMMERCIAL

## 2024-04-18 ENCOUNTER — HOSPITAL ENCOUNTER (OUTPATIENT)
Dept: PSYCHIATRY | Age: 49
Discharge: HOME | End: 2024-04-18

## 2024-05-01 ENCOUNTER — APPOINTMENT (OUTPATIENT)
Dept: PSYCHIATRY | Age: 49
End: 2024-05-01
Payer: COMMERCIAL

## 2024-05-03 ENCOUNTER — APPOINTMENT (OUTPATIENT)
Dept: PSYCHIATRY | Age: 49
End: 2024-05-03
Payer: COMMERCIAL

## 2024-05-06 ENCOUNTER — APPOINTMENT (OUTPATIENT)
Dept: PSYCHIATRY | Age: 49
End: 2024-05-06
Payer: COMMERCIAL

## 2024-05-08 ENCOUNTER — APPOINTMENT (OUTPATIENT)
Dept: PSYCHIATRY | Age: 49
End: 2024-05-08
Payer: COMMERCIAL

## 2024-05-10 ENCOUNTER — APPOINTMENT (OUTPATIENT)
Dept: PSYCHIATRY | Age: 49
End: 2024-05-10
Payer: COMMERCIAL

## 2024-05-10 ENCOUNTER — HOSPITAL ENCOUNTER (OUTPATIENT)
Dept: PSYCHIATRY | Age: 49
Setting detail: THERAPIES SERIES
Discharge: HOME | End: 2024-05-10
Payer: COMMERCIAL

## 2024-05-10 PROCEDURE — 90791 PSYCH DIAGNOSTIC EVALUATION: CPT

## 2024-05-10 PROCEDURE — 80305 DRUG TEST PRSMV DIR OPT OBS: CPT

## 2024-05-10 ASSESSMENT — PATIENT HEALTH QUESTIONNAIRE - PHQ9
3. TROUBLE FALLING OR STAYING ASLEEP: NOT AT ALL
2. FEELING DOWN, DEPRESSED OR HOPELESS: NOT AT ALL
9. THOUGHTS THAT YOU WOULD BE BETTER OFF DEAD, OR OF HURTING YOURSELF: NOT AT ALL
SUM OF ALL RESPONSES TO PHQ QUESTIONS 1-9: 0
6. FEELING BAD ABOUT YOURSELF - OR THAT YOU ARE A FAILURE OR HAVE LET YOURSELF OR YOUR FAMILY DOWN: NOT AT ALL
1. LITTLE INTEREST OR PLEASURE IN DOING THINGS: NOT AT ALL
SUM OF ALL RESPONSES TO PHQ9 QUESTIONS 1 & 2: 0
10. IF YOU CHECKED OFF ANY PROBLEMS, HOW DIFFICULT HAVE THESE PROBLEMS MADE IT FOR YOU TO DO YOUR WORK, TAKE CARE OF THINGS AT HOME, OR GET ALONG WITH OTHER PEOPLE: NOT DIFFICULT AT ALL
7. TROUBLE CONCENTRATING ON THINGS, SUCH AS READING THE NEWSPAPER OR WATCHING TELEVISION: NOT AT ALL
5. POOR APPETITE OR OVEREATING: NOT AT ALL
SUM OF ALL RESPONSES TO PHQ QUESTIONS 1-9: 0
SUM OF ALL RESPONSES TO PHQ QUESTIONS 1-9: 0
4. FEELING TIRED OR HAVING LITTLE ENERGY: NOT AT ALL
SUM OF ALL RESPONSES TO PHQ QUESTIONS 1-9: 0
8. MOVING OR SPEAKING SO SLOWLY THAT OTHER PEOPLE COULD HAVE NOTICED. OR THE OPPOSITE, BEING SO FIGETY OR RESTLESS THAT YOU HAVE BEEN MOVING AROUND A LOT MORE THAN USUAL: NOT AT ALL

## 2024-05-10 ASSESSMENT — ANXIETY QUESTIONNAIRES
3. WORRYING TOO MUCH ABOUT DIFFERENT THINGS: NOT AT ALL
1. FEELING NERVOUS, ANXIOUS, OR ON EDGE: NOT AT ALL
7. FEELING AFRAID AS IF SOMETHING AWFUL MIGHT HAPPEN: NOT AT ALL
4. TROUBLE RELAXING: NOT AT ALL
GAD7 TOTAL SCORE: 0
6. BECOMING EASILY ANNOYED OR IRRITABLE: NOT AT ALL
2. NOT BEING ABLE TO STOP OR CONTROL WORRYING: NOT AT ALL
5. BEING SO RESTLESS THAT IT IS HARD TO SIT STILL: NOT AT ALL
IF YOU CHECKED OFF ANY PROBLEMS ON THIS QUESTIONNAIRE, HOW DIFFICULT HAVE THESE PROBLEMS MADE IT FOR YOU TO DO YOUR WORK, TAKE CARE OF THINGS AT HOME, OR GET ALONG WITH OTHER PEOPLE: NOT DIFFICULT AT ALL

## 2024-05-10 NOTE — PROGRESS NOTES
[facility]                Progress Note    [x] [place]  [] [place]                    Patient Name: Jenny Rucker   : 1975     Case # :  [id number]  Therapist: JOSEPHINE Benito        Objective/Service/Time:  ASSESSMENT READMIT        1-HOUR    S:  Client is a readmit. He is referred by Mitchell Anderson after completing residential treatment (45-Days). He is a 49 YO WSM who lives with his father and step-mother.     O:  Client was pleasant and fully oriented with an intact thought process. Speech was normal and affect congruent. Reports no hallucinations, delusions, or SI/HI.    A:  Completed Intake Paperwork, Psychosocial Assessment, and Initial UDS.    P:  He was recommended to -[facility] due to employment and will begin IOP group on Monday, May 13, 2024. He will complete his Initial Individualized treatment Plan at his next individual session.                  Adri Lindsay MA, JOSEPHINE, 05/10/24, 2:53 PM

## 2024-05-13 ENCOUNTER — HOSPITAL ENCOUNTER (OUTPATIENT)
Dept: PSYCHIATRY | Age: 49
Setting detail: THERAPIES SERIES
Discharge: HOME | End: 2024-05-13
Payer: COMMERCIAL

## 2024-05-13 PROCEDURE — H2020 THER BEHAV SVC, PER DIEM: HCPCS

## 2024-05-13 NOTE — GROUP NOTE
[facility] Group Therapy Note      5/13/2024    Location:  [place]      Clients Presents: [phone]    Clients Absent: 0    Length of session: 3.0 hours    Group Note: IOP    Group Type: Co-Ed    New members were welcomed and introduced.  Norms and expectations of group were discussed.    Content: GROUP CHECKED-IN  TOPIC:  \"Stages of Change in Addiction Recovery\" - \"Goals Worksheet\"  Clients learned about the stages of change in addiction recovery. They completed a worksheet where they identified stages in which they experienced and how it affected them. Clients completed another worksheet where they learned the importance of goal setting in particular areas in their lives (i.e. Family, Friends, Work/School, Spirituality, Body, and Mental Health.    JOSEPHINE Benito  5/13/2024 1:29 PM    Co-Therapist: N/A      [facility] Individual Group Progress Note    Alexroxleesa TYRA Rucker  1975 5/13/2024    Notes on Client Progress in Group    Jenny is back in the IOP group after completing treatment at [facility]. He participated in the group discussion. Client reports going through all the stages of change in his life and is currently in the Action stage of change in his recovery. He reports his goal is to save money to purchase a new car. He gave minimal feedback.    JOSEPHINE Benito  5/13/2024 1:52 PM    Co-Therapist: N/A

## 2024-05-15 ENCOUNTER — HOSPITAL ENCOUNTER (OUTPATIENT)
Dept: PSYCHIATRY | Age: 49
Discharge: HOME | End: 2024-05-15

## 2024-05-15 ENCOUNTER — APPOINTMENT (OUTPATIENT)
Dept: PSYCHIATRY | Age: 49
End: 2024-05-15
Payer: COMMERCIAL

## 2024-05-15 NOTE — GROUP NOTE
[facility] Group Therapy Note      5/15/2024    Location:  [place]      Clients Presents: [phone]    Clients Absent: [phone]    Length of session: 3.0 hours    Group Note: IOP    Group Type: Co-Ed    New members were welcomed and introduced.  Norms and expectations of group were discussed.    Content: GROUP CHECKED-IN  TOPIC:  \"The 'You' In Recovery\" - \"Self-Discovery Assignment\"  Clients completed two assignments-The First-'To Know Yourself' clients explored their past to see how  to their present. The second, allowed each group member to complete a Self-Discovery worksheet where they answered questions about themselves in a narrative form and shared what they had written.    JOSEPHINE Benito  5/15/2024 1:43 PM    Co-Therapist: N/A      [facility] Individual Group Progress Note    Jenny Rucker  1975  5/15/2024    Notes on Client Progress in Group    Reason for Absence: CX-Client reported having an already scheduled appointment with his dentist.     JOSEPHINE Benito  5/15/2024 2:31 PM    Co-Therapist: N/A

## 2024-05-17 ENCOUNTER — APPOINTMENT (OUTPATIENT)
Dept: PSYCHIATRY | Age: 49
End: 2024-05-17
Payer: COMMERCIAL

## 2024-05-20 ENCOUNTER — HOSPITAL ENCOUNTER (OUTPATIENT)
Dept: PSYCHIATRY | Age: 49
Setting detail: THERAPIES SERIES
Discharge: HOME | End: 2024-05-20
Payer: COMMERCIAL

## 2024-05-20 PROCEDURE — H2020 THER BEHAV SVC, PER DIEM: HCPCS

## 2024-05-20 NOTE — PROGRESS NOTES
[facility]     PHYSICIAN ORDER  &  LABORATORY TESTING  &       CLINICAL DIAGNOSTIC SUMMARY             Location: [x] [place] [] [place]                   Patient Name: Jenny Rucker   : 1975                           Case # :  [id number]  Therapist: Adri Lindsay [facility]     Diagnostic Summary:     PROBLEM STATEMENT:   Client completed residential services at [facility] and need continued AOD support.     IDENTIFYING INFORMATION:  Jenny Rucker / 1975      Client is a 48 year old single  male who was referred by [facility] after completing 45 days in their residential program for continue AOD support. Client has history with [facility] and was here in November and relapsed so it was recommended he go inpatient. Client reports he went to [facility] inpatient treatment from November 15, 2023 through 2024. Client shared he is here to do aftercare. Client is employed at [employer] 5 evenings a week. He has not engaged in any 12 step meetings since getting out of residential but reports that he will be attending. Client reports obtaining another BAYRON on 3/25/24.     Alcohol-Reports first use age 11 Teens off/on liquor fifth; 20's - 30's 2 fifths or more; 40's 2-3 fifths daily Client reports drinking 1/5 of tequila and blacking out after a relapse in mid 2023. Client reports last use of alcohol 3/25/24     Cocaine- began at 18 powder 18-40's 1-2 grams weekly, crack started in mid 's smoked 1 x a month. Client reports relapse in early February using 1 gram of cocaine weekly; Reports his last use of cocaine on 3/25/24 snorting 2 lines     Opiate- began 32 prescribed due to surgery, abused percocets taking 28 pills orally weekly. Client reports his last use on 2023 smoked Fentanyl overdosed     Cannabis- started 18 smoked occasionally joint in one setting; Reports in early February smoking 2 joints weekly; Reports last use 2023

## 2024-05-20 NOTE — GROUP NOTE
[facility] Group Therapy Note      5/20/2024    Location:  [place]      Clients Presents: [phone]    Clients Absent: [phone]    Length of session: 3.0 hours    Group Note: [facility]    Group Type: Co-Ed    New members were welcomed and introduced.  Norms and expectations of group were discussed.    Content: GROUP CHECKED-IN  TOPIC:  \"At SEA-(Self-Esteem-Adrift\" - \"How Self-Discipline, Willpower, and Motivation work in Recovery\"  \"Thoughts To Help Increase Self-Acceptance-Exercise\"  Clients learned the leti of self-discipline, willpower and motivation in recovery. Clients explored how certain thoughts can increase Self-Acceptance and completed a Self-Acceptance Exercise..    JOSEPHINE Benito  5/20/2024 2:48 PM    Co-Therapist: N/A      [facility] Individual Group Progress Note    Jenny Rucker  1975 5/20/2024    Notes on Client Progress in Group    Jenny participated in the group discussion. He was attentive, offered good insight in how he stays motivated, and what he's been doing in increasing his self-acceptance. He stated, \"I'm concerned about court on 6/17/24 and I believe they are charging with six counts?\" He reports having an  and believes that he will probably do some time in penitentiary.    JOSEPHINE Benito  5/20/2024 3:32 PM    Co-Therapist: N/A

## 2024-05-21 ENCOUNTER — HOSPITAL ENCOUNTER (OUTPATIENT)
Dept: PSYCHIATRY | Age: 49
Discharge: HOME | End: 2024-05-21

## 2024-05-21 NOTE — PROGRESS NOTES
[facility]                Progress Note    [x] [place]  [] [place]                    Patient Name: Jenny Rucker   : 1975     Case # :  [id number]  Therapist: JOSEPHINE Benito        Objective/Service/Time:    CX-12:37 PM-Client called, left a voicemail and reported that his ride fell through and would not be able to complete a telehealth but will be in group on 24.                      Adri Lindsay MA, JOSEPHINE, 24, 2:34 PM

## 2024-05-22 ENCOUNTER — APPOINTMENT (OUTPATIENT)
Dept: PSYCHIATRY | Age: 49
End: 2024-05-22
Payer: COMMERCIAL

## 2024-05-22 ENCOUNTER — HOSPITAL ENCOUNTER (OUTPATIENT)
Dept: PSYCHIATRY | Age: 49
Setting detail: THERAPIES SERIES
Discharge: HOME | End: 2024-05-22
Payer: COMMERCIAL

## 2024-05-22 PROCEDURE — H2020 THER BEHAV SVC, PER DIEM: HCPCS

## 2024-05-22 NOTE — GROUP NOTE
[facility] Group Therapy Note      5/22/2024    Location:  [place]      Clients Presents: [phone]    Clients Absent: [phone]    Length of session: 3.0 hours    Group Note: IOP    Group Type: Co-Ed    New members were welcomed and introduced.  Norms and expectations of group were discussed.    Content: GROUP CHECKED IN  TOPIC:  \"12-Step Program\" - \"Defining Spirituality\" - \"Serenity Prayer\"  Clients learn how 12-Step and [facility]-Help programs support recovery. Clients explored the variety of 12-Step and mutual-help programs available. Clients explored the difference between spirituality and [facility]itan, discussed ways that spiritual beliefs can support recovery and reviewed the Serenity Prayer where they learned to distinguish between things that can be changed and those things that cannot. Clients discussed things in their lives that they will change.    JOSEPHINE Benito  5/22/2024 1:16 PM    Co-Therapist: N/A      [facility] Individual Group Progress Note    Jenny Rucker  1975 5/22/2024    Notes on Client Progress in Group    Jenny participated in the group discussion. He was attentive, offered insight in how the 12-Step program has helped him, and stated, \"I believe in God and attend mass when I can.\"  Client gave appropriate feedback.    JOSEPHINE Benito  5/22/2024 4:50 PM    Co-Therapist: N/A

## 2024-05-24 ENCOUNTER — HOSPITAL ENCOUNTER (OUTPATIENT)
Dept: PSYCHIATRY | Age: 49
Setting detail: THERAPIES SERIES
End: 2024-05-24
Payer: COMMERCIAL

## 2024-05-28 ENCOUNTER — HOSPITAL ENCOUNTER (OUTPATIENT)
Dept: PSYCHIATRY | Age: 49
Setting detail: THERAPIES SERIES
Discharge: HOME | End: 2024-05-28
Payer: COMMERCIAL

## 2024-05-28 PROCEDURE — 90834 PSYTX W PT 45 MINUTES: CPT

## 2024-05-28 NOTE — PROGRESS NOTES
[facility]                Progress Note    [x] [place]  [] [place]                    Patient Name: Jenny Rucker   : 1975     Case # :  [id number]  Therapist: JOSEPHINE Benito        Objective/Service/Time:    1-HOUR    S:  Client completed individual session. He reports no use of any substances. He stated, \"I'm just reclusive!\" \"I have no car, no sober friends, and my father is too controlling to the put that he checks if I have more then two ice cubs in my cup!\" \"I have court in mid  and will probably go to intermediate!\" He reports no current SI/HI and stated, \"I watch Netflix, YouTub, and stay in my room. Client voiced concerns about his father being in [place] during his court date and not relying on his sister for a ride because she is \"undependable.\" He was given a flyer Transportation Assistance to contact the numbers to secure a ride not only to treatment but also to court. He was also encouraged to contact medicaid to see if they provide a  to assist him in applying for continued medicaid and food-stamp assistance. Client signed appropriate release for [address] and complete Individualized Treatment Plan.     O:  Client was cooperative and fully oriented.       A:  Client will continue working toward completing Individualized Treatment Plan goals and objectives.      P:  Client is committed to attending IOP (2x Week) group and individual therapy sessions weekly.                  Adri Lindsay MA, JOSEPHINE, 24, 2:56 PM

## 2024-05-28 NOTE — PROGRESS NOTES
[facility] TREATMENT PLAN      Location: [x] [place] [] [place]    Treatment plan:   Initial      Strengths:  Hard Worker, Dependable    Weakness/Limitations:  Tends to Isolate, Grief    Service/Frequency/Duration: IOP Mon/Wed/Fri from 9-12 in 90 Days, Individual sessions (TBD) in 90 Days, Urinalysis Random monthly in 90 Days, AA/NA 2-4 Weekly in 90 Days, Sponsor Contact Weekly in 90 Days and Case Management as Needed in 90 Days    Diagnosis:   F11.20 Opioid dependence-unspecified use,   F14.20 Cocaine dependence-unspecified use,  Nondependent cannabis abuse-unspecified use,  F11.10 Nondependent opioid abuse-unspecified use,      Level of Care: 2.1 Intensive Outpatient Services    Problem: Client completed residential and needs continued AOD support  Goal: Abstain from using drugs/alcohol in 90 Days   Objectives:   1) Identify 3 Indicators of Addiction in 90 Days.   Evaluation Date:  2/9/24 Code: C Continue TBD   2)  Identify 3 Negative Effects of Substance Use in 90 Days.   Evaluation Date:  2/9/24 Code: C Continue TBD     iii  3)  Identify 3 Positives for maintaining sobriety in 90 day Evaluation Date:  2/9/24 Code: C continue TBD    2.    Problem: Lacks Coping Skills to Maintain Long-term Sobriety   Goal: Acquire necessary skills to maintain sobriety in 90 Days   Objectives:   1)  Identify 3 Coping Skills that support recovery in 90 Days Evaluation Date:  2/9/24 Code: C Continue TBD   2) Identify 3 External/Internal Triggers and Sober Responses to them in 90 Days Evaluation Date: 2/9/24 Code: C Continue TBD   3) 1x Month contact with [facility]  for support in 90 Days Evaluation Date: 2/9/24 Code: C Continue TBD     3.    Problem: Lacks Understanding and Knowledge of Addiction   Goal: Develop increased awareness of the Disease of Addiction and Relapse triggers and coping strategies needed to effectively deal with them that support long-term sobriety in 90 Days  Objectives:   1)  Identify 3 Reasons the

## 2024-05-29 ENCOUNTER — HOSPITAL ENCOUNTER (OUTPATIENT)
Dept: PSYCHIATRY | Age: 49
Setting detail: THERAPIES SERIES
Discharge: HOME | End: 2024-05-29
Payer: COMMERCIAL

## 2024-05-29 ENCOUNTER — APPOINTMENT (OUTPATIENT)
Dept: PSYCHIATRY | Age: 49
End: 2024-05-29
Payer: COMMERCIAL

## 2024-05-29 PROCEDURE — H2020 THER BEHAV SVC, PER DIEM: HCPCS

## 2024-05-29 NOTE — GROUP NOTE
[facility] Group Therapy Note      5/29/2024    Location:  [facility]      Clients Presents: [phone]  [id number] [phone]    Clients Absent: [phone]    Length of session: 3.0 hours    Group Note: IOP    Group Type: Co-Ed    New members were welcomed and introduced.  Norms and expectations of group were discussed.    Content: GROUP CHECKED-IN  TOPIC:  \"Character Defects\" - \"Resentments\"  Clients learned what Defects of Character are in Recovery and How to Treat them. Clients completed \"My Resentments\" worksheet and discussed a hand-out on resentment. Clients learned various pathways to work on resentments in addiction recovery.    JOSEPHINE Benito  5/29/2024 1:57 PM    Co-Therapist: N/A      [facility] Individual Group Progress Note    Jenny Rucker  1975 5/29/2024    Notes on Client Progress in Group    Jenny participated in the group discussion. He was attentive, completed treatment worksheet and stated his goal is to maintain sobriety. He gave minimal feedback.    JOSEPHINE Benito  5/29/2024 2:39 PM    Co-Therapist: N/A

## 2024-05-31 ENCOUNTER — APPOINTMENT (OUTPATIENT)
Dept: PSYCHIATRY | Age: 49
End: 2024-05-31
Payer: COMMERCIAL

## 2024-06-03 ENCOUNTER — HOSPITAL ENCOUNTER (OUTPATIENT)
Dept: PSYCHIATRY | Age: 49
Setting detail: THERAPIES SERIES
Discharge: HOME | End: 2024-06-03
Payer: COMMERCIAL

## 2024-06-03 PROCEDURE — H2020 THER BEHAV SVC, PER DIEM: HCPCS

## 2024-06-03 PROCEDURE — 80305 DRUG TEST PRSMV DIR OPT OBS: CPT

## 2024-06-03 NOTE — GROUP NOTE
[facility] Group Therapy Note      6/3/2024    Location:  [facility] Presents: [phone]    Clients Absent: [phone]    Length of session: 3.0 hours    Group Note: IOP    Group Type: Co-Ed    New members were welcomed and introduced.  Norms and expectations of group were discussed.    Content: GROUP CHECKED-IN  TOPIC:  \"Medical Aspects of Substance Use Disorders-Video\"  Clients viewed a video which showed the various substances and their effect on the brain/body. Clients discussed what they learned from the video, how substances have effected their brain/body, and completed treatment worksheet.     JOSEPHINE Benito  6/3/2024 3:08 PM    Co-Therapist: N/A      [facility] Individual Group Progress Note    Jenny Rucker  1975  6/3/2024    Notes on Client Progress in Group    Jenny participated in the group discussion. He was attentive and shared minimally. He viewed the video, completed treatment worksheet and gave appropriate feedback.    JOSEPHINE Benito  6/3/2024 3:44 PM    Co-Therapist: N/A

## 2024-06-04 ENCOUNTER — HOSPITAL ENCOUNTER (OUTPATIENT)
Dept: PSYCHIATRY | Age: 49
Setting detail: THERAPIES SERIES
Discharge: HOME | End: 2024-06-04
Payer: COMMERCIAL

## 2024-06-04 PROCEDURE — 90834 PSYTX W PT 45 MINUTES: CPT

## 2024-06-04 NOTE — PROGRESS NOTES
[facility]                Progress Note    [x] [place]  [] [place]                    Patient Name: Jenny Rucker   : 1975     Case # :  [id number]  Therapist: JOSEPHINE Benito        Objective/Service/Time:    1-HOUR    S:  Client completed individual session. He stated, \"I'm back at work     O:  Client was cooperative and fully oriented.       A:  Client will continue working toward completing Individualized Treatment Plan goals and objectives.      P:  Client is committed to attending IOP (2x Week) group and individual therapy sessions weekly.                  Adri Lindsay MA, JOSEPHINE, 24, 3:04 PM

## 2024-06-05 ENCOUNTER — APPOINTMENT (OUTPATIENT)
Dept: PSYCHIATRY | Age: 49
End: 2024-06-05
Payer: COMMERCIAL

## 2024-06-05 ENCOUNTER — HOSPITAL ENCOUNTER (OUTPATIENT)
Dept: PSYCHIATRY | Age: 49
Setting detail: THERAPIES SERIES
Discharge: HOME | End: 2024-06-05
Payer: COMMERCIAL

## 2024-06-05 NOTE — GROUP NOTE
[facility] Group Therapy Note      6/5/2024    Location:  [place]      Clients Presents: [phone]    Clients Absent: [phone]    Length of session: 3.0 hours    Group Note: IOP    Group Type: Co-Ed    New members were welcomed and introduced.  Norms and expectations of group were discussed.    Content: GROUP CHECKED-IN  TOPIC:  \"Communication Styles\"  Clients learned about the three common communication styles (Passive, Aggressive, and Assertive), and identified which styles they have. Clients also completed and discussed treatment worksheet.    JOSEPHINE Benito  6/5/2024 2:09 PM    Co-Therapist: N/A      [facility] Individual Group Progress Note    Jenny Rucker  1975 6/5/2024    Notes on Client Progress in Group    Reason for Absence: CX-Client called and reported that his ride fell through.     JOSEPHINE Benito  6/5/2024 2:24 PM    Co-Therapist: N/A

## 2024-06-07 ENCOUNTER — APPOINTMENT (OUTPATIENT)
Dept: PSYCHIATRY | Age: 49
End: 2024-06-07
Payer: COMMERCIAL

## 2024-06-10 ENCOUNTER — HOSPITAL ENCOUNTER (OUTPATIENT)
Dept: PSYCHIATRY | Age: 49
Setting detail: THERAPIES SERIES
Discharge: HOME | End: 2024-06-10
Payer: COMMERCIAL

## 2024-06-10 PROCEDURE — H2020 THER BEHAV SVC, PER DIEM: HCPCS

## 2024-06-10 NOTE — GROUP NOTE
[facility] Group Therapy Note      6/10/2024    Location:  [place]      Clients Presents: [phone], [phone], [phone],     Clients Absent: [phone]    Length of session: 3.0 hours    Group Note: IOP    Group Type: Co-Ed    New members were welcomed and introduced.  Norms and expectations of group were discussed.    Content: Counselor presented a topic focused discussion on cravings. Client identified warning signs for cravings and coping skills to avoid relapse.     OSCAR Woody  6/10/2024 12:00 PM    Co-Therapist: N/A      [facility] Individual Group Progress Note    Alexjohanny Rucker  1975  6/10/2024    Notes on Client Progress in Group    Client shared he is making progress and remaining sober. Client reports anxiety today due to court hearing in 2 weeks.   Client participated in group discussion on cravings reporting he gets overwhelmed and doesn't think his life will ever get better.     OSCAR Woody  6/10/2024 12:19 PM    Co-Therapist: N/A

## 2024-06-11 ENCOUNTER — HOSPITAL ENCOUNTER (OUTPATIENT)
Dept: PSYCHIATRY | Age: 49
Setting detail: THERAPIES SERIES
Discharge: HOME | End: 2024-06-11
Payer: COMMERCIAL

## 2024-06-11 PROCEDURE — 90834 PSYTX W PT 45 MINUTES: CPT

## 2024-06-11 NOTE — PROGRESS NOTES
[facility]                Progress Note    [x] [place]  [] [place]                    Patient Name: Jenny Rucker   : 1975     Case # :  [id number]  Therapist: JOESPHINE Benito        Objective/Service/Time:    1-HOUR    S:  Client completed individual session. He reports no use of any substances. He stated, \"I'm just at a stand still and waiting for my court day on 2024.\" \"My parents left for [place] for 10 days and I'll be house sitting.\" Client reports that he is still working has no major concerns at this time. He reports having a dentist appointment on  and will not make his individual session that day.     O:  Client was cooperative and fully oriented.       A:  Client reports having low support due to not having a vehicle but has been watching some [employer] videos. He will continue working toward completing his Individualized Treatment Plan goals and objectives.      P:  Client is committed to attending group and individual therapy sessions weekly.                  Adri Lindsay MA, JOSEPHINE, 24, 2:52 PM

## 2024-06-12 ENCOUNTER — APPOINTMENT (OUTPATIENT)
Dept: PSYCHIATRY | Age: 49
End: 2024-06-12
Payer: COMMERCIAL

## 2024-06-12 ENCOUNTER — HOSPITAL ENCOUNTER (OUTPATIENT)
Dept: PSYCHIATRY | Age: 49
Setting detail: THERAPIES SERIES
Discharge: HOME | End: 2024-06-12
Payer: COMMERCIAL

## 2024-06-12 PROCEDURE — H2020 THER BEHAV SVC, PER DIEM: HCPCS
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
## 2024-06-14 ENCOUNTER — APPOINTMENT (OUTPATIENT)
Dept: PSYCHIATRY | Age: 49
End: 2024-06-14
Payer: COMMERCIAL

## 2024-06-17 ENCOUNTER — HOSPITAL ENCOUNTER (OUTPATIENT)
Dept: PSYCHIATRY | Age: 49
Setting detail: THERAPIES SERIES
Discharge: HOME | End: 2024-06-17
Payer: COMMERCIAL

## 2024-06-17 PROCEDURE — H2020 THER BEHAV SVC, PER DIEM: HCPCS

## 2024-06-17 NOTE — GROUP NOTE
[facility] Group Therapy Note      6/17/2024    Location:  [place]      Clients Presents: [phone]    Clients Absent: [phone]    Length of session: 3.0 hours    Group Note: [facility]    Group Type: Co-Ed    New members were welcomed and introduced.  Norms and expectations of group were discussed.    Content: GROUP CHECKED-IN  TOPIC:  \"Decision Making Skills\"  Clients learned what to do in making decisions:  1) Consider all of your options, 2) Think ahead to the possible outcomes of each option, 3) Select the option that will minimize your relapse risk.    JOSEPHINE Benito  6/17/2024 12:23 PM    Co-Therapist: N/A      [facility] Individual Group Progress Note    Jenny Rucker  1975 6/17/2024    Notes on Client Progress in Group    Jenny participated in the group discussion. He stated, \"I relapsed on alcohol and powder cocaine!\" He processed his feelings about the relapse and had been more open in sharing in the group then he's ever been. He received positive feedback from other group members who shared about their own relapse. Client was attentive, offered good insight in understanding the decision making process, and gave appropriate feedback.    JOSEPHINE Benito  6/17/2024 1:33 PM    Co-Therapist: N/A

## 2024-06-18 ENCOUNTER — HOSPITAL ENCOUNTER (OUTPATIENT)
Dept: PSYCHIATRY | Age: 49
Setting detail: THERAPIES SERIES
Discharge: HOME | End: 2024-06-18
Payer: COMMERCIAL

## 2024-06-18 PROCEDURE — 90834 PSYTX W PT 45 MINUTES: CPT

## 2024-06-19 ENCOUNTER — APPOINTMENT (OUTPATIENT)
Dept: PSYCHIATRY | Age: 49
End: 2024-06-19
Payer: COMMERCIAL

## 2024-06-19 ENCOUNTER — HOSPITAL ENCOUNTER (OUTPATIENT)
Dept: PSYCHIATRY | Age: 49
Setting detail: THERAPIES SERIES
Discharge: HOME | End: 2024-06-19
Payer: COMMERCIAL

## 2024-06-19 PROCEDURE — H2020 THER BEHAV SVC, PER DIEM: HCPCS

## 2024-06-20 NOTE — GROUP NOTE
[facility] Group Therapy Note      6/19/2024    Location:  [facility] Presents: [phone]    Clients Absent: 0    Length of session: 3.0 hours    Group Note: IOP    Group Type: Co-Ed    New members were welcomed and introduced.  Norms and expectations of group were discussed.    Content: GROUP CHECKED-IN  TOPIC:  \"Self Inventory\" - \"What Is Inside You? - \"Honesty Equals New Life\"  Clients learned through a \"Self Inventory how dependency of alcohol/drugs has altered their lives. Clients maggie slips of paper from a basket to identify feelings, that intensities and learned how feelings can vary In depth with different situations. Clients completed an exercise where they broke into dyads and interviewed one another about honesty which brought more cohesiveness in the group dynamics.    JOSEPHINE Benito  6/19/2024 1:36 PM    Co-Therapist: N/A      [facility] Individual Group Progress Note    Jenny Rucker  1975 6/20/2024    Notes on Client Progress in Group    Jenny participated in the group discussion. Jorge reports struggling to stay clean. He stated, \"I don't know how court is going to go on next Friday and I've been very anxious about!\" Client was encouraged to stay in the now, abstain from drinking and if he feels the need to go to the ED to do so. He completed treatment worksheets and gave appropriate feedback.    JOSEPHINE Benito  6/20/2024 3:59 PM    Co-Therapist: N/A

## 2024-06-21 ENCOUNTER — APPOINTMENT (OUTPATIENT)
Dept: PSYCHIATRY | Age: 49
End: 2024-06-21
Payer: COMMERCIAL

## 2024-06-24 ENCOUNTER — HOSPITAL ENCOUNTER (OUTPATIENT)
Dept: PSYCHIATRY | Age: 49
Setting detail: THERAPIES SERIES
Discharge: HOME | End: 2024-06-24
Payer: COMMERCIAL

## 2024-06-24 PROCEDURE — H2020 THER BEHAV SVC, PER DIEM: HCPCS

## 2024-06-24 NOTE — GROUP NOTE
[facility] Group Therapy Note      6/24/2024    Location:  [facility] Presents: [phone]    Clients Absent: 0    Length of session: 3.0 hours    Group Note: IOP    Group Type: Co-Ed    New members were welcomed and introduced.  Norms and expectations of group were discussed.    Content: GROUP CHECKED-IN  TOPIC:  \"Stages Of Recovery\"  Clients learned about the stages of recovery (Abstinence 0-6 months, Lifestyle Change 6-18 months, Self-Knowledge greater than 18 months, Spirituality Lifetime).    JOSEPHINE Benito  6/24/2024 1:44 PM    Co-Therapist: N/A      [facility] Individual Group Progress Note    Jenny Rucker  1975 6/24/2024    Notes on Client Progress in Group    Jenny participated in the group discussion. He looked better today and stated, \"I feel better today although I just woke up at 7 am.\" He was attentive, stated that he was in the first stage of recovery, and gave appropriate feedback.    JOSEPHINE Benito  6/24/2024 2:28 PM    Co-Therapist: N/A

## 2024-06-25 ENCOUNTER — HOSPITAL ENCOUNTER (OUTPATIENT)
Dept: PSYCHIATRY | Age: 49
Setting detail: THERAPIES SERIES
Discharge: HOME | End: 2024-06-25
Payer: COMMERCIAL

## 2024-06-25 NOTE — PROGRESS NOTES
[facility]                Progress Note    [x] [place]  [] [place]                    Patient Name: Jenny Rucker   : 1975     Case # :  [id number]  Therapist: JOSEPHINE Benito        Objective/Service/Time:    CX-Client reports having a previous scheduled dental appointment prior to treatment.                       Adri Lindsay MA, JOSEPHINE, 24, 2:02 PM

## 2024-06-26 ENCOUNTER — HOSPITAL ENCOUNTER (OUTPATIENT)
Dept: PSYCHIATRY | Age: 49
Setting detail: THERAPIES SERIES
Discharge: HOME | End: 2024-06-26
Payer: COMMERCIAL

## 2024-06-26 ENCOUNTER — APPOINTMENT (OUTPATIENT)
Dept: PSYCHIATRY | Age: 49
End: 2024-06-26
Payer: COMMERCIAL

## 2024-06-26 PROCEDURE — H2020 THER BEHAV SVC, PER DIEM: HCPCS

## 2024-06-26 NOTE — GROUP NOTE
[facility] Group Therapy Note      6/26/2024    Location:  [place]      Clients Presents: [phone]    Clients Absent: [phone]    Length of session: 3.0 hours    Group Note: IOP    Group Type: Co-Ed    New members were welcomed and introduced.  Norms and expectations of group were discussed.    Content: GROUP CHECKED-IN  TOPIC:  \"Are Shame & Guilt Different?\"  Clients learned the difference between shame and guilt. Clients explored the Causes of Shame, the Effects of Shame, and How to Overcome Shame.    JOSEPHINE Benito  6/26/2024 12:03 PM    Co-Therapist: N/A      [facility] Individual Group Progress Note    Jenny Rucker  1975 6/26/2024    Notes on Client Progress in Group    Jenny participated in the group discussion. He was attentive and was given an updated report regarding his progress in IOP. He stated that [facility] has court on Friday 6/28/24 and is hoping for a better outcome then going to [facility]. He stated, \"I'm not shamed about who I am, I ashamed of the things I've done.\" He gave appropriate feedback.    JOSEPHINE Benito  6/26/2024 12:16 PM    Co-Therapist: N/A

## 2024-06-28 ENCOUNTER — APPOINTMENT (OUTPATIENT)
Dept: PSYCHIATRY | Age: 49
End: 2024-06-28
Payer: COMMERCIAL

## 2024-07-01 ENCOUNTER — HOSPITAL ENCOUNTER (OUTPATIENT)
Dept: PSYCHIATRY | Age: 49
Setting detail: THERAPIES SERIES
Discharge: HOME | End: 2024-07-01

## 2024-07-02 NOTE — GROUP NOTE
[facility] Group Therapy Note      7/1/2024    Location:  [place]      Clients Presents: [phone]    Clients Absent: [phone]    Length of session: 3.0 hours    Group Note: IOP    Group Type: Co-Ed    New members were welcomed and introduced.  Norms and expectations of group were discussed.    Content: GROUP CHECKED-IN  TOPIC:  \"Why Can't They Just Stop!\" - \"To Choose... Or... Not To Choose\" - \"Knowledge\"  Clients viewed DVD \"Why Can't They Just Stop\" and shared what they could relate to. Clients completed treatment worksheets \"To Choose or Not To Choose where they explored decision-making abilities. Clients also tested their knowledge of what physical dependency is and to understand the importance of total abstinence in recovery.      JOSEPHINE Benito  7/1/2024 5:05 PM    Co-Therapist: N/A      [facility] Individual Group Progress Note    Jenny Rucker  1975 7/2/2024    Notes on Client Progress in Group    Reason for Absence: CX-Client reported that he had to go to halfway for 10 days (Beginning 71/24) and will resume IOP once he gets out.    JOSEPHINE Benito  7/2/2024 10:13 AM    Co-Therapist: N/A

## 2024-07-03 ENCOUNTER — HOSPITAL ENCOUNTER (OUTPATIENT)
Dept: PSYCHIATRY | Age: 49
Setting detail: THERAPIES SERIES
End: 2024-07-03
Payer: COMMERCIAL

## 2024-07-03 ENCOUNTER — APPOINTMENT (OUTPATIENT)
Dept: PSYCHIATRY | Age: 49
End: 2024-07-03
Payer: COMMERCIAL

## 2024-07-05 ENCOUNTER — APPOINTMENT (OUTPATIENT)
Dept: PSYCHIATRY | Age: 49
End: 2024-07-05
Payer: COMMERCIAL

## 2024-07-08 ENCOUNTER — APPOINTMENT (OUTPATIENT)
Dept: PSYCHIATRY | Age: 49
End: 2024-07-08
Payer: COMMERCIAL

## 2024-07-09 ENCOUNTER — HOSPITAL ENCOUNTER (OUTPATIENT)
Dept: PSYCHIATRY | Age: 49
Discharge: HOME | End: 2024-07-09

## 2024-07-09 ENCOUNTER — APPOINTMENT (OUTPATIENT)
Dept: PSYCHIATRY | Age: 49
End: 2024-07-09
Payer: COMMERCIAL

## 2024-07-09 NOTE — PROGRESS NOTES
[facility]                Progress Note    [x] [place]  [] [place]                    Patient Name: Jenny Rucker   : 1975     Case # :  [id number]  Therapist: JOSEPHINE Benito        Objective/Service/Time:    CX-Client is completing his last week (10) days in al[place] for an [facility].                      Adri Lindsay MA, JOSEPHINE, 24, 2:37 PM

## 2024-07-10 ENCOUNTER — APPOINTMENT (OUTPATIENT)
Dept: PSYCHIATRY | Age: 49
End: 2024-07-10
Payer: COMMERCIAL

## 2024-07-12 ENCOUNTER — APPOINTMENT (OUTPATIENT)
Dept: PSYCHIATRY | Age: 49
End: 2024-07-12
Payer: COMMERCIAL

## 2024-07-15 ENCOUNTER — HOSPITAL ENCOUNTER (OUTPATIENT)
Dept: PSYCHIATRY | Age: 49
Setting detail: THERAPIES SERIES
Discharge: HOME | End: 2024-07-15
Payer: COMMERCIAL

## 2024-07-15 PROCEDURE — 80305 DRUG TEST PRSMV DIR OPT OBS: CPT

## 2024-07-15 PROCEDURE — H2020 THER BEHAV SVC, PER DIEM: HCPCS

## 2024-07-15 NOTE — GROUP NOTE
[facility] Group Therapy Note      7/15/2024    Location:  [place]      Clients Presents: [phone]    Clients Absent: [phone]    Length of session: 3.0 hours    Group Note: IOP    Group Type: Co-Ed    New members were welcomed and introduced.  Norms and expectations of group were discussed.    Content: GROUP CHECKED-IN  TOPIC:  \"Boundaries In Addiction Recovery\" - Tips for Healthy Boundaries\" - \"Creating Work-Life Boundaries\"  Clients explored the definition of boundaries in recovery. Clients learned how to set personal boundaries (i.e. Know your limits, Know your values, Have self-respect and Respect for others etc.). Clients learned the value of setting Work-Life Boundaries which can lead could lead to exhaustion and/or burnout if they are not set.    JOSEPHINE Benito  7/15/2024 1:27 PM    Co-Therapist: N/A      [facility] Individual Group Progress Note    Alexroxleesa TYRA Rucker  1975  7/15/2024    Notes on Client Progress in Group    Jenny has resumed IOP since completing 10 days in [facility] after obtaining an [facility]. He participated in the group discussion. He was attentive, offered good insight in setting personal boundaries with his friends and family members. He gave appropriate feedback.    JOSEPHINE Benito  7/15/2024 1:44 PM    Co-Therapist: N/A

## 2024-07-16 ENCOUNTER — HOSPITAL ENCOUNTER (OUTPATIENT)
Dept: PSYCHIATRY | Age: 49
Setting detail: THERAPIES SERIES
Discharge: HOME | End: 2024-07-16
Payer: COMMERCIAL

## 2024-07-16 PROCEDURE — 90834 PSYTX W PT 45 MINUTES: CPT

## 2024-07-17 ENCOUNTER — HOSPITAL ENCOUNTER (OUTPATIENT)
Dept: PSYCHIATRY | Age: 49
Setting detail: THERAPIES SERIES
Discharge: HOME | End: 2024-07-17
Payer: COMMERCIAL

## 2024-07-17 PROCEDURE — H2020 THER BEHAV SVC, PER DIEM: HCPCS

## 2024-07-17 NOTE — GROUP NOTE
[facility] Group Therapy Note      7/17/2024    Location:  [place]      Clients Presents: [phone]    Clients Absent: [id number]    Length of session: 3.0 hours    Group Note: IOP    Group Type: Co-Ed    New members were welcomed and introduced.  Norms and expectations of group were discussed.    Content: GROUP CHECKED-IN  TOPIC:  \"A Road Map Through Addiction\"  Clients began a worksheet packet where they learned that an addiction is a biopsychosocial phenomenon, affecting physical, mental, emotional, vocational, and interpersonal functioning. Clients also explored their own journey in understanding addiction and dependency.    JOSEPHINE Benito  7/17/2024 12:05 PM    Co-Therapist: N/A      [facility] Individual Group Progress Note    Jenny Rucker  1975 7/17/2024    Notes on Client Progress in Group    Jenny participated in the group discussion. He was attentive and very open in sharing about his own journey with addiction. He identified how his addiction affected various life areas and gave appropriate feedback.    JOSEPHINE Benito  7/17/2024 1:12 PM    Co-Therapist: N/A

## 2024-07-19 ENCOUNTER — APPOINTMENT (OUTPATIENT)
Dept: PSYCHIATRY | Age: 49
End: 2024-07-19
Payer: COMMERCIAL

## 2024-07-19 NOTE — PROGRESS NOTES
[facility]        Individual  Progress Note    Location: [x] [place] [] [place]                   Patient Name: Jenny Rucker   : 1975     Case # :  [id number]  Therapist: OSCAR Puckett        Objective/Service/Time:   CASE MANAGEMENT  .50    S:  I met with client to complete Medicaid and SNAP application.  Client provided necessary documentation.        O:  Client was oriented and open to discussion.    A:  Client was provided information about Medicaid process and will follow up with interview.    P:  I will follow up with [employer] and client.  Client will continue to speak with Therapist or  about requests for assistance if needed.      ANITHA Puckett, ERIN III, CTTS      Electronically signed by OSCAR Puckett on 2024 at 9:58 AM

## 2024-07-22 ENCOUNTER — HOSPITAL ENCOUNTER (OUTPATIENT)
Dept: PSYCHIATRY | Age: 49
Setting detail: THERAPIES SERIES
Discharge: HOME | End: 2024-07-22
Payer: COMMERCIAL

## 2024-07-22 PROCEDURE — H2020 THER BEHAV SVC, PER DIEM: HCPCS

## 2024-07-22 NOTE — GROUP NOTE
[facility] Group Therapy Note      7/22/2024    Location:  [facility] Presents: [phone]    Clients Absent: 0    Length of session: 3.0 hours    Group Note: IOP    Group Type: Co-Ed    New members were welcomed and introduced.  Norms and expectations of group were discussed.    Content: GROUP CHECKED-IN  TOPIC:  \"The Continuum of Recovery\"  Clients explored the Five Stages of Recovery, the tasks of the each stage, along with potential triggers for relapse in each stage.    JOSEPHINE Benito  7/22/2024 1:27 PM    Co-Therapist: N/A      [facility] Individual Group Progress Note    Jenny Rucker  1975 7/22/2024    Notes on Client Progress in Group    Jenny participated in the group discussion. He was attentive, offered good insight in identifying how he has been in and out of the stages or recovery through out his life-time. He gave appropriate feedback.    JOSEPHINE Benito  7/22/2024 1:41 PM    Co-Therapist: N/A

## 2024-07-23 ENCOUNTER — HOSPITAL ENCOUNTER (OUTPATIENT)
Dept: PSYCHIATRY | Age: 49
Setting detail: THERAPIES SERIES
Discharge: HOME | End: 2024-07-23
Payer: COMMERCIAL

## 2024-07-23 NOTE — PROGRESS NOTES
[facility]                Progress Note    [x] [place]  [place]                    Patient Name: Jenny Rucker   : 1975     Case # :  [id number]  Therapist: JOSEPHINE Benito        Objective/Service/Time:    CX-Client reported having a scheduled conflict.                      Adri Lindsay MA, JOSEPHINE, 24, 2:08 PM

## 2024-07-24 ENCOUNTER — HOSPITAL ENCOUNTER (OUTPATIENT)
Dept: PSYCHIATRY | Age: 49
Setting detail: THERAPIES SERIES
Discharge: HOME | End: 2024-07-24
Payer: COMMERCIAL

## 2024-07-24 PROCEDURE — H2020 THER BEHAV SVC, PER DIEM: HCPCS

## 2024-07-24 NOTE — PROGRESS NOTES
[facility]        Individual  Progress Note    Location: [x] [place] [] [place]                   Patient Name: Jenny Rucker   : 1975     Case # :  [id number]  Therapist: OSCAR Puckett        Objective/Service/Time:   CASE MANAGEMENT  .50    S:  I spoke with client about necessary forms needed to complete his Medicaid application. Client explained that he completed the interview, but needs to send statement form his father and utility bills to show his portion of bills.       O:  Client was oriented and open to discussion.    A:  Client provided me with documents.  I contacted [employer] and provided necessary information.      P:  I will continue to follow up with client and [employer].  Client will continue to speak with Therapist or  about requests for assistance if needed.      ANITHA Puckett, ERIN III, CTTS        Electronically signed by OSCAR Puckett on 2024 at 10:41 AM

## 2024-07-24 NOTE — GROUP NOTE
[facility] Group Therapy Note      7/24/2024    Location:  [facility] Presents: [phone]    Clients Absent: 0    Length of session: 3.0 hours    Group Note: IOP    Group Type: Co-Ed    New members were welcomed and introduced.  Norms and expectations of group were discussed.    Content: GROUP CHECKED-IN  TOPIC:  \"What Is Self-Care in Recovery From Substance Use and Addiction?\" - \"Assessing Your Lifestyle\" - \"Leisure Activity Exercise\"  Clients learned various important components of self-care, how it can nurture both physical and mental health, help prevent relapse, helps in connecting with other people, and therapy.    JOSEPHINE Benito  7/24/2024 12:19 PM    Co-Therapist: N/A      [facility] Individual Group Progress Note    Jenny Rucker  1975 7/24/2024    Notes on Client Progress in Group    Jenny participated in the group discussion. He was attentive, offered good insight in how he practices self-care, and stated, \"I take pride in caring for myself, and I get more of my socialization at work.\" He completed treatment worksheet and gave appropriate feedback.    JOSEPHINE Benito  7/24/2024 12:36 PM    Co-Therapist: N/A

## 2024-07-26 ENCOUNTER — APPOINTMENT (OUTPATIENT)
Dept: PSYCHIATRY | Age: 49
End: 2024-07-26
Payer: COMMERCIAL

## 2024-07-29 ENCOUNTER — HOSPITAL ENCOUNTER (OUTPATIENT)
Dept: PSYCHIATRY | Age: 49
Setting detail: THERAPIES SERIES
Discharge: HOME | End: 2024-07-29
Payer: COMMERCIAL

## 2024-07-29 PROCEDURE — H2020 THER BEHAV SVC, PER DIEM: HCPCS

## 2024-07-29 NOTE — GROUP NOTE
[facility] Group Therapy Note      7/29/2024    Location:  [place]      Clients Presents: [phone]    Clients Absent: 1602    Length of session: 3.0 hours    Group Note: IOP    Group Type: Co-Ed    New members were welcomed and introduced.  Norms and expectations of group were discussed.    Content: GROUP CHECKED-IN  TOPIC:  \"Abstinence VS Sobriety\" -  \"Five Skills For Communication In Recovery\" - \"Communication Styles and Roadblocks\"  Clients learned the difference between abstinence and sobriety. Clients discussed the value of sobriety/recovery which includes a program for self-growth and self-actualization. Clients identified five skills for communicating in recovery, communication styles and roadblocks to effective communication.    JOSEPHINE Benito  7/29/2024 10:28 AM    Co-Therapist: N/A      [facility] Individual Group Progress Note    Jenny Rucker  1975 7/29/2024    Notes on Client Progress in Group    Jenny participated in the group discussion. He was attentive, offered insight in understanding the difference between abstinence and sobriety. He stated, \"I've worked in the [employer] industry most of my life and have good communication skills.\" He gave appropriate feedback.    JOSEPHINE Benito  7/29/2024 11:42 AM    Co-Therapist: N/A

## 2024-07-30 ENCOUNTER — HOSPITAL ENCOUNTER (OUTPATIENT)
Dept: PSYCHIATRY | Age: 49
Setting detail: THERAPIES SERIES
Discharge: HOME | End: 2024-07-30
Payer: COMMERCIAL

## 2024-07-30 PROCEDURE — 90834 PSYTX W PT 45 MINUTES: CPT

## 2024-07-30 NOTE — PROGRESS NOTES
[facility] TREATMENT PLAN      Location: [x] [place] [] [place]    Treatment plan:   Initial  8718  (7/30/24)    Strengths:  Hard Worker, Dependable    Weakness/Limitations:  Tends to Isolate, Grief    Service/Frequency/Duration: IOP Mon/Wed/Fri from 9-12 in 90 Days, Individual sessions (TBD) in 90 Days, Urinalysis Random monthly in 90 Days, AA/NA 2-4 Weekly in 90 Days, Sponsor Contact Weekly in 90 Days and Case Management as Needed in 90 Days    Diagnosis:   F11.20 Opioid dependence-unspecified use,   F14.20 Cocaine dependence-unspecified use,  Nondependent cannabis abuse-unspecified use,  F11.10 Nondependent opioid abuse-unspecified use,      Level of Care: 2.1 Intensive Outpatient Services    Problem: Client completed residential and needs continued AOD support  Goal: Abstain from using drugs/alcohol in 90 Days   Objectives:   1) Identify 3 Indicators of Addiction in 90 Days.   Evaluation Date:  11/21/24 Code: C Continue TBD   2)  Identify 3 Negative Effects of Substance Use in 90 Days.   Evaluation Date:  2/9/24 Code: C Continue TBD     iii  3)  Identify 3 Positives for maintaining sobriety in 90 day Evaluation Date:  2/9/24 Code: C continue TBD    2.    Problem: Lacks Coping Skills to Maintain Long-term Sobriety   Goal: Acquire necessary skills to maintain sobriety in 90 Days   Objectives:   1)  Identify 3 Coping Skills that support recovery in 90 Days Evaluation Date:  2/9/24 Code: C Continue TBD   2) Identify 3 External/Internal Triggers and Sober Responses to them in 90 Days Evaluation Date: 2/9/24 Code: C Continue TBD   3) 1x Month contact with [facility]  for support in 90 Days Evaluation Date: 2/9/24 Code: C Continue TBD     3.    Problem: Lacks Understanding and Knowledge of Addiction   Goal: Develop increased awareness of the Disease of Addiction and Relapse triggers and coping strategies needed to effectively deal with them that support long-term sobriety in 90 Days  Objectives:   1)

## 2024-07-30 NOTE — PROGRESS NOTES
[facility]                Progress Note    [x] [place]  [] [place]                    Patient Name: Jenny Rucker   : 1975     Case # :  [id number]  Therapist: JOSEPHINE Benito        Objective/Service/Time:    1-HOUR    S:  Client completed individual session. He stated, \"I was called into work after my session today.\" He shared about the various medical appointments he has to take care of, the need to pay his court fines, and how he can apply for driving privileges with an apparatus in his car to drive in the near future.     O:  Client was cooperative and fully oriented.       A:  Client will continue working toward completing Individualized Treatment Plan goals and objectives.      P:  Client is committed to attending group and individual therapy sessions weekly.                  Adri Lindsay MA, JOSEPHINE, 24, 2:08 PM

## 2024-07-31 ENCOUNTER — HOSPITAL ENCOUNTER (OUTPATIENT)
Dept: PSYCHIATRY | Age: 49
Setting detail: THERAPIES SERIES
Discharge: HOME | End: 2024-07-31
Payer: COMMERCIAL

## 2024-07-31 PROCEDURE — H2020 THER BEHAV SVC, PER DIEM: HCPCS

## 2024-07-31 NOTE — GROUP NOTE
[facility] Group Therapy Note      7/31/2024    Location:  [facility]        Clients Presents: 1635, 1602, [id number] [phone]    Clients Absent:     Length of session: 3.0 hours      Group Note: IOP        Group Type: Co-Ed        New members were welcomed and introduced.  Norms and expectations of group were discussed.        Content: Understanding Correlation to Toxic Relationships and Substance Use         KRISTEN Alonzo  7/31/2024 4:25 PM        Co-Therapist: N/A      [facility] Individual Group Progress Note    Jenny Rucker  1975 7/31/2024    Notes on Client Progress in Group      Jenny attended group, introduced himself and events leading to arrival: presented check in information: attentive to information: guarded ad selective feedback.        KRISTEN Alonzo  7/31/2024 4:35 PM      Co-Therapist: N/A

## 2024-08-02 ENCOUNTER — APPOINTMENT (OUTPATIENT)
Dept: PSYCHIATRY | Age: 49
End: 2024-08-02
Payer: COMMERCIAL

## 2024-08-05 ENCOUNTER — HOSPITAL ENCOUNTER (OUTPATIENT)
Dept: PSYCHIATRY | Age: 49
Setting detail: THERAPIES SERIES
Discharge: HOME | End: 2024-08-05
Payer: COMMERCIAL

## 2024-08-05 PROCEDURE — H2020 THER BEHAV SVC, PER DIEM: HCPCS

## 2024-08-05 NOTE — GROUP NOTE
[facility] Group Therapy Note      8/5/2024    Location:  [place]      Clients Presents: [phone], 1216, 1602, [phone]    Clients Absent:     Length of session: 3.0 hours    Group Note: [facility]    Group Type: Co-Ed    New members were welcomed and introduced.  Norms and expectations of group were discussed.    Content: Counselor presented a topic focused discussion on \"my recovery map\". Client participated in drawing his/her recovery map.     OSCAR Woody  8/5/2024 12:00 PM    Co-Therapist: N/A      [facility] Individual Group Progress Note    Jenny Rucker  1975 8/5/2024    Notes on Client Progress in Group    Client shared he is making progress on his goals in treatment. Client denies any use but has occasional cravings.   Client participated in making a \"Recovery Map\". Client shared his recovery map with his peers and gave appropriate feedback and support to peers.     OSCAR Woody  8/5/2024 12:02 PM    Co-Therapist: N/A

## 2024-08-06 ENCOUNTER — HOSPITAL ENCOUNTER (OUTPATIENT)
Dept: PSYCHIATRY | Age: 49
Setting detail: THERAPIES SERIES
Discharge: HOME | End: 2024-08-06
Payer: COMMERCIAL

## 2024-08-06 PROCEDURE — 90834 PSYTX W PT 45 MINUTES: CPT

## 2024-08-06 NOTE — PROGRESS NOTES
[facility]                Progress Note    [x] [place]  [] [place]                    Patient Name: Jenny Rucker   : 1975     Case # :  [id number]  Therapist: JOSEPHINE Benito        Objective/Service/Time:    1-HOUR    S:  Client completed individual session. He reports no use of any substances. He stated, \"Our  quit and and it's been a mess at work!\" He shared more about the frustration at work and how he is making the best out it.     O:  Client was cooperative and fully oriented.       A:  Client will continue working toward completing Individualized Treatment Plan goals and objectives.      P:  Client is committed to attending group and individual therapy sessions weekly.                  Adri Lindsay MA, JOSEPHINE, 24, 2:18 PM  
and Knowledge of Addiction   Goal: Develop increased awareness of the Disease of Addiction and Relapse triggers and coping strategies needed to effectively deal with them that support long-term sobriety in 90 Days  Objectives:   1)  Identify 3 Reasons the Importance of Sober Support in Recovery in 90 Days Evaluation Date: 11/21/24  Code: C Continue TBD  2)  Identify 3 of the 5 Stages of Drug and Alcohol Addiction in 90 Days Evaluation Date:   11/21/24  Code: C Continue TBD  3) Identify 3 Strategies to Avoid Relapse in 90 Days Evaluation Date:  2/9/24 Code: C Continue TBD  4) Identify 4 Steps to the Progression of a Relapse in 90 Days Evaluation Date: 11/21/24  Code:  C  Continue TBD     Defer:  Smoking Cessation    Discharge Plan/Instructions: Complete treatment plan goals and objectives, and maintain sobriety.    Jenny Rucker / 1975 has participated in the treatment plan development outlined above on 8/6/2024.     Adri Lindsay, [facility]  8/6/2024/2:48 PM

## 2024-08-07 ENCOUNTER — HOSPITAL ENCOUNTER (OUTPATIENT)
Dept: PSYCHIATRY | Age: 49
Setting detail: THERAPIES SERIES
Discharge: HOME | End: 2024-08-07
Payer: COMMERCIAL

## 2024-08-07 PROCEDURE — H2020 THER BEHAV SVC, PER DIEM: HCPCS

## 2024-08-07 PROCEDURE — 80305 DRUG TEST PRSMV DIR OPT OBS: CPT

## 2024-08-07 NOTE — GROUP NOTE
[facility] Group Therapy Note      8/7/2024    Location:  [place]      Clients Presents: [phone] [id number]    Clients Absent: 1635    Length of session: 3.0 hours    Group Note: IOP    Group Type: Co-Ed    New members were welcomed and introduced.  Norms and expectations of group were discussed.    Content: GROUP CHECKED-IN  TOPIC:  \"How To Recognize Self-Defeating Behaviors and Strategies to Overcome Them\" - \"Thought Stopping in Recovery\"  Clients learned the value of recognizing self-defeating behaviors, stop the behavior(s), and replace it with a healthier behavior(s). Clients examined how beneficial thought stopping techniques can add value to one's own recovery when practiced diligently.    JOSEPHINE Benito  8/7/2024 12:55 PM    Co-Therapist: N/A      [facility] Individual Group Progress Note    Jenny Rucker  1975 8/7/2024    Notes on Client Progress in Group    Jenny participated in the group discussion. He was attentive, offered good insight into his own self-defeating behaviors, and gave appropriate feedback.    JOSEPHINE Benito  8/7/2024 1:11 PM    Co-Therapist: N/A

## 2024-08-09 ENCOUNTER — APPOINTMENT (OUTPATIENT)
Dept: PSYCHIATRY | Age: 49
End: 2024-08-09
Payer: COMMERCIAL

## 2024-08-12 ENCOUNTER — HOSPITAL ENCOUNTER (OUTPATIENT)
Dept: PSYCHIATRY | Age: 49
Setting detail: THERAPIES SERIES
Discharge: HOME | End: 2024-08-12
Payer: COMMERCIAL

## 2024-08-12 NOTE — GROUP NOTE
[facility] Group Therapy Note      8/12/2024    Location:  [place]      Clients Presents: [phone]    Clients Absent: [phone]    Length of session: 1.5 hours    Group Note: IOP    Group Type: Co-Ed    New members were welcomed and introduced.  Norms and expectations of group were discussed.    Content: GROUP CHECKED-IN  TOPIC:  The AA Principles and Virtues  Clients learned about the principles and virtues of the AA 12-Step program and how they can add value to ones own recovery.    JOSEPHINE Benito  8/12/2024 1:10 PM    Co-Therapist: N/A      [facility] Individual Group Progress Note    Jenny Rucker  1975 8/12/2024    Notes on Client Progress in Group    Reason for Absence: CX-Client reported injuring his leg at work and would not be in group today.     JOSEPHINE Benito  8/12/2024 1:20 PM    Co-Therapist: N/A

## 2024-08-13 ENCOUNTER — HOSPITAL ENCOUNTER (OUTPATIENT)
Dept: PSYCHIATRY | Age: 49
Setting detail: THERAPIES SERIES
Discharge: HOME | End: 2024-08-13
Payer: COMMERCIAL

## 2024-08-13 PROCEDURE — 90834 PSYTX W PT 45 MINUTES: CPT

## 2024-08-14 ENCOUNTER — HOSPITAL ENCOUNTER (OUTPATIENT)
Dept: PSYCHIATRY | Age: 49
Setting detail: THERAPIES SERIES
Discharge: HOME | End: 2024-08-14
Payer: COMMERCIAL

## 2024-08-14 PROCEDURE — H2020 THER BEHAV SVC, PER DIEM: HCPCS

## 2024-08-14 NOTE — GROUP NOTE
[facility] Group Therapy Note      8/14/2024    Location:  [place]      Clients Presents: [phone]    Clients Absent: [phone]    Length of session: 1.5 hours    Group Note: IOP    Group Type: Co-Ed    New members were welcomed and introduced.  Norms and expectations of group were discussed.    Content: GROUP CHECKED-IN  TOPIC:  \"The 5 Stages of Drug & Alcohol Addiction\" - \"6 Ways to Understand Changes In Addiction and Treatment Recovery\"  Clients identified the 5 stages of Drug and Alcohol addiction (Experimentation, Regular Use, Risky Use, Dependence, Addiction). Clients learned about the stages of change (Pre-contemplation, Contemplation, Contemplation, Determination, Action, Maintenance and Relapse, and Termination).    JOSEPHINE Benito  8/14/2024 11:58 AM    Co-Therapist: N/A      [facility] Individual Group Progress Note    Jenny Rucker  1975 8/14/2024    Notes on Client Progress in Group    Jenny participated in the group discussion. He was attentive, offered insight in understanding the progression of the disease in his own life along with the stages of change. He gave appropriate feedback.    JOSEPHINE Benito  8/14/2024 1:13 PM    Co-Therapist: N/A

## 2024-08-19 ENCOUNTER — HOSPITAL ENCOUNTER (OUTPATIENT)
Dept: PSYCHIATRY | Age: 49
Setting detail: THERAPIES SERIES
Discharge: HOME | End: 2024-08-19
Payer: COMMERCIAL

## 2024-08-19 PROCEDURE — H2020 THER BEHAV SVC, PER DIEM: HCPCS

## 2024-08-19 NOTE — GROUP NOTE
[facility] Group Therapy Note      8/19/2024    Location:  [place]      Clients Presents: [phone]    Clients Absent: [phone]    Length of session: 3.0 hours    Group Note: IOP    Group Type: Co-Ed    New members were welcomed and introduced.  Norms and expectations of group were discussed.    Content: GROUP CHECKED-IN  TOPIC:  \"5 Ways to Stay Motivated in Recovery\" - \"The Disease Concept of Addiction\"  Clients learned about the importance of staying motivated in recovery and shared how do they stay motivated in their own recovery. Clients discussed the disease concept of addiction.    JOSEPHINE Benito  8/19/2024 12:20 PM    Co-Therapist: N/A      [facility] Individual Group Progress Note    Jenny Rucker  1975 8/19/2024    Notes on Client Progress in Group    Jenny participated in the group discussion. He was attentive, offered insight into how he stays motivated in recovery and stated, \"I've been dealing with this disease for over 30 years!\" \"I have to motivated to not use or I'll die!\" He gave appropriate feedback.    JOSEPHINE Benito  8/19/2024 12:40 PM    Co-Therapist: N/A

## 2024-08-20 ENCOUNTER — HOSPITAL ENCOUNTER (OUTPATIENT)
Dept: PSYCHIATRY | Age: 49
Setting detail: THERAPIES SERIES
Discharge: HOME | End: 2024-08-20
Payer: COMMERCIAL

## 2024-08-20 PROCEDURE — 90834 PSYTX W PT 45 MINUTES: CPT

## 2024-08-20 NOTE — PROGRESS NOTES
[facility]                Progress Note    [x] [place]  [] [place]                    Patient Name: Jenny Rucker   : 1975     Case # :  [id number]  Therapist: JOSEPHINE Benito        Objective/Service/Time:    1-HOUR    S:  Client completed individual session. He reports no use of any substances. He reports continued employment and stated, \"We loss another cook over the weekend!\" \"So its been very slow at work.\"     O:  Client was cooperative and fully oriented.       A:  Client reports having good support and keeps busy with work and social media. He will continue working toward completing Individualized Treatment Plan goals and objectives.      P:  Client is committed to attending group and individual therapy sessions weekly.                  Adri Lindsay MA, JOSEPHINE, 24, 2:59 PM

## 2024-08-21 ENCOUNTER — APPOINTMENT (OUTPATIENT)
Dept: PSYCHIATRY | Age: 49
End: 2024-08-21
Payer: COMMERCIAL

## 2024-08-23 ENCOUNTER — APPOINTMENT (OUTPATIENT)
Dept: PSYCHIATRY | Age: 49
End: 2024-08-23
Payer: COMMERCIAL

## 2024-08-26 ENCOUNTER — HOSPITAL ENCOUNTER (OUTPATIENT)
Dept: PSYCHIATRY | Age: 49
Setting detail: THERAPIES SERIES
Discharge: HOME | End: 2024-08-26
Payer: COMMERCIAL

## 2024-08-26 PROCEDURE — H2020 THER BEHAV SVC, PER DIEM: HCPCS

## 2024-08-26 NOTE — GROUP NOTE
[facility] Group Therapy Note      8/26/2024    Location:  [place]      Clients Presents: [phone]    Clients Absent: [phone]    Length of session: 3.0 hours    Group Note: IOP    Group Type: Co-Ed    New members were welcomed and introduced.  Norms and expectations of group were discussed.    Content: GROUP CHECKED-IN  TOPIC:  \"Anger Iceberg\" - \"Anger Management and Addiction\"  Clients learned that anger is an emotion that tends to be easy to see but it's just the tip of the iceberg. Clients learned that other emotions may be hidden beneath the surface (i.e. Hurt, Frustration, Disappointed, Loneliness, Pain, Anxiety, and Stress etc). Clients also learned that substance use often co-occurs alongside anger, aggression, and violent tendencies.    JOSEPHINE Benito  8/26/2024 1:29 PM    Co-Therapist: N/A      [facility] Individual Group Progress Note    Jenny Rucker  1975 8/26/2024    Notes on Client Progress in Group    Jenny participated in the group discussion. Client was attentive, offered good insight into how he has handled anger in the past, and stated, \"I had rage Sunday at work and surprised myself!\" Client processed his feelings about what happened at work and gave appropriate feedback.    JOSEPHINE Benito  8/26/2024 2:07 PM    Co-Therapist: N/A

## 2024-08-27 ENCOUNTER — HOSPITAL ENCOUNTER (OUTPATIENT)
Dept: PSYCHIATRY | Age: 49
Setting detail: THERAPIES SERIES
Discharge: HOME | End: 2024-08-27
Payer: COMMERCIAL

## 2024-08-27 PROCEDURE — 90834 PSYTX W PT 45 MINUTES: CPT

## 2024-08-27 NOTE — PROGRESS NOTES
[facility]                Progress Note    [x] [place]  [] [place]                    Patient Name: Jenny Rucker   : 1975     Case # :  [id number]  Therapist: JOSEPHINE Benito        Objective/Service/Time:    1-HOUR    S:  Client completed individual session. He reports no use of any substances. He reports no major concerns at this time and continues with employment.d     O:  Client was cooperative and fully oriented.       A:  Client reports having good support and keeps busy with work and social media. He will continue working toward completing Individualized Treatment Plan goals and objectives.      P:  Client is committed to attending group and individual therapy sessions weekly.                  Adri Lindsay MA, JOSEPHINE, 24, 2:52 PM

## 2024-08-28 ENCOUNTER — APPOINTMENT (OUTPATIENT)
Dept: PSYCHIATRY | Age: 49
End: 2024-08-28
Payer: COMMERCIAL

## 2024-08-30 ENCOUNTER — APPOINTMENT (OUTPATIENT)
Dept: PSYCHIATRY | Age: 49
End: 2024-08-30
Payer: COMMERCIAL

## 2024-09-03 ENCOUNTER — HOSPITAL ENCOUNTER (OUTPATIENT)
Dept: PSYCHIATRY | Age: 49
Setting detail: THERAPIES SERIES
Discharge: HOME | End: 2024-09-03
Payer: COMMERCIAL

## 2024-09-03 PROCEDURE — 90832 PSYTX W PT 30 MINUTES: CPT

## 2024-09-03 NOTE — PROGRESS NOTES
[facility]                Progress Note    [x] [place]  [] [place]                    Patient Name: Jenny Rucker   : 1975     Case # :  [id number]  Therapist: JOSEPHINE Benito        Objective/Service/Time:  TELEHEALTH      .30 MINUTES    S:  Client completed a telehealth session due to waiting for services of his air conditioning not working. The service men hadn't arrived so client checked in.Client stated their name and soc #. Client agreed to Telehealth and reported in a Confidential setting. Client and counselor will call back if there is a disconnect. Client has the crisis # for  needs. He reports no use of any substance and no major concerns at this time.    O: Client oriented to the day of the week and year.    A:  Client reports having a solid support network and continues with employment.     P:  Client is committed to attending group and individual therapy sessions weekly.                    Adri Lindsay MA, JOSEPHINE, 24, 2:51 PM

## 2024-09-04 ENCOUNTER — APPOINTMENT (OUTPATIENT)
Dept: PSYCHIATRY | Age: 49
End: 2024-09-04
Payer: COMMERCIAL

## 2024-09-06 ENCOUNTER — APPOINTMENT (OUTPATIENT)
Dept: PSYCHIATRY | Age: 49
End: 2024-09-06
Payer: COMMERCIAL

## 2024-09-09 ENCOUNTER — HOSPITAL ENCOUNTER (OUTPATIENT)
Dept: PSYCHIATRY | Age: 49
Setting detail: THERAPIES SERIES
Discharge: HOME | End: 2024-09-09
Payer: COMMERCIAL

## 2024-09-09 PROCEDURE — H2020 THER BEHAV SVC, PER DIEM: HCPCS

## 2024-09-11 ENCOUNTER — APPOINTMENT (OUTPATIENT)
Dept: PSYCHIATRY | Age: 49
End: 2024-09-11
Payer: COMMERCIAL

## 2024-09-13 ENCOUNTER — APPOINTMENT (OUTPATIENT)
Dept: PSYCHIATRY | Age: 49
End: 2024-09-13
Payer: COMMERCIAL

## 2024-09-16 ENCOUNTER — HOSPITAL ENCOUNTER (OUTPATIENT)
Dept: PSYCHIATRY | Age: 49
Setting detail: THERAPIES SERIES
Discharge: HOME | End: 2024-09-16
Payer: COMMERCIAL

## 2024-09-16 PROCEDURE — H2020 THER BEHAV SVC, PER DIEM: HCPCS

## 2024-09-17 ENCOUNTER — HOSPITAL ENCOUNTER (OUTPATIENT)
Dept: PSYCHIATRY | Age: 49
Setting detail: THERAPIES SERIES
Discharge: HOME | End: 2024-09-17
Payer: COMMERCIAL

## 2024-09-17 PROCEDURE — 80305 DRUG TEST PRSMV DIR OPT OBS: CPT

## 2024-09-17 PROCEDURE — 90834 PSYTX W PT 45 MINUTES: CPT

## 2024-09-18 ENCOUNTER — APPOINTMENT (OUTPATIENT)
Dept: PSYCHIATRY | Age: 49
End: 2024-09-18
Payer: COMMERCIAL

## 2024-09-20 ENCOUNTER — APPOINTMENT (OUTPATIENT)
Dept: PSYCHIATRY | Age: 49
End: 2024-09-20
Payer: COMMERCIAL

## 2024-09-23 ENCOUNTER — HOSPITAL ENCOUNTER (OUTPATIENT)
Dept: PSYCHIATRY | Age: 49
Setting detail: THERAPIES SERIES
Discharge: HOME | End: 2024-09-23
Payer: COMMERCIAL

## 2024-09-23 PROCEDURE — H2020 THER BEHAV SVC, PER DIEM: HCPCS

## 2024-09-24 ENCOUNTER — HOSPITAL ENCOUNTER (OUTPATIENT)
Dept: PSYCHIATRY | Age: 49
Setting detail: THERAPIES SERIES
Discharge: HOME | End: 2024-09-24
Payer: COMMERCIAL

## 2024-09-24 PROCEDURE — 90834 PSYTX W PT 45 MINUTES: CPT

## 2024-09-30 ENCOUNTER — HOSPITAL ENCOUNTER (OUTPATIENT)
Dept: PSYCHIATRY | Age: 49
Discharge: HOME | End: 2024-09-30

## 2024-09-30 ENCOUNTER — APPOINTMENT (OUTPATIENT)
Dept: PSYCHIATRY | Age: 49
End: 2024-09-30
Payer: COMMERCIAL

## 2024-09-30 NOTE — PROGRESS NOTES
[facility] Discharge Summary    Jenny Rucker  1975  Case # [id number]    Location: [x] [place] [] [place]    Admission Date:  5/10/24    Date of last service: 9/23/24    Therapist: JOSEPHINE Benito     Last drug screen: 9/17/24     Results: Negative    Diagnosis: F10.20 Other and unspecified alcohol dependence    PRESENTING PROBLEM:  Client reports completing residential program and needs continued AOD support for sobriety    Service: Individual 1x Month, IOP 3x Week, Urinalysis Random, AA/NA 1-2x Week, Sponsor As Needed, and Case Management As Needed    Level of care at admission:   2.1 Intensive Outpatient Services    Level of care at discharge:   2.1 Intensive Outpatient Services    Client's Outcomes Treatment: Client was engaged in IOP, relapsed on alcohol and is requesting residential services at [facility] to eventually go into sober living housing.     Service History Appointments: Scheduled-51   Kept-43   Cancelled-8   Did not show-0    Discharge Code: A    Reason for discharge: Client needed a Higher Level Of Care not provided for at [facility].    Recommendations/Referrals: Client to attend residential program at [facility] to eventually move into a Sober Living House.    Upon involuntary termination from service, client has received Client Rights as to their right to file an appeal.     Adult And Adolescent Discharge Level of Care Criteria to be completed separately See Attachment.      JOSEPHINE Benito   9/30/2024 / 4:43 PM       Medical Director     ANDRA Miller MD    Signature:

## 2024-10-15 ENCOUNTER — HOSPITAL ENCOUNTER (OUTPATIENT)
Age: 49
Discharge: HOME | End: 2024-10-15
Payer: COMMERCIAL

## 2024-10-15 DIAGNOSIS — I10 HYPERTENSION, UNSPECIFIED TYPE: ICD-10-CM

## 2024-10-15 DIAGNOSIS — N18.1 CKD STAGE G1/A3, GFR > 90 AND ALBUMIN CREATININE RATIO >300 MG/G: ICD-10-CM

## 2024-10-15 LAB
ALBUMIN SERPL-MCNC: 4 G/DL (ref 3.4–5)
ALBUMIN SERPL-MCNC: 4 G/DL (ref 3.4–5)
ALBUMIN/GLOB SERPL: 1.4 {RATIO} (ref 1.1–2.2)
ALBUMIN/GLOB SERPL: 1.4 {RATIO} (ref 1.1–2.2)
ALP SERPL-CCNC: 125 U/L (ref 40–129)
ALP SERPL-CCNC: 126 U/L (ref 40–129)
ALT SERPL-CCNC: 15 U/L (ref 10–40)
ALT SERPL-CCNC: 15 U/L (ref 10–40)
ANION GAP SERPL CALCULATED.3IONS-SCNC: 10 MMOL/L (ref 9–17)
AST SERPL-CCNC: 15 U/L (ref 15–37)
AST SERPL-CCNC: 15 U/L (ref 15–37)
BILIRUB DIRECT SERPL-MCNC: <0.2 MG/DL (ref 0–0.3)
BILIRUB INDIRECT SERPL-MCNC: NORMAL MG/DL (ref 0–0.7)
BILIRUB SERPL-MCNC: 0.3 MG/DL (ref 0–1)
BILIRUB SERPL-MCNC: 0.3 MG/DL (ref 0–1)
BUN SERPL-MCNC: 28 MG/DL (ref 7–20)
CALCIUM SERPL-MCNC: 8.7 MG/DL (ref 8.3–10.6)
CHLORIDE SERPL-SCNC: 106 MMOL/L (ref 99–110)
CO2 SERPL-SCNC: 22 MMOL/L (ref 21–32)
CREAT SERPL-MCNC: 1.9 MG/DL (ref 0.9–1.3)
CREAT UR-MCNC: 132 MG/DL (ref 39–259)
GFR, ESTIMATED: 39 ML/MIN/1.73M2
GLUCOSE SERPL-MCNC: 69 MG/DL (ref 74–99)
MAGNESIUM SERPL-MCNC: 2 MG/DL (ref 1.8–2.4)
PHOSPHATE SERPL-MCNC: 4.1 MG/DL (ref 2.5–4.9)
POTASSIUM SERPL-SCNC: 5.7 MMOL/L (ref 3.5–5.1)
PROT SERPL-MCNC: 6.8 G/DL (ref 6.4–8.2)
PROT SERPL-MCNC: 6.8 G/DL (ref 6.4–8.2)
SODIUM SERPL-SCNC: 138 MMOL/L (ref 136–145)
T PALLIDUM AB SER QL HA: NORMAL
TOTAL PROTEIN, URINE: 16 MG/DL
URINE TOTAL PROTEIN CREATININE RATIO: 0.12 (ref 0–0.2)

## 2024-10-15 PROCEDURE — 80053 COMPREHEN METABOLIC PANEL: CPT

## 2024-10-15 PROCEDURE — 86780 TREPONEMA PALLIDUM: CPT

## 2024-10-15 PROCEDURE — 86803 HEPATITIS C AB TEST: CPT

## 2024-10-15 PROCEDURE — 86708 HEPATITIS A ANTIBODY: CPT

## 2024-10-15 PROCEDURE — 84100 ASSAY OF PHOSPHORUS: CPT

## 2024-10-15 PROCEDURE — 87340 HEPATITIS B SURFACE AG IA: CPT

## 2024-10-15 PROCEDURE — 36415 COLL VENOUS BLD VENIPUNCTURE: CPT

## 2024-10-15 PROCEDURE — 87536 HIV-1 QUANT&REVRSE TRNSCRPJ: CPT

## 2024-10-15 PROCEDURE — 83735 ASSAY OF MAGNESIUM: CPT

## 2024-10-15 PROCEDURE — 82570 ASSAY OF URINE CREATININE: CPT

## 2024-10-15 PROCEDURE — 86593 SYPHILIS TEST NON-TREP QUANT: CPT

## 2024-10-15 PROCEDURE — 80076 HEPATIC FUNCTION PANEL: CPT

## 2024-10-15 PROCEDURE — 86592 SYPHILIS TEST NON-TREP QUAL: CPT

## 2024-10-15 PROCEDURE — 84156 ASSAY OF PROTEIN URINE: CPT

## 2024-10-16 LAB
HBV SURFACE AG SERPL QL IA: NONREACTIVE
HCV AB SERPL QL IA: NONREACTIVE
RPR SER QL: REACTIVE

## 2024-10-17 LAB — HEP A AB: POSITIVE

## 2024-10-20 LAB
HIV1 RNA SERPL NAA+PROBE-LOG#: NOT DETECTED LOG CPY/ML
HIV1 RNA SERPL QL NAA+PROBE: NOT DETECTED
HIV1 RNA SERPL QL NAA+PROBE: NOT DETECTED CPY/ML

## 2024-10-22 LAB
MISCELLANEOUS LAB TEST RESULT: ABNORMAL
TEST NAME: ABNORMAL

## 2024-11-01 NOTE — PROGRESS NOTES
[facility]                Progress Note    [x] [place]  [] [place]                    Patient Name: Jenny Rucker   : 1975     Case # :  [id number]  Therapist: JOSEPHINE Benito        Objective/Service/Time:    1-HOUR    S:  Client completed individual session. He stated, \"I know that I'm dealing with withdrawal symptoms from my relapse .\" This writer asked if he thinks that he needs to go to the hospital? He stated, \"No.\" He processed his feelings of his parents coming home and court on the  of this month. He stated, \"I just feel like I disappointed my father once again!\" It was suggested that he talk to his accountability people, press into his bayron, and maintain an attitude of gratitude of the support he has along with him still being alive.     O:  Client was cooperative and fully oriented.       A:  Client will continue working toward completing Individualized Treatment Plan goals and objectives.      P:  Client is committed to attending group and individual therapy sessions weekly.                  Adri Lindsay MA, JOSEPHINE, 24, 2:56 PM   Detail Level: Detailed Number Of Curettages: 3 Size Of Lesion In Cm: 1.4 Size Of Lesion After Curettage: 2 Add Intralesional Injection: No Total Volume (Ccs): 1 Anesthesia Type: 1% lidocaine with epinephrine Cautery Type: electrodesiccation What Was Performed First?: Curettage Final Size Statement: The size of the lesion after curettage was Additional Information: (Optional): The wound was cleaned, and a pressure dressing was applied.  The patient received detailed post-op instructions. Consent was obtained from the patient. The risks, benefits and alternatives to therapy were discussed in detail. Specifically, the risks of infection, scarring, bleeding, prolonged wound healing, nerve injury, incomplete removal, allergy to anesthesia and recurrence were addressed. Alternatives to ED&C, such as: surgical removal and XRT were also discussed.  Prior to the procedure, the treatment site was clearly identified and confirmed by the patient. All components of Universal Protocol/PAUSE Rule completed. Post-Care Instructions: I reviewed with the patient in detail post-care instructions. Patient is to keep the area dry for 48 hours, and not to engage in any swimming until the area is healed. Should the patient develop any fevers, chills, bleeding, severe pain patient will contact the office immediately. Bill As A Line Item Or As Units: Line Item

## 2024-11-05 ENCOUNTER — HOSPITAL ENCOUNTER (OUTPATIENT)
Age: 49
Discharge: HOME | End: 2024-11-05
Payer: COMMERCIAL

## 2024-11-05 DIAGNOSIS — N17.9 AKI (ACUTE KIDNEY INJURY) (HCC): ICD-10-CM

## 2024-11-05 LAB
ALBUMIN SERPL-MCNC: 4.1 G/DL (ref 3.4–5)
ALBUMIN/GLOB SERPL: 1.6 {RATIO} (ref 1.1–2.2)
ALP SERPL-CCNC: 135 U/L (ref 40–129)
ALT SERPL-CCNC: 17 U/L (ref 10–40)
ANION GAP SERPL CALCULATED.3IONS-SCNC: 12 MMOL/L (ref 9–17)
AST SERPL-CCNC: 17 U/L (ref 15–37)
BASOPHILS # BLD: 0.05 K/UL
BASOPHILS NFR BLD: 1 % (ref 0–1)
BILIRUB SERPL-MCNC: 0.4 MG/DL (ref 0–1)
BILIRUB UR QL STRIP: NEGATIVE
BUN SERPL-MCNC: 15 MG/DL (ref 7–20)
CALCIUM SERPL-MCNC: 8.7 MG/DL (ref 8.3–10.6)
CHLORIDE SERPL-SCNC: 101 MMOL/L (ref 99–110)
CLARITY UR: CLEAR
CO2 SERPL-SCNC: 23 MMOL/L (ref 21–32)
COLOR UR: YELLOW
COMMENT: ABNORMAL
CREAT SERPL-MCNC: 1.4 MG/DL (ref 0.9–1.3)
CREAT UR-MCNC: 42.9 MG/DL (ref 39–259)
EOSINOPHIL # BLD: 0.12 K/UL
EOSINOPHILS RELATIVE PERCENT: 2 % (ref 0–3)
ERYTHROCYTE [DISTWIDTH] IN BLOOD BY AUTOMATED COUNT: 13.2 % (ref 11.7–14.9)
GFR, ESTIMATED: 52 ML/MIN/1.73M2
GLUCOSE SERPL-MCNC: 88 MG/DL (ref 74–99)
GLUCOSE UR STRIP-MCNC: NEGATIVE MG/DL
HCT VFR BLD AUTO: 37.1 % (ref 42–52)
HGB BLD-MCNC: 11.8 G/DL (ref 13.5–18)
HGB UR QL STRIP.AUTO: NEGATIVE
IMM GRANULOCYTES # BLD AUTO: 0.02 K/UL
IMM GRANULOCYTES NFR BLD: 0 %
KETONES UR STRIP-MCNC: NEGATIVE MG/DL
LEUKOCYTE ESTERASE UR QL STRIP: NEGATIVE
LYMPHOCYTES NFR BLD: 2.79 K/UL
LYMPHOCYTES RELATIVE PERCENT: 37 % (ref 24–44)
MAGNESIUM SERPL-MCNC: 1.8 MG/DL (ref 1.8–2.4)
MCH RBC QN AUTO: 30.5 PG (ref 27–31)
MCHC RBC AUTO-ENTMCNC: 31.8 G/DL (ref 32–36)
MCV RBC AUTO: 95.9 FL (ref 78–100)
MONOCYTES NFR BLD: 0.72 K/UL
MONOCYTES NFR BLD: 10 % (ref 0–4)
NEUTROPHILS NFR BLD: 51 % (ref 36–66)
NEUTS SEG NFR BLD: 3.82 K/UL
NITRITE UR QL STRIP: NEGATIVE
PH UR STRIP: 6 [PH] (ref 5–8)
PHOSPHATE SERPL-MCNC: 3.5 MG/DL (ref 2.5–4.9)
PLATELET # BLD AUTO: 188 K/UL (ref 140–440)
PMV BLD AUTO: 10.5 FL (ref 7.5–11.1)
POTASSIUM SERPL-SCNC: 4.6 MMOL/L (ref 3.5–5.1)
PROT SERPL-MCNC: 6.7 G/DL (ref 6.4–8.2)
PROT UR STRIP-MCNC: NEGATIVE MG/DL
RBC # BLD AUTO: 3.87 M/UL (ref 4.6–6.2)
SODIUM SERPL-SCNC: 136 MMOL/L (ref 136–145)
SP GR UR STRIP: <1.005 (ref 1–1.03)
TOTAL PROTEIN, URINE: 7 MG/DL
URINE TOTAL PROTEIN CREATININE RATIO: 0.17 (ref 0–0.2)
UROBILINOGEN UR STRIP-ACNC: 0.2 EU/DL (ref 0–1)
WBC OTHER # BLD: 7.5 K/UL (ref 4–10.5)

## 2024-11-05 PROCEDURE — 82570 ASSAY OF URINE CREATININE: CPT

## 2024-11-05 PROCEDURE — 81003 URINALYSIS AUTO W/O SCOPE: CPT

## 2024-11-05 PROCEDURE — 84100 ASSAY OF PHOSPHORUS: CPT

## 2024-11-05 PROCEDURE — 84156 ASSAY OF PROTEIN URINE: CPT

## 2024-11-05 PROCEDURE — 85025 COMPLETE CBC W/AUTO DIFF WBC: CPT

## 2024-11-05 PROCEDURE — 83735 ASSAY OF MAGNESIUM: CPT

## 2024-11-05 PROCEDURE — 80053 COMPREHEN METABOLIC PANEL: CPT

## 2024-11-05 PROCEDURE — 36415 COLL VENOUS BLD VENIPUNCTURE: CPT

## 2025-01-17 PROBLEM — K86.1 OTHER CHRONIC PANCREATITIS: Status: ACTIVE | Noted: 2025-01-17

## 2025-01-17 PROBLEM — K86.81 EXOCRINE PANCREATIC INSUFFICIENCY: Status: ACTIVE | Noted: 2025-01-17

## 2025-04-14 ENCOUNTER — HOSPITAL ENCOUNTER (OUTPATIENT)
Age: 50
Discharge: HOME | End: 2025-04-14
Payer: COMMERCIAL

## 2025-04-14 DIAGNOSIS — N18.31 CKD STAGE G3A/A1, GFR 45-59 AND ALBUMIN CREATININE RATIO <30 MG/G (HCC): ICD-10-CM

## 2025-04-14 LAB
ALBUMIN SERPL-MCNC: 4.2 G/DL (ref 3.4–5)
ALBUMIN/GLOB SERPL: 1.5 {RATIO} (ref 1.1–2.2)
ALP SERPL-CCNC: 112 U/L (ref 40–129)
ALT SERPL-CCNC: 14 U/L (ref 10–40)
ANION GAP SERPL CALCULATED.3IONS-SCNC: 9 MMOL/L (ref 9–17)
AST SERPL-CCNC: 17 U/L (ref 15–37)
BASOPHILS # BLD: 0.04 K/UL
BASOPHILS NFR BLD: 1 % (ref 0–1)
BILIRUB SERPL-MCNC: 1.5 MG/DL (ref 0–1)
BILIRUB UR QL STRIP: NEGATIVE
BUN SERPL-MCNC: 11 MG/DL (ref 7–20)
CALCIUM SERPL-MCNC: 8.7 MG/DL (ref 8.3–10.6)
CHLORIDE SERPL-SCNC: 104 MMOL/L (ref 99–110)
CLARITY UR: CLEAR
CO2 SERPL-SCNC: 23 MMOL/L (ref 21–32)
COLOR UR: YELLOW
COMMENT: ABNORMAL
CREAT SERPL-MCNC: 1.2 MG/DL (ref 0.9–1.3)
EOSINOPHIL # BLD: 0.19 K/UL
EOSINOPHILS RELATIVE PERCENT: 3 % (ref 0–3)
ERYTHROCYTE [DISTWIDTH] IN BLOOD BY AUTOMATED COUNT: 12.5 % (ref 11.7–14.9)
FERRITIN SERPL-MCNC: 55 NG/ML (ref 30–400)
FOLATE SERPL-MCNC: 22.6 NG/ML (ref 4.8–24.2)
GFR, ESTIMATED: 65 ML/MIN/1.73M2
GLUCOSE SERPL-MCNC: 86 MG/DL (ref 74–99)
GLUCOSE UR STRIP-MCNC: NEGATIVE MG/DL
HCT VFR BLD AUTO: 41.6 % (ref 42–52)
HGB BLD-MCNC: 13.6 G/DL (ref 13.5–18)
HGB UR QL STRIP.AUTO: NEGATIVE
IMM GRANULOCYTES # BLD AUTO: 0.01 K/UL
IMM GRANULOCYTES NFR BLD: 0 %
IRON SATN MFR SERPL: 39 % (ref 15–50)
IRON SERPL-MCNC: 124 UG/DL (ref 59–158)
KETONES UR STRIP-MCNC: NEGATIVE MG/DL
LEUKOCYTE ESTERASE UR QL STRIP: NEGATIVE
LYMPHOCYTES NFR BLD: 2.51 K/UL
LYMPHOCYTES RELATIVE PERCENT: 38 % (ref 24–44)
MCH RBC QN AUTO: 30.2 PG (ref 27–31)
MCHC RBC AUTO-ENTMCNC: 32.7 G/DL (ref 32–36)
MCV RBC AUTO: 92.2 FL (ref 78–100)
MONOCYTES NFR BLD: 0.59 K/UL
MONOCYTES NFR BLD: 9 % (ref 0–4)
NEUTROPHILS NFR BLD: 50 % (ref 36–66)
NEUTS SEG NFR BLD: 3.35 K/UL
NITRITE UR QL STRIP: NEGATIVE
PH UR STRIP: 6 [PH] (ref 5–8)
PLATELET # BLD AUTO: 207 K/UL (ref 140–440)
PMV BLD AUTO: 10.5 FL (ref 7.5–11.1)
POTASSIUM SERPL-SCNC: 4.3 MMOL/L (ref 3.5–5.1)
PROT SERPL-MCNC: 7 G/DL (ref 6.4–8.2)
PROT UR STRIP-MCNC: NEGATIVE MG/DL
RBC # BLD AUTO: 4.51 M/UL (ref 4.6–6.2)
RETICS # AUTO: 0.05 M/UL
RETICS/RBC NFR AUTO: 1 % (ref 0.2–2)
SODIUM SERPL-SCNC: 137 MMOL/L (ref 136–145)
SP GR UR STRIP: <1.005 (ref 1–1.03)
TIBC SERPL-MCNC: 318 UG/DL (ref 260–445)
TSH SERPL DL<=0.05 MIU/L-ACNC: 0.81 UIU/ML (ref 0.27–4.2)
UNSATURATED IRON BINDING CAPACITY: 194 UG/DL (ref 110–370)
UROBILINOGEN UR STRIP-ACNC: 0.2 EU/DL (ref 0–1)
VIT B12 SERPL-MCNC: 343 PG/ML (ref 211–911)
WBC OTHER # BLD: 6.7 K/UL (ref 4–10.5)

## 2025-04-14 PROCEDURE — 82746 ASSAY OF FOLIC ACID SERUM: CPT

## 2025-04-14 PROCEDURE — 81003 URINALYSIS AUTO W/O SCOPE: CPT

## 2025-04-14 PROCEDURE — 82607 VITAMIN B-12: CPT

## 2025-04-14 PROCEDURE — 80053 COMPREHEN METABOLIC PANEL: CPT

## 2025-04-14 PROCEDURE — 82728 ASSAY OF FERRITIN: CPT

## 2025-04-14 PROCEDURE — 85045 AUTOMATED RETICULOCYTE COUNT: CPT

## 2025-04-14 PROCEDURE — 83540 ASSAY OF IRON: CPT

## 2025-04-14 PROCEDURE — 83550 IRON BINDING TEST: CPT

## 2025-04-14 PROCEDURE — 85025 COMPLETE CBC W/AUTO DIFF WBC: CPT

## 2025-04-14 PROCEDURE — 84443 ASSAY THYROID STIM HORMONE: CPT

## 2025-05-21 NOTE — PROGRESS NOTES
5/21/25 - .LM with my call-back # concerning  surgery @ [facility] on  5/29/25.  Please call the PAT Nurse for a phone assessment and surgery instructions.

## 2025-05-22 NOTE — PROGRESS NOTES
.Surgery @ [facility] on 5/29/25 you will be called 5/28/25 with times    FOLLOW THE OFFICE INSTRUCTIONS FOR YOUR BOWEL PREP    1. Enter thru the hospital main entrance on day of surgery, check in at the Information Desk. If you arrive prior to 6:00am, enter thru the ER entrance.    2. Follow the directions as prescribed by the doctor for your procedure and medications.         Morning of surgery take:  Omeprazole with sips of water         Stop vitamins, supplements and NSAIDS:      3. Check with your Doctor regarding stopping blood thinners and follow their instructions.    4. Do not smoke, vape or use chewing tobacco morning of surgery. Do not drink any alcoholic beverages 24 hours prior to surgery.       This includes NA Beer. No street drugs 7 days prior to surgery.    5. If you have dentures, contacts of glasses they will be removed before going to the OR; please bring a case.    6. Please bring picture ID, insurance card, paperwork from the doctor’s office (H & P, Consent, & card for implantable devices).    7. Take a shower with an antibacterial soap the night before surgery and the morning of surgery. Do not put anything on your skin      After your morning shower.    8. You will need a responsible adult to drive you home and check on you after surgery.

## 2025-05-28 ENCOUNTER — ANESTHESIA EVENT (OUTPATIENT)
Dept: ENDOSCOPY | Age: 50
End: 2025-05-28
Payer: COMMERCIAL

## 2025-05-28 ASSESSMENT — LIFESTYLE VARIABLES: SMOKING_STATUS: 1

## 2025-05-28 NOTE — PROGRESS NOTES
Notified patient surgery @ [facility] on  5/29/25 @ 1430, arrival 1300. NPO status and morning medications reviewed. Understanding verbalized.

## 2025-05-29 ENCOUNTER — HOSPITAL ENCOUNTER (OUTPATIENT)
Age: 50
Setting detail: OUTPATIENT SURGERY
Discharge: HOME | End: 2025-05-29
Attending: INTERNAL MEDICINE | Admitting: INTERNAL MEDICINE
Payer: COMMERCIAL

## 2025-05-29 ENCOUNTER — ANESTHESIA (OUTPATIENT)
Dept: ENDOSCOPY | Age: 50
End: 2025-05-29
Payer: COMMERCIAL

## 2025-05-29 VITALS
OXYGEN SATURATION: 99 % | SYSTOLIC BLOOD PRESSURE: 131 MMHG | DIASTOLIC BLOOD PRESSURE: 88 MMHG | TEMPERATURE: 97.3 F | HEIGHT: 71 IN | HEART RATE: 66 BPM | BODY MASS INDEX: 25.2 KG/M2 | RESPIRATION RATE: 16 BRPM | WEIGHT: 180 LBS

## 2025-05-29 PROCEDURE — 2580000003 HC RX 258: Performed by: ANESTHESIOLOGY

## 2025-05-29 PROCEDURE — 2709999900 HC NON-CHARGEABLE SUPPLY: Performed by: INTERNAL MEDICINE

## 2025-05-29 PROCEDURE — 7100000011 HC PHASE II RECOVERY - ADDTL 15 MIN: Performed by: INTERNAL MEDICINE

## 2025-05-29 PROCEDURE — 3700000000 HC ANESTHESIA ATTENDED CARE: Performed by: INTERNAL MEDICINE

## 2025-05-29 PROCEDURE — 7100000010 HC PHASE II RECOVERY - FIRST 15 MIN: Performed by: INTERNAL MEDICINE

## 2025-05-29 PROCEDURE — 3609027000 HC COLONOSCOPY: Performed by: INTERNAL MEDICINE

## 2025-05-29 PROCEDURE — 6360000002 HC RX W HCPCS: Performed by: NURSE ANESTHETIST, CERTIFIED REGISTERED

## 2025-05-29 PROCEDURE — 3700000001 HC ADD 15 MINUTES (ANESTHESIA): Performed by: INTERNAL MEDICINE

## 2025-05-29 RX ORDER — LIDOCAINE HYDROCHLORIDE 20 MG/ML
INJECTION, SOLUTION INTRAVENOUS
Status: DISCONTINUED | OUTPATIENT
Start: 2025-05-29 | End: 2025-05-29 | Stop reason: SDUPTHER

## 2025-05-29 RX ORDER — SODIUM CHLORIDE 9 MG/ML
INJECTION, SOLUTION INTRAVENOUS PRN
Status: DISCONTINUED | OUTPATIENT
Start: 2025-05-29 | End: 2025-05-29 | Stop reason: HOSPADM

## 2025-05-29 RX ORDER — PROPOFOL 10 MG/ML
INJECTION, EMULSION INTRAVENOUS
Status: DISCONTINUED | OUTPATIENT
Start: 2025-05-29 | End: 2025-05-29 | Stop reason: SDUPTHER

## 2025-05-29 RX ORDER — SODIUM CHLORIDE 0.9 % (FLUSH) 0.9 %
5-40 SYRINGE (ML) INJECTION PRN
Status: DISCONTINUED | OUTPATIENT
Start: 2025-05-29 | End: 2025-05-29 | Stop reason: HOSPADM

## 2025-05-29 RX ORDER — SODIUM CHLORIDE 0.9 % (FLUSH) 0.9 %
5-40 SYRINGE (ML) INJECTION EVERY 12 HOURS SCHEDULED
Status: DISCONTINUED | OUTPATIENT
Start: 2025-05-29 | End: 2025-05-29 | Stop reason: HOSPADM

## 2025-05-29 RX ORDER — SODIUM CHLORIDE, SODIUM LACTATE, POTASSIUM CHLORIDE, CALCIUM CHLORIDE 600; 310; 30; 20 MG/100ML; MG/100ML; MG/100ML; MG/100ML
INJECTION, SOLUTION INTRAVENOUS CONTINUOUS
Status: DISCONTINUED | OUTPATIENT
Start: 2025-05-29 | End: 2025-05-29 | Stop reason: HOSPADM

## 2025-05-29 RX ADMIN — SODIUM CHLORIDE, SODIUM LACTATE, POTASSIUM CHLORIDE, AND CALCIUM CHLORIDE: .6; .31; .03; .02 INJECTION, SOLUTION INTRAVENOUS at 13:05

## 2025-05-29 RX ADMIN — LIDOCAINE HYDROCHLORIDE 100 MG: 20 INJECTION, SOLUTION INTRAVENOUS at 14:01

## 2025-05-29 RX ADMIN — PROPOFOL 120 MG: 10 INJECTION, EMULSION INTRAVENOUS at 14:01

## 2025-05-29 ASSESSMENT — PAIN - FUNCTIONAL ASSESSMENT
PAIN_FUNCTIONAL_ASSESSMENT: 0-10

## 2025-05-29 NOTE — ANESTHESIA POSTPROCEDURE EVALUATION
Department of Anesthesiology  Postprocedure Note    Patient: Jenny Rucker  MRN: [id number]  YOB: 1975  Date of evaluation: 5/29/2025    Procedure Summary       Date: 05/29/25 Room / Location: [place] [address] / [facility]    Anesthesia Start: 1355 Anesthesia Stop: 1415    Procedure: COLONOSCOPY DIAGNOSTIC Diagnosis:       Special screening for malignant neoplasm of colon      (Special screening for malignant neoplasm of colon [Z12.11])    Surgeons: Awais Eugene MD Responsible Provider: Yrn Casillas MD    Anesthesia Type: MAC ASA Status: 2            Anesthesia Type: No value filed.    Nain Phase I: Nain Score: 10    Nain Phase II:      Anesthesia Post Evaluation    Patient location during evaluation: bedside  Patient participation: complete - patient participated  Level of consciousness: awake and alert  Pain score: 0  Airway patency: patent  Nausea & Vomiting: no nausea and no vomiting  Cardiovascular status: blood pressure returned to baseline and hemodynamically stable  Respiratory status: acceptable, room air and spontaneous ventilation  Hydration status: euvolemic  Pain management: adequate and satisfactory to patient    No notable events documented.

## 2025-05-29 NOTE — OP NOTE
Operative Note      Patient: Jenny Rucker  YOB: 1975  MRN: [id number]    Date of Procedure: 5/29/2025    Pre-Op Diagnosis Codes:      * Special screening for malignant neoplasm of colon [Z12.11]    [facility]      BRIEF OP REPORT:    Impression:    1) colonoscopy showing grade 2 to grade 3 internal hemorrhoids   2) normal mucosa whole colon   3) evidence of prior colectomy in the ascending colon evidence of anastomosis neoterminal ileum appeared normal        Suggest:   1) high-fiber diet continue probiotics every day, 64 ounces of water every day   2) follow-up as needed   3) recall in 10 years     Full EGD/COLONOSCOPY report available by going to \"chart review\" then \"procedures\" then  \"EGD/Colonoscopy\"  then \"View Endoscopy Report\"       Please note that time of note may not reflect time of procedure     Electronically signed by Awais Eugene MD on 5/29/2025 at 2:11 PM

## 2025-05-29 NOTE — DISCHARGE INSTRUCTIONS
[facility]  [phone]    Do not drive, work around machines or use equipment.  Do not drink any alcoholic beverages.  Do not smoke while alone.  Avoid making important decisions.  Plan to spend a quiet, relaxed evening @ home.  Resume normal activities as you begin to feel better.  Eat lightly for your first meal, then gradually increase your diet to what is normal for you.  In case of nausea, avoid food and drink only clear liquids.  Resume food as nausea ceases.  Notify your surgeon if you experience fever, chills, large amount of bleeding, difficulty breathing, persistent nausea and vomiting or any other disturbing problem.  Call for a follow-up appointment with your surgeon. COLONOSCOPY        FOLLOW UP APPOINTMENT  AS NEEDED.     REPEAT PROCEDURE IN 10 YEARS OR AS NEEDED.    TEST ORDERED: NONE     What to expect at home:  Your Recovery   Your doctor will tell you when you can eat and do your other usual activities Your doctor will talk to you about when you will need your next colonoscopy. Your doctor can help you decide how often you need to be checked. This will depend on the results of your test and your risk for colorectal cancer.  After the test, you may be bloated or have gas pains. You may need to pass gas. If a biopsy was done or a polyp was removed, you may have streaks of blood in your stool (feces) for a few days.  This care sheet gives you a general idea about how long it will take for you to recover. But each person recovers at a different pace. Follow the steps below to get better as quickly as possible.  How can you care for yourself at home?  Activity  Rest when you feel tired.  Diet  Follow your doctor's directions for eating.  Unless your doctor has told you not to, drink plenty of fluids. This helps to replace the fluids that were lost during the colon prep.  DO NOT DRINK ALCOHOL.  Medicines  Your doctor will tell you if and when you can restart your

## 2025-05-29 NOTE — ANESTHESIA PRE PROCEDURE
Department of Anesthesiology  Preprocedure Note       Name:  Jenny Rucker   Age:  49 y.o.  :  1975                                          MRN:  [id number]         Date:  2025      Surgeon: Surgeon(s):  Awais Eugene MD    Procedure: Procedure(s):  COLORECTAL CANCER SCREENING, NOT HIGH RISK    Medications prior to admission:   Prior to Admission medications    Medication Sig Start Date End Date Taking? Authorizing Provider   BIKTARVY -25 MG TABS per tablet Take 1 tablet by mouth daily   Yes Franc Boss MD   VIVITROL 380 MG injection Inject 380 mg into the muscle every 28 days 1/10/25  Yes Franc Boss MD   naltrexone (DEPADE) 50 MG tablet Take 1 tablet by mouth daily as needed 24  Yes Franc Boss MD   omeprazole (PRILOSEC) 20 MG delayed release capsule Take 1 capsule by mouth as needed 10/21/24  Yes Franc Boss MD   Multiple Vitamin (MULTIVITAMIN ADULT) TABS Take 1 mg by mouth daily 25  Yes Corrine Moran MD   traZODone (DESYREL) 50 MG tablet Take 1 tablet by mouth nightly 25  Yes Corrine Moran MD   vitamin D (ERGOCALCIFEROL) 1.25 MG (83345 UT) CAPS capsule Take 1 capsule by mouth once a week 25  Yes Corrine Moran MD   atorvastatin (LIPITOR) 20 MG tablet Take 1 tablet by mouth daily 23  Yes Corrine Moran MD   hydrOXYzine pamoate (VISTARIL) 50 MG capsule Take 1 capsule by mouth as needed for Itching 23  Yes Corrine Moran MD   thiamine 100 MG tablet Take 1 tablet by mouth daily 23  Yes Franc Boss MD   folic acid (FOLVITE) 1 MG tablet Take 1 tablet by mouth daily 23  Yes Franc Boss MD       Current medications:    Current Facility-Administered Medications   Medication Dose Route Frequency Provider Last Rate Last Admin   • lactated ringers infusion   IntraVENous Continuous Yrn Casillas MD       • sodium chloride flush 0.9 % injection 5-40 mL  5-40 mL 
GERD:, bowel prep          Endo/Other:                      ROS comment: HIV positive Abdominal:             Vascular: negative vascular ROS.         Other Findings:             Anesthesia Plan      MAC     ASA 3       Induction: intravenous.                            Geronimo Hare APRN - CRNA   5/28/2025       Pre Anesthesia Assessment complete. Chart reviewed on 5/28/2025

## 2025-05-29 NOTE — H&P
[facility]    [facility]   Pre-operative History and Physical    Patient: Jenny Rucker  : 1975      History Obtained From: Patient, Nursing chart and Guardian/family members        HISTORY OF PRESENT ILLNESS:                The patient is a 49 y.o. male with significant past medical history as below who presents for C scope    Indication screening    Past Medical History:        Diagnosis Date    Abscess of sigmoid colon due to diverticulitis 10/16/2020    Alcoholic (HCC)     Bipolar 1 disorder (HCC)     \"was on medication yrs ago for this'    Colostomy in place ([facility]) 2021    Diverticulitis     HIV positive ([facility])     follow with Dr Moran     Hypertension     \"started on medication for b/p approx 20 yrs ago \"\"dr Moran had me stop the blood pressure medication in May ( ) got to low\"    Incisional hernia, without obstruction or gangrene 10/22/2020    Pancreatitis     \" in \"    Wears glasses        Past Surgical History:        Procedure Laterality Date    ABDOMEN SURGERY      \"age 33- removed part of large and small intesine\"    COLONOSCOPY  2017    COLOSTOMY Left     with Dr Araujo     COLOSTOMY N/A 2021    COLOSTOMY REVERSAL, EXTENSIVE LYSIS OF ADHESISIONS performed by Quinn Araujo MD at [facility] OR    CT ABSCESS DRAINAGE PERITONEAL/PERITONITIS OPEN  10/17/2020    CT DRAINAGE ABDOM ABSCESS OPEN 10/17/2020 [facility] CT SCAN    HERNIA REPAIR      mila ing hernia     LAPAROTOMY N/A 10/21/2020    LAPAROTOMY EXPLORATORY COLOSTOMY CREATION performed by Quinn Araujo MD at [facility] OR         Current Medications:    Medications    Prior to Admission medications    Medication Sig Start Date End Date Taking? Authorizing Provider   BIKTARVY -25 MG TABS per tablet Take 1 tablet by mouth daily   Yes ProviderFranc MD   VIVITROL 380 MG injection Inject 380 mg into the muscle every 28 days 1/10/25  Yes Franc Boss MD   naltrexone

## 2025-05-29 NOTE — PROGRESS NOTES
1422 patient to room from endo a/o vss assessment wnl,family at bedside,call light in reach,beverage of choice given  1500 vss assessment wnl denies any pain discharge instructions reviewed with patient voiced understanding,family to go get the car,iv removed,patient up to the side of the bed to get dressed  1510 patient to the car

## (undated) DEVICE — GLOVE ORANGE PI 7   MSG9070

## (undated) DEVICE — TOTAL TRAY, 16FR 10ML SIL FOLEY, URN: Brand: MEDLINE

## (undated) DEVICE — SUTURE PERMAHAND SZ 2-0 L17X18IN NONABSORBABLE BLK SILK SA65H

## (undated) DEVICE — RELOAD STPL L75MM OPN H3.8MM CLS 1.5MM WIRE DIA0.2MM REG

## (undated) DEVICE — Device

## (undated) DEVICE — PENCIL ES CRD L10FT HND SWCHING ROCK SWCH W/ EDGE COAT BLDE

## (undated) DEVICE — TOTAL TRAY, DB, 100% SILI FOLEY, 16FR 10: Brand: MEDLINE

## (undated) DEVICE — GLOVE SURG SZ 6 THK91MIL LTX FREE SYN POLYISOPRENE ANTI

## (undated) DEVICE — STAPLER INT L75MM CUT LN L73MM STPL LN L77MM BLU B FRM 8

## (undated) DEVICE — ELECTRODE BLDE L6.5IN CAUT EXT DISP

## (undated) DEVICE — COUNTER NDL 30 COUNT FOAM STRP SGL MAG

## (undated) DEVICE — GAUZE,PACKING STRIP,IODOFORM,1/2"X5YD,ST: Brand: CURAD

## (undated) DEVICE — SPONGE LAP W18XL18IN WHT COT 4 PLY FLD STRUNG RADPQ DISP ST

## (undated) DEVICE — 4-PORT MANIFOLD: Brand: NEPTUNE 2

## (undated) DEVICE — DRAIN SURG 19FR 100% SIL RADPQ RND CHN FULL FLUT

## (undated) DEVICE — TUBING, SUCTION, 9/32" X 10', STRAIGHT: Brand: MEDLINE

## (undated) DEVICE — GAUZE,SPONGE,4"X4",16PLY,XRAY,STRL,LF: Brand: MEDLINE

## (undated) DEVICE — SPONGE GZ W4XL8IN COT WVN 12 PLY

## (undated) DEVICE — TOWEL,OR,DSP,ST,BLUE,STD,6/PK,12PK/CS: Brand: MEDLINE

## (undated) DEVICE — SUTURE CHROMIC GUT SZ 3-0 L27IN ABSRB BRN L26MM SH 1/2 CIR G122H

## (undated) DEVICE — ELECTRODE ES AD CRDLSS PT RET REM POLYHESIVE

## (undated) DEVICE — INTENDED FOR TISSUE SEPARATION, AND OTHER PROCEDURES THAT REQUIRE A SHARP SURGICAL BLADE TO PUNCTURE OR CUT.: Brand: BARD-PARKER ® STAINLESS STEEL BLADES

## (undated) DEVICE — SHEET, T, LAPAROTOMY, STERILE: Brand: MEDLINE

## (undated) DEVICE — SUTURE PROL SZ 1 L30IN NONABSORBABLE BLU L36MM CT-1 1/2 CIR 8425H

## (undated) DEVICE — ACHIEVE NEEDLE BIOP SOFT TISSUE 18GX20CM: Brand: ACHIEVE

## (undated) DEVICE — SEALER ENDOSCP NANO COAT OPN DIV CRV L JAW LIGASURE IMPACT

## (undated) DEVICE — STAPLER INT L55MM CUT LN L53MM STPL LN L57MM BLU B FRM 8

## (undated) DEVICE — APPLICATOR MEDICATED 26 CC SOLUTION HI LT ORNG CHLORAPREP

## (undated) DEVICE — YANKAUER,FLEXIBLE HANDLE,REGLR CAPACITY: Brand: MEDLINE INDUSTRIES, INC.

## (undated) DEVICE — RELOAD STPL L55MM OPN H3.8MM CLS H1.5MM WIRE DIA0.2MM REG

## (undated) DEVICE — GLOVE SURG SZ 65 THK91MIL LTX FREE SYN POLYISOPRENE

## (undated) DEVICE — JELLY,LUBE,STERILE,FLIP TOP,TUBE,2-OZ: Brand: MEDLINE

## (undated) DEVICE — SUTURE VCRL SZ 4-0 L18IN ABSRB UD RB-1 L17MM 1/2 CIR J714D

## (undated) DEVICE — COVER,MAYO STAND,STERILE: Brand: MEDLINE

## (undated) DEVICE — ENDOSCOPIC KIT 1.1+ OP4 CA DE 2 GWN AAMI LEVEL 3

## (undated) DEVICE — GLOVE ORANGE PI 7 1/2   MSG9075

## (undated) DEVICE — STAPLER INT TI LIN STD TISS 30MM 2 ROW 11 STPL NONCUTTING

## (undated) DEVICE — Device: Brand: DISPOSABLE BULB & BLADDER

## (undated) DEVICE — SUTURE VCRL SZ 0 L18IN ABSRB UD L36MM CT-1 1/2 CIR J840D

## (undated) DEVICE — SIGMOIDOSCOPE MED L25CM DIA20MM W/ OBT DISP KLEENSPEC

## (undated) DEVICE — TRAY PREP DRY W/ PREM GLV 2 APPL 6 SPNG 2 UNDPD 1 OVERWRAP

## (undated) DEVICE — BLADE CLIPPER GEN PURP NS

## (undated) DEVICE — RESERVOIR,SUCTION,100CC,SILICONE: Brand: MEDLINE

## (undated) DEVICE — BLADE ES L6IN ELASTOMERIC COAT EXT DURABLE BEND UPTO 90DEG

## (undated) DEVICE — SOLUTION IV IRRIG POUR BRL 0.9% SODIUM CHL 2F7124

## (undated) DEVICE — SUTURE PERMAHAND SZ 0 L30IN NONABSORBABLE BLK SILK UNIDIR SA86G

## (undated) DEVICE — Z DISCONTINUED USE 2744636  DRESSING AQUACEL 14 IN ALG W3.5XL14IN POLYUR FLM CVR W/ HYDRCOLL

## (undated) DEVICE — BLADE ES L2.75IN ELASTOMERIC COAT DURABLE BEND UPTO 90DEG

## (undated) DEVICE — SUTURE PROL SZ 0 L30IN NONABSORBABLE BLU L36MM CT-1 1/2 CIR 8424H

## (undated) DEVICE — SYRINGE IRRIG 60ML SFT PLIABLE BLB EZ TO GRP 1 HND USE W/

## (undated) DEVICE — STAPLER INT L60MM REG TISS BLU B FRM 8 FIRING 2 ROW AUTO

## (undated) DEVICE — AGENT HEMSTAT W2XL4IN OXIDIZED REGENERATED CELOS ABSRB

## (undated) DEVICE — TAPE,CLOTH/SILK,CURAD,3"X10YD,LF,40/CS: Brand: CURAD

## (undated) DEVICE — SOLUTION IV IRRIG WATER 1000ML POUR BRL 2F7114

## (undated) DEVICE — SYRINGE CATH TIP 50ML

## (undated) DEVICE — DRESSING TRNSPAR W5XL4.5IN FLM SHT SEMIPERMEABLE WIND